# Patient Record
Sex: MALE | Race: BLACK OR AFRICAN AMERICAN | NOT HISPANIC OR LATINO | Employment: OTHER | URBAN - METROPOLITAN AREA
[De-identification: names, ages, dates, MRNs, and addresses within clinical notes are randomized per-mention and may not be internally consistent; named-entity substitution may affect disease eponyms.]

---

## 2017-04-10 PROBLEM — M54.42 LEFT-SIDED LOW BACK PAIN WITH SCIATICA: Status: ACTIVE | Noted: 2017-04-10

## 2017-08-14 PROBLEM — D64.9 ANEMIA: Status: ACTIVE | Noted: 2017-08-14

## 2017-12-18 PROBLEM — J30.1 ALLERGIC RHINITIS DUE TO POLLEN: Status: ACTIVE | Noted: 2017-12-18

## 2018-03-14 PROBLEM — Z09 HOSPITAL DISCHARGE FOLLOW-UP: Status: ACTIVE | Noted: 2018-03-14

## 2018-03-14 PROBLEM — M54.50 LEFT-SIDED LOW BACK PAIN WITHOUT SCIATICA: Status: ACTIVE | Noted: 2017-04-10

## 2018-06-18 PROBLEM — Z09 HOSPITAL DISCHARGE FOLLOW-UP: Status: RESOLVED | Noted: 2018-03-14 | Resolved: 2018-06-18

## 2018-10-03 PROBLEM — M54.2 NECK PAIN, ACUTE: Status: ACTIVE | Noted: 2018-10-03

## 2018-10-03 PROBLEM — E78.5 HYPERLIPIDEMIA: Status: ACTIVE | Noted: 2018-10-03

## 2022-08-09 ENCOUNTER — HOSPITAL ENCOUNTER (INPATIENT)
Facility: OTHER | Age: 77
LOS: 2 days | Discharge: HOME | DRG: 291 | End: 2022-08-11
Attending: EMERGENCY MEDICINE | Admitting: EMERGENCY MEDICINE
Payer: MEDICARE

## 2022-08-09 DIAGNOSIS — E87.70 VOLUME OVERLOAD: ICD-10-CM

## 2022-08-09 DIAGNOSIS — I50.9 HEART FAILURE: ICD-10-CM

## 2022-08-09 DIAGNOSIS — N17.9 AKI (ACUTE KIDNEY INJURY): ICD-10-CM

## 2022-08-09 DIAGNOSIS — R07.9 CHEST PAIN: ICD-10-CM

## 2022-08-09 DIAGNOSIS — I50.43 ACUTE ON CHRONIC COMBINED SYSTOLIC AND DIASTOLIC CONGESTIVE HEART FAILURE: Primary | ICD-10-CM

## 2022-08-09 DIAGNOSIS — I21.4 NSTEMI (NON-ST ELEVATED MYOCARDIAL INFARCTION): ICD-10-CM

## 2022-08-09 PROBLEM — F32.A DEPRESSION: Status: ACTIVE | Noted: 2022-08-09

## 2022-08-09 PROBLEM — Z85.46 HISTORY OF MALIGNANT NEOPLASM OF PROSTATE: Status: ACTIVE | Noted: 2022-08-09

## 2022-08-09 PROBLEM — I70.219 ATHEROSCLEROSIS OF NATIVE ARTERIES OF EXTREMITY WITH INTERMITTENT CLAUDICATION: Status: ACTIVE | Noted: 2022-08-09

## 2022-08-09 PROBLEM — R79.1 SUPRATHERAPEUTIC INR: Status: ACTIVE | Noted: 2022-08-09

## 2022-08-09 PROBLEM — N18.30 ACUTE RENAL FAILURE SUPERIMPOSED ON STAGE 3 CHRONIC KIDNEY DISEASE: Status: ACTIVE | Noted: 2022-08-09

## 2022-08-09 PROBLEM — F13.20 ANXIOLYTIC DEPENDENCE: Status: ACTIVE | Noted: 2022-08-09

## 2022-08-09 PROBLEM — I25.10 CAD (CORONARY ARTERY DISEASE): Status: ACTIVE | Noted: 2022-08-09

## 2022-08-09 PROBLEM — D50.9 IRON DEFICIENCY ANEMIA: Status: ACTIVE | Noted: 2022-08-09

## 2022-08-09 PROBLEM — Z86.73 HISTORY OF CVA (CEREBROVASCULAR ACCIDENT): Status: ACTIVE | Noted: 2022-08-09

## 2022-08-09 PROBLEM — I25.2 HISTORY OF HEART ATTACK: Status: ACTIVE | Noted: 2022-08-09

## 2022-08-09 PROBLEM — K21.9 GASTROESOPHAGEAL REFLUX DISEASE: Status: ACTIVE | Noted: 2022-08-09

## 2022-08-09 PROBLEM — D68.59 HYPERCOAGULABLE STATE: Status: ACTIVE | Noted: 2022-08-09

## 2022-08-09 LAB
ALBUMIN SERPL BCP-MCNC: 3.1 G/DL (ref 3.5–5.2)
ALBUMIN SERPL BCP-MCNC: 3.3 G/DL (ref 3.5–5.2)
ALP SERPL-CCNC: 74 U/L (ref 55–135)
ALP SERPL-CCNC: 75 U/L (ref 55–135)
ALT SERPL W/O P-5'-P-CCNC: 21 U/L (ref 10–44)
ALT SERPL W/O P-5'-P-CCNC: 22 U/L (ref 10–44)
ANION GAP SERPL CALC-SCNC: 11 MMOL/L (ref 8–16)
ANION GAP SERPL CALC-SCNC: 13 MMOL/L (ref 8–16)
ANISOCYTOSIS BLD QL SMEAR: SLIGHT
ANISOCYTOSIS BLD QL SMEAR: SLIGHT
AST SERPL-CCNC: 23 U/L (ref 10–40)
AST SERPL-CCNC: 25 U/L (ref 10–40)
AV INDEX (PROSTH): 0.76
AV MEAN GRADIENT: 1 MMHG
AV PEAK GRADIENT: 3 MMHG
AV VALVE AREA: 2.67 CM2
AV VELOCITY RATIO: 0.62
BASOPHILS # BLD AUTO: 0.03 K/UL (ref 0–0.2)
BASOPHILS # BLD AUTO: 0.03 K/UL (ref 0–0.2)
BASOPHILS NFR BLD: 0.4 % (ref 0–1.9)
BASOPHILS NFR BLD: 0.4 % (ref 0–1.9)
BILIRUB SERPL-MCNC: 0.6 MG/DL (ref 0.1–1)
BILIRUB SERPL-MCNC: 0.9 MG/DL (ref 0.1–1)
BNP SERPL-MCNC: 1117 PG/ML (ref 0–99)
BSA FOR ECHO PROCEDURE: 2.1 M2
BUN SERPL-MCNC: 26 MG/DL (ref 8–23)
BUN SERPL-MCNC: 26 MG/DL (ref 8–23)
CALCIUM SERPL-MCNC: 8.6 MG/DL (ref 8.7–10.5)
CALCIUM SERPL-MCNC: 9 MG/DL (ref 8.7–10.5)
CHLORIDE SERPL-SCNC: 109 MMOL/L (ref 95–110)
CHLORIDE SERPL-SCNC: 110 MMOL/L (ref 95–110)
CHOLEST SERPL-MCNC: 123 MG/DL (ref 120–199)
CHOLEST/HDLC SERPL: 3.5 {RATIO} (ref 2–5)
CO2 SERPL-SCNC: 19 MMOL/L (ref 23–29)
CO2 SERPL-SCNC: 20 MMOL/L (ref 23–29)
CREAT SERPL-MCNC: 1.8 MG/DL (ref 0.5–1.4)
CREAT SERPL-MCNC: 1.8 MG/DL (ref 0.5–1.4)
CREAT UR-MCNC: 102.4 MG/DL (ref 23–375)
CREAT UR-MCNC: 102.4 MG/DL (ref 23–375)
CTP QC/QA: YES
CV ECHO LV RWT: 0.39 CM
DIFFERENTIAL METHOD: ABNORMAL
DIFFERENTIAL METHOD: ABNORMAL
DOP CALC AO PEAK VEL: 0.89 M/S
DOP CALC AO VTI: 12.41 CM
DOP CALC LVOT AREA: 3.5 CM2
DOP CALC LVOT DIAMETER: 2.11 CM
DOP CALC LVOT PEAK VEL: 0.55 M/S
DOP CALC LVOT STROKE VOLUME: 33.1 CM3
DOP CALC RVOT PEAK VEL: 0.27 M/S
DOP CALC RVOT VTI: 6.38 CM
DOP CALCLVOT PEAK VEL VTI: 9.47 CM
E WAVE DECELERATION TIME: 102.63 MSEC
E/A RATIO: 2.79
E/E' RATIO: 26.29 M/S
ECHO LV POSTERIOR WALL: 0.99 CM (ref 0.6–1.1)
EJECTION FRACTION: 45 %
EOSINOPHIL # BLD AUTO: 0.1 K/UL (ref 0–0.5)
EOSINOPHIL # BLD AUTO: 0.2 K/UL (ref 0–0.5)
EOSINOPHIL NFR BLD: 1.8 % (ref 0–8)
EOSINOPHIL NFR BLD: 2.4 % (ref 0–8)
ERYTHROCYTE [DISTWIDTH] IN BLOOD BY AUTOMATED COUNT: 16.3 % (ref 11.5–14.5)
ERYTHROCYTE [DISTWIDTH] IN BLOOD BY AUTOMATED COUNT: 16.5 % (ref 11.5–14.5)
EST. GFR  (NO RACE VARIABLE): 38 ML/MIN/1.73 M^2
EST. GFR  (NO RACE VARIABLE): 38 ML/MIN/1.73 M^2
ESTIMATED AVG GLUCOSE: 117 MG/DL (ref 68–131)
FRACTIONAL SHORTENING: 27 % (ref 28–44)
GIANT PLATELETS BLD QL SMEAR: PRESENT
GIANT PLATELETS BLD QL SMEAR: PRESENT
GLUCOSE SERPL-MCNC: 80 MG/DL (ref 70–110)
GLUCOSE SERPL-MCNC: 80 MG/DL (ref 70–110)
HBA1C MFR BLD: 5.7 % (ref 4–5.6)
HCT VFR BLD AUTO: 40.6 % (ref 40–54)
HCT VFR BLD AUTO: 41.9 % (ref 40–54)
HDLC SERPL-MCNC: 35 MG/DL (ref 40–75)
HDLC SERPL: 28.5 % (ref 20–50)
HGB BLD-MCNC: 13.2 G/DL (ref 14–18)
HGB BLD-MCNC: 13.2 G/DL (ref 14–18)
IMM GRANULOCYTES # BLD AUTO: 0.01 K/UL (ref 0–0.04)
IMM GRANULOCYTES # BLD AUTO: 0.04 K/UL (ref 0–0.04)
IMM GRANULOCYTES NFR BLD AUTO: 0.1 % (ref 0–0.5)
IMM GRANULOCYTES NFR BLD AUTO: 0.5 % (ref 0–0.5)
INR PPP: 1.1 (ref 0.8–1.2)
INR PPP: 4.2 (ref 0.8–1.2)
INTERVENTRICULAR SEPTUM: 1.03 CM (ref 0.6–1.1)
IVRT: 91.34 MSEC
LA MAJOR: 5.58 CM
LA MINOR: 5.8 CM
LA WIDTH: 4.39 CM
LDLC SERPL CALC-MCNC: 78.2 MG/DL (ref 63–159)
LEFT ATRIUM SIZE: 4.3 CM
LEFT ATRIUM VOLUME INDEX MOD: 31.6 ML/M2
LEFT ATRIUM VOLUME INDEX: 43.7 ML/M2
LEFT ATRIUM VOLUME MOD: 66 CM3
LEFT ATRIUM VOLUME: 91.26 CM3
LEFT INTERNAL DIMENSION IN SYSTOLE: 3.69 CM (ref 2.1–4)
LEFT VENTRICLE DIASTOLIC VOLUME INDEX: 57.65 ML/M2
LEFT VENTRICLE DIASTOLIC VOLUME: 120.48 ML
LEFT VENTRICLE MASS INDEX: 89 G/M2
LEFT VENTRICLE SYSTOLIC VOLUME INDEX: 27.6 ML/M2
LEFT VENTRICLE SYSTOLIC VOLUME: 57.65 ML
LEFT VENTRICULAR INTERNAL DIMENSION IN DIASTOLE: 5.04 CM (ref 3.5–6)
LEFT VENTRICULAR MASS: 186.86 G
LV LATERAL E/E' RATIO: 23 M/S
LV SEPTAL E/E' RATIO: 30.67 M/S
LYMPHOCYTES # BLD AUTO: 1.7 K/UL (ref 1–4.8)
LYMPHOCYTES # BLD AUTO: 1.9 K/UL (ref 1–4.8)
LYMPHOCYTES NFR BLD: 23.7 % (ref 18–48)
LYMPHOCYTES NFR BLD: 24.2 % (ref 18–48)
MAGNESIUM SERPL-MCNC: 1.4 MG/DL (ref 1.6–2.6)
MCH RBC QN AUTO: 29.1 PG (ref 27–31)
MCH RBC QN AUTO: 29.4 PG (ref 27–31)
MCHC RBC AUTO-ENTMCNC: 31.5 G/DL (ref 32–36)
MCHC RBC AUTO-ENTMCNC: 32.5 G/DL (ref 32–36)
MCV RBC AUTO: 90 FL (ref 82–98)
MCV RBC AUTO: 92 FL (ref 82–98)
MONOCYTES # BLD AUTO: 1 K/UL (ref 0.3–1)
MONOCYTES # BLD AUTO: 1.1 K/UL (ref 0.3–1)
MONOCYTES NFR BLD: 13.4 % (ref 4–15)
MONOCYTES NFR BLD: 13.7 % (ref 4–15)
MV A" WAVE DURATION": 9.51 MSEC
MV PEAK A VEL: 0.33 M/S
MV PEAK E VEL: 0.92 M/S
MV STENOSIS PRESSURE HALF TIME: 29.76 MS
MV VALVE AREA P 1/2 METHOD: 7.39 CM2
NEUTROPHILS # BLD AUTO: 4.3 K/UL (ref 1.8–7.7)
NEUTROPHILS # BLD AUTO: 4.6 K/UL (ref 1.8–7.7)
NEUTROPHILS NFR BLD: 59.1 % (ref 38–73)
NEUTROPHILS NFR BLD: 60.3 % (ref 38–73)
NONHDLC SERPL-MCNC: 88 MG/DL
NRBC BLD-RTO: 0 /100 WBC
NRBC BLD-RTO: 0 /100 WBC
OSMOLALITY UR: 568 MOSM/KG (ref 50–1200)
OVALOCYTES BLD QL SMEAR: ABNORMAL
OVALOCYTES BLD QL SMEAR: ABNORMAL
PISA MRMAX VEL: 0.04 M/S
PISA TR MAX VEL: 3.74 M/S
PLATELET # BLD AUTO: 114 K/UL (ref 150–450)
PLATELET # BLD AUTO: 119 K/UL (ref 150–450)
PLATELET BLD QL SMEAR: ABNORMAL
PLATELET BLD QL SMEAR: ABNORMAL
PMV BLD AUTO: ABNORMAL FL (ref 9.2–12.9)
PMV BLD AUTO: ABNORMAL FL (ref 9.2–12.9)
POIKILOCYTOSIS BLD QL SMEAR: SLIGHT
POIKILOCYTOSIS BLD QL SMEAR: SLIGHT
POTASSIUM SERPL-SCNC: 4.5 MMOL/L (ref 3.5–5.1)
POTASSIUM SERPL-SCNC: 4.6 MMOL/L (ref 3.5–5.1)
PROT SERPL-MCNC: 6.8 G/DL (ref 6–8.4)
PROT SERPL-MCNC: 6.9 G/DL (ref 6–8.4)
PROT UR-MCNC: 19 MG/DL (ref 0–15)
PROT/CREAT UR: 0.19 MG/G{CREAT} (ref 0–0.2)
PROTHROMBIN TIME: 12.1 SEC (ref 9–12.5)
PROTHROMBIN TIME: 42.9 SEC (ref 9–12.5)
PULM VEIN S/D RATIO: 0.47
PV MEAN GRADIENT: 0.25 MMHG
PV PEAK D VEL: 0.43 M/S
PV PEAK S VEL: 0.2 M/S
PV PEAK VELOCITY: 0.53 CM/S
RA MAJOR: 4.71 CM
RA WIDTH: 5.03 CM
RBC # BLD AUTO: 4.49 M/UL (ref 4.6–6.2)
RBC # BLD AUTO: 4.54 M/UL (ref 4.6–6.2)
RIGHT VENTRICULAR END-DIASTOLIC DIMENSION: 3.26 CM
SARS-COV-2 RDRP RESP QL NAA+PROBE: NEGATIVE
SINUS: 3.05 CM
SODIUM SERPL-SCNC: 141 MMOL/L (ref 136–145)
SODIUM SERPL-SCNC: 141 MMOL/L (ref 136–145)
SODIUM UR-SCNC: 140 MMOL/L (ref 20–250)
STJ: 1.94 CM
TDI LATERAL: 0.04 M/S
TDI SEPTAL: 0.03 M/S
TDI: 0.04 M/S
TR MAX PG: 56 MMHG
TRICUSPID ANNULAR PLANE SYSTOLIC EXCURSION: 0.86 CM
TRIGL SERPL-MCNC: 49 MG/DL (ref 30–150)
TROPONIN I SERPL DL<=0.01 NG/ML-MCNC: 0.13 NG/ML (ref 0–0.03)
TROPONIN I SERPL DL<=0.01 NG/ML-MCNC: 0.14 NG/ML (ref 0–0.03)
TROPONIN I SERPL DL<=0.01 NG/ML-MCNC: 0.15 NG/ML (ref 0–0.03)
WBC # BLD AUTO: 7.06 K/UL (ref 3.9–12.7)
WBC # BLD AUTO: 7.82 K/UL (ref 3.9–12.7)

## 2022-08-09 PROCEDURE — 25000003 PHARM REV CODE 250: Performed by: EMERGENCY MEDICINE

## 2022-08-09 PROCEDURE — 84484 ASSAY OF TROPONIN QUANT: CPT | Performed by: EMERGENCY MEDICINE

## 2022-08-09 PROCEDURE — 80053 COMPREHEN METABOLIC PANEL: CPT | Mod: 91 | Performed by: PHYSICIAN ASSISTANT

## 2022-08-09 PROCEDURE — 85610 PROTHROMBIN TIME: CPT | Mod: 91 | Performed by: PHYSICIAN ASSISTANT

## 2022-08-09 PROCEDURE — 80053 COMPREHEN METABOLIC PANEL: CPT | Performed by: EMERGENCY MEDICINE

## 2022-08-09 PROCEDURE — 93010 EKG 12-LEAD: ICD-10-PCS | Mod: 76,,, | Performed by: INTERNAL MEDICINE

## 2022-08-09 PROCEDURE — 99222 1ST HOSP IP/OBS MODERATE 55: CPT | Mod: 25,,, | Performed by: INTERNAL MEDICINE

## 2022-08-09 PROCEDURE — 85610 PROTHROMBIN TIME: CPT | Performed by: EMERGENCY MEDICINE

## 2022-08-09 PROCEDURE — 36415 COLL VENOUS BLD VENIPUNCTURE: CPT | Performed by: PHYSICIAN ASSISTANT

## 2022-08-09 PROCEDURE — 96375 TX/PRO/DX INJ NEW DRUG ADDON: CPT

## 2022-08-09 PROCEDURE — 83880 ASSAY OF NATRIURETIC PEPTIDE: CPT | Performed by: EMERGENCY MEDICINE

## 2022-08-09 PROCEDURE — 84484 ASSAY OF TROPONIN QUANT: CPT | Mod: 91 | Performed by: PHYSICIAN ASSISTANT

## 2022-08-09 PROCEDURE — 83935 ASSAY OF URINE OSMOLALITY: CPT | Performed by: PHYSICIAN ASSISTANT

## 2022-08-09 PROCEDURE — 96365 THER/PROPH/DIAG IV INF INIT: CPT

## 2022-08-09 PROCEDURE — 85025 COMPLETE CBC W/AUTO DIFF WBC: CPT | Mod: 91 | Performed by: HOSPITALIST

## 2022-08-09 PROCEDURE — 83735 ASSAY OF MAGNESIUM: CPT | Performed by: PHYSICIAN ASSISTANT

## 2022-08-09 PROCEDURE — 93005 ELECTROCARDIOGRAM TRACING: CPT

## 2022-08-09 PROCEDURE — 80061 LIPID PANEL: CPT | Performed by: PHYSICIAN ASSISTANT

## 2022-08-09 PROCEDURE — 96366 THER/PROPH/DIAG IV INF ADDON: CPT

## 2022-08-09 PROCEDURE — 99222 PR INITIAL HOSPITAL CARE,LEVL II: ICD-10-PCS | Mod: 25,,, | Performed by: INTERNAL MEDICINE

## 2022-08-09 PROCEDURE — U0002 COVID-19 LAB TEST NON-CDC: HCPCS | Performed by: EMERGENCY MEDICINE

## 2022-08-09 PROCEDURE — 25000003 PHARM REV CODE 250: Performed by: PHYSICIAN ASSISTANT

## 2022-08-09 PROCEDURE — 82570 ASSAY OF URINE CREATININE: CPT | Performed by: PHYSICIAN ASSISTANT

## 2022-08-09 PROCEDURE — 99223 PR INITIAL HOSPITAL CARE,LEVL III: ICD-10-PCS | Mod: ,,, | Performed by: PHYSICIAN ASSISTANT

## 2022-08-09 PROCEDURE — 21400001 HC TELEMETRY ROOM

## 2022-08-09 PROCEDURE — 63600175 PHARM REV CODE 636 W HCPCS: Performed by: PHYSICIAN ASSISTANT

## 2022-08-09 PROCEDURE — 83036 HEMOGLOBIN GLYCOSYLATED A1C: CPT | Performed by: PHYSICIAN ASSISTANT

## 2022-08-09 PROCEDURE — 99285 EMERGENCY DEPT VISIT HI MDM: CPT | Mod: 25

## 2022-08-09 PROCEDURE — 85025 COMPLETE CBC W/AUTO DIFF WBC: CPT | Performed by: EMERGENCY MEDICINE

## 2022-08-09 PROCEDURE — 84300 ASSAY OF URINE SODIUM: CPT | Performed by: PHYSICIAN ASSISTANT

## 2022-08-09 PROCEDURE — 93010 ELECTROCARDIOGRAM REPORT: CPT | Mod: ,,, | Performed by: INTERNAL MEDICINE

## 2022-08-09 PROCEDURE — 99223 1ST HOSP IP/OBS HIGH 75: CPT | Mod: ,,, | Performed by: PHYSICIAN ASSISTANT

## 2022-08-09 PROCEDURE — 25000242 PHARM REV CODE 250 ALT 637 W/ HCPCS: Performed by: EMERGENCY MEDICINE

## 2022-08-09 RX ORDER — NITROGLYCERIN 0.4 MG/1
0.4 TABLET SUBLINGUAL EVERY 5 MIN PRN
Status: DISCONTINUED | OUTPATIENT
Start: 2022-08-09 | End: 2022-08-11 | Stop reason: HOSPADM

## 2022-08-09 RX ORDER — ACETAMINOPHEN 325 MG/1
650 TABLET ORAL EVERY 6 HOURS PRN
Status: DISCONTINUED | OUTPATIENT
Start: 2022-08-09 | End: 2022-08-11 | Stop reason: HOSPADM

## 2022-08-09 RX ORDER — SODIUM CHLORIDE 0.9 % (FLUSH) 0.9 %
10 SYRINGE (ML) INJECTION EVERY 8 HOURS
Status: DISCONTINUED | OUTPATIENT
Start: 2022-08-09 | End: 2022-08-11 | Stop reason: HOSPADM

## 2022-08-09 RX ORDER — NITROGLYCERIN 0.4 MG/1
0.4 TABLET SUBLINGUAL
Status: COMPLETED | OUTPATIENT
Start: 2022-08-09 | End: 2022-08-09

## 2022-08-09 RX ORDER — NALOXONE HCL 0.4 MG/ML
0.02 VIAL (ML) INJECTION
Status: DISCONTINUED | OUTPATIENT
Start: 2022-08-09 | End: 2022-08-11 | Stop reason: HOSPADM

## 2022-08-09 RX ORDER — ATORVASTATIN CALCIUM 20 MG/1
40 TABLET, FILM COATED ORAL DAILY
Status: DISCONTINUED | OUTPATIENT
Start: 2022-08-09 | End: 2022-08-11 | Stop reason: HOSPADM

## 2022-08-09 RX ORDER — AMOXICILLIN 250 MG
1 CAPSULE ORAL 2 TIMES DAILY PRN
Status: DISCONTINUED | OUTPATIENT
Start: 2022-08-09 | End: 2022-08-11 | Stop reason: HOSPADM

## 2022-08-09 RX ORDER — ASPIRIN 81 MG/1
81 TABLET ORAL DAILY
Status: DISCONTINUED | OUTPATIENT
Start: 2022-08-09 | End: 2022-08-11 | Stop reason: HOSPADM

## 2022-08-09 RX ORDER — TALC
6 POWDER (GRAM) TOPICAL NIGHTLY PRN
Status: DISCONTINUED | OUTPATIENT
Start: 2022-08-09 | End: 2022-08-11 | Stop reason: HOSPADM

## 2022-08-09 RX ORDER — MAGNESIUM SULFATE HEPTAHYDRATE 40 MG/ML
2 INJECTION, SOLUTION INTRAVENOUS ONCE
Status: COMPLETED | OUTPATIENT
Start: 2022-08-09 | End: 2022-08-09

## 2022-08-09 RX ORDER — FUROSEMIDE 10 MG/ML
20 INJECTION INTRAMUSCULAR; INTRAVENOUS DAILY
Status: DISCONTINUED | OUTPATIENT
Start: 2022-08-09 | End: 2022-08-10

## 2022-08-09 RX ORDER — NAPROXEN SODIUM 220 MG/1
324 TABLET, FILM COATED ORAL
Status: COMPLETED | OUTPATIENT
Start: 2022-08-09 | End: 2022-08-09

## 2022-08-09 RX ADMIN — MAGNESIUM SULFATE HEPTAHYDRATE 2 G: 40 INJECTION, SOLUTION INTRAVENOUS at 01:08

## 2022-08-09 RX ADMIN — NITROGLYCERIN 0.4 MG: 0.4 TABLET, ORALLY DISINTEGRATING SUBLINGUAL at 02:08

## 2022-08-09 RX ADMIN — ASPIRIN 81 MG: 81 TABLET, COATED ORAL at 08:08

## 2022-08-09 RX ADMIN — FUROSEMIDE 20 MG: 10 INJECTION, SOLUTION INTRAMUSCULAR; INTRAVENOUS at 08:08

## 2022-08-09 RX ADMIN — ASPIRIN 81 MG CHEWABLE TABLET 324 MG: 81 TABLET CHEWABLE at 02:08

## 2022-08-09 RX ADMIN — ATORVASTATIN CALCIUM 40 MG: 20 TABLET, FILM COATED ORAL at 08:08

## 2022-08-09 NOTE — ASSESSMENT & PLAN NOTE
- s/p stenting per history  - continue clopidogrel 75 mg QD, simvastatin 40 mg QD (or formulary equivalent)   - no ASA on home meds list, suspect 2/2 h/o thrombocytopenia

## 2022-08-09 NOTE — ASSESSMENT & PLAN NOTE
- baseline Cr appears to be about 1.3   - today Cr is 1.8   - suspect 2/2 volume overload  - holding lisinopril    - urine lytes ordered   - avoid nephrotoxins   - renally dose meds

## 2022-08-09 NOTE — ASSESSMENT & PLAN NOTE
- normotensive at present   - home meds: HCTZ 25 mg QD, lisinopril 20 mg QD, metoprolol tartrate 25 mg BID   - monitor

## 2022-08-09 NOTE — HPI
Mr. Felicia Lopez is a 77 y.o. male, with PMH of CAD (s/p stenting), CHF, prior MI/CVA, HTN, CKD-3, anemnia, HLD, who presented to [facility] ED on 8/9/22 due to chest pressure that awoke him from sleep tonight. He states the pain radiated to his left arm, and was associated with shortness of breath. He notes the pain started 30 mins PTA to the ED. He states the pain improved after 1 spray of Nitroglycerine. He states symptoms are similar to his prior MI, which occurred 12 years ago. He denied nausea, dizziness. He was evaluated in the ED with labs showing thrombocytopenia with PLT of 119K. INR was elevated at 4.2, he does not have coumadin listed as a home med. A metabolic panel showed Cr 1.8, BUN 26,  without hyperkalemia. BNP was elevated at 1117. A troponin was 0.150. CXR showed b/l infiltrates and left sided effusion concerning for pulmonary edema. An EKG showed ST depression and TWI in V4. After his pain returned in the ED, a repeat EKG showed both of these findings had normalized. He was placed on OBS.

## 2022-08-09 NOTE — ASSESSMENT & PLAN NOTE
- notes b/l LE edema, which on exam is only trace edema   - no lasix on home meds list   - today BNP is elevated at 1117  - lasix 20 mg ordered    - last Echo:   ECG Rhythm: Resting bradycardia (Heart rate < 60 bpm).   CHAMBER SIZE: Normal size chambers.   AORTA: Normal aortic root and proximal ascending aorta.   VALVULAR STRUCTURES: The visualized cardiac valves are structurally normal.   LEFT/RIGHT VENTRICULAR FUNCTION: Overall left ventricular systolic function is normal, with an  EF of > 55%.   LEFT/RIGHT VENTRICULAR FUNCTION: The right ventricular systolic function is normal.   PERICARDIUM / EFFUSIONS: No effusions noted.   INFERIOR VENA CAVA: Normal size inferior vena cava.   DOPPLER: Color: No valvular insufficiencies.   PA PRESSURE: An insufficent tricuspid regurgitation jet velocity envelope precludes \.br\confident assessment of pulmonary artery pressures.   DIASTOLOGY: The diastolic filling is normal for the age of the patient.   PULMONARY VEINS: The visualized pulmonary veins appear normal.   CARDIOLOGY:   Electronically signed: STAFF: AURELIA TRUJILLO M.D.   Fellow: TERRY FOREMAN M.D.   CONCLUSIONS   -----------   Conclusions: 1. GOOD LV FUNCTION   Conclusions: 2. AS BEFORE on 3/31/10  Specimen Collected: 01/10/12  9:38   - monitor I&Os and daily weights

## 2022-08-09 NOTE — ED PROVIDER NOTES
Encounter Date: 2022    SCRIBE #1 NOTE: I, Sarmad Truong, am scribing for, and in the presence of,  Beronica Lela MD. I have scribed the following portions of the note - Other sections scribed: HPI, ROS, PE.       History     Chief Complaint   Patient presents with    Chest Pain     X30 minutes with associated SOB pt pain relieved with 1 spray of NGT     Time seen by provider: 1:45 AM    This is a 77 y.o. male who presents with complaint of sudden left-sided chest pain. Patient reports that he awoke to this pain 1 hour and 45 minutes ago. He describes a pressure which extends into his left arm. While he does report improvement with nitroglycerin, his pain has returned and is currently rated at 7/10. Associated shortness of breath here. He recalls this presentation as familiar to his prior MI, which occurred 12 years ago. This required coronary stents. No recent illness. He denies any nausea or dizziness. PMHx also includes CHF as well as CVA with chronic right lower extremity deficit.     The history is provided by the patient.     Review of patient's allergies indicates:  No Known Allergies  Past Medical History:   Diagnosis Date    Disorder of kidney and ureter     Hyperlipidemia     Hypertension      Past Surgical History:   Procedure Laterality Date    FACIAL RECONSTRUCTION SURGERY      PROSTATE SURGERY       Family History   Problem Relation Age of Onset    No Known Problems Mother     No Known Problems Father      Social History     Tobacco Use    Smoking status: Former Smoker     Quit date: 10/5/2008     Years since quittin.8   Substance Use Topics    Alcohol use: No     Alcohol/week: 0.0 standard drinks    Drug use: No     Review of Systems   Constitutional: Negative for fever.   HENT: Negative for sore throat.    Respiratory: Positive for shortness of breath.    Cardiovascular: Positive for chest pain.   Gastrointestinal: Negative for nausea.   Genitourinary: Negative for dysuria.    Musculoskeletal: Negative for back pain.   Skin: Negative for rash.   Neurological: Negative for weakness.   Hematological: Does not bruise/bleed easily.   All other systems reviewed and are negative.      Physical Exam     Initial Vitals [08/09/22 0110]   BP Pulse Resp Temp SpO2   125/78 88 18 98.6 °F (37 °C) 96 %      MAP       --         Physical Exam    Nursing note and vitals reviewed.  Constitutional: He appears well-developed and well-nourished. He does not have a sickly appearance. No distress.   HENT:   Head: Normocephalic and atraumatic.   Right Ear: External ear normal.   Left Ear: External ear normal.   Eyes: Conjunctivae, EOM and lids are normal. Right eye exhibits no discharge. Left eye exhibits no discharge. Right conjunctiva is not injected. Right conjunctiva has no hemorrhage. Left conjunctiva is not injected. Left conjunctiva has no hemorrhage. No scleral icterus.   Neck: Phonation normal. No stridor present. No tracheal deviation present.   Normal range of motion.  Cardiovascular: Normal rate, regular rhythm and normal heart sounds. Exam reveals no friction rub.    No murmur heard.  Pulses:       Radial pulses are 2+ on the right side and 2+ on the left side.        Dorsalis pedis pulses are 2+ on the right side and 2+ on the left side.   Pulmonary/Chest: Breath sounds normal. No respiratory distress. He has no wheezes. He has no rhonchi. He has no rales. He exhibits no tenderness.   Abdominal: Abdomen is soft. He exhibits no distension. There is no abdominal tenderness. There is no rebound and no guarding.   Musculoskeletal:         General: No tenderness.      Cervical back: Normal range of motion.      Comments: No calf tenderness.     Neurological: He is alert and oriented to person, place, and time. He has normal strength. GCS eye subscore is 4. GCS verbal subscore is 5. GCS motor subscore is 6.   Skin: Skin is warm.   Psychiatric: He has a normal mood and affect. His speech is normal and  behavior is normal. Judgment and thought content normal. Cognition and memory are normal.         ED Course   Procedures  Labs Reviewed   CBC W/ AUTO DIFFERENTIAL - Abnormal; Notable for the following components:       Result Value    RBC 4.49 (*)     Hemoglobin 13.2 (*)     RDW 16.3 (*)     Platelets 119 (*)     Mono # 1.1 (*)     Platelet Estimate Decreased (*)     All other components within normal limits   COMPREHENSIVE METABOLIC PANEL - Abnormal; Notable for the following components:    CO2 19 (*)     BUN 26 (*)     Creatinine 1.8 (*)     Calcium 8.6 (*)     Albumin 3.1 (*)     eGFR 38 (*)     All other components within normal limits   B-TYPE NATRIURETIC PEPTIDE - Abnormal; Notable for the following components:    BNP 1,117 (*)     All other components within normal limits   TROPONIN I - Abnormal; Notable for the following components:    Troponin I 0.150 (*)     All other components within normal limits   PROTIME-INR - Abnormal; Notable for the following components:    Prothrombin Time 42.9 (*)     INR 4.2 (*)     All other components within normal limits   SARS-COV-2 RDRP GENE     EKG Readings: (Independently Interpreted)   Normal sinus rhythm. Rate of 93. T wave inversion in V4 with ST depression. Q waves in V1 and V2. Normal intervals. No old EKG for comparison.       Imaging Results          X-Ray Chest AP Portable (Final result)  Result time 08/09/22 01:40:07   Procedure changed from X-Ray Chest PA And Lateral     Final result by Charly Guzman MD (08/09/22 01:40:07)                 Impression:      Bilateral infiltrates and left effusion.  Differential considerations include pulmonary edema versus pneumonia and pneumonitis.      Electronically signed by: Charly Guzman  Date:    08/09/2022  Time:    01:40             Narrative:    EXAMINATION:  XR CHEST AP PORTABLE    CLINICAL HISTORY:  Chest Pain;    TECHNIQUE:  Single frontal view of the chest was  performed.    COMPARISON:  None    FINDINGS:  Cardiac silhouette is not enlarged with the trachea midline.  There are bilateral airspace opacities with elevation of the left hemidiaphragm and pleural effusion.                                 Medications   aspirin chewable tablet 324 mg (324 mg Oral Given 8/9/22 0208)   nitroGLYCERIN SL tablet 0.4 mg (0.4 mg Sublingual Given 8/9/22 0206)   nitroGLYCERIN SL tablet 0.4 mg (0.4 mg Sublingual Given 8/9/22 0255)     Medical Decision Making:   History:   Old Medical Records: I decided to obtain old medical records.  Initial Assessment:   77-year-old male presents with complaint of left-sided chest pain while at rest that was relieved with nitroglycerin given to him by EMS.  He states the pain feels similar to his previous MI.  shortly after arriving in the emergency department the pain intensified and radiated down his left arm.    Independently Interpreted Test(s):   I have ordered and independently interpreted EKG Reading(s) - see prior notes  Clinical Tests:   Lab Tests: Ordered and Reviewed  Radiological Study: Ordered and Reviewed  Medical Tests: Ordered and Reviewed  ED Management:  Initial EKG with T-wave inversion and ST depression in the for only.  Repeat EKG after the pain intensified showed normalization of the ST depression and T-wave inversion.  No other new changes compared to initial.  Unfortunately, there is no old EKG prior to today for comparison.    Will give aspirin and nitroglycerin as well as obtain lab work.  Heart score is greater than 6. Will plan to admit to the hospitalist service for further evaluation once workup complete.    2:58 AM  Pain improved to a 2/10 after 1 sublingual nitroglycerin and he refused any further doses.  Troponin is elevated at 0.150 with no old available for comparison.  Lab work also significant for an upward trend in his creatinine to 1.8.      He is currently resting comfortably but does report his pain is a 5/10.  Give  additional nitroglycerin now.  Case has been discussed with Mis Edge PA-C and the patient will be admitted to the hospitalist service.    Additional MDM:   Heart Score:    History:          Highly suspicious.  ECG:             Nonspecific repolarisation disturbance  Age:               >65 years  Risk factors: >= 3 risk factors or history of atherosclerotic disease  Troponin:       1-2x normal limit  Final Score: 8           Scribe Attestation:   Scribe #1: I performed the above scribed service and the documentation accurately describes the services I performed. I attest to the accuracy of the note.               Physician Attestation for Scribe: I, Beronica Leal  , reviewed documentation as scribed in my presence, which is both accurate and complete.    Clinical Impression:   Final diagnoses:  [R07.9] Chest pain  [I21.4] NSTEMI (non-ST elevated myocardial infarction) (Primary)  [N17.9] ARELI (acute kidney injury)          ED Disposition Condition    Observation               Beronica Leal MD  08/09/22 5850

## 2022-08-09 NOTE — H&P
[facility] Medicine  History & Physical    Patient Name: Felicia Lopez  MRN: [id number]  Patient Class: OP- Observation  Admission Date: 8/9/2022  Attending Physician: Miriam Field MD   Primary Care Provider: Mickey Darden MD         Patient information was obtained from patient, past medical records and ER records.     Subjective:     Principal Problem:Chest pain    Chief Complaint:   Chief Complaint   Patient presents with    Chest Pain     X30 minutes with associated SOB pt pain relieved with 1 spray of NGT        HPI: Mr. Felicia Lopez is a 77 y.o. male, with PMH of CAD (s/p stenting), CHF, prior MI/CVA, HTN, CKD-3, anemnia, HLD, who presented to [facility] [place] on 8/9/22 due to chest pressure that awoke him from sleep tonight. He states the pain radiated to his left arm, and was associated with shortness of breath. He notes the pain started 30 mins PTA to the ED. He states the pain improved after 1 spray of Nitroglycerine. He states symptoms are similar to his prior MI, which occurred 12 years ago. He denied nausea, dizziness. He was evaluated in the ED with labs showing thrombocytopenia with PLT of 119K. INR was elevated at 4.2, he does not have coumadin listed as a home med. A metabolic panel showed Cr 1.8, BUN 26,  without hyperkalemia. BNP was elevated at 1117. A troponin was 0.150. CXR showed b/l infiltrates and left sided effusion concerning for pulmonary edema. An EKG showed ST depression and TWI in V4. After his pain returned in the ED, a repeat EKG showed both of these findings had normalized. He was placed on OBS.       Past Medical History:   Diagnosis Date    Disorder of kidney and ureter     Hyperlipidemia     Hypertension        Past Surgical History:   Procedure Laterality Date    FACIAL RECONSTRUCTION SURGERY      PROSTATE SURGERY         Review of patient's allergies indicates:  No Known Allergies    No current facility-administered medications on file prior to  encounter.     Current Outpatient Medications on File Prior to Encounter   Medication Sig    clopidogrel (PLAVIX) 75 mg tablet Take 1 tablet (75 mg total) by mouth once daily.    hydroCHLOROthiazide (MICROZIDE) 12.5 mg capsule Take 1 capsule (12.5 mg total) by mouth once daily. (Patient taking differently: Take 25 mg by mouth once daily.)    lisinopril (PRINIVIL,ZESTRIL) 20 MG tablet Take 1 tablet (20 mg total) by mouth 2 (two) times daily.    simvastatin (ZOCOR) 40 MG tablet Take 1 tablet (40 mg total) by mouth once daily.    azithromycin (Z-CALLUM) 250 MG tablet Take 2 tablets by mouth on day 1; Take 1 tablet by mouth on days 2-5    baclofen (LIORESAL) 10 MG tablet Take 1 tablet (10 mg total) by mouth 3 (three) times daily.    brimonidine 0.2% (ALPHAGAN) 0.2 % Drop Place 1 drop into both eyes once daily.    diazePAM (VALIUM) 10 MG Tab Take 1 tablet (10 mg total) by mouth daily as needed.    diclofenac sodium (VOLTAREN) 1 % Gel Apply 4 g p.r.n. to left knee for pain.    latanoprost 0.005 % ophthalmic solution Place 1 drop into both eyes 2 (two) times daily.    levocetirizine (XYZAL) 5 MG tablet Take 1 tablet (5 mg total) by mouth every evening.    metoprolol tartrate (LOPRESSOR) 25 MG tablet TAKE 1/2 TABLET BY MOUTH ONCE DAILY     Family History       Problem Relation (Age of Onset)    No Known Problems Mother, Father          Tobacco Use    Smoking status: Former Smoker     Quit date: 10/5/2008     Years since quittin.8    Smokeless tobacco: Not on file   Substance and Sexual Activity    Alcohol use: No     Alcohol/week: 0.0 standard drinks    Drug use: No    Sexual activity: Never     Review of Systems   Reason unable to perform ROS: Poor patient participation during exam.   Constitutional:  Negative for activity change, appetite change, chills and fever.   Respiratory:  Positive for shortness of breath. Negative for cough and wheezing.    Cardiovascular:  Positive for chest pain (with  radiation to left arm). Negative for palpitations.   Gastrointestinal:  Negative for abdominal pain, constipation, diarrhea, nausea and vomiting.   Genitourinary:  Negative for decreased urine volume, difficulty urinating, dysuria, frequency, hematuria and urgency.   Musculoskeletal:  Negative for back pain, myalgias, neck pain and neck stiffness.   Skin:  Negative for color change and rash.   Neurological:  Negative for dizziness, syncope, weakness, light-headedness and headaches.   Hematological:  Does not bruise/bleed easily.   Psychiatric/Behavioral:  Negative for agitation and confusion.    Objective:     Vital Signs (Most Recent):  Temp: 98.6 °F (37 °C) (08/09/22 0110)  Pulse: 86 (08/09/22 0443)  Resp: 18 (08/09/22 0443)  BP: 131/83 (08/09/22 0443)  SpO2: 95 % (08/09/22 0443) Vital Signs (24h Range):  Temp:  [98.6 °F (37 °C)] 98.6 °F (37 °C)  Pulse:  [83-88] 86  Resp:  [18-22] 18  SpO2:  [95 %-97 %] 95 %  BP: (113-131)/(71-85) 131/83        There is no height or weight on file to calculate BMI.    Physical Exam  Vitals and nursing note reviewed.   Constitutional:       General: He is not in acute distress.     Appearance: He is well-developed and normal weight. He is not ill-appearing, toxic-appearing or diaphoretic.   HENT:      Head: Normocephalic and atraumatic.      Right Ear: External ear normal.      Left Ear: External ear normal.   Eyes:      General: No scleral icterus.        Right eye: No discharge.         Left eye: No discharge.      Conjunctiva/sclera: Conjunctivae normal.   Neck:      Vascular: No JVD.      Trachea: No tracheal deviation.   Cardiovascular:      Rate and Rhythm: Normal rate and regular rhythm.      Heart sounds: Normal heart sounds. No murmur heard.    No gallop.   Pulmonary:      Effort: Pulmonary effort is normal. No respiratory distress.      Breath sounds: Normal breath sounds. No stridor. No wheezing or rales.   Abdominal:      General: Bowel sounds are normal. There is no  distension.      Palpations: Abdomen is soft. There is no mass.      Tenderness: There is no abdominal tenderness. There is no guarding.   Musculoskeletal:         General: No deformity. Normal range of motion.      Cervical back: Normal range of motion and neck supple.   Skin:     General: Skin is warm and dry.   Neurological:      General: No focal deficit present.      Mental Status: He is alert and oriented to person, place, and time.      Cranial Nerves: No cranial nerve deficit.      Motor: No abnormal muscle tone.      Coordination: Coordination normal.   Psychiatric:         Mood and Affect: Mood normal. Affect is angry.         Speech: He is noncommunicative.         Behavior: Behavior normal.         Thought Content: Thought content normal.         Judgment: Judgment normal.           Significant Labs: All pertinent labs within the past 24 hours have been reviewed.  BMP:   Recent Labs   Lab 08/09/22  0122   GLU 80      K 4.5      CO2 19*   BUN 26*   CREATININE 1.8*   CALCIUM 8.6*     CBC:   Recent Labs   Lab 08/09/22 0122   WBC 7.82   HGB 13.2*   HCT 40.6   *     CMP:   Recent Labs   Lab 08/09/22 0122      K 4.5      CO2 19*   GLU 80   BUN 26*   CREATININE 1.8*   CALCIUM 8.6*   PROT 6.8   ALBUMIN 3.1*   BILITOT 0.6   ALKPHOS 74   AST 23   ALT 21   ANIONGAP 13     Urine Culture: No results for input(s): LABURIN in the last 48 hours.  Urine Studies: No results for input(s): COLORU, APPEARANCEUA, PHUR, SPECGRAV, PROTEINUA, GLUCUA, KETONESU, BILIRUBINUA, OCCULTUA, NITRITE, UROBILINOGEN, LEUKOCYTESUR, RBCUA, WBCUA, BACTERIA, SQUAMEPITHEL, HYALINECASTS in the last 48 hours.    Invalid input(s): WRIGHTSUR    Significant Imaging: I have reviewed all pertinent imaging results/findings within the past 24 hours.  Imaging Results              X-Ray Chest AP Portable (Final result)  Result time 08/09/22 01:40:07   Procedure changed from X-Ray Chest PA And Lateral     Final result by  Charly Guzman MD (08/09/22 01:40:07)                   Impression:      Bilateral infiltrates and left effusion.  Differential considerations include pulmonary edema versus pneumonia and pneumonitis.      Electronically signed by: Charly Guzman  Date:    08/09/2022  Time:    01:40               Narrative:    EXAMINATION:  XR CHEST AP PORTABLE    CLINICAL HISTORY:  Chest Pain;    TECHNIQUE:  Single frontal view of the chest was performed.    COMPARISON:  None    FINDINGS:  Cardiac silhouette is not enlarged with the trachea midline.  There are bilateral airspace opacities with elevation of the left hemidiaphragm and pleural effusion.                                       Assessment/Plan:     * Chest pain  - Mr. Felicia Lopez presents with acute onset of left sided chest pain while sleeping   - chest pain was relieved by Nitro administration both with EMS, and in the ED   - a 1/2012 stress echo showed evidence of inducible ischemia   - Troponin was elevated at 0.133, trend   - no anticoagulation ordered 2/2 thrombocytopenia, and elevated INR   - lipid panel and A1C pending     CAD (coronary artery disease)  - s/p stenting per history  - continue clopidogrel 75 mg QD, simvastatin 40 mg QD (or formulary equivalent)   - no ASA on home meds list, suspect 2/2 h/o thrombocytopenia     CHF (congestive heart failure)  - notes b/l LE edema, which on exam is only trace edema   - no lasix on home meds list   - today BNP is elevated at 1117  - lasix 20 mg ordered    - last Echo:   ECG Rhythm: Resting bradycardia (Heart rate < 60 bpm).   CHAMBER SIZE: Normal size chambers.   AORTA: Normal aortic root and proximal ascending aorta.   VALVULAR STRUCTURES: The visualized cardiac valves are structurally normal.   LEFT/RIGHT VENTRICULAR FUNCTION: Overall left ventricular systolic function is normal, with an  EF of > 55%.   LEFT/RIGHT VENTRICULAR FUNCTION: The right ventricular systolic function is normal.   PERICARDIUM /  EFFUSIONS: No effusions noted.   INFERIOR VENA CAVA: Normal size inferior vena cava.   DOPPLER: Color: No valvular insufficiencies.   PA PRESSURE: An insufficent tricuspid regurgitation jet velocity envelope precludes \.br\confident assessment of pulmonary artery pressures.   DIASTOLOGY: The diastolic filling is normal for the age of the patient.   PULMONARY VEINS: The visualized pulmonary veins appear normal.   CARDIOLOGY:   Electronically signed: STAFF: AURELIA TRUJILLO M.D.   Fellow: TERRY FOREMAN M.D.   CONCLUSIONS   -----------   Conclusions: 1. GOOD LV FUNCTION   Conclusions: 2. AS BEFORE on 3/31/10  Specimen Collected: 01/10/12  9:38   - monitor I&Os and daily weights       Acute renal failure superimposed on stage 3 chronic kidney disease  - baseline Cr appears to be about 1.3   - today Cr is 1.8   - suspect 2/2 volume overload  - holding lisinopril    - urine lytes ordered   - avoid nephrotoxins   - renally dose meds     Supratherapeutic INR  - INR in ED was elevated at 4.2   - monitor INR daily   - no warfarin use   - given he also has thrombocytopenia, suspect there may be an element of liver dysfunction  - no active bleeding at present     Thrombocytopenia  - baseline PLT appears to be 130-140K  - today PLT count is 119K   - no active bleeding   - monitor       Essential hypertension  - normotensive at present   - home meds: HCTZ 25 mg QD, lisinopril 20 mg QD, metoprolol tartrate 25 mg BID   - monitor       Iron deficiency anemia  - H&H appear stable   - monitor       Hyperlipidemia  - continue statin   - lipid panel pending       Anemia        VTE Risk Mitigation (From admission, onward)         Ordered     Reason for No Pharmacological VTE Prophylaxis  Once        Question:  Reasons:  Answer:  Thrombocytopenia    08/09/22 0645     IP VTE HIGH RISK PATIENT  Once         08/09/22 0645     Place sequential compression device  Until discontinued         08/09/22 0645                   Mis Edge,  CHULA  Department of [facility] Medicine   [place] - Emergency Dept

## 2022-08-09 NOTE — SUBJECTIVE & OBJECTIVE
Past Medical History:   Diagnosis Date    Disorder of kidney and ureter     Hyperlipidemia     Hypertension        Past Surgical History:   Procedure Laterality Date    FACIAL RECONSTRUCTION SURGERY      PROSTATE SURGERY         Review of patient's allergies indicates:  No Known Allergies    No current facility-administered medications on file prior to encounter.     Current Outpatient Medications on File Prior to Encounter   Medication Sig    clopidogrel (PLAVIX) 75 mg tablet Take 1 tablet (75 mg total) by mouth once daily.    hydroCHLOROthiazide (MICROZIDE) 12.5 mg capsule Take 1 capsule (12.5 mg total) by mouth once daily. (Patient taking differently: Take 25 mg by mouth once daily.)    lisinopril (PRINIVIL,ZESTRIL) 20 MG tablet Take 1 tablet (20 mg total) by mouth 2 (two) times daily.    simvastatin (ZOCOR) 40 MG tablet Take 1 tablet (40 mg total) by mouth once daily.    azithromycin (Z-CALLUM) 250 MG tablet Take 2 tablets by mouth on day 1; Take 1 tablet by mouth on days 2-5    baclofen (LIORESAL) 10 MG tablet Take 1 tablet (10 mg total) by mouth 3 (three) times daily.    brimonidine 0.2% (ALPHAGAN) 0.2 % Drop Place 1 drop into both eyes once daily.    diazePAM (VALIUM) 10 MG Tab Take 1 tablet (10 mg total) by mouth daily as needed.    diclofenac sodium (VOLTAREN) 1 % Gel Apply 4 g p.r.n. to left knee for pain.    latanoprost 0.005 % ophthalmic solution Place 1 drop into both eyes 2 (two) times daily.    levocetirizine (XYZAL) 5 MG tablet Take 1 tablet (5 mg total) by mouth every evening.    metoprolol tartrate (LOPRESSOR) 25 MG tablet TAKE 1/2 TABLET BY MOUTH ONCE DAILY     Family History       Problem Relation (Age of Onset)    No Known Problems Mother, Father          Tobacco Use    Smoking status: Former Smoker     Quit date: 10/5/2008     Years since quittin.8    Smokeless tobacco: Not on file   Substance and Sexual Activity    Alcohol use: No     Alcohol/week: 0.0 standard drinks    Drug use: No     Sexual activity: Never     Review of Systems   Reason unable to perform ROS: Poor patient participation during exam.   Constitutional:  Negative for activity change, appetite change, chills and fever.   Respiratory:  Positive for shortness of breath. Negative for cough and wheezing.    Cardiovascular:  Positive for chest pain (with radiation to left arm). Negative for palpitations.   Gastrointestinal:  Negative for abdominal pain, constipation, diarrhea, nausea and vomiting.   Genitourinary:  Negative for decreased urine volume, difficulty urinating, dysuria, frequency, hematuria and urgency.   Musculoskeletal:  Negative for back pain, myalgias, neck pain and neck stiffness.   Skin:  Negative for color change and rash.   Neurological:  Negative for dizziness, syncope, weakness, light-headedness and headaches.   Hematological:  Does not bruise/bleed easily.   Psychiatric/Behavioral:  Negative for agitation and confusion.    Objective:     Vital Signs (Most Recent):  Temp: 98.6 °F (37 °C) (08/09/22 0110)  Pulse: 86 (08/09/22 0443)  Resp: 18 (08/09/22 0443)  BP: 131/83 (08/09/22 0443)  SpO2: 95 % (08/09/22 0443) Vital Signs (24h Range):  Temp:  [98.6 °F (37 °C)] 98.6 °F (37 °C)  Pulse:  [83-88] 86  Resp:  [18-22] 18  SpO2:  [95 %-97 %] 95 %  BP: (113-131)/(71-85) 131/83        There is no height or weight on file to calculate BMI.    Physical Exam  Vitals and nursing note reviewed.   Constitutional:       General: He is not in acute distress.     Appearance: He is well-developed and normal weight. He is not ill-appearing, toxic-appearing or diaphoretic.   HENT:      Head: Normocephalic and atraumatic.      Right Ear: External ear normal.      Left Ear: External ear normal.   Eyes:      General: No scleral icterus.        Right eye: No discharge.         Left eye: No discharge.      Conjunctiva/sclera: Conjunctivae normal.   Neck:      Vascular: No JVD.      Trachea: No tracheal deviation.   Cardiovascular:      Rate  and Rhythm: Normal rate and regular rhythm.      Heart sounds: Normal heart sounds. No murmur heard.    No gallop.   Pulmonary:      Effort: Pulmonary effort is normal. No respiratory distress.      Breath sounds: Normal breath sounds. No stridor. No wheezing or rales.   Abdominal:      General: Bowel sounds are normal. There is no distension.      Palpations: Abdomen is soft. There is no mass.      Tenderness: There is no abdominal tenderness. There is no guarding.   Musculoskeletal:         General: No deformity. Normal range of motion.      Cervical back: Normal range of motion and neck supple.   Skin:     General: Skin is warm and dry.   Neurological:      General: No focal deficit present.      Mental Status: He is alert and oriented to person, place, and time.      Cranial Nerves: No cranial nerve deficit.      Motor: No abnormal muscle tone.      Coordination: Coordination normal.   Psychiatric:         Mood and Affect: Mood normal. Affect is angry.         Speech: He is noncommunicative.         Behavior: Behavior normal.         Thought Content: Thought content normal.         Judgment: Judgment normal.           Significant Labs: All pertinent labs within the past 24 hours have been reviewed.  BMP:   Recent Labs   Lab 08/09/22  0122   GLU 80      K 4.5      CO2 19*   BUN 26*   CREATININE 1.8*   CALCIUM 8.6*     CBC:   Recent Labs   Lab 08/09/22 0122   WBC 7.82   HGB 13.2*   HCT 40.6   *     CMP:   Recent Labs   Lab 08/09/22  0122      K 4.5      CO2 19*   GLU 80   BUN 26*   CREATININE 1.8*   CALCIUM 8.6*   PROT 6.8   ALBUMIN 3.1*   BILITOT 0.6   ALKPHOS 74   AST 23   ALT 21   ANIONGAP 13     Urine Culture: No results for input(s): LABURIN in the last 48 hours.  Urine Studies: No results for input(s): COLORU, APPEARANCEUA, PHUR, SPECGRAV, PROTEINUA, GLUCUA, KETONESU, BILIRUBINUA, OCCULTUA, NITRITE, UROBILINOGEN, LEUKOCYTESUR, RBCUA, WBCUA, BACTERIA, SQUAMEPITHEL, HYALINECASTS  in the last 48 hours.    Invalid input(s): STELLA    Significant Imaging: I have reviewed all pertinent imaging results/findings within the past 24 hours.  Imaging Results              X-Ray Chest AP Portable (Final result)  Result time 08/09/22 01:40:07   Procedure changed from X-Ray Chest PA And Lateral     Final result by Charly Guzman MD (08/09/22 01:40:07)                   Impression:      Bilateral infiltrates and left effusion.  Differential considerations include pulmonary edema versus pneumonia and pneumonitis.      Electronically signed by: Charly Guzman  Date:    08/09/2022  Time:    01:40               Narrative:    EXAMINATION:  XR CHEST AP PORTABLE    CLINICAL HISTORY:  Chest Pain;    TECHNIQUE:  Single frontal view of the chest was performed.    COMPARISON:  None    FINDINGS:  Cardiac silhouette is not enlarged with the trachea midline.  There are bilateral airspace opacities with elevation of the left hemidiaphragm and pleural effusion.

## 2022-08-09 NOTE — ASSESSMENT & PLAN NOTE
- INR in ED was elevated at 4.2   - monitor INR daily   - no warfarin use   - given he also has thrombocytopenia, suspect there may be an element of liver dysfunction  - no active bleeding at present

## 2022-08-09 NOTE — ED TRIAGE NOTES
"76 y/o male presents to ED via EMS with c/o L sided chest pain with associated SOB that woke pt out of sleep. Pt reports pmh of MI and stroke.  Pt reports L chest pain that radiates to L elbow.  Pt received 1 spray of NGT with EMS reports pain 5/10 with increased difficulty in breathing.  Pt compliant with all home medications as prescribed.  Of note pt states increased swelling in R leg and R arm pt states "it's been swollen for a while now and they can't get it to go down"  Pt AAOx4.  VSS.  Pt lying in stretcher, respirations even and unlabored, eyes open spontaneously.  NAD noted, AAOx4, answering questions appropriately.  Pt placed on BP cycling, pulse ox, and cardiac monitoring q30min.  Bed locked and in lowest position, SRx2 up, call light within reach.  "

## 2022-08-09 NOTE — ASSESSMENT & PLAN NOTE
- baseline PLT appears to be 130-140K  - today PLT count is 119K   - no active bleeding   - monitor

## 2022-08-09 NOTE — CARE UPDATE
Felicia Lopez was admitted for chest pain, CHF exacerbation. Troponin trend mildly elevated but flat. Seen at bedside in mild respiratory distress while laying flat, improved work of breathing once sitting up. Decreased breath sounds at base of lungs, pitting edema BLE. He has about 500cc clear yellow urine at bedside shortly after lasix IVP. Given his cardiac history, presentation with typical chest pain, will consult cardiology, appreciate assistance. TTE pending for today      Gill Vang PA-C  [facility]

## 2022-08-09 NOTE — CONSULTS
Cardiology Consult  8/9/2022  1:32 PM    Attending Cardiologist: Fabio Martinez M.D.  Primary Care Provider: Mickey Darden MD  Chief Complaint/Reason For Consultation:  SOB      Problem list  Patient Active Problem List   Diagnosis    Essential hypertension    Hyperglycemia    CKD (chronic kidney disease)    Thrombocytopenia    CKD (chronic kidney disease) stage 2, GFR 60-89 ml/min    CKD (chronic kidney disease) stage 3, GFR 30-59 ml/min    BMI 27.0-27.9,adult    Pain of left hand    Dizziness    Left-sided low back pain without sciatica    Anemia    Allergic rhinitis due to pollen    Hyperlipidemia    Neck pain, acute    Depression    Anxiolytic dependence    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    Gastroesophageal reflux disease    History of malignant neoplasm of prostate    Hypercoagulable state    Iron deficiency anemia    Chest pain    CAD (coronary artery disease)    CHF (congestive heart failure)    History of heart attack    History of CVA (cerebrovascular accident)    Supratherapeutic INR    Acute renal failure superimposed on stage 3 chronic kidney disease       CC:  SOB, CP    HPI:  Felicia Lopez is a 77 y.o.year-old male with PMH of CAD (s/p stenting 2010 at ), CHF (Dr Baum is his cardiologist), prior MI/CVA, HTN, CKD-3, anemnia, HLD, who presented to ED on 8/9/22 due to chest pressure that awoke him from sleep last night. He states the pain radiated to his left arm, and was associated with shortness of breath. He notes the pain started 30 mins PTA to the ED. He states the pain improved after 1 spray of Nitroglycerin.  He stated that he has been having worsening shortness of breath and dyspnea on exertion for the past 2-3 months.  He states that walking inside his house, he would be short of breath.  He also reported having paroxysmal nocturnal dyspnea which is been ongoing for the past 2 or 3 weeks.  He has not  follow-up with a cardiologist until recently.  He reported these symptoms to his primary care physician Dr. Darden who referred him to see Dr Baum last week.  Patient describes undergoing series cardiac testing at Dr Baum.  Echocardiogram showed EF 25-30%,  grade 1 diastolic dysfunction, trace aortic insufficiency mild to moderate tricuspid regurgitation.    Medications  Current Facility-Administered Medications   Medication Dose Route Frequency Provider Last Rate Last Admin    acetaminophen tablet 650 mg  650 mg Oral Q6H PRN Mis Edge PA-C        aspirin EC tablet 81 mg  81 mg Oral Daily Mis Edge PA-C   81 mg at 08/09/22 0813    atorvastatin tablet 40 mg  40 mg Oral Daily Mis Edge PA-C   40 mg at 08/09/22 0813    furosemide injection 20 mg  20 mg Intravenous Daily Mis Edge PA-C   20 mg at 08/09/22 0808    magnesium sulfate 2g in water 50mL IVPB (premix)  2 g Intravenous Once Gill Vang PA-C 25 mL/hr at 08/09/22 1304 2 g at 08/09/22 1304    melatonin tablet 6 mg  6 mg Oral Nightly PRN Mis Edge PA-C        naloxone 0.4 mg/mL injection 0.02 mg  0.02 mg Intravenous PRN Mis Edge PA-C        nitroGLYCERIN SL tablet 0.4 mg  0.4 mg Sublingual Q5 Min PRN Mis Edge PA-C        senna-docusate 8.6-50 mg per tablet 1 tablet  1 tablet Oral BID PRN Mis Edge PA-C        sodium chloride 0.9% flush 10 mL  10 mL Intravenous Q8H Mis Edge PA-C         Current Outpatient Medications   Medication Sig Dispense Refill    clopidogrel (PLAVIX) 75 mg tablet Take 1 tablet (75 mg total) by mouth once daily. 90 tablet 0    hydroCHLOROthiazide (MICROZIDE) 12.5 mg capsule Take 1 capsule (12.5 mg total) by mouth once daily. (Patient taking differently: Take 25 mg by mouth once daily.) 90 capsule 1    lisinopril (PRINIVIL,ZESTRIL) 20 MG tablet Take 1 tablet (20 mg total) by mouth 2 (two) times daily. 180 tablet 0     simvastatin (ZOCOR) 40 MG tablet Take 1 tablet (40 mg total) by mouth once daily. 90 tablet 0    azithromycin (Z-CALLUM) 250 MG tablet Take 2 tablets by mouth on day 1; Take 1 tablet by mouth on days 2-5 6 tablet 0    baclofen (LIORESAL) 10 MG tablet Take 1 tablet (10 mg total) by mouth 3 (three) times daily. 90 tablet 11    brimonidine 0.2% (ALPHAGAN) 0.2 % Drop Place 1 drop into both eyes once daily.      diazePAM (VALIUM) 10 MG Tab Take 1 tablet (10 mg total) by mouth daily as needed. 30 tablet 3    diclofenac sodium (VOLTAREN) 1 % Gel Apply 4 g p.r.n. to left knee for pain.      latanoprost 0.005 % ophthalmic solution Place 1 drop into both eyes 2 (two) times daily.      levocetirizine (XYZAL) 5 MG tablet Take 1 tablet (5 mg total) by mouth every evening. 30 tablet 1    metoprolol tartrate (LOPRESSOR) 25 MG tablet TAKE 1/2 TABLET BY MOUTH ONCE DAILY 90 tablet 2      Prior to Admission medications    Medication Sig Start Date End Date Taking? Authorizing Provider   clopidogrel (PLAVIX) 75 mg tablet Take 1 tablet (75 mg total) by mouth once daily. 12/14/18  Yes Mickey Darden MD   hydroCHLOROthiazide (MICROZIDE) 12.5 mg capsule Take 1 capsule (12.5 mg total) by mouth once daily.  Patient taking differently: Take 25 mg by mouth once daily. 10/31/18 8/9/22 Yes Mickey Darden MD   lisinopril (PRINIVIL,ZESTRIL) 20 MG tablet Take 1 tablet (20 mg total) by mouth 2 (two) times daily. 1/28/19  Yes Mickey Darden MD   simvastatin (ZOCOR) 40 MG tablet Take 1 tablet (40 mg total) by mouth once daily. 12/14/18  Yes Mickey Darden MD   azithromycin (Z-CALLUM) 250 MG tablet Take 2 tablets by mouth on day 1; Take 1 tablet by mouth on days 2-5 11/21/16   Mickey Darden MD   baclofen (LIORESAL) 10 MG tablet Take 1 tablet (10 mg total) by mouth 3 (three) times daily. 10/3/18 10/3/19  Mickey Darden MD   brimonidine 0.2% (ALPHAGAN) 0.2 % Drop Place 1 drop into both eyes once daily.    Historical  Provider   diazePAM (VALIUM) 10 MG Tab Take 1 tablet (10 mg total) by mouth daily as needed. 17  Mickey Darden MD   diclofenac sodium (VOLTAREN) 1 % Gel Apply 4 g p.r.n. to left knee for pain. 18   Historical Provider   latanoprost 0.005 % ophthalmic solution Place 1 drop into both eyes 2 (two) times daily.    Historical Provider   levocetirizine (XYZAL) 5 MG tablet Take 1 tablet (5 mg total) by mouth every evening. 16  Mickey Darden MD   metoprolol tartrate (LOPRESSOR) 25 MG tablet TAKE 1/2 TABLET BY MOUTH ONCE DAILY 16   Mickey Darden MD         History  Past Medical History:   Diagnosis Date    Disorder of kidney and ureter     Hyperlipidemia     Hypertension      Past Surgical History:   Procedure Laterality Date    FACIAL RECONSTRUCTION SURGERY      PROSTATE SURGERY       Social History     Socioeconomic History    Marital status:    Tobacco Use    Smoking status: Former Smoker     Quit date: 10/5/2008     Years since quittin.8   Substance and Sexual Activity    Alcohol use: No     Alcohol/week: 0.0 standard drinks    Drug use: No    Sexual activity: Never         Allergies  Review of patient's allergies indicates:  No Known Allergies      Review of Systems   Review of Systems   Constitutional: Negative for decreased appetite, fever and weight loss.   HENT: Negative for congestion and nosebleeds.    Eyes: Negative for double vision, vision loss in left eye, vision loss in right eye and visual disturbance.   Cardiovascular: Positive for chest pain and dyspnea on exertion. Negative for claudication, cyanosis, irregular heartbeat, leg swelling, near-syncope, orthopnea, palpitations, paroxysmal nocturnal dyspnea and syncope.   Respiratory: Negative for cough, hemoptysis, shortness of breath, sleep disturbances due to breathing, snoring, sputum production and wheezing.    Endocrine: Negative for cold intolerance and heat intolerance.    Skin: Negative for nail changes and rash.   Musculoskeletal: Negative for joint pain, muscle cramps, muscle weakness and myalgias.   Gastrointestinal: Negative for change in bowel habit, heartburn, hematemesis, hematochezia, hemorrhoids and melena.   Neurological: Negative for dizziness, focal weakness and headaches.         Physical Exam  Wt Readings from Last 1 Encounters:   08/09/22 87.1 kg (192 lb)     BP Readings from Last 3 Encounters:   08/09/22 123/82   10/03/18 117/64   05/03/18 115/69     Pulse Readings from Last 1 Encounters:   08/09/22 86     Body mass index is 26.04 kg/m².    Physical Exam  Vitals reviewed.   Neck:      Vascular: JVD present.   Cardiovascular:      Rate and Rhythm: Normal rate.      Pulses:           Carotid pulses are 2+ on the right side and 2+ on the left side.       Radial pulses are 2+ on the right side and 2+ on the left side.      Heart sounds: S1 normal and S2 normal.   Pulmonary:      Breath sounds: Examination of the right-lower field reveals rales. Examination of the left-lower field reveals rales. Wheezing and rales present.   Musculoskeletal:      Right lower leg: Swelling present.      Left lower leg: Swelling present.   Neurological:      Mental Status: He is alert.                   Assessment and Plan:  Acute on chronic systolic heart failure.  BNP level is 1117.  Chest x-ray is consistent with pulmonary edema.  Physical examination is consistent with pulmonary edema.  No ACS.  -Lasix 40 mg IV twice daily  -Follow-up echocardiogram results      HTN    INR 4.2, not on warfarin, no bleeding.  -monitor    Thrombocytopenia with platelet count [id number].    CKD       Thank you for allowing me to participate in the care of Felicia Lopez.      Fabio Martinez MD, F.A.C.C, F.S.C.A.I.

## 2022-08-09 NOTE — PLAN OF CARE
POC reviewed with patient. AAOx4. stable on room air. No concerns/complaints verbalized during shift. BSC utilized for voiding. Positions self. Turn Q2/frequent weight shift encouraged by RN. Low air loss mattress in use. Instructed to call for help ambulating. No injuries, falls, or trauma occurred during shift. Purposeful rounding completed. Bed low and locked with side rails up x 3 and call light within reach.        Problem: Adult Inpatient Plan of Care  Goal: Plan of Care Review  Outcome: Ongoing, Progressing  Goal: Patient-Specific Goal (Individualized)  Outcome: Ongoing, Progressing  Goal: Absence of Hospital-Acquired Illness or Injury  Outcome: Ongoing, Progressing  Goal: Optimal Comfort and Wellbeing  Outcome: Ongoing, Progressing  Goal: Readiness for Transition of Care  Outcome: Ongoing, Progressing

## 2022-08-09 NOTE — ASSESSMENT & PLAN NOTE
- Mr. Felicia Lopez presents with acute onset of left sided chest pain while sleeping   - chest pain was relieved by Nitro administration both with EMS, and in the ED   - a 1/2012 stress echo showed evidence of inducible ischemia   - Troponin was elevated at 0.133, trend   - no anticoagulation ordered 2/2 thrombocytopenia, and elevated INR   - lipid panel and A1C pending

## 2022-08-09 NOTE — Clinical Note
Diagnosis: NSTEMI (non-ST elevated myocardial infarction) [442577]   Future Attending Provider: ANTONI CAMPBELL [[id number]]   Admitting Provider:: SUSAN COREA [[id number]]

## 2022-08-10 PROBLEM — I50.43 ACUTE ON CHRONIC COMBINED SYSTOLIC AND DIASTOLIC CONGESTIVE HEART FAILURE: Status: ACTIVE | Noted: 2022-08-09

## 2022-08-10 LAB
ANION GAP SERPL CALC-SCNC: 14 MMOL/L (ref 8–16)
BASOPHILS # BLD AUTO: 0.03 K/UL (ref 0–0.2)
BASOPHILS NFR BLD: 0.4 % (ref 0–1.9)
BUN SERPL-MCNC: 32 MG/DL (ref 8–23)
CALCIUM SERPL-MCNC: 9.2 MG/DL (ref 8.7–10.5)
CHLORIDE SERPL-SCNC: 107 MMOL/L (ref 95–110)
CO2 SERPL-SCNC: 21 MMOL/L (ref 23–29)
CREAT SERPL-MCNC: 1.9 MG/DL (ref 0.5–1.4)
DIFFERENTIAL METHOD: ABNORMAL
EOSINOPHIL # BLD AUTO: 0.2 K/UL (ref 0–0.5)
EOSINOPHIL NFR BLD: 2.1 % (ref 0–8)
ERYTHROCYTE [DISTWIDTH] IN BLOOD BY AUTOMATED COUNT: 16.3 % (ref 11.5–14.5)
EST. GFR  (NO RACE VARIABLE): 36 ML/MIN/1.73 M^2
GLUCOSE SERPL-MCNC: 97 MG/DL (ref 70–110)
HCT VFR BLD AUTO: 46.1 % (ref 40–54)
HGB BLD-MCNC: 14.8 G/DL (ref 14–18)
IMM GRANULOCYTES # BLD AUTO: 0.02 K/UL (ref 0–0.04)
IMM GRANULOCYTES NFR BLD AUTO: 0.3 % (ref 0–0.5)
INR PPP: 1.1 (ref 0.8–1.2)
LYMPHOCYTES # BLD AUTO: 1.5 K/UL (ref 1–4.8)
LYMPHOCYTES NFR BLD: 21.1 % (ref 18–48)
MAGNESIUM SERPL-MCNC: 1.7 MG/DL (ref 1.6–2.6)
MCH RBC QN AUTO: 29.1 PG (ref 27–31)
MCHC RBC AUTO-ENTMCNC: 32.1 G/DL (ref 32–36)
MCV RBC AUTO: 91 FL (ref 82–98)
MONOCYTES # BLD AUTO: 0.8 K/UL (ref 0.3–1)
MONOCYTES NFR BLD: 11 % (ref 4–15)
NEUTROPHILS # BLD AUTO: 4.7 K/UL (ref 1.8–7.7)
NEUTROPHILS NFR BLD: 65.1 % (ref 38–73)
NRBC BLD-RTO: 0 /100 WBC
PLATELET # BLD AUTO: 111 K/UL (ref 150–450)
PMV BLD AUTO: ABNORMAL FL (ref 9.2–12.9)
POTASSIUM SERPL-SCNC: 4.7 MMOL/L (ref 3.5–5.1)
PROTHROMBIN TIME: 11.9 SEC (ref 9–12.5)
RBC # BLD AUTO: 5.08 M/UL (ref 4.6–6.2)
SODIUM SERPL-SCNC: 142 MMOL/L (ref 136–145)
WBC # BLD AUTO: 7.26 K/UL (ref 3.9–12.7)

## 2022-08-10 PROCEDURE — 80048 BASIC METABOLIC PNL TOTAL CA: CPT | Performed by: PHYSICIAN ASSISTANT

## 2022-08-10 PROCEDURE — 99232 SBSQ HOSP IP/OBS MODERATE 35: CPT | Mod: ,,, | Performed by: INTERNAL MEDICINE

## 2022-08-10 PROCEDURE — 25000003 PHARM REV CODE 250: Performed by: PHYSICIAN ASSISTANT

## 2022-08-10 PROCEDURE — 99233 PR SUBSEQUENT HOSPITAL CARE,LEVL III: ICD-10-PCS | Mod: ,,, | Performed by: PHYSICIAN ASSISTANT

## 2022-08-10 PROCEDURE — 99232 PR SUBSEQUENT HOSPITAL CARE,LEVL II: ICD-10-PCS | Mod: ,,, | Performed by: INTERNAL MEDICINE

## 2022-08-10 PROCEDURE — 83735 ASSAY OF MAGNESIUM: CPT | Performed by: PHYSICIAN ASSISTANT

## 2022-08-10 PROCEDURE — 85025 COMPLETE CBC W/AUTO DIFF WBC: CPT | Performed by: PHYSICIAN ASSISTANT

## 2022-08-10 PROCEDURE — 99233 SBSQ HOSP IP/OBS HIGH 50: CPT | Mod: ,,, | Performed by: PHYSICIAN ASSISTANT

## 2022-08-10 PROCEDURE — 63600175 PHARM REV CODE 636 W HCPCS: Performed by: PHYSICIAN ASSISTANT

## 2022-08-10 PROCEDURE — 94761 N-INVAS EAR/PLS OXIMETRY MLT: CPT

## 2022-08-10 PROCEDURE — 85610 PROTHROMBIN TIME: CPT | Performed by: PHYSICIAN ASSISTANT

## 2022-08-10 PROCEDURE — 21400001 HC TELEMETRY ROOM

## 2022-08-10 PROCEDURE — 36415 COLL VENOUS BLD VENIPUNCTURE: CPT | Performed by: PHYSICIAN ASSISTANT

## 2022-08-10 PROCEDURE — 25000242 PHARM REV CODE 250 ALT 637 W/ HCPCS: Performed by: PHYSICIAN ASSISTANT

## 2022-08-10 RX ORDER — LANOLIN ALCOHOL/MO/W.PET/CERES
400 CREAM (GRAM) TOPICAL ONCE
Status: COMPLETED | OUTPATIENT
Start: 2022-08-10 | End: 2022-08-10

## 2022-08-10 RX ORDER — FUROSEMIDE 10 MG/ML
40 INJECTION INTRAMUSCULAR; INTRAVENOUS
Status: DISCONTINUED | OUTPATIENT
Start: 2022-08-10 | End: 2022-08-11

## 2022-08-10 RX ADMIN — ASPIRIN 81 MG: 81 TABLET, COATED ORAL at 09:08

## 2022-08-10 RX ADMIN — NITROGLYCERIN 0.4 MG: 0.4 TABLET, ORALLY DISINTEGRATING SUBLINGUAL at 09:08

## 2022-08-10 RX ADMIN — FUROSEMIDE 40 MG: 10 INJECTION, SOLUTION INTRAMUSCULAR; INTRAVENOUS at 09:08

## 2022-08-10 RX ADMIN — ATORVASTATIN CALCIUM 40 MG: 20 TABLET, FILM COATED ORAL at 09:08

## 2022-08-10 RX ADMIN — Medication 400 MG: at 12:08

## 2022-08-10 NOTE — PLAN OF CARE
POC reviewed. Strict I and O recorded in flowsheet.  No significant events this shift. Cardiac monitor maintained. Pt voids and shifts in bed independently.   Low air loss mattress in use.  Instructed to call for help ambulating.  No injuries, falls, or trauma occurred during shift.  Purposeful rounding completed.  Bed low and locked with side rails up x 3 and call light within reach.      Problem: Adult Inpatient Plan of Care  Goal: Plan of Care Review  Outcome: Ongoing, Progressing  Goal: Patient-Specific Goal (Individualized)  Outcome: Ongoing, Progressing  Goal: Absence of Hospital-Acquired Illness or Injury  Outcome: Ongoing, Progressing  Intervention: Identify and Manage Fall Risk  Flowsheets (Taken 8/10/2022 0946)  Safety Promotion/Fall Prevention:   high risk medications identified   medications reviewed   lighting adjusted   commode/urinal/bedpan at bedside  Intervention: Prevent Skin Injury  Flowsheets (Taken 8/10/2022 0946)  Body Position:   position changed independently   sitting up in bed  Skin Protection: adhesive use limited  Intervention: Prevent and Manage VTE (Venous Thromboembolism) Risk  Flowsheets (Taken 8/10/2022 0946)  Activity Management: Ambulated in room - L4  VTE Prevention/Management: ambulation promoted  Intervention: Prevent Infection  Flowsheets (Taken 8/10/2022 0946)  Infection Prevention: hand hygiene promoted  Goal: Optimal Comfort and Wellbeing  Outcome: Ongoing, Progressing  Intervention: Provide Person-Centered Care  Flowsheets (Taken 8/10/2022 0946)  Trust Relationship/Rapport:   care explained   choices provided   emotional support provided   empathic listening provided   questions answered   thoughts/feelings acknowledged   reassurance provided   questions encouraged  Goal: Readiness for Transition of Care  Outcome: Ongoing, Progressing     Problem: Fall Injury Risk  Goal: Absence of Fall and Fall-Related Injury  Outcome: Ongoing, Progressing

## 2022-08-10 NOTE — HOSPITAL COURSE
Felicia Lopez was admitted for acute CHF exacerbation. BNP 1117 and CXR with pulmonary edema. Troponin mildly elevated but flat. Initially placed on 2L NC for increased work of breathing, weaning as tolerated. Cardiology consulted, diurese with lasix 40mg IVP BID, repeat TTE with cCHFrEF 45%.  Cardiology agreeable to discharge with lasix 40 mg bid.  D/C HCTZ and plavix.  CBC and BMP expected 8/15.  Cardiology and PCP follow up in 3-7 days.

## 2022-08-10 NOTE — PLAN OF CARE
08/10/22 1612   Physical Activity   On average, how many days per week do you engage in moderate to strenuous exercise (like a brisk walk)? 0 days   On average, how many minutes do you engage in exercise at this level? 0 min   Financial Resource Strain   How hard is it for you to pay for the very basics like food, housing, medical care, and heating? Not hard   Housing Stability   In the last 12 months, was there a time when you were not able to pay the mortgage or rent on time? N   In the last 12 months, was there a time when you did not have a steady place to sleep or slept in a shelter (including now)? N   Transportation Needs   In the past 12 months, has lack of transportation kept you from medical appointments or from getting medications? no   In the past 12 months, has lack of transportation kept you from meetings, work, or from getting things needed for daily living? No   Food Insecurity   Within the past 12 months, you worried that your food would run out before you got the money to buy more. Never true   Within the past 12 months, the food you bought just didn't last and you didn't have money to get more. Never true   Stress   Do you feel stress - tense, restless, nervous, or anxious, or unable to sleep at night because your mind is troubled all the time - these days? Only a littl   Social Connections   In a typical week, how many times do you talk on the phone with family, friends, or neighbors? Never   How often do you get together with friends or relatives? Never   How often do you attend Confucianism or Rastafari services? Never   Do you belong to any clubs or organizations such as Confucianism groups, unions, fraternal or athletic groups, or school groups? No   How often do you attend meetings of the clubs or organizations you belong to? Never   Are you , , , , never , or living with a partner?    Alcohol Use   Q1: How often do you have a drink containing alcohol?  Never   Q2: How many drinks containing alcohol do you have on a typical day when you are drinking? None   Q3: How often do you have six or more drinks on one occasion? Never

## 2022-08-10 NOTE — ASSESSMENT & PLAN NOTE
- Mr. Felicia Lopez presents with acute onset of left sided chest pain while sleeping   - chest pain was relieved by Nitro administration both with EMS, and in the ED   - a 1/2012 stress echo showed evidence of inducible ischemia   - Troponin was elevated at 0.133 and flat  - cardiology consulted: no ACS  - no anticoagulation ordered 2/2 thrombocytopenia, and elevated INR   - lipid panel and A1C: lipids well controlled, a1c 5.7

## 2022-08-10 NOTE — SUBJECTIVE & OBJECTIVE
Interval History: No acute events overnight, patient seen this AM sitting at bedside, work of breathing improved since yesterday's exam. Still decreased breath sounds in lower left lung fields, BLE edema  Continue diuresis    Review of Systems   Reason unable to perform ROS: Poor patient participation during exam.   Constitutional:  Negative for activity change, chills and fever.   Respiratory:  Positive for shortness of breath. Negative for cough.    Cardiovascular:  Positive for chest pain (with radiation to left arm; resolved). Negative for palpitations.   Gastrointestinal:  Negative for abdominal pain, nausea and vomiting.   Genitourinary:  Positive for frequency (2/2 lasix). Negative for decreased urine volume, difficulty urinating and dysuria.   Musculoskeletal:  Negative for back pain.   Skin:  Negative for color change.   Neurological:  Negative for dizziness, syncope, weakness, light-headedness and headaches.   Psychiatric/Behavioral:  Negative for agitation and confusion.    Objective:     Vital Signs (Most Recent):  Temp: 98.8 °F (37.1 °C) (08/10/22 0809)  Pulse: 99 (08/10/22 0809)  Resp: 17 (08/10/22 0809)  BP: 139/75 (08/10/22 0809)  SpO2: 97 % (08/10/22 0809) Vital Signs (24h Range):  Temp:  [97.2 °F (36.2 °C)-98.8 °F (37.1 °C)] 98.8 °F (37.1 °C)  Pulse:  [] 99  Resp:  [17-21] 17  SpO2:  [96 %-97 %] 97 %  BP: (109-139)/(62-87) 139/75     Weight: 89.8 kg (197 lb 15.6 oz)  Body mass index is 26.85 kg/m².    Intake/Output Summary (Last 24 hours) at 8/10/2022 0943  Last data filed at 8/10/2022 0906  Gross per 24 hour   Intake 240 ml   Output 650 ml   Net -410 ml      Physical Exam  Vitals and nursing note reviewed.   Constitutional:       General: He is not in acute distress.     Appearance: He is well-developed and normal weight. He is not ill-appearing.   HENT:      Head: Normocephalic and atraumatic.   Eyes:      General:         Right eye: No discharge.         Left eye: No discharge.   Neck:       Vascular: No JVD.      Trachea: No tracheal deviation.   Cardiovascular:      Rate and Rhythm: Normal rate and regular rhythm.   Pulmonary:      Effort: Pulmonary effort is normal. No respiratory distress.      Comments: Decreased breath sounds on left  Abdominal:      General: Abdomen is flat. There is no distension.      Palpations: Abdomen is soft.   Musculoskeletal:      Cervical back: Normal range of motion and neck supple.      Right lower leg: Edema present.      Left lower leg: Edema present.   Skin:     General: Skin is warm and dry.   Neurological:      General: No focal deficit present.      Mental Status: He is alert.      Motor: No abnormal muscle tone.   Psychiatric:         Mood and Affect: Mood normal.         Behavior: Behavior normal.       Significant Labs: All pertinent labs within the past 24 hours have been reviewed.    Significant Imaging: I have reviewed all pertinent imaging results/findings within the past 24 hours.

## 2022-08-10 NOTE — ASSESSMENT & PLAN NOTE
- notes b/l LE edema  - no lasix on home meds list   - today BNP is elevated at 1117  - CXR with pulm edema  - cardiology consulted given his cardiac history and chest pain with typical features  - No ACS  - diurese with lasix 40mg IVP BID  - monitor I&Os and daily weights     TTE:  Results for orders placed during the hospital encounter of 08/09/22    Echo    Interpretation Summary  · Moderate left atrial enlargement.  · The left ventricle is normal in size with mildly decreased systolic function.  · The estimated ejection fraction is 45%.  · Grade III left ventricular diastolic dysfunction.  · There is left ventricular global hypokinesis.  · Normal right ventricular size with normal right ventricular systolic function.  · Mild mitral regurgitation.  · Mild tricuspid regurgitation.

## 2022-08-10 NOTE — PROGRESS NOTES
Cardiology Progress Note    8/10/2022  1:53 PM    Subjective/Interim:      Feels much better today, almost back to baseline.  SOB improved.     Objective:   24-hour Vitals:  Temp:  [97.2 °F (36.2 °C)-98.8 °F (37.1 °C)] 97.4 °F (36.3 °C)  Pulse:  [] 96  Resp:  [16-21] 16  SpO2:  [96 %-97 %] 97 %  BP: (109-139)/(62-87) 124/65    Physical Examination:  Vitals: /65 (BP Location: Right arm, Patient Position: Sitting)   Pulse 96   Temp 97.4 °F (36.3 °C) (Oral)   Resp 16   Ht 6' (1.829 m)   Wt 89.8 kg (197 lb 15.6 oz)   SpO2 97%   BMI 26.85 kg/m²     Physical Exam  Vitals reviewed.   Neck:      Vascular: JVD present.   Cardiovascular:      Rate and Rhythm: Normal rate.      Pulses:           Carotid pulses are 2+ on the right side and 2+ on the left side.       Radial pulses are 2+ on the right side and 2+ on the left side.      Heart sounds: S1 normal and S2 normal.   Pulmonary:      Breath sounds: Normal breath sounds. No wheezing or rales.   Musculoskeletal:      Right lower le+ Edema present.      Left lower le+ Edema present.   Neurological:      Mental Status: He is alert.           Intake/Output Summary (Last 24 hours) at 8/10/2022 1353  Last data filed at 8/10/2022 1200  Gross per 24 hour   Intake 240 ml   Output 1800 ml   Net -1560 ml       Laboratory:  Trended Lab Data:  Recent Labs   Lab 22  0122 22  0812 08/10/22  0826   WBC 7.82 7.06 7.26   HGB 13.2* 13.2* 14.8   HCT 40.6 41.9 46.1   * 114* 111*       Recent Labs   Lab 22  0122 22  0610 08/10/22  0825    141 142   K 4.5 4.6 4.7    110 107   CO2 19* 20* 21*   BUN 26* 26* 32*   GLU 80 80 97   CALCIUM 8.6* 9.0 9.2   MG  --  1.4* 1.7       Recent Labs   Lab 22  0122 22  0610   PROT 6.8 6.9   ALBUMIN 3.1* 3.3*   BILITOT 0.6 0.9   AST 23 25   ALT 21 22   ALKPHOS 74 75       Recent Labs   Lab 22  0122 22  0655 08/10/22  0429   INR 4.2* 1.1 1.1       Cardiac:   Recent  Labs   Lab 08/09/22  0122 08/09/22  0557 08/09/22  1315   TROPONINI 0.150* 0.133* 0.141*   BNP 1,117*  --   --        FLP:   Lab Results   Component Value Date    CHOL 123 08/09/2022    HDL 35 (L) 08/09/2022    LDLCALC 78.2 08/09/2022    TRIG 49 08/09/2022    CHOLHDL 28.5 08/09/2022     DM:   Lab Results   Component Value Date    HGBA1C 5.7 (H) 08/09/2022    HGBA1C 5.7 (H) 02/01/2019    HGBA1C 6.0 (H) 09/28/2018    MICROALBUR 2.4 02/15/2016    LDLCALC 78.2 08/09/2022    CREATININE 1.9 (H) 08/10/2022     Thyroid: No results found for: TSH, FREET4, Q5ZCVUX, C6SQUIL, THYROIDAB  Anemia:   Lab Results   Component Value Date    IRON 57 02/01/2019     Urinalysis:   Lab Results   Component Value Date    COLORU YELLOW 02/01/2019    SPECGRAV 1.021 02/01/2019    NITRITE NEGATIVE 02/01/2019    GLUCOSEU NEGATIVE 02/01/2019    KETONESU NEGATIVE 02/01/2019    BILIRUBINUR NEGATIVE 02/01/2019     @      Other Results:      Echo:   Results for orders placed or performed during the hospital encounter of 08/09/22   Echo   Result Value Ref Range    STJ 1.94 cm    AV mean gradient 1 mmHg    Ao peak boby 0.89 m/s    Ao VTI 12.41 cm    IVRT 91.34 msec    IVS 1.03 0.6 - 1.1 cm    LA size 4.30 cm    Left Atrium Major Axis 5.58 cm    Left Atrium Minor Axis 5.80 cm    LVIDd 5.04 3.5 - 6.0 cm    LVIDs 3.69 2.1 - 4.0 cm    LVOT diameter 2.11 cm    LVOT peak VTI 9.47 cm    Posterior Wall 0.99 0.6 - 1.1 cm    MV Peak A Boby 0.33 m/s    E wave deceleration time 102.63 msec    MV Peak E Boby 0.92 m/s    PV Peak D Boby 0.43 m/s    PV Peak S Boby 0.20 m/s    RA Major Axis 4.71 cm    RA Width 5.03 cm    RVDD 3.26 cm    Sinus 3.05 cm    TAPSE 0.86 cm    TR Max Boby 3.74 m/s    LA WIDTH 4.39 cm    PV PEAK VELOCITY 0.53 cm/s    MV stenosis pressure 1/2 time 29.76 ms    LV Diastolic Volume 120.48 mL    LV Systolic Volume 57.65 mL    LVOT peak boby 0.55 m/s    TDI LATERAL 0.04 m/s    TDI SEPTAL 0.03 m/s    RVOT peak VTI 6.38 cm    Mr max boby 0.04 m/s    RVOT peak  "leora 0.27 m/s    MV "A" wave duration 9.51 msec    PV mean gradient 0.25 mmHg    LV LATERAL E/E' RATIO 23.00 m/s    LV SEPTAL E/E' RATIO 30.67 m/s    FS 27 %    LA volume 91.26 cm3    LV mass 186.86 g    Left Ventricle Relative Wall Thickness 0.39 cm    AV valve area 2.67 cm2    AV Velocity Ratio 0.62     AV index (prosthetic) 0.76     MV valve area p 1/2 method 7.39 cm2    E/A ratio 2.79     Mean e' 0.04 m/s    Pulm vein S/D ratio 0.47     LVOT area 3.5 cm2    LVOT stroke volume 33.10 cm3    AV peak gradient 3 mmHg    E/E' ratio 26.29 m/s    Triscuspid Valve Regurgitation Peak Gradient 56 mmHg    BSA 2.1 m2    LV Systolic Volume Index 27.6 mL/m2    LV Diastolic Volume Index 57.65 mL/m2    LA Volume Index 43.7 mL/m2    LV Mass Index 89 g/m2    LA Volume Index (Mod) 31.6 mL/m2    LA volume (mod) 66.00 cm3    EF 45 %    Narrative    · Moderate left atrial enlargement.  · The left ventricle is normal in size with mildly decreased systolic   function.  · The estimated ejection fraction is 45%.  · Grade III left ventricular diastolic dysfunction.  · There is left ventricular global hypokinesis.  · Normal right ventricular size with normal right ventricular systolic   function.  · Mild mitral regurgitation.  · Mild tricuspid regurgitation.          Radiology:  X-Ray Chest AP Portable    Result Date: 8/9/2022  EXAMINATION: XR CHEST AP PORTABLE CLINICAL HISTORY: Chest Pain; TECHNIQUE: Single frontal view of the chest was performed. COMPARISON: None FINDINGS: Cardiac silhouette is not enlarged with the trachea midline.  There are bilateral airspace opacities with elevation of the left hemidiaphragm and pleural effusion.     Bilateral infiltrates and left effusion.  Differential considerations include pulmonary edema versus pneumonia and pneumonitis. Electronically signed by: Charly Guzman Date:    08/09/2022 Time:    01:40    Echo    Result Date: 8/9/2022  · Moderate left atrial enlargement. · The left ventricle is normal " in size with mildly decreased systolic function. · The estimated ejection fraction is 45%. · Grade III left ventricular diastolic dysfunction. · There is left ventricular global hypokinesis. · Normal right ventricular size with normal right ventricular systolic function. · Mild mitral regurgitation. · Mild tricuspid regurgitation.          Current Medications:     Infusions:       Scheduled:   aspirin  81 mg Oral Daily    atorvastatin  40 mg Oral Daily    furosemide (LASIX) injection  40 mg Intravenous Q12H    sodium chloride 0.9%  10 mL Intravenous Q8H        PRN:  acetaminophen, melatonin, naloxone, nitroGLYCERIN, senna-docusate 8.6-50 mg     Assessment and Plan:     eFlicia Lopez is a 77 y.o.male with  Acute on chronic systolic heart failure.  BNP level is 1117.  Chest x-ray is consistent with pulmonary edema.  Physical examination is consistent with pulmonary edema.  No ACS.  -Lasix 40 mg IV twice daily today and change to PO tomorrow.  -echocardiogram results noted     HTN, controlled     INR 4.2, not on warfarin, no bleeding.  -repeat INR was 1.1, likely erroneous result of 4.2     Thrombocytopenia with platelet count [id number].     CKD          Fabio Martinez MD        Disclaimer: This document was created using voice recognition software (M*Modal Fluency Direct). Although it may be edited, this document may contain errors related to incorrect recognition of the spoken word. Please call the physician if clarification is needed.

## 2022-08-10 NOTE — PLAN OF CARE
Initial Discharge Planning Assessment:  Patient admitted on: 8/9/22     Chart reviewed, Care plan discussed with treatment team,  attending Everardo      PCP updated in Epic: Dr.Baptist   Pharmacy, updated in Epic: [facility]      DME at home: None      Current dispo: Home with family       Transportation: Family can provide      Power of  or Living Will: Wife      Anticipated DC needs from CM perspective:             08/10/22 1607   Discharge Assessment   Assessment Type Discharge Planning Assessment   Confirmed/corrected address, phone number and insurance Yes   Confirmed Demographics Correct on Facesheet   Source of Information patient;health record   Lives With significant other   Do you expect to return to your current living situation? Yes   Do you have help at home or someone to help you manage your care at home? Yes   Who are your caregiver(s) and their phone number(s)? Family can provide help   Prior to hospitilization cognitive status: Alert/Oriented   Current cognitive status: Alert/Oriented   Walking or Climbing Stairs Difficulty none   Dressing/Bathing Difficulty none   Equipment Currently Used at Home none   Readmission within 30 days? No   Patient currently being followed by outpatient case management? No   Do you currently have service(s) that help you manage your care at home? No   Do you take prescription medications? Yes   Do you have prescription coverage? Yes   Do you have any problems affording any of your prescribed medications? No   Is the patient taking medications as prescribed? yes   How do you get to doctors appointments? car, drives self;family or friend will provide   Are you on dialysis? No   Do you take coumadin? No   Discharge Plan A Home with family   Discharge Plan B Home Health   DME Needed Upon Discharge  none   Discharge Plan discussed with: Patient   Discharge Barriers Identified None

## 2022-08-11 ENCOUNTER — TELEPHONE (OUTPATIENT)
Dept: ADMINISTRATIVE | Facility: OTHER | Age: 77
End: 2022-08-11
Payer: MEDICARE

## 2022-08-11 VITALS
HEIGHT: 72 IN | DIASTOLIC BLOOD PRESSURE: 78 MMHG | TEMPERATURE: 98 F | SYSTOLIC BLOOD PRESSURE: 145 MMHG | OXYGEN SATURATION: 98 % | WEIGHT: 198 LBS | RESPIRATION RATE: 16 BRPM | HEART RATE: 97 BPM | BODY MASS INDEX: 26.82 KG/M2

## 2022-08-11 LAB
ANION GAP SERPL CALC-SCNC: 12 MMOL/L (ref 8–16)
BASOPHILS # BLD AUTO: 0.03 K/UL (ref 0–0.2)
BASOPHILS NFR BLD: 0.4 % (ref 0–1.9)
BUN SERPL-MCNC: 33 MG/DL (ref 8–23)
CALCIUM SERPL-MCNC: 8.7 MG/DL (ref 8.7–10.5)
CHLORIDE SERPL-SCNC: 108 MMOL/L (ref 95–110)
CO2 SERPL-SCNC: 22 MMOL/L (ref 23–29)
CREAT SERPL-MCNC: 1.7 MG/DL (ref 0.5–1.4)
DIFFERENTIAL METHOD: ABNORMAL
EOSINOPHIL # BLD AUTO: 0.2 K/UL (ref 0–0.5)
EOSINOPHIL NFR BLD: 2.6 % (ref 0–8)
ERYTHROCYTE [DISTWIDTH] IN BLOOD BY AUTOMATED COUNT: 16.6 % (ref 11.5–14.5)
EST. GFR  (NO RACE VARIABLE): 41 ML/MIN/1.73 M^2
GLUCOSE SERPL-MCNC: 76 MG/DL (ref 70–110)
HCT VFR BLD AUTO: 42.3 % (ref 40–54)
HGB BLD-MCNC: 13.5 G/DL (ref 14–18)
IMM GRANULOCYTES # BLD AUTO: 0.01 K/UL (ref 0–0.04)
IMM GRANULOCYTES NFR BLD AUTO: 0.1 % (ref 0–0.5)
INR PPP: 1.1 (ref 0.8–1.2)
LYMPHOCYTES # BLD AUTO: 1.5 K/UL (ref 1–4.8)
LYMPHOCYTES NFR BLD: 21.4 % (ref 18–48)
MCH RBC QN AUTO: 28.8 PG (ref 27–31)
MCHC RBC AUTO-ENTMCNC: 31.9 G/DL (ref 32–36)
MCV RBC AUTO: 90 FL (ref 82–98)
MONOCYTES # BLD AUTO: 0.9 K/UL (ref 0.3–1)
MONOCYTES NFR BLD: 12.7 % (ref 4–15)
NEUTROPHILS # BLD AUTO: 4.5 K/UL (ref 1.8–7.7)
NEUTROPHILS NFR BLD: 62.8 % (ref 38–73)
NRBC BLD-RTO: 0 /100 WBC
PLATELET # BLD AUTO: 116 K/UL (ref 150–450)
PMV BLD AUTO: ABNORMAL FL (ref 9.2–12.9)
POTASSIUM SERPL-SCNC: 4.8 MMOL/L (ref 3.5–5.1)
PROTHROMBIN TIME: 11.8 SEC (ref 9–12.5)
RBC # BLD AUTO: 4.68 M/UL (ref 4.6–6.2)
SODIUM SERPL-SCNC: 142 MMOL/L (ref 136–145)
WBC # BLD AUTO: 7.18 K/UL (ref 3.9–12.7)

## 2022-08-11 PROCEDURE — 80048 BASIC METABOLIC PNL TOTAL CA: CPT | Performed by: PHYSICIAN ASSISTANT

## 2022-08-11 PROCEDURE — 25000003 PHARM REV CODE 250: Performed by: PHYSICIAN ASSISTANT

## 2022-08-11 PROCEDURE — 85025 COMPLETE CBC W/AUTO DIFF WBC: CPT | Performed by: PHYSICIAN ASSISTANT

## 2022-08-11 PROCEDURE — 99239 HOSP IP/OBS DSCHRG MGMT >30: CPT | Mod: ,,, | Performed by: PHYSICIAN ASSISTANT

## 2022-08-11 PROCEDURE — 85610 PROTHROMBIN TIME: CPT | Performed by: PHYSICIAN ASSISTANT

## 2022-08-11 PROCEDURE — 99239 PR HOSPITAL DISCHARGE DAY,>30 MIN: ICD-10-PCS | Mod: ,,, | Performed by: PHYSICIAN ASSISTANT

## 2022-08-11 PROCEDURE — 36415 COLL VENOUS BLD VENIPUNCTURE: CPT | Performed by: PHYSICIAN ASSISTANT

## 2022-08-11 RX ORDER — FUROSEMIDE 40 MG/1
40 TABLET ORAL 2 TIMES DAILY
Status: DISCONTINUED | OUTPATIENT
Start: 2022-08-11 | End: 2022-08-11 | Stop reason: HOSPADM

## 2022-08-11 RX ORDER — FUROSEMIDE 40 MG/1
40 TABLET ORAL 2 TIMES DAILY
Qty: 60 TABLET | Refills: 1 | Status: ON HOLD | OUTPATIENT
Start: 2022-08-11 | End: 2023-08-29

## 2022-08-11 RX ORDER — ASPIRIN 81 MG/1
81 TABLET ORAL DAILY
Qty: 30 TABLET | Refills: 0 | Status: SHIPPED | OUTPATIENT
Start: 2022-08-11 | End: 2024-02-01

## 2022-08-11 RX ADMIN — FUROSEMIDE 40 MG: 40 TABLET ORAL at 09:08

## 2022-08-11 RX ADMIN — ASPIRIN 81 MG: 81 TABLET, COATED ORAL at 09:08

## 2022-08-11 RX ADMIN — ATORVASTATIN CALCIUM 40 MG: 20 TABLET, FILM COATED ORAL at 09:08

## 2022-08-11 NOTE — DISCHARGE SUMMARY
[facility] Medicine  Discharge Summary      Patient Name: Felicia Lopez  MRN: [id number]  Patient Class: IP- Inpatient  Admission Date: 8/9/2022  Hospital Length of Stay: 2 days  Discharge Date and Time:  08/11/2022 9:34 AM  Attending Physician: Dwight Molina MD   Discharging Provider: Mara Burgos PA-C  Primary Care Provider: Mickey Darden MD      HPI:   Mr. Felicia Lopez is a 77 y.o. male, with PMH of CAD (s/p stenting), CHF, prior MI/CVA, HTN, CKD-3, anemnia, HLD, who [place] [facility] [place]22 due to chest pressure that awoke him from sleep tonight. He states the pain radiated to his left arm, and was associated with shortness of breath. He notes the pain started 30 mins PTA to the ED. He states the pain improved after 1 spray of Nitroglycerine. He states symptoms are similar to his prior MI, which occurred 12 years ago. He denied nausea, dizziness. He was evaluated in the ED with labs showing thrombocytopenia with PLT of 119K. INR was elevated at 4.2, he does not have coumadin listed as a home med. A metabolic panel showed Cr 1.8, BUN 26,  without hyperkalemia. BNP was elevated at 1117. A troponin was 0.150. CXR showed b/l infiltrates and left sided effusion concerning for pulmonary edema. An EKG showed ST depression and TWI in V4. After his pain returned in the ED, a repeat EKG showed both of these findings had normalized. He was placed on OBS.       * No surgery found *      Hospital Course:   Felicia Lopez was admitted for acute CHF exacerbation. BNP 1117 and CXR with pulmonary edema. Troponin mildly elevated but flat. Initially placed on 2L NC for increased work of breathing, weaning as tolerated. Cardiology consulted, diurese with lasix 40mg IVP BID, repeat TTE with cCHFrEF 45%.  Cardiology agreeable to discharge with lasix 40 mg bid.  D/C HCTZ and plavix.  CBC and BMP expected 8/15.  Cardiology and PCP follow up in 3-7 days.       Goals of Care Treatment  Preferences:  Code Status: Full Code      Consults:   Consults (From admission, onward)        Status Ordering Provider     IP consult to case management  Once        Provider:  (Not yet assigned)    Acknowledged ANTONI CAMPBELL     Inpatient consult to Spiritual Care  Once        Provider:  (Not yet assigned)    Acknowledged ANTONI CAMPBELL     Inpatient consult to Cardiology  Once        Provider:  MD Silvana Ochoa BRITTANY M.          * Acute on chronic combined systolic and diastolic congestive heart failure  - notes b/l LE edema  - no lasix on home meds list   - today BNP is elevated at 1117  - CXR with pulm edema  - cardiology consulted given his cardiac history and chest pain with typical features  - No ACS  - diurese with lasix 40mg IVP BID.  Discharge with 40 mg po bid.  - monitor I&Os and daily weights     TTE:  Results for orders placed during the hospital encounter of 08/09/22    Echo    Interpretation Summary  · Moderate left atrial enlargement.  · The left ventricle is normal in size with mildly decreased systolic function.  · The estimated ejection fraction is 45%.  · Grade III left ventricular diastolic dysfunction.  · There is left ventricular global hypokinesis.  · Normal right ventricular size with normal right ventricular systolic function.  · Mild mitral regurgitation.  · Mild tricuspid regurgitation.      Chest pain  - Mr. Felicia Lopez presents with acute onset of left sided chest pain while sleeping   - chest pain was relieved by Nitro administration both with EMS, and in the ED   - a 1/2012 stress echo showed evidence of inducible ischemia   - Troponin was elevated at 0.133 and flat  - cardiology consulted: no ACS  - no anticoagulation ordered 2/2 thrombocytopenia, and elevated INR   - lipid panel and A1C: lipids well controlled, a1c 5.7    Acute renal failure superimposed on stage 3 chronic kidney disease  - baseline Cr appears to be about 1.3   - today Cr is 1.8    - suspect 2/2 volume overload  - holding lisinopril    - urine lytes ordered   - avoid nephrotoxins   - renally dose meds   Cr 1.7 at discharge, improved with diuresis, BMP for 8/15 and PCP follow up      Supratherapeutic INR  - INR in ED was elevated at 4.2   - monitor INR daily   - no warfarin use   - given he also has thrombocytopenia, suspect there may be an element of liver dysfunction  - no active bleeding at present, repeat 1.1, may have been lab error    CAD (coronary artery disease)  - s/p stenting per history  - continue simvastatin 40 mg QD (or formulary equivalent)   - no ASA on home meds list, suspect 2/2 h/o thrombocytopenia.  Cardiology recs asa 81 mg daily and d/c plavix (stent 2010)    Iron deficiency anemia  - H&H appear stable   - monitor       Hyperlipidemia  - continue statin         Thrombocytopenia  - baseline PLT appears to be 130-140K  - today PLT count is 119K   - no active bleeding   - monitor   Stop plavix    Essential hypertension  - normotensive at present   - home meds: HCTZ 25 mg QD, lisinopril 20 mg QD, metoprolol tartrate 25 mg BID   - monitor   Stop Hctz as going home on lasix        Final Active Diagnoses:    Diagnosis Date Noted POA    PRINCIPAL PROBLEM:  Acute on chronic combined systolic and diastolic congestive heart failure [I50.43] 08/09/2022 Yes    Chest pain [R07.9] 08/09/2022 Yes    Acute renal failure superimposed on stage 3 chronic kidney disease [N17.9, N18.30] 08/09/2022 Yes    Supratherapeutic INR [R79.1] 08/09/2022 Yes    CAD (coronary artery disease) [I25.10] 08/09/2022 Yes    Iron deficiency anemia [D50.9] 08/09/2022 Yes    Hyperlipidemia [E78.5] 10/03/2018 Yes    Thrombocytopenia [D69.6] 10/05/2015 Yes    Essential hypertension [I10] 10/05/2015 Yes      Problems Resolved During this Admission:       Discharged Condition: good    Disposition: Home or Self Care    Follow Up:   Follow-up Information     Mickey Darden MD Follow up in 3 day(s).     Specialty: Internal Medicine  Contact information:  [address] [facility] [address]  [phone]                       Patient Instructions:      CBC Auto Differential   Standing Status: Future Standing Exp. Date: 08/11/23     BASIC METABOLIC PANEL   Standing Status: Future Standing Exp. Date: 10/10/23     Ambulatory referral/consult to Cardiology   Standing Status: Future   Referral Priority: Urgent Referral Type: Consultation   Referral Reason: Specialty Services Required   Requested Specialty: Cardiology   Number of Visits Requested: 1     Notify your health care provider if you experience any of the following:  temperature >100.4     Notify your health care provider if you experience any of the following:  persistent nausea and vomiting or diarrhea     Notify your health care provider if you experience any of the following:  difficulty breathing or increased cough       Significant Diagnostic Studies: Labs: All labs within the past 24 hours have been reviewed    Pending Diagnostic Studies:     None         Medications:  Reconciled Home Medications:      Medication List      START taking these medications    aspirin 81 MG EC tablet  Commonly known as: ECOTRIN  Take 1 tablet (81 mg total) by mouth once daily.     furosemide 40 MG tablet  Commonly known as: LASIX  Take 1 tablet (40 mg total) by mouth 2 (two) times daily.        CONTINUE taking these medications    brimonidine 0.2% 0.2 % Drop  Commonly known as: ALPHAGAN  Place 1 drop into both eyes once daily.     diazePAM 10 MG Tab  Commonly known as: VALIUM  Take 1 tablet (10 mg total) by mouth daily as needed.     diclofenac sodium 1 % Gel  Commonly known as: VOLTAREN  Apply 4 g p.r.n. to left knee for pain.     latanoprost 0.005 % ophthalmic solution  Place 1 drop into both eyes 2 (two) times daily.     levocetirizine 5 MG tablet  Commonly known as: XYZAL  Take 1 tablet (5 mg total) by mouth every evening.     lisinopriL 20 MG tablet  Commonly known  as: PRINIVIL,ZESTRIL  Take 1 tablet (20 mg total) by mouth 2 (two) times daily.     metoprolol tartrate 25 MG tablet  Commonly known as: LOPRESSOR  TAKE 1/2 TABLET BY MOUTH ONCE DAILY     simvastatin 40 MG tablet  Commonly known as: ZOCOR  Take 1 tablet (40 mg total) by mouth once daily.        STOP taking these medications    azithromycin 250 MG tablet  Commonly known as: Z-CALLUM     baclofen 10 MG tablet  Commonly known as: LIORESAL     clopidogreL 75 mg tablet  Commonly known as: PLAVIX     hydroCHLOROthiazide 12.5 mg capsule  Commonly known as: MICROZIDE            Indwelling Lines/Drains at time of discharge:   Lines/Drains/Airways     None                 Time spent on the discharge of patient: >30 minutes         Mara Burgos PA-C  Department of Hospital Medicine  [facility]

## 2022-08-11 NOTE — ASSESSMENT & PLAN NOTE
- baseline Cr appears to be about 1.3   - today Cr is 1.8   - suspect 2/2 volume overload  - holding lisinopril    - urine lytes ordered   - avoid nephrotoxins   - renally dose meds   Cr 1.7 at discharge, improved with diuresis, BMP for 8/15 and PCP follow up

## 2022-08-11 NOTE — ASSESSMENT & PLAN NOTE
- notes b/l LE edema  - no lasix on home meds list   - today BNP is elevated at 1117  - CXR with pulm edema  - cardiology consulted given his cardiac history and chest pain with typical features  - No ACS  - diurese with lasix 40mg IVP BID.  Discharge with 40 mg po bid.  - monitor I&Os and daily weights     TTE:  Results for orders placed during the hospital encounter of 08/09/22    Echo    Interpretation Summary  · Moderate left atrial enlargement.  · The left ventricle is normal in size with mildly decreased systolic function.  · The estimated ejection fraction is 45%.  · Grade III left ventricular diastolic dysfunction.  · There is left ventricular global hypokinesis.  · Normal right ventricular size with normal right ventricular systolic function.  · Mild mitral regurgitation.  · Mild tricuspid regurgitation.

## 2022-08-11 NOTE — NURSING
Pt complaining of chest pain - not radiating anywhere. Vital signs stable see flowsheet for details.  Administered nitro and pain subsided. Telemetry not changed during episode. Notified Dick Duenas and no further orders. Will continue to monitor

## 2022-08-11 NOTE — ASSESSMENT & PLAN NOTE
- normotensive at present   - home meds: HCTZ 25 mg QD, lisinopril 20 mg QD, metoprolol tartrate 25 mg BID   - monitor   Stop Hctz as going home on lasix

## 2022-08-11 NOTE — ASSESSMENT & PLAN NOTE
- INR in ED was elevated at 4.2   - monitor INR daily   - no warfarin use   - given he also has thrombocytopenia, suspect there may be an element of liver dysfunction  - no active bleeding at present, repeat 1.1, may have been lab error

## 2022-08-11 NOTE — PROGRESS NOTES
AVS given to and discussed with pt.  IV removed.  Telemetry box removed and returned.  Pt assessed and determined to be in stable condition.  Pt refused transport.  RN escorted pt to elevator.

## 2022-08-11 NOTE — ASSESSMENT & PLAN NOTE
- baseline PLT appears to be 130-140K  - today PLT count is 119K   - no active bleeding   - monitor   Stop plavix

## 2022-08-11 NOTE — PLAN OF CARE
08/11/22 1126   Final Note   Assessment Type Final Discharge Note   Anticipated Discharge Disposition Home   Hospital Resources/Appts/Education Provided Provided patient/caregiver with written discharge plan information;Appointments scheduled and added to AVS;Appointments scheduled by Navigator/Coordinator   Post-Acute Status   Discharge Delays None known at this time       Pt states he lives independently at home with his wife.     Family to provide transportation home.    No DC needs from CM perspective.

## 2022-08-11 NOTE — ASSESSMENT & PLAN NOTE
- s/p stenting per history  - continue simvastatin 40 mg QD (or formulary equivalent)   - no ASA on home meds list, suspect 2/2 h/o thrombocytopenia.  Cardiology recs asa 81 mg daily and d/c plavix (stent 2010)

## 2022-08-15 ENCOUNTER — TELEPHONE (OUTPATIENT)
Dept: ADMINISTRATIVE | Facility: OTHER | Age: 77
End: 2022-08-15
Payer: MEDICARE

## 2022-08-15 NOTE — TELEPHONE ENCOUNTER
Spoke to patient who declined rescheduling Cardiology appointment of 8-15-22 until he sees his primary care provider. He will call when ready to reschedule.

## 2022-08-23 NOTE — PHYSICIAN QUERY
PT Name: Felicia Lopez  MR #: [id number]    DOCUMENTATION CLARIFICATION     CDS/: Russel Arauz Jr, RN CCDS              Contact information:[email or web address]  This form is a permanent document in the medical record.     Query Date: August 23, 2022    By submitting this query, we are merely seeking further clarification of documentation.  Please utilize your independent clinical judgment when addressing the question(s) below.      The Medical Record reflects the following:  Clinical Information Location in Medical Record   Final diagnoses:  [R07.9] Chest pain  [I21.4] NSTEMI (non-ST elevated myocardial infarction) (Primary)  [N17.9] ARELI (acute kidney injury)    Summary    · Moderate left atrial enlargement.  · The left ventricle is normal in size with mildly decreased systolic function.  · The estimated ejection fraction is 45%.  · Grade III left ventricular diastolic dysfunction.  · There is left ventricular global hypokinesis.  · Normal right ventricular size with normal right ventricular systolic function.  · Mild mitral regurgitation.  · Mild tricuspid regurgitation.    Sinus rhythm with occasional Premature ventricular complexes   Nonspecific ST and T wave abnormality   Prolonged QT   Abnormal ECG     Troponin=0.150-->0.133-->0.141    Physical examination is consistent with pulmonary edema.  No ACS.    presented to [facility] ED on 8/9/22 due to chest pressure that awoke him from sleep tonight. He states the pain radiated to his left arm, and was associated with shortness of breath. He notes the pain started 30 mins PTA to the ED. He states the pain improved after 1 spray of Nitroglycerine. He states symptoms are similar to his prior MI, which occurred 12 years ago.      8/9 ED Provider Note            8/9 Echo Report                        8/9 EKG Report        8/9 Labs      8/10 Cardiology Progress Note      8/11 Discharge Summary     Please clarify/confirm the Emergency Medicine diagnosis of        NSTEMI (non-ST elevated myocardial infarction)   ?     [   ] Diagnosis ruled in   [  X ] Diagnosis ruled out   [   ] Other diagnosis (please specify):    [   ] Clinically undetermined

## 2022-08-23 NOTE — PHYSICIAN QUERY
PT Name: Felicia Lopez  MR #: [id number]     DOCUMENTATION CLARIFICATION     CDS/: Russel Arauz Jr, RN CCDS               Contact information:[email or web address]  This form is a permanent document in the medical record.     Query Date: August 23, 2022    By submitting this query, we are merely seeking further clarification of documentation.  Please utilize your independent clinical judgment when addressing the question(s) below.    The Medical Record contains the following   Indicators Supporting Clinical Findings Location in Medical Record   x Heart Failure documented Acute on chronic systolic heart failure    Acute on chronic combined systolic and diastolic congestive heart failure   8/9 Cardiology Consult Note    8/11 Discharge Summary   x BNP today BNP is elevated at 1117   8/9 H&P   x EF/Echo Summary    · Moderate left atrial enlargement.  · The left ventricle is normal in size with mildly decreased systolic function.  · The estimated ejection fraction is 45%.  · Grade III left ventricular diastolic dysfunction.  · There is left ventricular global hypokinesis.  · Normal right ventricular size with normal right ventricular systolic function.  · Mild mitral regurgitation.  · Mild tricuspid regurgitation.      8/9 Echo   x Radiology findings Chest x-ray is consistent with pulmonary edema 8/9 Cardiology Consult Note   x Subjective/Objective Respiratory Conditions Respiratory:  Positive for shortness of breath 8/9 H&P    Recent/Current MI      Heart Transplant, LVAD     x Edema, JVD CHF (congestive heart failure)  - notes b/l LE edema, which on exam is only trace edema    8/9 H&P    Ascites     x Diuretics/Meds furosemide injection 20 mg Daily    furosemide injection 40 mg Every 12 Hours 8/9-8/10 MAR    8/10-8/11 MAR    Other Treatment      Other       Heart failure is a clinical diagnosis which includes symptomatic fluid retention, elevated intracardiac pressures, and/or the inability of the heart to  deliver adequate blood flow.     Heart Failure with reduced Ejection Fraction (HFrEF) or Systolic Heart Failure (loses ability to contract normally, EF is <40%)     Heart Failure with preserved Ejection Fraction (HFpEF) or Diastolic Heart Failure (stiff ventricles, does not relax properly, EF is >50%)      Heart Failure with Combined Systolic and Diastolic Failure (stiff ventricles, does not relax properly and EF is <50%)     Mid-range or mildly reduced ejection fraction (HFmrEF) is classified as systolic heart failure.   Congestive heart failure with a recovered EF is classified as Diastolic Heart Failure.  Common clues to acute exacerbation:  Rapidly progressive symptoms (w/in 2 weeks of presentation), using IV diuretics, using supplemental O2, pulmonary edema on Xray, new or worsening pleural effusion, +JVD or other signs of volume overload, MI w/in 4 weeks, and/or BNP >500  The clinical guidelines noted are only system guidelines, and do not replace the providers clinical judgment.    Provider, due to documentation conflict please clarify the type of          Acute on Chronic Heart Failure              [   ]  Acute on Chronic Systolic Heart Failure (HFrEF or HFmrEF) - worsening of CHF signs/symptoms in preexisting CHF   [ X  ]  Acute on Chronic Combined Systolic and Diastolic Heart Failure - worsening of CHF signs/symptoms in preexisting CHF   [   ]  Other (please specify):    [   ]  Clinically Undetermined       Please document in your progress notes daily for the duration of treatment until resolved and include in your discharge summary.    References:  American Heart Association editorial staff. (2017, May). Ejection Fraction Heart Failure Measurement. American Heart Association. https://www.heart.org/en/health-topics/heart-failure/diagnosing-heart-failure/ejection-fraction-heart-failure-measurement#:~:text=Ejection%20fraction%20(EF)%20is%20a,pushed%20out%20with%20each%20heartbeat  RON Francsico (2020,  December 15). Heart failure with preserved ejection fraction: Clinical manifestations and diagnosis. 5appToDate. https://www.[employer].com/contents/heart-failure-with-preserved-ejection-fraction-clinical-manifestations-and-diagnosis.  ICD-10-CM/PCS Coding Clinic Third Quarter ICD-10, Effective with discharges: September 8, 2020 [facility] [place] Heart failure with mid-range or mildly reduced ejection fraction (2020).  ICD-10-CM/PCS Coding Clinic Third Quarter ICD-10, Effective with discharges: September 8, 2020 [facility] [place] Heart failure with recovered ejection fraction (2020).  Form No. [id number]

## 2022-09-30 ENCOUNTER — OFFICE VISIT (OUTPATIENT)
Dept: CARDIOLOGY | Facility: CLINIC | Age: 77
End: 2022-09-30
Payer: MEDICARE

## 2022-09-30 VITALS
SYSTOLIC BLOOD PRESSURE: 116 MMHG | WEIGHT: 193.81 LBS | BODY MASS INDEX: 26.28 KG/M2 | HEART RATE: 82 BPM | DIASTOLIC BLOOD PRESSURE: 70 MMHG | OXYGEN SATURATION: 96 %

## 2022-09-30 DIAGNOSIS — D69.6 THROMBOCYTOPENIA: ICD-10-CM

## 2022-09-30 DIAGNOSIS — I50.43 ACUTE ON CHRONIC COMBINED SYSTOLIC AND DIASTOLIC CONGESTIVE HEART FAILURE: ICD-10-CM

## 2022-09-30 DIAGNOSIS — I10 ESSENTIAL HYPERTENSION: ICD-10-CM

## 2022-09-30 DIAGNOSIS — N18.30 STAGE 3 CHRONIC KIDNEY DISEASE, UNSPECIFIED WHETHER STAGE 3A OR 3B CKD: ICD-10-CM

## 2022-09-30 DIAGNOSIS — Z86.73 HISTORY OF CVA (CEREBROVASCULAR ACCIDENT): Primary | ICD-10-CM

## 2022-09-30 PROCEDURE — 3074F SYST BP LT 130 MM HG: CPT | Mod: CPTII,S$GLB,, | Performed by: INTERNAL MEDICINE

## 2022-09-30 PROCEDURE — 1101F PR PT FALLS ASSESS DOC 0-1 FALLS W/OUT INJ PAST YR: ICD-10-PCS | Mod: CPTII,S$GLB,, | Performed by: INTERNAL MEDICINE

## 2022-09-30 PROCEDURE — 99214 OFFICE O/P EST MOD 30 MIN: CPT | Mod: S$GLB,,, | Performed by: INTERNAL MEDICINE

## 2022-09-30 PROCEDURE — 99999 PR PBB SHADOW E&M-EST. PATIENT-LVL III: CPT | Mod: PBBFAC,,, | Performed by: INTERNAL MEDICINE

## 2022-09-30 PROCEDURE — 1125F PR PAIN SEVERITY QUANTIFIED, PAIN PRESENT: ICD-10-PCS | Mod: CPTII,S$GLB,, | Performed by: INTERNAL MEDICINE

## 2022-09-30 PROCEDURE — 1159F MED LIST DOCD IN RCRD: CPT | Mod: CPTII,S$GLB,, | Performed by: INTERNAL MEDICINE

## 2022-09-30 PROCEDURE — 1125F AMNT PAIN NOTED PAIN PRSNT: CPT | Mod: CPTII,S$GLB,, | Performed by: INTERNAL MEDICINE

## 2022-09-30 PROCEDURE — 3078F PR MOST RECENT DIASTOLIC BLOOD PRESSURE < 80 MM HG: ICD-10-PCS | Mod: CPTII,S$GLB,, | Performed by: INTERNAL MEDICINE

## 2022-09-30 PROCEDURE — 3074F PR MOST RECENT SYSTOLIC BLOOD PRESSURE < 130 MM HG: ICD-10-PCS | Mod: CPTII,S$GLB,, | Performed by: INTERNAL MEDICINE

## 2022-09-30 PROCEDURE — 3288F PR FALLS RISK ASSESSMENT DOCUMENTED: ICD-10-PCS | Mod: CPTII,S$GLB,, | Performed by: INTERNAL MEDICINE

## 2022-09-30 PROCEDURE — 1159F PR MEDICATION LIST DOCUMENTED IN MEDICAL RECORD: ICD-10-PCS | Mod: CPTII,S$GLB,, | Performed by: INTERNAL MEDICINE

## 2022-09-30 PROCEDURE — 3288F FALL RISK ASSESSMENT DOCD: CPT | Mod: CPTII,S$GLB,, | Performed by: INTERNAL MEDICINE

## 2022-09-30 PROCEDURE — 1101F PT FALLS ASSESS-DOCD LE1/YR: CPT | Mod: CPTII,S$GLB,, | Performed by: INTERNAL MEDICINE

## 2022-09-30 PROCEDURE — 99999 PR PBB SHADOW E&M-EST. PATIENT-LVL III: ICD-10-PCS | Mod: PBBFAC,,, | Performed by: INTERNAL MEDICINE

## 2022-09-30 PROCEDURE — 99214 PR OFFICE/OUTPT VISIT, EST, LEVL IV, 30-39 MIN: ICD-10-PCS | Mod: S$GLB,,, | Performed by: INTERNAL MEDICINE

## 2022-09-30 PROCEDURE — 3078F DIAST BP <80 MM HG: CPT | Mod: CPTII,S$GLB,, | Performed by: INTERNAL MEDICINE

## 2022-09-30 NOTE — PROGRESS NOTES
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  He was admitted to [facility] for acute on chronic heart failure decompensation.  He was diuresed and felt better.  Since discharge, he feels great.  He denies any chest pain, shortness of breath, proximal nocturnal dyspnea or orthopnea.  He has since decrease his Lasix to once a day because he was losing weight.   He decided to f/u with us at [facility] for cardiology care.      Medications  Current Outpatient Medications   Medication Sig Dispense Refill    aspirin (ECOTRIN) 81 MG EC tablet Take 1 tablet (81 mg total) by mouth once daily. 30 tablet 0    brimonidine 0.2% (ALPHAGAN) 0.2 % Drop Place 1 drop into both eyes once daily.      diclofenac sodium (VOLTAREN) 1 % Gel Apply 4 g p.r.n. to left knee for pain.      furosemide (LASIX) 40 MG tablet Take 1 tablet (40 mg total) by mouth 2 (two) times daily. (Patient taking differently: Take 40 mg by mouth once  daily.) 60 tablet 1    lisinopril (PRINIVIL,ZESTRIL) 20 MG tablet Take 1 tablet (20 mg total) by mouth 2 (two) times daily. 180 tablet 0    simvastatin (ZOCOR) 40 MG tablet Take 1 tablet (40 mg total) by mouth once daily. 90 tablet 0    diazePAM (VALIUM) 10 MG Tab Take 1 tablet (10 mg total) by mouth daily as needed. 30 tablet 3    levocetirizine (XYZAL) 5 MG tablet Take 1 tablet (5 mg total) by mouth every evening. 30 tablet 1    metoprolol tartrate (LOPRESSOR) 25 MG tablet TAKE 1/2 TABLET BY MOUTH ONCE DAILY 90 tablet 2     No current facility-administered medications for this visit.      Prior to Admission medications    Medication Sig Start Date End Date Taking? Authorizing Provider   aspirin (ECOTRIN) 81 MG EC tablet Take 1 tablet (81 mg total) by mouth once daily. 8/11/22 8/11/23 Yes Mara Burgos PA-C   brimonidine 0.2% (ALPHAGAN) 0.2 % Drop Place 1 drop into both eyes once daily.   Yes Historical Provider   diclofenac sodium (VOLTAREN) 1 % Gel Apply 4 g p.r.n. to left knee for pain. 5/29/18  Yes Historical Provider   furosemide (LASIX) 40 MG tablet Take 1 tablet (40 mg total) by mouth 2 (two) times daily.  Patient taking differently: Take 40 mg by mouth once daily. 8/11/22 8/11/23 Yes Mara Burgos PA-C   lisinopril (PRINIVIL,ZESTRIL) 20 MG tablet Take 1 tablet (20 mg total) by mouth 2 (two) times daily. 1/28/19  Yes Mickey Darden MD   simvastatin (ZOCOR) 40 MG tablet Take 1 tablet (40 mg total) by mouth once daily. 12/14/18  Yes Mickey Darden MD   diazePAM (VALIUM) 10 MG Tab Take 1 tablet (10 mg total) by mouth daily as needed. 12/18/17 8/9/22  Mickey Darden MD   levocetirizine (XYZAL) 5 MG tablet Take 1 tablet (5 mg total) by mouth every evening. 11/21/16 11/21/17  Mickey Darden MD   metoprolol tartrate (LOPRESSOR) 25 MG tablet TAKE 1/2 TABLET BY MOUTH ONCE DAILY 7/25/16   Mickey Darden MD   baclofen (LIORESAL) 10 MG tablet Take 1 tablet (10 mg total) by mouth 3 (three)  times daily. 10/3/18 8/11/22  Mickey Darden MD   clopidogrel (PLAVIX) 75 mg tablet Take 1 tablet (75 mg total) by mouth once daily. 18  Mickey Darden MD   hydroCHLOROthiazide (MICROZIDE) 12.5 mg capsule Take 1 capsule (12.5 mg total) by mouth once daily.  Patient taking differently: Take 25 mg by mouth once daily. 10/31/18 8/11/22  Mickey Darden MD   latanoprost 0.005 % ophthalmic solution Place 1 drop into both eyes 2 (two) times daily.  22  Historical Provider         History  Past Medical History:   Diagnosis Date    Disorder of kidney and ureter     Hyperlipidemia     Hypertension      Past Surgical History:   Procedure Laterality Date    FACIAL RECONSTRUCTION SURGERY      PROSTATE SURGERY       Social History     Socioeconomic History    Marital status:    Tobacco Use    Smoking status: Former     Types: Cigarettes     Quit date: 10/5/2008     Years since quittin.9    Smokeless tobacco: Never   Substance and Sexual Activity    Alcohol use: No     Alcohol/week: 0.0 standard drinks    Drug use: No    Sexual activity: Never     Social Determinants of Health     Financial Resource Strain: Low Risk     Difficulty of Paying Living Expenses: Not hard at all   Food Insecurity: No Food Insecurity    Worried About Running Out of Food in the Last Year: Never true    Ran Out of Food in the Last Year: Never true   Transportation Needs: No Transportation Needs    Lack of Transportation (Medical): No    Lack of Transportation (Non-Medical): No   Physical Activity: Inactive    Days of Exercise per Week: 0 days    Minutes of Exercise per Session: 0 min   Stress: No Stress Concern Present    Feeling of Stress : Only a little   Social Connections: Socially Isolated    Frequency of Communication with Friends and Family: Never    Frequency of Social Gatherings with Friends and Family: Never    Attends Scientology Services: Never    Active Member of Clubs or Organizations: No    Attends  Club or Organization Meetings: Never    Marital Status:    Housing Stability: Unknown    Unable to Pay for Housing in the Last Year: No    Unstable Housing in the Last Year: No         Allergies  Review of patient's allergies indicates:  No Known Allergies      Review of Systems   Review of Systems   Cardiovascular: Negative.    Respiratory: Negative.     All other systems reviewed and are negative.      Physical Exam  Wt Readings from Last 1 Encounters:   09/30/22 87.9 kg (193 lb 12.8 oz)     BP Readings from Last 3 Encounters:   09/30/22 116/70   08/11/22 (!) 145/78   10/03/18 117/64     Pulse Readings from Last 1 Encounters:   09/30/22 82     Body mass index is 26.28 kg/m².    Physical Exam  Neck:      Vascular: No JVD.   Cardiovascular:      Rate and Rhythm: Normal rate and regular rhythm.      Pulses:           Carotid pulses are 2+ on the right side and 2+ on the left side.       Radial pulses are 2+ on the right side and 2+ on the left side.      Heart sounds: S1 normal and S2 normal. Murmur heard.   Systolic murmur is present.   Pulmonary:      Breath sounds: Normal breath sounds and air entry.   Musculoskeletal:      Right lower leg: No edema.      Left lower leg: No edema.   Neurological:      Mental Status: He is alert.           Assessment  1. History of CVA (cerebrovascular accident)  stable    2. Essential hypertension  controlled    3. Acute on chronic combined systolic and diastolic congestive heart failure  stable  - Nuclear Stress - Cardiology Interpreted; Future    4. Stage 3 chronic kidney disease, unspecified whether stage 3a or 3b CKD  stable    5. Thrombocytopenia  unchanged        Plan and Discussion  Continue current meds.  Will get Lexiscan nuclear stress test to evaluate for ischemic CMP given his prior stent.    Follow Up  1-2 months      Fabio Martinez MD, F.A.C.C, F.S.C.A.I.

## 2022-10-24 ENCOUNTER — HOSPITAL ENCOUNTER (OUTPATIENT)
Dept: RADIOLOGY | Facility: OTHER | Age: 77
Discharge: HOME | End: 2022-10-24
Attending: INTERNAL MEDICINE
Payer: MEDICARE

## 2022-10-24 ENCOUNTER — HOSPITAL ENCOUNTER (OUTPATIENT)
Dept: CARDIOLOGY | Facility: OTHER | Age: 77
Discharge: HOME | End: 2022-10-24
Attending: INTERNAL MEDICINE
Payer: MEDICARE

## 2022-10-24 DIAGNOSIS — I50.43 ACUTE ON CHRONIC COMBINED SYSTOLIC AND DIASTOLIC CONGESTIVE HEART FAILURE: ICD-10-CM

## 2022-10-24 LAB
CV STRESS BASE HR: 73 BPM
DIASTOLIC BLOOD PRESSURE: 62 MMHG
NUC REST EJECTION FRACTION: 26
NUC STRESS EJECTION FRACTION: 41 %
OHS CV CPX 85 PERCENT MAX PREDICTED HEART RATE MALE: 121
OHS CV CPX MAX PREDICTED HEART RATE: 142
OHS CV CPX PATIENT IS FEMALE: 0
OHS CV CPX PATIENT IS MALE: 1
OHS CV CPX PEAK DIASTOLIC BLOOD PRESSURE: 68 MMHG
OHS CV CPX PEAK HEAR RATE: 82 BPM
OHS CV CPX PEAK RATE PRESSURE PRODUCT: 9266
OHS CV CPX PEAK SYSTOLIC BLOOD PRESSURE: 113 MMHG
OHS CV CPX PERCENT MAX PREDICTED HEART RATE ACHIEVED: 58
OHS CV CPX RATE PRESSURE PRODUCT PRESENTING: 8468
SYSTOLIC BLOOD PRESSURE: 116 MMHG

## 2022-10-24 PROCEDURE — 78452 HT MUSCLE IMAGE SPECT MULT: CPT | Mod: 26,,, | Performed by: INTERNAL MEDICINE

## 2022-10-24 PROCEDURE — 93018 CV STRESS TEST I&R ONLY: CPT | Mod: ,,, | Performed by: INTERNAL MEDICINE

## 2022-10-24 PROCEDURE — 93018 STRESS TEST WITH MYOCARDIAL PERFUSION (CUPID ONLY): ICD-10-PCS | Mod: ,,, | Performed by: INTERNAL MEDICINE

## 2022-10-24 PROCEDURE — 63600175 PHARM REV CODE 636 W HCPCS: Performed by: INTERNAL MEDICINE

## 2022-10-24 PROCEDURE — 78452 STRESS TEST WITH MYOCARDIAL PERFUSION (CUPID ONLY): ICD-10-PCS | Mod: 26,,, | Performed by: INTERNAL MEDICINE

## 2022-10-24 PROCEDURE — 93016 STRESS TEST WITH MYOCARDIAL PERFUSION (CUPID ONLY): ICD-10-PCS | Mod: ,,, | Performed by: INTERNAL MEDICINE

## 2022-10-24 PROCEDURE — 93016 CV STRESS TEST SUPVJ ONLY: CPT | Mod: ,,, | Performed by: INTERNAL MEDICINE

## 2022-10-24 PROCEDURE — A9500 TC99M SESTAMIBI: HCPCS

## 2022-10-24 RX ORDER — REGADENOSON 0.08 MG/ML
0.4 INJECTION, SOLUTION INTRAVENOUS ONCE
Status: COMPLETED | OUTPATIENT
Start: 2022-10-24 | End: 2022-10-24

## 2022-10-24 RX ADMIN — REGADENOSON 0.4 MG: 0.08 INJECTION, SOLUTION INTRAVENOUS at 10:10

## 2022-10-28 ENCOUNTER — OFFICE VISIT (OUTPATIENT)
Dept: CARDIOLOGY | Facility: CLINIC | Age: 77
End: 2022-10-28
Payer: MEDICARE

## 2022-10-28 VITALS
SYSTOLIC BLOOD PRESSURE: 110 MMHG | OXYGEN SATURATION: 98 % | BODY MASS INDEX: 26.45 KG/M2 | HEIGHT: 72 IN | DIASTOLIC BLOOD PRESSURE: 60 MMHG | WEIGHT: 195.31 LBS | HEART RATE: 86 BPM

## 2022-10-28 DIAGNOSIS — I70.219 ATHEROSCLEROSIS OF NATIVE ARTERY OF EXTREMITY WITH INTERMITTENT CLAUDICATION, UNSPECIFIED EXTREMITY: ICD-10-CM

## 2022-10-28 DIAGNOSIS — I25.10 CORONARY ARTERY DISEASE, UNSPECIFIED VESSEL OR LESION TYPE, UNSPECIFIED WHETHER ANGINA PRESENT, UNSPECIFIED WHETHER NATIVE OR TRANSPLANTED HEART: ICD-10-CM

## 2022-10-28 DIAGNOSIS — I10 ESSENTIAL HYPERTENSION: Primary | ICD-10-CM

## 2022-10-28 DIAGNOSIS — E78.5 HYPERLIPIDEMIA, UNSPECIFIED HYPERLIPIDEMIA TYPE: ICD-10-CM

## 2022-10-28 DIAGNOSIS — I50.43 ACUTE ON CHRONIC COMBINED SYSTOLIC AND DIASTOLIC CONGESTIVE HEART FAILURE: ICD-10-CM

## 2022-10-28 PROCEDURE — 3288F FALL RISK ASSESSMENT DOCD: CPT | Mod: CPTII,S$GLB,, | Performed by: INTERNAL MEDICINE

## 2022-10-28 PROCEDURE — 3288F PR FALLS RISK ASSESSMENT DOCUMENTED: ICD-10-PCS | Mod: CPTII,S$GLB,, | Performed by: INTERNAL MEDICINE

## 2022-10-28 PROCEDURE — 1126F AMNT PAIN NOTED NONE PRSNT: CPT | Mod: CPTII,S$GLB,, | Performed by: INTERNAL MEDICINE

## 2022-10-28 PROCEDURE — 3078F DIAST BP <80 MM HG: CPT | Mod: CPTII,S$GLB,, | Performed by: INTERNAL MEDICINE

## 2022-10-28 PROCEDURE — 1126F PR PAIN SEVERITY QUANTIFIED, NO PAIN PRESENT: ICD-10-PCS | Mod: CPTII,S$GLB,, | Performed by: INTERNAL MEDICINE

## 2022-10-28 PROCEDURE — 99999 PR PBB SHADOW E&M-EST. PATIENT-LVL III: ICD-10-PCS | Mod: PBBFAC,,, | Performed by: INTERNAL MEDICINE

## 2022-10-28 PROCEDURE — 3078F PR MOST RECENT DIASTOLIC BLOOD PRESSURE < 80 MM HG: ICD-10-PCS | Mod: CPTII,S$GLB,, | Performed by: INTERNAL MEDICINE

## 2022-10-28 PROCEDURE — 99999 PR PBB SHADOW E&M-EST. PATIENT-LVL III: CPT | Mod: PBBFAC,,, | Performed by: INTERNAL MEDICINE

## 2022-10-28 PROCEDURE — 1101F PR PT FALLS ASSESS DOC 0-1 FALLS W/OUT INJ PAST YR: ICD-10-PCS | Mod: CPTII,S$GLB,, | Performed by: INTERNAL MEDICINE

## 2022-10-28 PROCEDURE — 1159F MED LIST DOCD IN RCRD: CPT | Mod: CPTII,S$GLB,, | Performed by: INTERNAL MEDICINE

## 2022-10-28 PROCEDURE — 99214 PR OFFICE/OUTPT VISIT, EST, LEVL IV, 30-39 MIN: ICD-10-PCS | Mod: S$GLB,,, | Performed by: INTERNAL MEDICINE

## 2022-10-28 PROCEDURE — 3074F SYST BP LT 130 MM HG: CPT | Mod: CPTII,S$GLB,, | Performed by: INTERNAL MEDICINE

## 2022-10-28 PROCEDURE — 3074F PR MOST RECENT SYSTOLIC BLOOD PRESSURE < 130 MM HG: ICD-10-PCS | Mod: CPTII,S$GLB,, | Performed by: INTERNAL MEDICINE

## 2022-10-28 PROCEDURE — 99214 OFFICE O/P EST MOD 30 MIN: CPT | Mod: S$GLB,,, | Performed by: INTERNAL MEDICINE

## 2022-10-28 PROCEDURE — 1101F PT FALLS ASSESS-DOCD LE1/YR: CPT | Mod: CPTII,S$GLB,, | Performed by: INTERNAL MEDICINE

## 2022-10-28 PROCEDURE — 1159F PR MEDICATION LIST DOCUMENTED IN MEDICAL RECORD: ICD-10-PCS | Mod: CPTII,S$GLB,, | Performed by: INTERNAL MEDICINE

## 2022-10-28 NOTE — PROGRESS NOTES
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  He denies any chest pain, shortness of breath, dyspnea on exertion, palpitation or syncope.  He denies any bleeding.    Medications  Current Outpatient Medications   Medication Sig Dispense Refill    aspirin (ECOTRIN) 81 MG EC tablet Take 1 tablet (81 mg total) by mouth once daily. 30 tablet 0    furosemide (LASIX) 40 MG tablet Take 1 tablet (40 mg total) by mouth 2 (two) times daily. (Patient taking differently: Take 40 mg by mouth once daily.) 60 tablet 1    lisinopril (PRINIVIL,ZESTRIL) 20 MG tablet Take 1 tablet (20 mg total) by mouth 2 (two) times daily. 180 tablet 0    simvastatin (ZOCOR) 40 MG tablet Take 1 tablet (40 mg total) by mouth once daily. 90 tablet 0    brimonidine 0.2% (ALPHAGAN) 0.2 % Drop Place 1 drop into both eyes once daily.      diazePAM (VALIUM) 10 MG Tab Take 1 tablet (10 mg total) by  mouth daily as needed. 30 tablet 3    diclofenac sodium (VOLTAREN) 1 % Gel Apply 4 g p.r.n. to left knee for pain.      levocetirizine (XYZAL) 5 MG tablet Take 1 tablet (5 mg total) by mouth every evening. 30 tablet 1    metoprolol tartrate (LOPRESSOR) 25 MG tablet TAKE 1/2 TABLET BY MOUTH ONCE DAILY 90 tablet 2     No current facility-administered medications for this visit.      Prior to Admission medications    Medication Sig Start Date End Date Taking? Authorizing Provider   aspirin (ECOTRIN) 81 MG EC tablet Take 1 tablet (81 mg total) by mouth once daily. 8/11/22 8/11/23 Yes Mara Burgos PA-C   furosemide (LASIX) 40 MG tablet Take 1 tablet (40 mg total) by mouth 2 (two) times daily.  Patient taking differently: Take 40 mg by mouth once daily. 8/11/22 8/11/23 Yes Mara Burgos PA-C   lisinopril (PRINIVIL,ZESTRIL) 20 MG tablet Take 1 tablet (20 mg total) by mouth 2 (two) times daily. 1/28/19  Yes Mickey Darden MD   simvastatin (ZOCOR) 40 MG tablet Take 1 tablet (40 mg total) by mouth once daily. 12/14/18  Yes Mickey Darden MD   brimonidine 0.2% (ALPHAGAN) 0.2 % Drop Place 1 drop into both eyes once daily.    Historical Provider   diazePAM (VALIUM) 10 MG Tab Take 1 tablet (10 mg total) by mouth daily as needed. 12/18/17 8/9/22  Mickey Darden MD   diclofenac sodium (VOLTAREN) 1 % Gel Apply 4 g p.r.n. to left knee for pain. 5/29/18   Historical Provider   levocetirizine (XYZAL) 5 MG tablet Take 1 tablet (5 mg total) by mouth every evening. 11/21/16 11/21/17  Mickey Darden MD   metoprolol tartrate (LOPRESSOR) 25 MG tablet TAKE 1/2 TABLET BY MOUTH ONCE DAILY 7/25/16   Mickey Darden MD   baclofen (LIORESAL) 10 MG tablet Take 1 tablet (10 mg total) by mouth 3 (three) times daily. 10/3/18 8/11/22  Mickey Darden MD   clopidogrel (PLAVIX) 75 mg tablet Take 1 tablet (75 mg total) by mouth once daily. 12/14/18 8/11/22  Mickey Darden MD   hydroCHLOROthiazide (MICROZIDE) 12.5 mg  capsule Take 1 capsule (12.5 mg total) by mouth once daily.  Patient taking differently: Take 25 mg by mouth once daily. 10/31/18 8/11/22  Mickey Darden MD         History  Past Medical History:   Diagnosis Date    Disorder of kidney and ureter     Hyperlipidemia     Hypertension      Past Surgical History:   Procedure Laterality Date    FACIAL RECONSTRUCTION SURGERY      PROSTATE SURGERY       Social History     Socioeconomic History    Marital status:    Tobacco Use    Smoking status: Former     Types: Cigarettes     Quit date: 10/5/2008     Years since quittin.0    Smokeless tobacco: Never   Substance and Sexual Activity    Alcohol use: No     Alcohol/week: 0.0 standard drinks    Drug use: No    Sexual activity: Never     Social Determinants of Health     Financial Resource Strain: Low Risk     Difficulty of Paying Living Expenses: Not hard at all   Food Insecurity: No Food Insecurity    Worried About Running Out of Food in the Last Year: Never true    Ran Out of Food in the Last Year: Never true   Transportation Needs: No Transportation Needs    Lack of Transportation (Medical): No    Lack of Transportation (Non-Medical): No   Physical Activity: Inactive    Days of Exercise per Week: 0 days    Minutes of Exercise per Session: 0 min   Stress: No Stress Concern Present    Feeling of Stress : Only a little   Social Connections: Socially Isolated    Frequency of Communication with Friends and Family: Never    Frequency of Social Gatherings with Friends and Family: Never    Attends Alevism Services: Never    Active Member of Clubs or Organizations: No    Attends Club or Organization Meetings: Never    Marital Status:    Housing Stability: Unknown    Unable to Pay for Housing in the Last Year: No    Unstable Housing in the Last Year: No         Allergies  Review of patient's allergies indicates:  No Known Allergies      Review of Systems   Review of Systems   Cardiovascular: Negative.     Respiratory: Negative.     All other systems reviewed and are negative.      Physical Exam  Wt Readings from Last 1 Encounters:   10/28/22 88.6 kg (195 lb 4.8 oz)     BP Readings from Last 3 Encounters:   10/28/22 110/60   09/30/22 116/70   08/11/22 (!) 145/78     Pulse Readings from Last 1 Encounters:   10/28/22 86     Body mass index is 26.49 kg/m².    Physical Exam  Neck:      Vascular: No JVD.   Cardiovascular:      Rate and Rhythm: Normal rate and regular rhythm.      Pulses:           Carotid pulses are 2+ on the right side and 2+ on the left side.       Radial pulses are 2+ on the right side and 2+ on the left side.      Heart sounds: S1 normal and S2 normal. Murmur heard.   Systolic murmur is present.   Pulmonary:      Breath sounds: Normal breath sounds and air entry.   Musculoskeletal:      Right lower leg: No edema.      Left lower leg: No edema.   Neurological:      Mental Status: He is alert.           Assessment  1. Essential hypertension  Well controlled    2. Acute on chronic combined systolic and diastolic congestive heart failure  Stable    3. Coronary artery disease, unspecified vessel or lesion type, unspecified whether angina present, unspecified whether native or transplanted heart  Stable no angina    4. Atherosclerosis of native artery of extremity with intermittent claudication, unspecified extremity  Unchanged    5. Hyperlipidemia, unspecified hyperlipidemia type  On statin        Plan and Discussion  Discussed his Lexiscan nuclear stress test results.  He has a scar in the in the mid to distal inferior and inferior apical wall.  He had a very small size mild intensity reversible defect in the mid lateral wall.  Given his CKD, and no angina and small defect which is low risk, will proceed with medical management for his CAD.    Follow Up  4 months      Fabio Martinez MD, F.A.C.C, F.S.C.A.I.

## 2022-11-14 PROBLEM — N18.30 ACUTE RENAL FAILURE SUPERIMPOSED ON STAGE 3 CHRONIC KIDNEY DISEASE: Status: RESOLVED | Noted: 2022-08-09 | Resolved: 2022-11-14

## 2022-11-14 PROBLEM — N17.9 ACUTE RENAL FAILURE SUPERIMPOSED ON STAGE 3 CHRONIC KIDNEY DISEASE: Status: RESOLVED | Noted: 2022-08-09 | Resolved: 2022-11-14

## 2023-03-02 ENCOUNTER — OFFICE VISIT (OUTPATIENT)
Dept: CARDIOLOGY | Facility: CLINIC | Age: 78
End: 2023-03-02
Payer: MEDICARE

## 2023-03-02 VITALS
BODY MASS INDEX: 25.58 KG/M2 | DIASTOLIC BLOOD PRESSURE: 64 MMHG | HEIGHT: 72 IN | HEART RATE: 86 BPM | SYSTOLIC BLOOD PRESSURE: 104 MMHG | WEIGHT: 188.88 LBS | OXYGEN SATURATION: 96 %

## 2023-03-02 DIAGNOSIS — N18.30 STAGE 3 CHRONIC KIDNEY DISEASE, UNSPECIFIED WHETHER STAGE 3A OR 3B CKD: ICD-10-CM

## 2023-03-02 DIAGNOSIS — I10 ESSENTIAL HYPERTENSION: Primary | ICD-10-CM

## 2023-03-02 DIAGNOSIS — I70.219 ATHEROSCLEROSIS OF NATIVE ARTERY OF EXTREMITY WITH INTERMITTENT CLAUDICATION, UNSPECIFIED EXTREMITY: ICD-10-CM

## 2023-03-02 DIAGNOSIS — I50.43 ACUTE ON CHRONIC COMBINED SYSTOLIC AND DIASTOLIC CONGESTIVE HEART FAILURE: ICD-10-CM

## 2023-03-02 DIAGNOSIS — I25.10 CORONARY ARTERY DISEASE INVOLVING NATIVE CORONARY ARTERY OF NATIVE HEART WITHOUT ANGINA PECTORIS: ICD-10-CM

## 2023-03-02 PROCEDURE — 1101F PR PT FALLS ASSESS DOC 0-1 FALLS W/OUT INJ PAST YR: ICD-10-PCS | Mod: CPTII,S$GLB,, | Performed by: INTERNAL MEDICINE

## 2023-03-02 PROCEDURE — 1126F AMNT PAIN NOTED NONE PRSNT: CPT | Mod: CPTII,S$GLB,, | Performed by: INTERNAL MEDICINE

## 2023-03-02 PROCEDURE — 99214 OFFICE O/P EST MOD 30 MIN: CPT | Mod: S$GLB,,, | Performed by: INTERNAL MEDICINE

## 2023-03-02 PROCEDURE — 3078F DIAST BP <80 MM HG: CPT | Mod: CPTII,S$GLB,, | Performed by: INTERNAL MEDICINE

## 2023-03-02 PROCEDURE — 99999 PR PBB SHADOW E&M-EST. PATIENT-LVL III: CPT | Mod: PBBFAC,,, | Performed by: INTERNAL MEDICINE

## 2023-03-02 PROCEDURE — 3288F FALL RISK ASSESSMENT DOCD: CPT | Mod: CPTII,S$GLB,, | Performed by: INTERNAL MEDICINE

## 2023-03-02 PROCEDURE — 3074F SYST BP LT 130 MM HG: CPT | Mod: CPTII,S$GLB,, | Performed by: INTERNAL MEDICINE

## 2023-03-02 PROCEDURE — 1159F MED LIST DOCD IN RCRD: CPT | Mod: CPTII,S$GLB,, | Performed by: INTERNAL MEDICINE

## 2023-03-02 PROCEDURE — 3074F PR MOST RECENT SYSTOLIC BLOOD PRESSURE < 130 MM HG: ICD-10-PCS | Mod: CPTII,S$GLB,, | Performed by: INTERNAL MEDICINE

## 2023-03-02 PROCEDURE — 93010 ELECTROCARDIOGRAM REPORT: CPT | Mod: S$GLB,,, | Performed by: INTERNAL MEDICINE

## 2023-03-02 PROCEDURE — 3078F PR MOST RECENT DIASTOLIC BLOOD PRESSURE < 80 MM HG: ICD-10-PCS | Mod: CPTII,S$GLB,, | Performed by: INTERNAL MEDICINE

## 2023-03-02 PROCEDURE — 1126F PR PAIN SEVERITY QUANTIFIED, NO PAIN PRESENT: ICD-10-PCS | Mod: CPTII,S$GLB,, | Performed by: INTERNAL MEDICINE

## 2023-03-02 PROCEDURE — 99999 PR PBB SHADOW E&M-EST. PATIENT-LVL III: ICD-10-PCS | Mod: PBBFAC,,, | Performed by: INTERNAL MEDICINE

## 2023-03-02 PROCEDURE — 3288F PR FALLS RISK ASSESSMENT DOCUMENTED: ICD-10-PCS | Mod: CPTII,S$GLB,, | Performed by: INTERNAL MEDICINE

## 2023-03-02 PROCEDURE — 99214 PR OFFICE/OUTPT VISIT, EST, LEVL IV, 30-39 MIN: ICD-10-PCS | Mod: S$GLB,,, | Performed by: INTERNAL MEDICINE

## 2023-03-02 PROCEDURE — 93005 ELECTROCARDIOGRAM TRACING: CPT

## 2023-03-02 PROCEDURE — 1101F PT FALLS ASSESS-DOCD LE1/YR: CPT | Mod: CPTII,S$GLB,, | Performed by: INTERNAL MEDICINE

## 2023-03-02 PROCEDURE — 1159F PR MEDICATION LIST DOCUMENTED IN MEDICAL RECORD: ICD-10-PCS | Mod: CPTII,S$GLB,, | Performed by: INTERNAL MEDICINE

## 2023-03-02 PROCEDURE — 93010 EKG 12-LEAD: ICD-10-PCS | Mod: S$GLB,,, | Performed by: INTERNAL MEDICINE

## 2023-03-02 RX ORDER — METOPROLOL SUCCINATE 25 MG/1
25 TABLET, EXTENDED RELEASE ORAL NIGHTLY
Qty: 90 TABLET | Refills: 3 | Status: SHIPPED | OUTPATIENT
Start: 2023-03-02 | End: 2024-03-01

## 2023-03-02 RX ORDER — METOPROLOL TARTRATE 25 MG/1
25 TABLET, FILM COATED ORAL NIGHTLY
Qty: 90 TABLET | Refills: 3 | Status: SHIPPED | OUTPATIENT
Start: 2023-03-02 | End: 2023-03-02 | Stop reason: ALTCHOICE

## 2023-03-02 NOTE — PROGRESS NOTES
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  He has been doing well.  He denies any chest pain, shortness of breath, palpitations.  Dr. Darden stopped his metoprolol because his blood pressure was low.    Medications  Current Outpatient Medications   Medication Sig Dispense Refill    aspirin (ECOTRIN) 81 MG EC tablet Take 1 tablet (81 mg total) by mouth once daily. 30 tablet 0    brimonidine 0.2% (ALPHAGAN) 0.2 % Drop Place 1 drop into both eyes once daily.      diclofenac sodium (VOLTAREN) 1 % Gel Apply 4 g p.r.n. to left knee for pain.      furosemide (LASIX) 40 MG tablet Take 1 tablet (40 mg total) by mouth 2 (two) times daily. (Patient taking differently: Take 40 mg by mouth once daily. Pt takes BID x2 weeks then once daily for 1 week) 60 tablet 1    lisinopril (PRINIVIL,ZESTRIL) 20 MG tablet Take 1 tablet (20 mg total) by mouth 2 (two) times daily. 180 tablet 0    simvastatin (ZOCOR) 40 MG tablet Take 1 tablet (40 mg total) by mouth once daily. 90 tablet 0     diazePAM (VALIUM) 10 MG Tab Take 1 tablet (10 mg total) by mouth daily as needed. 30 tablet 3    levocetirizine (XYZAL) 5 MG tablet Take 1 tablet (5 mg total) by mouth every evening. 30 tablet 1    metoprolol succinate (TOPROL-XL) 25 MG 24 hr tablet Take 1 tablet (25 mg total) by mouth every evening. 90 tablet 3     No current facility-administered medications for this visit.      Prior to Admission medications    Medication Sig Start Date End Date Taking? Authorizing Provider   aspirin (ECOTRIN) 81 MG EC tablet Take 1 tablet (81 mg total) by mouth once daily. 8/11/22 8/11/23 Yes Mara Burgos PA-C   brimonidine 0.2% (ALPHAGAN) 0.2 % Drop Place 1 drop into both eyes once daily.   Yes Historical Provider   diclofenac sodium (VOLTAREN) 1 % Gel Apply 4 g p.r.n. to left knee for pain. 5/29/18  Yes Historical Provider   furosemide (LASIX) 40 MG tablet Take 1 tablet (40 mg total) by mouth 2 (two) times daily.  Patient taking differently: Take 40 mg by mouth once daily. Pt takes BID x2 weeks then once daily for 1 week 8/11/22 8/11/23 Yes Mara Burgos PA-C   lisinopril (PRINIVIL,ZESTRIL) 20 MG tablet Take 1 tablet (20 mg total) by mouth 2 (two) times daily. 1/28/19  Yes Mickey Darden MD   simvastatin (ZOCOR) 40 MG tablet Take 1 tablet (40 mg total) by mouth once daily. 12/14/18  Yes Mickey Darden MD   diazePAM (VALIUM) 10 MG Tab Take 1 tablet (10 mg total) by mouth daily as needed. 12/18/17 8/9/22  Mickey Darden MD   levocetirizine (XYZAL) 5 MG tablet Take 1 tablet (5 mg total) by mouth every evening. 11/21/16 11/21/17  Mickey Darden MD   metoprolol succinate (TOPROL-XL) 25 MG 24 hr tablet Take 1 tablet (25 mg total) by mouth every evening. 3/2/23 3/1/24  Fabio Martinez MD   baclofen (LIORESAL) 10 MG tablet Take 1 tablet (10 mg total) by mouth 3 (three) times daily. 10/3/18 8/11/22  Mickey Darden MD   clopidogrel (PLAVIX) 75 mg tablet Take 1 tablet (75 mg total) by mouth once daily.  18  Mickey Darden MD   hydroCHLOROthiazide (MICROZIDE) 12.5 mg capsule Take 1 capsule (12.5 mg total) by mouth once daily.  Patient taking differently: Take 25 mg by mouth once daily. 10/31/18 8/11/22  Mickey Darden MD   metoprolol tartrate (LOPRESSOR) 25 MG tablet TAKE 1/2 TABLET BY MOUTH ONCE DAILY  Patient not taking: Reported on 3/2/2023 7/25/16 3/2/23  Mickey Darden MD   metoprolol tartrate (LOPRESSOR) 25 MG tablet Take 1 tablet (25 mg total) by mouth nightly. 3/2/23 3/2/23  Fabio Martinez MD         History  Past Medical History:   Diagnosis Date    Disorder of kidney and ureter     Hyperlipidemia     Hypertension      Past Surgical History:   Procedure Laterality Date    FACIAL RECONSTRUCTION SURGERY      PROSTATE SURGERY       Social History     Socioeconomic History    Marital status:    Tobacco Use    Smoking status: Former     Types: Cigarettes     Quit date: 10/5/2008     Years since quittin.4    Smokeless tobacco: Never   Substance and Sexual Activity    Alcohol use: No     Alcohol/week: 0.0 standard drinks    Drug use: No    Sexual activity: Never     Social Determinants of Health     Financial Resource Strain: Low Risk     Difficulty of Paying Living Expenses: Not hard at all   Food Insecurity: No Food Insecurity    Worried About Running Out of Food in the Last Year: Never true    Ran Out of Food in the Last Year: Never true   Transportation Needs: No Transportation Needs    Lack of Transportation (Medical): No    Lack of Transportation (Non-Medical): No   Physical Activity: Inactive    Days of Exercise per Week: 0 days    Minutes of Exercise per Session: 0 min   Stress: No Stress Concern Present    Feeling of Stress : Only a little   Social Connections: Socially Isolated    Frequency of Communication with Friends and Family: Never    Frequency of Social Gatherings with Friends and Family: Never    Attends Muslim Services: Never    Active Member of  Clubs or Organizations: No    Attends Club or Organization Meetings: Never    Marital Status:    Housing Stability: Unknown    Unable to Pay for Housing in the Last Year: No    Unstable Housing in the Last Year: No         Allergies  Review of patient's allergies indicates:  No Known Allergies      Review of Systems   Review of Systems   Cardiovascular: Negative.    Respiratory: Negative.     All other systems reviewed and are negative.      Physical Exam  Wt Readings from Last 1 Encounters:   03/02/23 85.7 kg (188 lb 14.4 oz)     BP Readings from Last 3 Encounters:   03/02/23 104/64   10/28/22 110/60   09/30/22 116/70     Pulse Readings from Last 1 Encounters:   03/02/23 86     Body mass index is 25.62 kg/m².    Physical Exam  Neck:      Vascular: No JVD.   Cardiovascular:      Rate and Rhythm: Normal rate and regular rhythm.      Pulses:           Carotid pulses are 2+ on the right side and 2+ on the left side.       Radial pulses are 2+ on the right side and 2+ on the left side.      Heart sounds: S1 normal and S2 normal. Murmur heard.   Systolic murmur is present.   Pulmonary:      Breath sounds: Normal breath sounds and air entry.   Musculoskeletal:      Right lower leg: No edema.      Left lower leg: No edema.   Neurological:      Mental Status: He is alert.           Assessment  1. Acute on chronic combined systolic and diastolic congestive heart failure  compensated    2. Atherosclerosis of native artery of extremity with intermittent claudication, unspecified extremity  Stable, no angina  - EKG 12-lead    3. Essential hypertension  Well controlled on meds, continue to monitor    4. Coronary artery disease involving native coronary artery of native heart without angina pectoris  Stable, no angina    5. Stage 3 chronic kidney disease, unspecified whether stage 3a or 3b CKD  unchanged        Plan and Discussion  Discussed his EKG today showed normal sinus rhythm rate of 91 with nonspecific ST T wave  changes.  Discussed that he has CAD and will benefit from taking metoprolol which is a beta blocker.  Therefore will decrease his lisinopril 20 mg twice daily to every morning and start metoprolol succinate 25 mg in the evening.  Patient was instructed to continue to monitor his blood pressure and heart rate.    Follow Up  2 months      Fabio Martinez MD, F.A.C.C, F.S.C.A.I.        35 minutes were spent in chart review, documentation and review of results, and evaluation, treatment, and counseling of patient on the same day of service.    Disclaimer: This document was created using voice recognition software ([employer] Direct). Although it may be edited, this document may contain errors related to incorrect recognition of the spoken word. Please call the physician if clarification is needed.

## 2023-05-25 ENCOUNTER — OFFICE VISIT (OUTPATIENT)
Dept: CARDIOLOGY | Facility: CLINIC | Age: 78
End: 2023-05-25
Payer: MEDICARE

## 2023-05-25 VITALS
DIASTOLIC BLOOD PRESSURE: 70 MMHG | OXYGEN SATURATION: 96 % | HEART RATE: 76 BPM | BODY MASS INDEX: 25.96 KG/M2 | HEIGHT: 72 IN | WEIGHT: 191.69 LBS | SYSTOLIC BLOOD PRESSURE: 110 MMHG

## 2023-05-25 DIAGNOSIS — I25.10 CORONARY ARTERY DISEASE INVOLVING NATIVE CORONARY ARTERY OF NATIVE HEART WITHOUT ANGINA PECTORIS: ICD-10-CM

## 2023-05-25 DIAGNOSIS — I10 ESSENTIAL HYPERTENSION: Primary | ICD-10-CM

## 2023-05-25 DIAGNOSIS — E78.5 HYPERLIPIDEMIA, UNSPECIFIED HYPERLIPIDEMIA TYPE: ICD-10-CM

## 2023-05-25 DIAGNOSIS — I70.219 ATHEROSCLEROSIS OF NATIVE ARTERY OF EXTREMITY WITH INTERMITTENT CLAUDICATION, UNSPECIFIED EXTREMITY: ICD-10-CM

## 2023-05-25 PROCEDURE — 1159F MED LIST DOCD IN RCRD: CPT | Mod: CPTII,S$GLB,, | Performed by: INTERNAL MEDICINE

## 2023-05-25 PROCEDURE — 99999 PR PBB SHADOW E&M-EST. PATIENT-LVL III: ICD-10-PCS | Mod: PBBFAC,,, | Performed by: INTERNAL MEDICINE

## 2023-05-25 PROCEDURE — 99214 PR OFFICE/OUTPT VISIT, EST, LEVL IV, 30-39 MIN: ICD-10-PCS | Mod: S$GLB,,, | Performed by: INTERNAL MEDICINE

## 2023-05-25 PROCEDURE — 3074F SYST BP LT 130 MM HG: CPT | Mod: CPTII,S$GLB,, | Performed by: INTERNAL MEDICINE

## 2023-05-25 PROCEDURE — 1126F AMNT PAIN NOTED NONE PRSNT: CPT | Mod: CPTII,S$GLB,, | Performed by: INTERNAL MEDICINE

## 2023-05-25 PROCEDURE — 1101F PR PT FALLS ASSESS DOC 0-1 FALLS W/OUT INJ PAST YR: ICD-10-PCS | Mod: CPTII,S$GLB,, | Performed by: INTERNAL MEDICINE

## 2023-05-25 PROCEDURE — 99214 OFFICE O/P EST MOD 30 MIN: CPT | Mod: S$GLB,,, | Performed by: INTERNAL MEDICINE

## 2023-05-25 PROCEDURE — 1101F PT FALLS ASSESS-DOCD LE1/YR: CPT | Mod: CPTII,S$GLB,, | Performed by: INTERNAL MEDICINE

## 2023-05-25 PROCEDURE — 3288F FALL RISK ASSESSMENT DOCD: CPT | Mod: CPTII,S$GLB,, | Performed by: INTERNAL MEDICINE

## 2023-05-25 PROCEDURE — 3078F DIAST BP <80 MM HG: CPT | Mod: CPTII,S$GLB,, | Performed by: INTERNAL MEDICINE

## 2023-05-25 PROCEDURE — 3078F PR MOST RECENT DIASTOLIC BLOOD PRESSURE < 80 MM HG: ICD-10-PCS | Mod: CPTII,S$GLB,, | Performed by: INTERNAL MEDICINE

## 2023-05-25 PROCEDURE — 1126F PR PAIN SEVERITY QUANTIFIED, NO PAIN PRESENT: ICD-10-PCS | Mod: CPTII,S$GLB,, | Performed by: INTERNAL MEDICINE

## 2023-05-25 PROCEDURE — 99999 PR PBB SHADOW E&M-EST. PATIENT-LVL III: CPT | Mod: PBBFAC,,, | Performed by: INTERNAL MEDICINE

## 2023-05-25 PROCEDURE — 3288F PR FALLS RISK ASSESSMENT DOCUMENTED: ICD-10-PCS | Mod: CPTII,S$GLB,, | Performed by: INTERNAL MEDICINE

## 2023-05-25 PROCEDURE — 3074F PR MOST RECENT SYSTOLIC BLOOD PRESSURE < 130 MM HG: ICD-10-PCS | Mod: CPTII,S$GLB,, | Performed by: INTERNAL MEDICINE

## 2023-05-25 PROCEDURE — 1159F PR MEDICATION LIST DOCUMENTED IN MEDICAL RECORD: ICD-10-PCS | Mod: CPTII,S$GLB,, | Performed by: INTERNAL MEDICINE

## 2023-05-25 NOTE — PROGRESS NOTES
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  He denies any chest pain or shortness of breath.  He is tolerating his medications.  His blood pressure at home was 122/68.    Medications  Current Outpatient Medications   Medication Sig Dispense Refill    aspirin (ECOTRIN) 81 MG EC tablet Take 1 tablet (81 mg total) by mouth once daily. 30 tablet 0    furosemide (LASIX) 40 MG tablet Take 1 tablet (40 mg total) by mouth 2 (two) times daily. (Patient taking differently: Take 40 mg by mouth once daily. Pt takes BID x2 weeks then once daily for 1 week) 60 tablet 1    lisinopril (PRINIVIL,ZESTRIL) 20 MG tablet Take 1 tablet (20 mg total) by mouth 2 (two) times daily. (Patient taking differently: Take 20 mg by mouth once daily.) 180 tablet 0    metoprolol succinate (TOPROL-XL) 25 MG 24 hr tablet Take 1 tablet (25 mg total) by mouth every evening. 90 tablet 3    simvastatin (ZOCOR) 40 MG tablet Take 1 tablet (40 mg total) by mouth once daily. 90 tablet 0    brimonidine  0.2% (ALPHAGAN) 0.2 % Drop Place 1 drop into both eyes once daily.      diazePAM (VALIUM) 10 MG Tab Take 1 tablet (10 mg total) by mouth daily as needed. 30 tablet 3    diclofenac sodium (VOLTAREN) 1 % Gel Apply 4 g p.r.n. to left knee for pain.      levocetirizine (XYZAL) 5 MG tablet Take 1 tablet (5 mg total) by mouth every evening. 30 tablet 1     No current facility-administered medications for this visit.      Prior to Admission medications    Medication Sig Start Date End Date Taking? Authorizing Provider   aspirin (ECOTRIN) 81 MG EC tablet Take 1 tablet (81 mg total) by mouth once daily. 8/11/22 8/11/23 Yes Mara Burgos PA-C   furosemide (LASIX) 40 MG tablet Take 1 tablet (40 mg total) by mouth 2 (two) times daily.  Patient taking differently: Take 40 mg by mouth once daily. Pt takes BID x2 weeks then once daily for 1 week 8/11/22 8/11/23 Yes Mara Burgos PA-C   lisinopril (PRINIVIL,ZESTRIL) 20 MG tablet Take 1 tablet (20 mg total) by mouth 2 (two) times daily.  Patient taking differently: Take 20 mg by mouth once daily. 1/28/19  Yes Mickey Darden MD   metoprolol succinate (TOPROL-XL) 25 MG 24 hr tablet Take 1 tablet (25 mg total) by mouth every evening. 3/2/23 3/1/24 Yes Fabio Martinez MD   simvastatin (ZOCOR) 40 MG tablet Take 1 tablet (40 mg total) by mouth once daily. 12/14/18  Yes Mickey Darden MD   brimonidine 0.2% (ALPHAGAN) 0.2 % Drop Place 1 drop into both eyes once daily.    Historical Provider   diazePAM (VALIUM) 10 MG Tab Take 1 tablet (10 mg total) by mouth daily as needed. 12/18/17 8/9/22  Mickey Darden MD   diclofenac sodium (VOLTAREN) 1 % Gel Apply 4 g p.r.n. to left knee for pain. 5/29/18   Historical Provider   levocetirizine (XYZAL) 5 MG tablet Take 1 tablet (5 mg total) by mouth every evening. 11/21/16 11/21/17  Mickey Darden MD   baclofen (LIORESAL) 10 MG tablet Take 1 tablet (10 mg total) by mouth 3 (three) times daily. 10/3/18 8/11/22  Mickey Darden,  MD   clopidogrel (PLAVIX) 75 mg tablet Take 1 tablet (75 mg total) by mouth once daily. 18  Mickey Darden MD   hydroCHLOROthiazide (MICROZIDE) 12.5 mg capsule Take 1 capsule (12.5 mg total) by mouth once daily.  Patient taking differently: Take 25 mg by mouth once daily. 10/31/18 8/11/22  Mickey Darden MD         History  Past Medical History:   Diagnosis Date    Disorder of kidney and ureter     Hyperlipidemia     Hypertension      Past Surgical History:   Procedure Laterality Date    FACIAL RECONSTRUCTION SURGERY      PROSTATE SURGERY       Social History     Socioeconomic History    Marital status:    Tobacco Use    Smoking status: Former     Types: Cigarettes     Quit date: 10/5/2008     Years since quittin.6    Smokeless tobacco: Never   Substance and Sexual Activity    Alcohol use: No     Alcohol/week: 0.0 standard drinks    Drug use: No    Sexual activity: Never     Social Determinants of Health     Financial Resource Strain: Low Risk     Difficulty of Paying Living Expenses: Not hard at all   Food Insecurity: No Food Insecurity    Worried About Running Out of Food in the Last Year: Never true    Ran Out of Food in the Last Year: Never true   Transportation Needs: No Transportation Needs    Lack of Transportation (Medical): No    Lack of Transportation (Non-Medical): No   Physical Activity: Inactive    Days of Exercise per Week: 0 days    Minutes of Exercise per Session: 0 min   Stress: No Stress Concern Present    Feeling of Stress : Only a little   Social Connections: Socially Isolated    Frequency of Communication with Friends and Family: Never    Frequency of Social Gatherings with Friends and Family: Never    Attends Moravian Services: Never    Active Member of Clubs or Organizations: No    Attends Club or Organization Meetings: Never    Marital Status:    Housing Stability: Unknown    Unable to Pay for Housing in the Last Year: No    Unstable Housing in the  Last Year: No         Allergies  Review of patient's allergies indicates:  No Known Allergies      Review of Systems   Review of Systems   Cardiovascular: Negative.    Respiratory: Negative.     All other systems reviewed and are negative.      Physical Exam  Wt Readings from Last 1 Encounters:   05/25/23 87 kg (191 lb 11.2 oz)     BP Readings from Last 3 Encounters:   05/25/23 110/70   03/02/23 104/64   10/28/22 110/60     Pulse Readings from Last 1 Encounters:   05/25/23 76     Body mass index is 26 kg/m².    Physical Exam  Neck:      Vascular: No JVD.   Cardiovascular:      Rate and Rhythm: Normal rate and regular rhythm.      Pulses:           Carotid pulses are 2+ on the right side and 2+ on the left side.       Radial pulses are 2+ on the right side and 2+ on the left side.      Heart sounds: S1 normal and S2 normal. Murmur heard.   Systolic murmur is present.   Pulmonary:      Breath sounds: Normal breath sounds and air entry.   Musculoskeletal:      Right lower leg: No edema.      Left lower leg: No edema.   Neurological:      Mental Status: He is alert.           Assessment  1. Essential hypertension  Well controlled on current medications, continue to monitor    2. Coronary artery disease involving native coronary artery of native heart without angina pectoris  Stable, no angina    3. Atherosclerosis of native artery of extremity with intermittent claudication, unspecified extremity  Stable    4. Hyperlipidemia, unspecified hyperlipidemia type  Stable on statin        Plan and Discussion  Discussed his blood pressure is well controlled.  Recommend dual with continue current medication and monitor blood pressure at home.    Follow Up  6 months      Fabio Martinez MD, F.A.C.C, F.S.C.A.I.        30 minutes were spent in chart review, documentation and review of results, and evaluation, treatment, and counseling of patient on the same day of service.    Disclaimer: This document was created using voice  recognition software ([employer] Direct). Although it may be edited, this document may contain errors related to incorrect recognition of the spoken word. Please call the physician if clarification is needed.

## 2023-08-28 ENCOUNTER — HOSPITAL ENCOUNTER (OUTPATIENT)
Facility: OTHER | Age: 78
Discharge: HOME | End: 2023-08-29
Attending: EMERGENCY MEDICINE | Admitting: EMERGENCY MEDICINE
Payer: MEDICARE

## 2023-08-28 DIAGNOSIS — R91.8 LEFT LOWER LOBE PULMONARY INFILTRATE: ICD-10-CM

## 2023-08-28 DIAGNOSIS — I50.43 ACUTE ON CHRONIC COMBINED SYSTOLIC AND DIASTOLIC HEART FAILURE: Primary | ICD-10-CM

## 2023-08-28 DIAGNOSIS — I50.9 HEART FAILURE: ICD-10-CM

## 2023-08-28 DIAGNOSIS — I50.9 ACUTE ON CHRONIC CONGESTIVE HEART FAILURE, UNSPECIFIED HEART FAILURE TYPE: ICD-10-CM

## 2023-08-28 DIAGNOSIS — R06.02 SHORTNESS OF BREATH: ICD-10-CM

## 2023-08-28 LAB
ALBUMIN SERPL BCP-MCNC: 3.3 G/DL (ref 3.5–5.2)
ALP SERPL-CCNC: 77 U/L (ref 55–135)
ALT SERPL W/O P-5'-P-CCNC: 23 U/L (ref 10–44)
ANION GAP SERPL CALC-SCNC: 11 MMOL/L (ref 8–16)
AORTIC ROOT ANNULUS: 2.53 CM
ASCENDING AORTA: 1.92 CM
AST SERPL-CCNC: 27 U/L (ref 10–40)
AV INDEX (PROSTH): 0.48
AV MEAN GRADIENT: 3 MMHG
AV PEAK GRADIENT: 5 MMHG
AV VALVE AREA BY VELOCITY RATIO: 1.73 CM²
AV VALVE AREA: 1.45 CM²
AV VELOCITY RATIO: 0.58
BASOPHILS # BLD AUTO: 0.02 K/UL (ref 0–0.2)
BASOPHILS NFR BLD: 0.3 % (ref 0–1.9)
BILIRUB SERPL-MCNC: 0.6 MG/DL (ref 0.1–1)
BNP SERPL-MCNC: 2033 PG/ML (ref 0–99)
BSA FOR ECHO PROCEDURE: 2.1 M2
BUN SERPL-MCNC: 32 MG/DL (ref 8–23)
CALCIUM SERPL-MCNC: 9.2 MG/DL (ref 8.7–10.5)
CHLORIDE SERPL-SCNC: 112 MMOL/L (ref 95–110)
CO2 SERPL-SCNC: 21 MMOL/L (ref 23–29)
CREAT SERPL-MCNC: 2 MG/DL (ref 0.5–1.4)
CTP QC/QA: YES
CV ECHO LV RWT: 0.39 CM
DIFFERENTIAL METHOD: ABNORMAL
DOP CALC AO PEAK VEL: 1.13 M/S
DOP CALC AO VTI: 20.8 CM
DOP CALC LVOT AREA: 3 CM2
DOP CALC LVOT DIAMETER: 1.96 CM
DOP CALC LVOT PEAK VEL: 0.65 M/S
DOP CALC LVOT STROKE VOLUME: 30.16 CM3
DOP CALCLVOT PEAK VEL VTI: 10 CM
E WAVE DECELERATION TIME: 169.77 MSEC
E/A RATIO: 2.43
E/E' RATIO: 18.25 M/S
ECHO LV POSTERIOR WALL: 1 CM (ref 0.6–1.1)
EOSINOPHIL # BLD AUTO: 0.1 K/UL (ref 0–0.5)
EOSINOPHIL NFR BLD: 1.3 % (ref 0–8)
ERYTHROCYTE [DISTWIDTH] IN BLOOD BY AUTOMATED COUNT: 16.4 % (ref 11.5–14.5)
EST. GFR  (NO RACE VARIABLE): 33 ML/MIN/1.73 M^2
ESTIMATED AVG GLUCOSE: 117 MG/DL (ref 68–131)
FRACTIONAL SHORTENING: 14 % (ref 28–44)
GLUCOSE SERPL-MCNC: 89 MG/DL (ref 70–110)
HBA1C MFR BLD: 5.7 % (ref 4–5.6)
HCT VFR BLD AUTO: 41.2 % (ref 40–54)
HGB BLD-MCNC: 12.9 G/DL (ref 14–18)
IMM GRANULOCYTES # BLD AUTO: 0.04 K/UL (ref 0–0.04)
IMM GRANULOCYTES NFR BLD AUTO: 0.5 % (ref 0–0.5)
INTERVENTRICULAR SEPTUM: 1.02 CM (ref 0.6–1.1)
IVC DIAMETER: 2.1 CM
LA MAJOR: 5.31 CM
LA MINOR: 5.36 CM
LA WIDTH: 4.7 CM
LEFT ATRIUM AREA SYSTOLIC (APICAL 2 CHAMBER): 24.2 CM2
LEFT ATRIUM AREA SYSTOLIC (APICAL 4 CHAMBER): 24.3 CM2
LEFT ATRIUM SIZE: 3.8 CM
LEFT ATRIUM VOLUME INDEX: 38.8 ML/M2
LEFT ATRIUM VOLUME: 80.99 CM3
LEFT INTERNAL DIMENSION IN SYSTOLE: 4.4 CM (ref 2.1–4)
LEFT VENTRICLE DIASTOLIC VOLUME INDEX: 59.93 ML/M2
LEFT VENTRICLE DIASTOLIC VOLUME: 125.25 ML
LEFT VENTRICLE MASS INDEX: 92 G/M2
LEFT VENTRICLE SYSTOLIC VOLUME INDEX: 42.1 ML/M2
LEFT VENTRICLE SYSTOLIC VOLUME: 87.91 ML
LEFT VENTRICULAR INTERNAL DIMENSION IN DIASTOLE: 5.13 CM (ref 3.5–6)
LEFT VENTRICULAR MASS: 192.39 G
LV LATERAL E/E' RATIO: 18.25 M/S
LV SEPTAL E/E' RATIO: 18.25 M/S
LVOT MG: 0.76 MMHG
LVOT MV: 0.41 CM/S
LYMPHOCYTES # BLD AUTO: 1.5 K/UL (ref 1–4.8)
LYMPHOCYTES NFR BLD: 19.2 % (ref 18–48)
MCH RBC QN AUTO: 28.6 PG (ref 27–31)
MCHC RBC AUTO-ENTMCNC: 31.3 G/DL (ref 32–36)
MCV RBC AUTO: 91 FL (ref 82–98)
MONOCYTES # BLD AUTO: 1 K/UL (ref 0.3–1)
MONOCYTES NFR BLD: 12.3 % (ref 4–15)
MV PEAK A VEL: 0.3 M/S
MV PEAK E VEL: 0.73 M/S
MV STENOSIS PRESSURE HALF TIME: 49.23 MS
MV VALVE AREA P 1/2 METHOD: 4.47 CM2
NEUTROPHILS # BLD AUTO: 5.2 K/UL (ref 1.8–7.7)
NEUTROPHILS NFR BLD: 66.4 % (ref 38–73)
NRBC BLD-RTO: 0 /100 WBC
PISA TR MAX VEL: 2.48 M/S
PLATELET # BLD AUTO: 116 K/UL (ref 150–450)
PLATELET BLD QL SMEAR: ABNORMAL
PMV BLD AUTO: ABNORMAL FL (ref 9.2–12.9)
POTASSIUM SERPL-SCNC: 4.7 MMOL/L (ref 3.5–5.1)
PROT SERPL-MCNC: 6.9 G/DL (ref 6–8.4)
PV MV: 0.34 M/S
PV PEAK GRADIENT: 1 MMHG
PV PEAK VELOCITY: 0.48 M/S
RA MAJOR: 4.26 CM
RA PRESSURE ESTIMATED: 8 MMHG
RA WIDTH: 3.2 CM
RBC # BLD AUTO: 4.51 M/UL (ref 4.6–6.2)
RIGHT VENTRICULAR END-DIASTOLIC DIMENSION: 2.27 CM
RV TB RVSP: 10 MMHG
RV TISSUE DOPPLER FREE WALL SYSTOLIC VELOCITY 1 (APICAL 4 CHAMBER VIEW): 5.88 CM/S
SARS-COV-2 RDRP RESP QL NAA+PROBE: NEGATIVE
SINUS: 2.4 CM
SODIUM SERPL-SCNC: 144 MMOL/L (ref 136–145)
STJ: 2.35 CM
TASV: 6 CM/S
TDI LATERAL: 0.04 M/S
TDI SEPTAL: 0.04 M/S
TDI: 0.04 M/S
TR MAX PG: 25 MMHG
TRICUSPID ANNULAR PLANE SYSTOLIC EXCURSION: 1.4 CM
TROPONIN I SERPL DL<=0.01 NG/ML-MCNC: 0.2 NG/ML (ref 0–0.03)
TV REST PULMONARY ARTERY PRESSURE: 33 MMHG
WBC # BLD AUTO: 7.87 K/UL (ref 3.9–12.7)
Z-SCORE OF LEFT VENTRICULAR DIMENSION IN END DIASTOLE: -2.29
Z-SCORE OF LEFT VENTRICULAR DIMENSION IN END SYSTOLE: 0.87

## 2023-08-28 PROCEDURE — 63600175 PHARM REV CODE 636 W HCPCS: Performed by: HOSPITALIST

## 2023-08-28 PROCEDURE — 25000003 PHARM REV CODE 250: Performed by: HOSPITALIST

## 2023-08-28 PROCEDURE — 96376 TX/PRO/DX INJ SAME DRUG ADON: CPT | Mod: 59

## 2023-08-28 PROCEDURE — 99285 EMERGENCY DEPT VISIT HI MDM: CPT | Mod: 25

## 2023-08-28 PROCEDURE — 99223 1ST HOSP IP/OBS HIGH 75: CPT | Mod: ,,, | Performed by: HOSPITALIST

## 2023-08-28 PROCEDURE — G0378 HOSPITAL OBSERVATION PER HR: HCPCS

## 2023-08-28 PROCEDURE — 83036 HEMOGLOBIN GLYCOSYLATED A1C: CPT | Performed by: HOSPITALIST

## 2023-08-28 PROCEDURE — 93010 ELECTROCARDIOGRAM REPORT: CPT | Mod: ,,, | Performed by: INTERNAL MEDICINE

## 2023-08-28 PROCEDURE — 87635 SARS-COV-2 COVID-19 AMP PRB: CPT | Performed by: EMERGENCY MEDICINE

## 2023-08-28 PROCEDURE — 96374 THER/PROPH/DIAG INJ IV PUSH: CPT | Mod: 59

## 2023-08-28 PROCEDURE — 63600175 PHARM REV CODE 636 W HCPCS: Performed by: EMERGENCY MEDICINE

## 2023-08-28 PROCEDURE — 84484 ASSAY OF TROPONIN QUANT: CPT | Performed by: EMERGENCY MEDICINE

## 2023-08-28 PROCEDURE — 93010 EKG 12-LEAD: ICD-10-PCS | Mod: ,,, | Performed by: INTERNAL MEDICINE

## 2023-08-28 PROCEDURE — 93005 ELECTROCARDIOGRAM TRACING: CPT

## 2023-08-28 PROCEDURE — 97161 PT EVAL LOW COMPLEX 20 MIN: CPT

## 2023-08-28 PROCEDURE — 83880 ASSAY OF NATRIURETIC PEPTIDE: CPT | Performed by: EMERGENCY MEDICINE

## 2023-08-28 PROCEDURE — 96372 THER/PROPH/DIAG INJ SC/IM: CPT | Mod: 59 | Performed by: HOSPITALIST

## 2023-08-28 PROCEDURE — 99223 PR INITIAL HOSPITAL CARE,LEVL III: ICD-10-PCS | Mod: ,,, | Performed by: HOSPITALIST

## 2023-08-28 PROCEDURE — 85025 COMPLETE CBC W/AUTO DIFF WBC: CPT | Performed by: EMERGENCY MEDICINE

## 2023-08-28 PROCEDURE — 97165 OT EVAL LOW COMPLEX 30 MIN: CPT

## 2023-08-28 PROCEDURE — 80053 COMPREHEN METABOLIC PANEL: CPT | Performed by: EMERGENCY MEDICINE

## 2023-08-28 RX ORDER — METHYLPREDNISOLONE 4 MG/1
TABLET ORAL
Status: ON HOLD | COMMUNITY
Start: 2023-08-25 | End: 2023-08-29 | Stop reason: HOSPADM

## 2023-08-28 RX ORDER — METHOCARBAMOL 500 MG/1
500 TABLET, FILM COATED ORAL 4 TIMES DAILY
Status: ON HOLD | COMMUNITY
Start: 2023-08-08 | End: 2023-08-29 | Stop reason: HOSPADM

## 2023-08-28 RX ORDER — SODIUM CHLORIDE 0.9 % (FLUSH) 0.9 %
10 SYRINGE (ML) INJECTION
Status: DISCONTINUED | OUTPATIENT
Start: 2023-08-28 | End: 2023-08-29 | Stop reason: HOSPADM

## 2023-08-28 RX ORDER — HYDROCODONE BITARTRATE AND ACETAMINOPHEN 5; 325 MG/1; MG/1
1 TABLET ORAL EVERY 6 HOURS PRN
Status: ON HOLD | COMMUNITY
Start: 2023-08-08 | End: 2024-02-04 | Stop reason: HOSPADM

## 2023-08-28 RX ORDER — TALC
6 POWDER (GRAM) TOPICAL NIGHTLY PRN
Status: DISCONTINUED | OUTPATIENT
Start: 2023-08-28 | End: 2023-08-29 | Stop reason: HOSPADM

## 2023-08-28 RX ORDER — FUROSEMIDE 10 MG/ML
80 INJECTION INTRAMUSCULAR; INTRAVENOUS
Status: DISCONTINUED | OUTPATIENT
Start: 2023-08-28 | End: 2023-08-29

## 2023-08-28 RX ORDER — ONDANSETRON 8 MG/1
8 TABLET, ORALLY DISINTEGRATING ORAL EVERY 8 HOURS PRN
Status: DISCONTINUED | OUTPATIENT
Start: 2023-08-28 | End: 2023-08-29 | Stop reason: HOSPADM

## 2023-08-28 RX ORDER — LATANOPROST 50 UG/ML
1 SOLUTION/ DROPS OPHTHALMIC NIGHTLY
COMMUNITY
Start: 2023-07-21

## 2023-08-28 RX ORDER — FUROSEMIDE 10 MG/ML
80 INJECTION INTRAMUSCULAR; INTRAVENOUS
Status: COMPLETED | OUTPATIENT
Start: 2023-08-28 | End: 2023-08-28

## 2023-08-28 RX ORDER — ATORVASTATIN CALCIUM 20 MG/1
20 TABLET, FILM COATED ORAL DAILY
Status: DISCONTINUED | OUTPATIENT
Start: 2023-08-28 | End: 2023-08-29 | Stop reason: HOSPADM

## 2023-08-28 RX ORDER — HEPARIN SODIUM 5000 [USP'U]/ML
5000 INJECTION, SOLUTION INTRAVENOUS; SUBCUTANEOUS EVERY 8 HOURS
Status: DISCONTINUED | OUTPATIENT
Start: 2023-08-28 | End: 2023-08-29 | Stop reason: HOSPADM

## 2023-08-28 RX ORDER — BRIMONIDINE TARTRATE 1.5 MG/ML
1 SOLUTION/ DROPS OPHTHALMIC DAILY
Status: DISCONTINUED | OUTPATIENT
Start: 2023-08-28 | End: 2023-08-28

## 2023-08-28 RX ORDER — FUROSEMIDE 10 MG/ML
80 INJECTION INTRAMUSCULAR; INTRAVENOUS
Status: DISCONTINUED | OUTPATIENT
Start: 2023-08-28 | End: 2023-08-28

## 2023-08-28 RX ORDER — HYDROCODONE BITARTRATE AND ACETAMINOPHEN 5; 325 MG/1; MG/1
1 TABLET ORAL EVERY 6 HOURS PRN
Status: DISCONTINUED | OUTPATIENT
Start: 2023-08-28 | End: 2023-08-29 | Stop reason: HOSPADM

## 2023-08-28 RX ORDER — LATANOPROST 50 UG/ML
1 SOLUTION/ DROPS OPHTHALMIC NIGHTLY
Status: DISCONTINUED | OUTPATIENT
Start: 2023-08-28 | End: 2023-08-29 | Stop reason: HOSPADM

## 2023-08-28 RX ORDER — ASPIRIN 81 MG/1
81 TABLET ORAL DAILY
Status: DISCONTINUED | OUTPATIENT
Start: 2023-08-28 | End: 2023-08-29 | Stop reason: HOSPADM

## 2023-08-28 RX ORDER — METOPROLOL SUCCINATE 25 MG/1
25 TABLET, EXTENDED RELEASE ORAL NIGHTLY
Status: DISCONTINUED | OUTPATIENT
Start: 2023-08-28 | End: 2023-08-29 | Stop reason: HOSPADM

## 2023-08-28 RX ORDER — LISINOPRIL 20 MG/1
20 TABLET ORAL DAILY
Status: DISCONTINUED | OUTPATIENT
Start: 2023-08-28 | End: 2023-08-29

## 2023-08-28 RX ADMIN — ASPIRIN 81 MG: 81 TABLET, COATED ORAL at 10:08

## 2023-08-28 RX ADMIN — HEPARIN SODIUM 5000 UNITS: 5000 INJECTION INTRAVENOUS; SUBCUTANEOUS at 06:08

## 2023-08-28 RX ADMIN — FUROSEMIDE 80 MG: 10 INJECTION, SOLUTION INTRAMUSCULAR; INTRAVENOUS at 06:08

## 2023-08-28 RX ADMIN — ATORVASTATIN CALCIUM 20 MG: 20 TABLET, FILM COATED ORAL at 10:08

## 2023-08-28 RX ADMIN — LISINOPRIL 20 MG: 10 TABLET ORAL at 10:08

## 2023-08-28 RX ADMIN — METOPROLOL SUCCINATE 25 MG: 25 TABLET, EXTENDED RELEASE ORAL at 08:08

## 2023-08-28 RX ADMIN — LATANOPROST 1 DROP: 50 SOLUTION OPHTHALMIC at 08:08

## 2023-08-28 NOTE — ED TRIAGE NOTES
"Pt received per EMS with complaint of shortness of breath x 2 weeks that has progressively worsened. Pt states that he woke from his sleep unable to "catch my breath." Respirations even, tachypneic, with dyspnea with speech noted.     Placed on continuous cardiopulmonary monitoring. MD Pratt at bedside. Awaiting further orders.  "

## 2023-08-28 NOTE — PLAN OF CARE
Met patient for initial assessment.  Verified PCP and pharmacy. No discharge needs at this time.    08/28/23 3014   Discharge Assessment   Confirmed/corrected address, phone number and insurance Yes   Confirmed Demographics Correct on Facesheet   Source of Information patient   Does patient/caregiver understand observation status Yes   Communicated SHANICE with patient/caregiver Yes   Reason For Admission Shortness of Breath   People in Home spouse   Do you expect to return to your current living situation? Yes   Do you have help at home or someone to help you manage your care at home? No   Prior to hospitilization cognitive status: Alert/Oriented;No Deficits   Current cognitive status: Alert/Oriented;No Deficits   Equipment Currently Used at Home cane, straight   Readmission within 30 days? No   Patient currently being followed by outpatient case management? No   Do you currently have service(s) that help you manage your care at home? No   Do you take prescription medications? Yes   Coverage [employer]   Do you have any problems affording any of your prescribed medications? No   Is the patient taking medications as prescribed? yes   Who is going to help you get home at discharge? needs transportation   How do you get to doctors appointments? family or friend will provide   Are you on dialysis? No   Do you take coumadin? No   Discharge Plan discussed with: Patient   Transition of Care Barriers None   Discharge Plan A Home   Discharge Plan B Home   Physical Activity   On average, how many days per week do you engage in moderate to strenuous exercise (like a brisk walk)? 7 days   On average, how many minutes do you engage in exercise at this level? 20 min   Financial Resource Strain   How hard is it for you to pay for the very basics like food, housing, medical care, and heating? Not very   Housing Stability   In the last 12 months, was there a time when you were not able to pay the mortgage or rent on time? N   In  the last 12 months, was there a time when you did not have a steady place to sleep or slept in a shelter (including now)? N   Transportation Needs   In the past 12 months, has lack of transportation kept you from medical appointments or from getting medications? no   In the past 12 months, has lack of transportation kept you from meetings, work, or from getting things needed for daily living? No   Food Insecurity   Within the past 12 months, you worried that your food would run out before you got the money to buy more. Never true   Within the past 12 months, the food you bought just didn't last and you didn't have money to get more. Never true   Stress   Do you feel stress - tense, restless, nervous, or anxious, or unable to sleep at night because your mind is troubled all the time - these days? Not at all   Social Connections   In a typical week, how many times do you talk on the phone with family, friends, or neighbors? More than 3   How often do you get together with friends or relatives? More than 3   How often do you attend Cheondoism or Voodoo services? More than 4   Do you belong to any clubs or organizations such as Cheondoism groups, unions, fraternal or athletic groups, or school groups? No   Are you , , , , never , or living with a partner?    Alcohol Use   Q1: How often do you have a drink containing alcohol? Monthly or l   Q2: How many drinks containing alcohol do you have on a typical day when you are drinking? 1 or 2   Q3: How often do you have six or more drinks on one occasion? Never   OTHER   Name(s) of People in Home Lauryn Lopez/spouse  [phone]

## 2023-08-28 NOTE — PT/OT/SLP EVAL
"Physical Therapy Evaluation and Discharge Note    Patient Name:  Felicia Lopez   MRN:  [id number]    Recommendations:     Discharge Recommendations: home  Discharge Equipment Recommendations: none   Barriers to discharge: None    Assessment:     Felicia Lopez is a 78 y.o. male admitted with a medical diagnosis of Acute on chronic combined systolic and diastolic heart failure. .  At this time, patient is functioning at their prior level of function and does not require further acute PT services.     Recent Surgery: * No surgery found *      Plan:     During this hospitalization, patient does not require further acute PT services.  Please re-consult if situation changes.      Subjective     Chief Complaint: "I want to go home, I am fine"  Patient/Family Comments/goals: Patient agrees to participate in PT evaluation  Pain/Comfort:  Pain Rating 1: 0/10    Patients cultural, spiritual, Jainism conflicts given the current situation: no    Living Environment:  Patient lives in [facility] with no [facility] with his wife.   Prior to admission, patients level of function was independent with all functional mobility and ADLs.  Equipment used at home: none.  DME owned (not currently used): single point cane.  Upon discharge, patient will have assistance from wife.    Objective:     Communicated with nursing prior to session.  Patient found sitting edge of bed with peripheral IV upon PT entry to room.    General Precautions: Standard, fall    Orthopedic Precautions:N/A   Braces: N/A  Respiratory Status: Room air    Exams:  Cognition:   Patient is oriented to person, place, time and situation.  Pt follows approximately 100% of verbal commands.    Mood: Pleasant and cooperative.   Safety Awareness: good  Musculoskeletal:  BMI: 25  Posture:  slouched shoulders  LE ROM/Strength:   R ROM: WNL  L ROM: WNL  R Strength:   Knee extension: 4/5  Dorsiflexion: 4/5   L Strength:   Knee extension: 4/5  Dorsiflexion: 4/5   Neuromuscular:  Sensation: " Intact to light touch bilateral LEs.   Tone/Reflexes: No impairments identified with functional mobility. No formal testing performed.   Balance:   Static sitting: good  Static standing: good  Dynamic standing: good  Visual-vestibular: No impairments identified with functional mobility. No formal testing performed.  Integument: Visible skin intact  Cardiopulmonary:  Edema: none noted       Functional Mobility:  Transfers:     Sit to Stand:  independence with no AD  Gait: Patient ambulated 30 feet in room with no AD and independence. No LOB ort instability noted     AM-PAC 6 CLICK MOBILITY  Total Score:24       Treatment and Education:  Patient educated on POC, and purpose of PT.     Gait and transfers as stated above.     AM-PAC 6 CLICK MOBILITY  Total Score:24     Patient left sitting edge of bed with all lines intact and call button in reach.    GOALS:   Multidisciplinary Problems       Physical Therapy Goals       Not on file              Multidisciplinary Problems (Resolved)          Problem: Physical Therapy    Goal Priority Disciplines Outcome Goal Variances Interventions   Physical Therapy Goal   (Resolved)     PT, PT/OT Met                         History:     Past Medical History:   Diagnosis Date    Disorder of kidney and ureter     Hyperlipidemia     Hypertension        Past Surgical History:   Procedure Laterality Date    FACIAL RECONSTRUCTION SURGERY      PROSTATE SURGERY         Time Tracking:     PT Received On: 08/28/23  PT Start Time: 1259     PT Stop Time: 1306  PT Total Time (min): 7 min     Billable Minutes: Evaluation 7 minutes       08/28/2023

## 2023-08-28 NOTE — H&P
[address] Medicine  History & Physical    Patient Name: Felicia Lopez  MRN: [id number]  Patient Class: OP- Observation  Admission Date: 8/28/2023  Attending Physician: Dwight Molina MD   Primary Care Provider: Mickey Darden MD         Patient information was obtained from patient, past medical records and ER records.     Subjective:     Principal Problem:Acute on chronic combined systolic and diastolic heart failure    Chief Complaint:   Chief Complaint   Patient presents with    Shortness of Breath     Pt c/o waking up feeling SOB.  Per EMS O2 99% on RA        HPI: Patient is a 78-year-old man with history of hypertension, chronic kidney disease stage 3 with recent baseline serum creatinine 1.7-1.9 mg/dL, chronic systolic and diastolic heart failure, coronary artery disease status post percutaneous coronary intervention, prior myocardial infarction, prior stroke, dyslipidemia who presents today with 2 week history of progressive worsening shortness of breath.  She also reports increased bilateral lower extremity edema.    He reports compliance with prescription medications and denies any dietary indiscretions.    Chest radiograph abnormal with evidence of pulmonary edema and opacity left lower lobe however not significantly different from prior study.  BNP elevated.    Patient admitted to the hospital under observation for treatment of decompensated heart failure.      Past Medical History:   Diagnosis Date    Disorder of kidney and ureter     Hyperlipidemia     Hypertension        Past Surgical History:   Procedure Laterality Date    FACIAL RECONSTRUCTION SURGERY      PROSTATE SURGERY         Review of patient's allergies indicates:  No Known Allergies    No current facility-administered medications on file prior to encounter.     Current Outpatient Medications on File Prior to Encounter   Medication Sig    HYDROcodone-acetaminophen (NORCO) 5-325 mg per tablet  Take 1 tablet by mouth every 6 (six) hours as needed for Pain.    latanoprost 0.005 % ophthalmic solution Place 1 drop into both eyes every evening.    methocarbamoL (ROBAXIN) 500 MG Tab Take 500 mg by mouth 4 (four) times daily.    aspirin (ECOTRIN) 81 MG EC tablet Take 1 tablet (81 mg total) by mouth once daily.    brimonidine 0.2% (ALPHAGAN) 0.2 % Drop Place 1 drop into both eyes once daily.    diazePAM (VALIUM) 10 MG Tab Take 1 tablet (10 mg total) by mouth daily as needed.    diclofenac sodium (VOLTAREN) 1 % Gel Apply 4 g p.r.n. to left knee for pain.    furosemide (LASIX) 40 MG tablet Take 1 tablet (40 mg total) by mouth 2 (two) times daily. (Patient taking differently: Take 40 mg by mouth once daily. Pt takes BID x2 weeks then once daily for 1 week)    levocetirizine (XYZAL) 5 MG tablet Take 1 tablet (5 mg total) by mouth every evening.    lisinopril (PRINIVIL,ZESTRIL) 20 MG tablet Take 1 tablet (20 mg total) by mouth 2 (two) times daily. (Patient taking differently: Take 20 mg by mouth once daily.)    methylPREDNISolone (MEDROL DOSEPACK) 4 mg tablet Take by mouth.    metoprolol succinate (TOPROL-XL) 25 MG 24 hr tablet Take 1 tablet (25 mg total) by mouth every evening.    simvastatin (ZOCOR) 40 MG tablet Take 1 tablet (40 mg total) by mouth once daily.    [DISCONTINUED] baclofen (LIORESAL) 10 MG tablet Take 1 tablet (10 mg total) by mouth 3 (three) times daily.    [DISCONTINUED] clopidogrel (PLAVIX) 75 mg tablet Take 1 tablet (75 mg total) by mouth once daily.    [DISCONTINUED] hydroCHLOROthiazide (MICROZIDE) 12.5 mg capsule Take 1 capsule (12.5 mg total) by mouth once daily. (Patient taking differently: Take 25 mg by mouth once daily.)     Family History       Problem Relation (Age of Onset)    Hypertension Mother    No Known Problems Father          Tobacco Use    Smoking status: Former     Current packs/day: 0.00     Types: Cigarettes     Quit date: 10/5/2008     Years since quittin.9     Smokeless tobacco: Never   Substance and Sexual Activity    Alcohol use: No     Alcohol/week: 0.0 standard drinks of alcohol    Drug use: No    Sexual activity: Never     Review of Systems   Constitutional:  Negative for chills, diaphoresis, fatigue and fever.   HENT:  Negative for congestion, ear pain, hearing loss and tinnitus.    Eyes:  Negative for photophobia, pain, discharge and visual disturbance.   Respiratory:  Positive for shortness of breath. Negative for chest tightness and wheezing.    Cardiovascular:  Positive for leg swelling. Negative for chest pain and palpitations.   Gastrointestinal:  Negative for abdominal distention, abdominal pain, blood in stool, constipation, diarrhea, nausea and vomiting.   Endocrine: Negative for cold intolerance, heat intolerance, polydipsia, polyphagia and polyuria.   Genitourinary:  Negative for dysuria, flank pain, frequency, hematuria and urgency.   Musculoskeletal:  Negative for arthralgias, back pain, gait problem, myalgias and neck pain.   Skin:  Negative for color change, pallor, rash and wound.   Allergic/Immunologic: Negative for environmental allergies, food allergies and immunocompromised state.   Neurological:  Negative for seizures, syncope, facial asymmetry, weakness and headaches.   Psychiatric/Behavioral:  Negative for agitation, behavioral problems, confusion and hallucinations.      Objective:     Vital Signs (Most Recent):  Temp: 99 °F (37.2 °C) (08/28/23 0613)  Pulse: 80 (08/28/23 0625)  Resp: (!) 36 (08/28/23 0625)  BP: 127/71 (08/28/23 0625)  SpO2: 97 % (08/28/23 0625) Vital Signs (24h Range):  Temp:  [99 °F (37.2 °C)] 99 °F (37.2 °C)  Pulse:  [80-81] 80  Resp:  [20-36] 36  SpO2:  [97 %-100 %] 97 %  BP: (127)/(71-77) 127/71        There is no height or weight on file to calculate BMI.     Physical Exam  Constitutional:       General: He is in acute distress.      Appearance: He is ill-appearing. He is not diaphoretic.   HENT:      Head:  Normocephalic and atraumatic.      Nose: Nose normal.      Mouth/Throat:      Pharynx: No oropharyngeal exudate.   Eyes:      General: No scleral icterus.        Right eye: No discharge.         Left eye: No discharge.      Conjunctiva/sclera: Conjunctivae normal.      Pupils: Pupils are equal, round, and reactive to light.   Neck:      Thyroid: No thyromegaly.      Vascular: No JVD.      Trachea: No tracheal deviation.   Cardiovascular:      Rate and Rhythm: Normal rate and regular rhythm.      Heart sounds: Normal heart sounds. No murmur heard.     No friction rub. No gallop.   Pulmonary:      Effort: Tachypnea and respiratory distress present.      Breath sounds: No stridor. Rales present. No wheezing.   Chest:      Chest wall: No tenderness.   Abdominal:      General: Bowel sounds are normal. There is no distension.      Palpations: Abdomen is soft. There is no mass.      Tenderness: There is no abdominal tenderness. There is no guarding or rebound.   Musculoskeletal:         General: Swelling present. No tenderness. Normal range of motion.      Cervical back: Normal range of motion and neck supple.      Right lower leg: Edema present.      Left lower leg: Edema present.   Lymphadenopathy:      Cervical: No cervical adenopathy.   Skin:     General: Skin is warm and dry.      Coloration: Skin is not pale.      Findings: No erythema or rash.   Neurological:      Mental Status: He is alert and oriented to person, place, and time.      Cranial Nerves: No cranial nerve deficit.      Motor: No abnormal muscle tone.      Coordination: Coordination normal.      Deep Tendon Reflexes: Reflexes are normal and symmetric. Reflexes normal.   Psychiatric:         Behavior: Behavior normal.         Thought Content: Thought content normal.         Judgment: Judgment normal.              CRANIAL NERVES     CN III, IV, VI   Pupils are equal, round, and reactive to light.       Significant Labs: All pertinent labs within the past  24 hours have been reviewed.    Significant Imaging: I have reviewed all pertinent imaging results/findings within the past 24 hours.    Assessment/Plan:     * Acute on chronic combined systolic and diastolic heart failure  Evidence of decompensated heart failure with pulmonary edema and bilateral lower extremity edema.  Continue with intravenous furosemide.  Titrated to achieve negative fluid balance.  Continue negative fluid balance until we achieve euvolemia.  Otherwise continue medical therapy.  Echocardiogram.  Consult Cardiology for evaluation further treatment recommendations.    Chronic kidney disease, stage III (moderate)  Evidence of fairly advanced chronic kidney disease based on prior serum creatinine levels.  Serum creatinine mildly more elevated than prior baseline.  May improve with improving volume status.  Continue to closely monitor.  Patient follows up with Dr. Fareed Matson in clinic for management of his kidney disease.    Essential hypertension  Reasonably controlled with current regimen.  Will continue with current regimen and continue to monitor.    VTE Risk Mitigation (From admission, onward)         Ordered     heparin (porcine) injection 5,000 Units  Every 8 hours         08/28/23 0943     IP VTE HIGH RISK PATIENT  Once         08/28/23 0943     Place sequential compression device  Until discontinued         08/28/23 0943                   On 08/28/2023, patient should be placed in hospital observation services under my care.        Dwight Molina MD  Department of Hospital Medicine  [facility] ([address])

## 2023-08-28 NOTE — SUBJECTIVE & OBJECTIVE
Past Medical History:   Diagnosis Date    Disorder of kidney and ureter     Hyperlipidemia     Hypertension        Past Surgical History:   Procedure Laterality Date    FACIAL RECONSTRUCTION SURGERY      PROSTATE SURGERY         Review of patient's allergies indicates:  No Known Allergies    No current facility-administered medications on file prior to encounter.     Current Outpatient Medications on File Prior to Encounter   Medication Sig    HYDROcodone-acetaminophen (NORCO) 5-325 mg per tablet Take 1 tablet by mouth every 6 (six) hours as needed for Pain.    latanoprost 0.005 % ophthalmic solution Place 1 drop into both eyes every evening.    methocarbamoL (ROBAXIN) 500 MG Tab Take 500 mg by mouth 4 (four) times daily.    aspirin (ECOTRIN) 81 MG EC tablet Take 1 tablet (81 mg total) by mouth once daily.    brimonidine 0.2% (ALPHAGAN) 0.2 % Drop Place 1 drop into both eyes once daily.    diazePAM (VALIUM) 10 MG Tab Take 1 tablet (10 mg total) by mouth daily as needed.    diclofenac sodium (VOLTAREN) 1 % Gel Apply 4 g p.r.n. to left knee for pain.    furosemide (LASIX) 40 MG tablet Take 1 tablet (40 mg total) by mouth 2 (two) times daily. (Patient taking differently: Take 40 mg by mouth once daily. Pt takes BID x2 weeks then once daily for 1 week)    levocetirizine (XYZAL) 5 MG tablet Take 1 tablet (5 mg total) by mouth every evening.    lisinopril (PRINIVIL,ZESTRIL) 20 MG tablet Take 1 tablet (20 mg total) by mouth 2 (two) times daily. (Patient taking differently: Take 20 mg by mouth once daily.)    methylPREDNISolone (MEDROL DOSEPACK) 4 mg tablet Take by mouth.    metoprolol succinate (TOPROL-XL) 25 MG 24 hr tablet Take 1 tablet (25 mg total) by mouth every evening.    simvastatin (ZOCOR) 40 MG tablet Take 1 tablet (40 mg total) by mouth once daily.    [DISCONTINUED] baclofen (LIORESAL) 10 MG tablet Take 1 tablet (10 mg total) by mouth 3 (three) times daily.    [DISCONTINUED] clopidogrel (PLAVIX) 75 mg tablet  Take 1 tablet (75 mg total) by mouth once daily.    [DISCONTINUED] hydroCHLOROthiazide (MICROZIDE) 12.5 mg capsule Take 1 capsule (12.5 mg total) by mouth once daily. (Patient taking differently: Take 25 mg by mouth once daily.)     Family History       Problem Relation (Age of Onset)    Hypertension Mother    No Known Problems Father          Tobacco Use    Smoking status: Former     Current packs/day: 0.00     Types: Cigarettes     Quit date: 10/5/2008     Years since quittin.9    Smokeless tobacco: Never   Substance and Sexual Activity    Alcohol use: No     Alcohol/week: 0.0 standard drinks of alcohol    Drug use: No    Sexual activity: Never     Review of Systems   Constitutional:  Negative for chills, diaphoresis, fatigue and fever.   HENT:  Negative for congestion, ear pain, hearing loss and tinnitus.    Eyes:  Negative for photophobia, pain, discharge and visual disturbance.   Respiratory:  Positive for shortness of breath. Negative for chest tightness and wheezing.    Cardiovascular:  Positive for leg swelling. Negative for chest pain and palpitations.   Gastrointestinal:  Negative for abdominal distention, abdominal pain, blood in stool, constipation, diarrhea, nausea and vomiting.   Endocrine: Negative for cold intolerance, heat intolerance, polydipsia, polyphagia and polyuria.   Genitourinary:  Negative for dysuria, flank pain, frequency, hematuria and urgency.   Musculoskeletal:  Negative for arthralgias, back pain, gait problem, myalgias and neck pain.   Skin:  Negative for color change, pallor, rash and wound.   Allergic/Immunologic: Negative for environmental allergies, food allergies and immunocompromised state.   Neurological:  Negative for seizures, syncope, facial asymmetry, weakness and headaches.   Psychiatric/Behavioral:  Negative for agitation, behavioral problems, confusion and hallucinations.      Objective:     Vital Signs (Most Recent):  Temp: 99 °F (37.2 °C) (23 0613)  Pulse:  80 (08/28/23 0625)  Resp: (!) 36 (08/28/23 0625)  BP: 127/71 (08/28/23 0625)  SpO2: 97 % (08/28/23 0625) Vital Signs (24h Range):  Temp:  [99 °F (37.2 °C)] 99 °F (37.2 °C)  Pulse:  [80-81] 80  Resp:  [20-36] 36  SpO2:  [97 %-100 %] 97 %  BP: (127)/(71-77) 127/71        There is no height or weight on file to calculate BMI.     Physical Exam  Constitutional:       General: He is in acute distress.      Appearance: He is ill-appearing. He is not diaphoretic.   HENT:      Head: Normocephalic and atraumatic.      Nose: Nose normal.      Mouth/Throat:      Pharynx: No oropharyngeal exudate.   Eyes:      General: No scleral icterus.        Right eye: No discharge.         Left eye: No discharge.      Conjunctiva/sclera: Conjunctivae normal.      Pupils: Pupils are equal, round, and reactive to light.   Neck:      Thyroid: No thyromegaly.      Vascular: No JVD.      Trachea: No tracheal deviation.   Cardiovascular:      Rate and Rhythm: Normal rate and regular rhythm.      Heart sounds: Normal heart sounds. No murmur heard.     No friction rub. No gallop.   Pulmonary:      Effort: Tachypnea and respiratory distress present.      Breath sounds: No stridor. Rales present. No wheezing.   Chest:      Chest wall: No tenderness.   Abdominal:      General: Bowel sounds are normal. There is no distension.      Palpations: Abdomen is soft. There is no mass.      Tenderness: There is no abdominal tenderness. There is no guarding or rebound.   Musculoskeletal:         General: Swelling present. No tenderness. Normal range of motion.      Cervical back: Normal range of motion and neck supple.      Right lower leg: Edema present.      Left lower leg: Edema present.   Lymphadenopathy:      Cervical: No cervical adenopathy.   Skin:     General: Skin is warm and dry.      Coloration: Skin is not pale.      Findings: No erythema or rash.   Neurological:      Mental Status: He is alert and oriented to person, place, and time.      Cranial  Nerves: No cranial nerve deficit.      Motor: No abnormal muscle tone.      Coordination: Coordination normal.      Deep Tendon Reflexes: Reflexes are normal and symmetric. Reflexes normal.   Psychiatric:         Behavior: Behavior normal.         Thought Content: Thought content normal.         Judgment: Judgment normal.              CRANIAL NERVES     CN III, IV, VI   Pupils are equal, round, and reactive to light.       Significant Labs: All pertinent labs within the past 24 hours have been reviewed.    Significant Imaging: I have reviewed all pertinent imaging results/findings within the past 24 hours.

## 2023-08-28 NOTE — ASSESSMENT & PLAN NOTE
Evidence of fairly advanced chronic kidney disease based on prior serum creatinine levels.  Serum creatinine mildly more elevated than prior baseline.  May improve with improving volume status.  Continue to closely monitor.  Patient follows up with Dr. Fareed Matson in clinic for management of his kidney disease.

## 2023-08-28 NOTE — ED PROVIDER NOTES
Encounter Date: 2023    SCRIBE #1 NOTE: IJosemanuel, am scribing for, and in the presence of,  Kathleen Pratt MD. I have scribed the following portions of the note - Other sections scribed: HPI, ROS, PE.       History     Chief Complaint   Patient presents with    Shortness of Breath     Pt c/o waking up feeling SOB.  Per EMS O2 99% on RA     78 year-old male who presents via EMS with complaint of shortness of breath which was present when he awakened this two hours ago. He was asymptomatic last night and denies any recent fever or cough. However, he does report steadily increasing bilateral leg swelling over the last week despite taking his Lasix as prescribed. He denies any chest pain.   His medical history includes  a prior MI and CVA, the latter with a lasting, albeit slight right leg weakness. He states he has had no major surgeries. He has no SHx of alcohol or tobacco use. He also has no known drug allergies. This is the extent of the patient's complaints at this time.    The history is provided by the patient.     Review of patient's allergies indicates:  No Known Allergies  Past Medical History:   Diagnosis Date    Disorder of kidney and ureter     Hyperlipidemia     Hypertension      Past Surgical History:   Procedure Laterality Date    FACIAL RECONSTRUCTION SURGERY      PROSTATE SURGERY       Family History   Problem Relation Age of Onset    No Known Problems Mother     No Known Problems Father      Social History     Tobacco Use    Smoking status: Former     Current packs/day: 0.00     Types: Cigarettes     Quit date: 10/5/2008     Years since quittin.9    Smokeless tobacco: Never   Substance Use Topics    Alcohol use: No     Alcohol/week: 0.0 standard drinks of alcohol    Drug use: No     Review of Systems   Constitutional:  Negative for chills and fever.   HENT:  Negative for congestion and sore throat.    Eyes:  Negative for visual disturbance.   Respiratory:  Positive for shortness  of breath. Negative for cough.    Cardiovascular:  Positive for leg swelling. Negative for chest pain and palpitations.   Gastrointestinal:  Negative for abdominal pain, diarrhea and vomiting.   Genitourinary:  Negative for decreased urine volume, dysuria and frequency.   Musculoskeletal:  Negative for joint swelling, neck pain and neck stiffness.   Skin:  Negative for rash and wound.   Neurological:  Negative for weakness, numbness and headaches.   Psychiatric/Behavioral:  Negative for behavioral problems and confusion.        Physical Exam     Initial Vitals [08/28/23 0613]   BP Pulse Resp Temp SpO2   127/77 81 20 99 °F (37.2 °C) 100 %      MAP       --         Physical Exam    Constitutional: He appears well-developed and well-nourished. He is cooperative. He appears distressed.   HENT:   Head: Normocephalic and atraumatic.   Nose: Nose normal.   Mouth/Throat: Uvula is midline, oropharynx is clear and moist and mucous membranes are normal. No oropharyngeal exudate.   Eyes: Conjunctivae and EOM are normal. Pupils are equal, round, and reactive to light.   Neck: Neck supple.   Normal range of motion.  Cardiovascular:  Normal rate, regular rhythm and normal heart sounds.     Exam reveals no gallop and no friction rub.       No murmur heard.  Pulmonary/Chest: Tachypnea noted. He has no wheezes. He has rales.   Bibasilar rales.   Abdominal: Abdomen is soft. Bowel sounds are normal. There is no abdominal tenderness. There is no rebound and no guarding.   Musculoskeletal:         General: Edema present. No tenderness.      Cervical back: Normal range of motion and neck supple.      Comments: 1+ edema of LLE, 2+ RLE. Patient reports there is asymmetry at baseline.      Neurological: He is alert and oriented to person, place, and time. He has normal strength. No cranial nerve deficit or sensory deficit. Gait normal. GCS score is 15. GCS eye subscore is 4. GCS verbal subscore is 5. GCS motor subscore is 6.   Skin: Skin is  warm and dry. Capillary refill takes less than 2 seconds. No rash noted.   Psychiatric: His speech is normal and behavior is normal. His mood appears anxious.         ED Course   Procedures  Labs Reviewed   CBC W/ AUTO DIFFERENTIAL - Abnormal; Notable for the following components:       Result Value    RBC 4.51 (*)     Hemoglobin 12.9 (*)     MCHC 31.3 (*)     RDW 16.4 (*)     Platelets 116 (*)     Platelet Estimate Decreased (*)     All other components within normal limits   COMPREHENSIVE METABOLIC PANEL - Abnormal; Notable for the following components:    Chloride 112 (*)     CO2 21 (*)     BUN 32 (*)     Creatinine 2.0 (*)     Albumin 3.3 (*)     eGFR 33 (*)     All other components within normal limits   TROPONIN I - Abnormal; Notable for the following components:    Troponin I 0.196 (*)     All other components within normal limits   B-TYPE NATRIURETIC PEPTIDE - Abnormal; Notable for the following components:    BNP 2,033 (*)     All other components within normal limits   SARS-COV-2 RDRP GENE     EKG Readings: (Independently Interpreted)   Normal sinus rhythm. Rate of 80. No STEMI. No ectopy.     ECG Results              EKG 12-lead (Final result)  Result time 08/28/23 08:20:03      Final result by Interface, Lab [facility] (08/28/23 08:20:03)                   Narrative:    Test Reason : R06.02,    Vent. Rate : 080 BPM     Atrial Rate : 080 BPM     P-R Int : 160 ms          QRS Dur : 100 ms      QT Int : 394 ms       P-R-T Axes : 078 085 077 degrees     QTc Int : 454 ms    Normal sinus rhythm  Nonspecific ST and T wave abnormality    Confirmed by Art Miguel MD ([id number]) on 8/28/2023 8:19:53 AM    Referred By: System System           Confirmed By:Art Miguel MD                                  Imaging Results              X-Ray Chest 1 View (Final result)  Result time 08/28/23 07:12:05      Final result by Rubens Mcdonnell MD (08/28/23 07:12:05)                   Impression:      Borderline  cardiomegaly with some accentuated interstitial markings bilaterally which may represent interstitial pulmonary edema are again appreciated, as is opacity in the inferior hemithorax on the left side consistent with airspace consolidation/volume loss in the left lower lobe and/or pleural fluid on this side.  The current appearance of the chest is actually quite similar to the earlier examination of 08/09/2022.      Electronically signed by: Rubens Mcdonnell MD  Date:    08/28/2023  Time:    07:12               Narrative:    EXAMINATION:  XR CHEST 1 VIEW    CLINICAL HISTORY:  shortness of breath;    TECHNIQUE:  One view    COMPARISON:  Comparison is made to the only available prior chest radiograph, dated 08/09/2022.    FINDINGS:  Cardiomediastinal silhouette is magnified by projection, with the true heart size at the upper limit of normal to minimally enlarged.  The appearance of the cardiomediastinal silhouette has not changed appreciably since the examination referenced above.  Pulmonary vascularity is also stable.  Some accentuated pulmonary interstitial markings are again seen in the mid and lower lung zones bilaterally, similar to the previous study.  Opacity in the inferior hemithorax on the left side is again observed, consistent with airspace consolidation/volume loss in the left lower lobe and/or pleural fluid on this side.  No pleural fluid on the right.  No pneumothorax.                                    X-Rays:   Independently Interpreted Readings:   Chest X-Ray: Left lower lobe infiltrate. No pneumothorax or effusion. Will defer to radiologist's read.     Medications   furosemide injection 80 mg (80 mg Intravenous Given 8/28/23 0641)     Medical Decision Making  78-year-old male with history of CHF and CVA presents with complaint of shortness of breath which started this morning.  He denies any noncompliance with medications or diet.  On exam he is tachypneic, bibasilar rales are present, bilateral lower  extremity edema, right greater than left.  He was treated with Lasix 80 mg IV, normally takes 40 p.o..  Chest x-ray shows chronic left lower lobe infiltrate.  BNP is elevated at double from last value, troponin is slightly elevated but I doubt acute MI. other labs show azotemia which is chronic in nature, no major electrolyte disturbance.  There is mild thrombocytopenia.  Patient would benefit from further diuresis, will place in observation    Amount and/or Complexity of Data Reviewed  External Data Reviewed: notes.  Labs: ordered. Decision-making details documented in ED Course.  Radiology: ordered and independent interpretation performed. Decision-making details documented in ED Course.  ECG/medicine tests: ordered and independent interpretation performed. Decision-making details documented in ED Course.    Risk  Prescription drug management.            Scribe Attestation:   Scribe #1: I performed the above scribed service and the documentation accurately describes the services I performed. I attest to the accuracy of the note.    Physician Attestation for Scribe: I, Kathleen Pratt, reviewed documentation as scribed in my presence, which is both accurate and complete.      ED Course as of 08/28/23 0823   Mon Aug 28, 2023   0739 Troponin I(!)  Mildly elevated.  Consistent with prior values on file. [AK]   0739 Brain Natriuretic Peptide(!)  Elevated.  Higher than previous. [AK]   0739 Comprehensive Metabolic Panel(!)  Azotemia is chronic, no major electrolyte disturbance present. [AK]   0747 Sent secure chat to hospitalist regarding admission. [AK]   0747 POCT COVID-19 Rapid Screening  Reviewed and negative [AK]   0747 CBC Auto Differential(!)  Thrombocytopenia present, no leukocytosis [AK]      ED Course User Index  [AK] Kathleen Pratt MD                      Clinical Impression:   Final diagnoses:  [R06.02] Shortness of breath (Primary)  [I50.9] Acute on chronic congestive heart failure, unspecified heart  failure type  [R91.8] Left lower lobe pulmonary infiltrate        ED Disposition Condition    Observation Stable                Kathleen Pratt MD  08/28/23 6096

## 2023-08-28 NOTE — HPI
Patient is a 78-year-old man with history of hypertension, chronic kidney disease stage 3 with recent baseline serum creatinine 1.7-1.9 mg/dL, chronic systolic and diastolic heart failure, coronary artery disease status post percutaneous coronary intervention, prior myocardial infarction, prior stroke, dyslipidemia who presents today with 2 week history of progressive worsening shortness of breath.  She also reports increased bilateral lower extremity edema.    He reports compliance with prescription medications and denies any dietary indiscretions.    Chest radiograph abnormal with evidence of pulmonary edema and opacity left lower lobe however not significantly different from prior study.  BNP elevated.    Patient admitted to the hospital under observation for treatment of decompensated heart failure.

## 2023-08-28 NOTE — ASSESSMENT & PLAN NOTE
Evidence of decompensated heart failure with pulmonary edema and bilateral lower extremity edema.  Continue with intravenous furosemide.  Titrated to achieve negative fluid balance.  Continue negative fluid balance until we achieve euvolemia.  Otherwise continue medical therapy.  Echocardiogram.  Consult Cardiology for evaluation further treatment recommendations.

## 2023-08-28 NOTE — PLAN OF CARE
Please see previous note for initial assessment.   08/28/23 1620   Discharge Assessment   Assessment Type Discharge Planning Assessment

## 2023-08-28 NOTE — PT/OT/SLP EVAL
Occupational Therapy   Evaluation & Discharge Summary    Name: Felicia Lopez  MRN: [id number]  Admitting Diagnosis: Acute on chronic combined systolic and diastolic heart failure  Recent Surgery: * No surgery found *      Recommendations:     Discharge Recommendations: home  Discharge Equipment Recommendations:  none  Barriers to discharge:  None    Assessment:     Felicia Lopez is a 78 y.o. male with a medical diagnosis of Acute on chronic combined systolic and diastolic heart failure.  He presents with no pain. Pt agreeable to participating in therapy upon arrival to room.  Pt demonstrates strength and ROM in (B) UE needed for ADLs that is WNL, and is able to doff/don sandal/sock without assist utilizing figure four dressing technique.  While ambulating in room mild postural sway noted, but no instances of LOB observed.      PTA pt reports being (I) with ADLs, and occasionally utilizing a SPC for mobility.  Pt is performing at baseline and does not require OT services in the acute care setting.  Pt to d/c from OT; no further therapy needs recommended upon d/c from hospital.    Rehab Prognosis: Good; patient would benefit from acute skilled OT services to address these deficits and reach maximum level of function.       Plan:     Patient to d/c from OT; no further therapy needs recommended upon d/c from hospital    Subjective     Chief Complaint: Ready to go home  Patient/Family Comments/goals: Return home    Occupational Profile:  Living Environment: Pt lives with wife in [facility],  [place].  Bathroom has tub/shower.  Previous level of function:   -ADLs: Independent   -iADLs: Independent   -Mobility: Independent   Roles and Routines:   Equipment Used at Home: cane, straight (on occasion)  Assistance upon Discharge: Wife able to provide assist    Pain/Comfort:  Pain Rating 1: 0/10  Pain Rating Post-Intervention 1: 0/10    Patients cultural, spiritual, [employer]aritan conflicts given the current situation: no    Objective:      Communicated with: RN (Hallie) prior to session.  Patient found HOB elevated with peripheral IV upon OT entry to room.    General Precautions: Standard, fall  Orthopedic Precautions: N/A  Braces: N/A  Respiratory Status: Room air    Occupational Performance:    Bed Mobility:    Independent    Functional Mobility/Transfers:  Sit <> Stand: Independent x 1 trial from EOB  Functional Mobility: Pt ambulated ~30 ft independently.   No instances of LOB noted.    Activities of Daily Living:  Lower Body Dressing: Independent for doffing/donning sock and sandal on L foot while seated at EOB.    Cognitive/Visual Perceptual:  Cognitive/Psychosocial Skills:    -       Oriented to: Person, Place, Time, and Situation   -       Follows Commands/attention: Follows 100% of commands  -       Communication: clear/fluent  -       Memory: No Deficits noted  -       Safety awareness/insight to disability: intact   -       Mood/Affect/Coping skills/emotional control: Cooperative and Pleasant    Physical Exam:  Postural examination/scapula alignment:    -       No postural abnormalities identified  Skin integrity: Visible skin intact  Edema:  None noted  Sensation: -       Intact for (B) UE; reports some numbness in (B) LE  Motor Planning: WNL  Dominant hand: Right  Upper Extremity Range of Motion:    -       Right Upper Extremity: WNL  -       Left Upper Extremity: WNL  Upper Extremity Strength:   -       Right Upper Extremity: WNL; grossly 5/5 all muscle groups  -       Left Upper Extremity: WNL; grossly 5/5 all muscle groups   Strength:5/5 both hands  Fine Motor Coordination: -       Intact  Gross motor coordination: WFL  Balance: Sitting- Independent; Standing- Independent     AMPAC 6 Click ADL:  AMPAC Total Score: 24    Treatment & Education:  *Pt educated on role of OT in acute care setting    Patient left sitting edge of bed with call button in reach    GOALS:   Multidisciplinary Problems       Occupational Therapy Goals        Not on file              Multidisciplinary Problems (Resolved)          Problem: Occupational Therapy    Goal Priority Disciplines Outcome Interventions   Occupational Therapy Goal   (Resolved)     OT, PT/OT Met                        History:     Past Medical History:   Diagnosis Date    Disorder of kidney and ureter     Hyperlipidemia     Hypertension          Past Surgical History:   Procedure Laterality Date    FACIAL RECONSTRUCTION SURGERY      PROSTATE SURGERY         Time Tracking:     OT Date of Treatment: 08/28/23  OT Start Time: 1255  OT Stop Time: 1305  OT Total Time (min): 10 min    Billable Minutes:Evaluation 10    *completed with PT 2* anticipated nature of pt    AUBREE Meyers  8/28/2023

## 2023-08-28 NOTE — ED NOTES
Urinal placed at bedside.   Call light within reach. Instructed to notify RN for assistance, concerns, or needs. Verbalized understanding.   No apparent distress noted at this time. Care ongoing.

## 2023-08-28 NOTE — CONSULTS
Food & Nutrition  Education    Diet Education: Heart Health  Time Spent: < 15 min  Learners: pt      Nutrition Education provided with handouts:   Heart Health Nutrition Therapy    Comments:  Pt with good appetite, reports wife does the cooking and does not use salt to prepare meals. Follows a low sodium diet at home. Pt states wife cooks from scratch and uses fresh protein source, fresh greens, denies use of processed food items. Pt state previous education on heart health and wife follows nutrition guidelines d/t her own health condition. Reinforced low sodium restriction, increase intake of fruits, vegetables, and whole grains. Provided pt with handout and pt voiced understanding.      All questions and concerns answered. Dietitian's contact information provided.       Follow-Up: yes    Please Re-consult as needed      Thanks!  Jazzy Chaidez, RICARDON, LDN

## 2023-08-28 NOTE — ED NOTES
Pt positioned high [place].   Nasal cannula removed that was placed by EMS. Patient maintaining room air saturation of 97%.   Continuous pulse ox monitoring ongoing.

## 2023-08-28 NOTE — PLAN OF CARE
Problem: Occupational Therapy  Goal: Occupational Therapy Goal  Outcome: Met     OT evaluation complete.  Pt is performing at baseline and does not require OT services in the acute care setting.  Pt to d/c from OT; no further therapy needs recommended upon d/c from hospital.    AUBREE Meyers  8/28/2023

## 2023-08-28 NOTE — PLAN OF CARE
Problem: Physical Therapy  Goal: Physical Therapy Goal  Outcome: Met   At this time, patient is functioning at their prior level of function and does not require further acute PT services.

## 2023-08-29 VITALS
HEIGHT: 72 IN | RESPIRATION RATE: 18 BRPM | HEART RATE: 93 BPM | OXYGEN SATURATION: 98 % | DIASTOLIC BLOOD PRESSURE: 67 MMHG | TEMPERATURE: 98 F | BODY MASS INDEX: 24.65 KG/M2 | SYSTOLIC BLOOD PRESSURE: 129 MMHG | WEIGHT: 182 LBS

## 2023-08-29 LAB
ANION GAP SERPL CALC-SCNC: 11 MMOL/L (ref 8–16)
BASOPHILS # BLD AUTO: 0.04 K/UL (ref 0–0.2)
BASOPHILS NFR BLD: 0.5 % (ref 0–1.9)
BUN SERPL-MCNC: 37 MG/DL (ref 8–23)
CALCIUM SERPL-MCNC: 9.4 MG/DL (ref 8.7–10.5)
CHLORIDE SERPL-SCNC: 105 MMOL/L (ref 95–110)
CO2 SERPL-SCNC: 26 MMOL/L (ref 23–29)
CREAT SERPL-MCNC: 2.3 MG/DL (ref 0.5–1.4)
DIFFERENTIAL METHOD: ABNORMAL
EOSINOPHIL # BLD AUTO: 0.2 K/UL (ref 0–0.5)
EOSINOPHIL NFR BLD: 1.7 % (ref 0–8)
ERYTHROCYTE [DISTWIDTH] IN BLOOD BY AUTOMATED COUNT: 16.3 % (ref 11.5–14.5)
EST. GFR  (NO RACE VARIABLE): 28 ML/MIN/1.73 M^2
GLUCOSE SERPL-MCNC: 92 MG/DL (ref 70–110)
HCT VFR BLD AUTO: 39.1 % (ref 40–54)
HGB BLD-MCNC: 12.4 G/DL (ref 14–18)
IMM GRANULOCYTES # BLD AUTO: 0.03 K/UL (ref 0–0.04)
IMM GRANULOCYTES NFR BLD AUTO: 0.3 % (ref 0–0.5)
LYMPHOCYTES # BLD AUTO: 1.4 K/UL (ref 1–4.8)
LYMPHOCYTES NFR BLD: 16.8 % (ref 18–48)
MAGNESIUM SERPL-MCNC: 1.6 MG/DL (ref 1.6–2.6)
MCH RBC QN AUTO: 28.6 PG (ref 27–31)
MCHC RBC AUTO-ENTMCNC: 31.7 G/DL (ref 32–36)
MCV RBC AUTO: 90 FL (ref 82–98)
MONOCYTES # BLD AUTO: 1.2 K/UL (ref 0.3–1)
MONOCYTES NFR BLD: 14 % (ref 4–15)
NEUTROPHILS # BLD AUTO: 5.7 K/UL (ref 1.8–7.7)
NEUTROPHILS NFR BLD: 66.7 % (ref 38–73)
NRBC BLD-RTO: 0 /100 WBC
PHOSPHATE SERPL-MCNC: 3.9 MG/DL (ref 2.7–4.5)
PLATELET # BLD AUTO: 111 K/UL (ref 150–450)
PMV BLD AUTO: ABNORMAL FL (ref 9.2–12.9)
POTASSIUM SERPL-SCNC: 4.6 MMOL/L (ref 3.5–5.1)
RBC # BLD AUTO: 4.33 M/UL (ref 4.6–6.2)
SODIUM SERPL-SCNC: 142 MMOL/L (ref 136–145)
WBC # BLD AUTO: 8.59 K/UL (ref 3.9–12.7)

## 2023-08-29 PROCEDURE — 84100 ASSAY OF PHOSPHORUS: CPT | Performed by: HOSPITALIST

## 2023-08-29 PROCEDURE — 94761 N-INVAS EAR/PLS OXIMETRY MLT: CPT

## 2023-08-29 PROCEDURE — 96372 THER/PROPH/DIAG INJ SC/IM: CPT | Performed by: HOSPITALIST

## 2023-08-29 PROCEDURE — 99239 HOSP IP/OBS DSCHRG MGMT >30: CPT | Mod: ,,, | Performed by: HOSPITALIST

## 2023-08-29 PROCEDURE — 96376 TX/PRO/DX INJ SAME DRUG ADON: CPT

## 2023-08-29 PROCEDURE — 99214 PR OFFICE/OUTPT VISIT, EST, LEVL IV, 30-39 MIN: ICD-10-PCS | Mod: ,,, | Performed by: INTERNAL MEDICINE

## 2023-08-29 PROCEDURE — 25000003 PHARM REV CODE 250: Performed by: HOSPITALIST

## 2023-08-29 PROCEDURE — 36415 COLL VENOUS BLD VENIPUNCTURE: CPT | Performed by: HOSPITALIST

## 2023-08-29 PROCEDURE — 80048 BASIC METABOLIC PNL TOTAL CA: CPT | Performed by: HOSPITALIST

## 2023-08-29 PROCEDURE — 83735 ASSAY OF MAGNESIUM: CPT | Performed by: HOSPITALIST

## 2023-08-29 PROCEDURE — G0378 HOSPITAL OBSERVATION PER HR: HCPCS

## 2023-08-29 PROCEDURE — 99239 PR HOSPITAL DISCHARGE DAY,>30 MIN: ICD-10-PCS | Mod: ,,, | Performed by: HOSPITALIST

## 2023-08-29 PROCEDURE — 63600175 PHARM REV CODE 636 W HCPCS: Performed by: HOSPITALIST

## 2023-08-29 PROCEDURE — 99214 OFFICE O/P EST MOD 30 MIN: CPT | Mod: ,,, | Performed by: INTERNAL MEDICINE

## 2023-08-29 PROCEDURE — 85025 COMPLETE CBC W/AUTO DIFF WBC: CPT | Performed by: HOSPITALIST

## 2023-08-29 RX ORDER — FUROSEMIDE 40 MG/1
40 TABLET ORAL 2 TIMES DAILY
Qty: 60 TABLET | Refills: 1
Start: 2023-08-29 | End: 2024-01-17 | Stop reason: SDUPTHER

## 2023-08-29 RX ADMIN — HEPARIN SODIUM 5000 UNITS: 5000 INJECTION INTRAVENOUS; SUBCUTANEOUS at 05:08

## 2023-08-29 RX ADMIN — FUROSEMIDE 80 MG: 10 INJECTION, SOLUTION INTRAMUSCULAR; INTRAVENOUS at 05:08

## 2023-08-29 RX ADMIN — ASPIRIN 81 MG: 81 TABLET, COATED ORAL at 08:08

## 2023-08-29 NOTE — PLAN OF CARE
CM met with pt for final discharge planning assessment. Pt is ready for discharge home today.    No medication changes needed for discharge.    CM called Dr Matson' office to schedule a follow-up apt, however, Dr Mann's assistant will call pt to schedule for Cr check.    Pt requires a lyft ride home, pt's nurse, Reji notified.    Pt is ready for discharge from CM perspective.   08/29/23 1033   Final Note   Assessment Type Final Discharge Note   Anticipated Discharge Disposition Home   What phone number can be called within the next 1-3 days to see how you are doing after discharge? [phone]   Hospital Resources/Appts/Education Provided Provided patient/caregiver with written discharge plan information;Provided education on problems/symptoms using teachback   Post-Acute Status   Discharge Delays None known at this time     [facility] ([address])  Discharge Final Note    Primary Care Provider: Mickey Darden MD    Expected Discharge Date: 8/29/2023    Final Discharge Note (most recent)       Final Note - 08/29/23 1033          Final Note    Assessment Type Final Discharge Note (P)      Anticipated Discharge Disposition Home or Self Care (P)      What phone number can be called within the next 1-3 days to see how you are doing after discharge? [phone] (P)      Hospital Resources/Appts/Education Provided Provided patient/caregiver with written discharge plan information;Provided education on problems/symptoms using teachback (P)         Post-Acute Status    Discharge Delays None known at this time (P)                      Important Message from Medicare             Contact Info       Fareed Matson MD   Specialty: Nephrology    [address] [place] [id number]   Phone: [phone]       Next Steps: Schedule an appointment as soon as possible for a visit in 2 day(s)    Instructions: Repeat labs

## 2023-08-29 NOTE — HOSPITAL COURSE
Patient is a 78-year-old man with history of hypertension, chronic kidney disease stage 3 with recent baseline serum creatinine 1.7-1.9 mg/dL, chronic systolic and diastolic heart failure, coronary artery disease status post percutaneous coronary intervention, prior myocardial infarction, prior stroke, dyslipidemia admitted the hospital with evidence of decompensated heart failure.  Patient treated intravenous diuretic therapy with improvement in his respiratory symptoms decreasing lower extremity edema.  Serum creatinine trended up mildly this morning.      Recommended patient remain hospitalized to receive further intravenous diuretic therapy and to closely monitor his kidney function.  Serious risks and leaving the hospital prematurely discussed.  Patient understands risks however states he needs to go home to take care of his wife he is able to follow-up with his nephrologist Dr. Fareed Matson in clinic for repeat.    Recommend he hold his ACE inhibitor for now and to resume his prior dose of home diuretic therapy.  Patient counseled here adhere to a low-sodium diet.  Patient advised to return to the hospital immediately if he develops recurrence and shortness of breath or worsening lower extremity edema.

## 2023-08-29 NOTE — CONSULTS
[facility]    Date of admission:  8/28/2023     Reason for Consult:    Heart failure    HPI:    This 78-year-old male with known systolic heart failure and coronary artery disease came to the emergency department for worsening exertional dyspnea and lower extremity edema.  He states that about a month ago, he was instructed by Dr. Matson to reduce his Lasix dosing to once daily.  Since that time he has become gradually more short of breath.  He is unable lie supine at night without getting dyspneic.  He has had increasing lower extremity edema.  He was admitted with a diagnosis of heart failure.  He has been treated with intravenous diuresis.  He reports brisk urine output and is starting to feel better.  He does not yet feel back to baseline.  He denies any exertional chest discomfort.  He reports compliance with his medications as prescribed.  He reports compliance with a sodium restricted diet.    Medications  Current Facility-Administered Medications   Medication Dose Route Frequency Provider Last Rate Last Admin    aspirin EC tablet 81 mg  81 mg Oral Daily Dwight Molina MD   81 mg at 08/28/23 1033    atorvastatin tablet 20 mg  20 mg Oral Daily Dwight Molina MD   20 mg at 08/28/23 1033    heparin (porcine) injection 5,000 Units  5,000 Units Subcutaneous Q8H Dwight Molina MD   5,000 Units at 08/29/23 0511    HYDROcodone-acetaminophen 5-325 mg per tablet 1 tablet  1 tablet Oral Q6H PRN Dwight Molina MD        latanoprost 0.005 % ophthalmic solution 1 drop  1 drop Both Eyes QHS Dwight Molina MD   1 drop at 08/28/23 2012    melatonin tablet 6 mg  6 mg Oral Nightly PRN Kathleen Pratt MD        metoprolol succinate (TOPROL-XL) 24 hr tablet 25 mg  25 mg Oral QHS Dwight Molina MD   25 mg at 08/28/23 2012    ondansetron disintegrating tablet 8 mg  8 mg Oral Q8H PRN Kathleen Pratt MD        sodium chloride 0.9% flush 10 mL  10 mL Intravenous PRN Kathleen Pratt MD         sodium chloride 0.9% flush 10 mL  10 mL Intravenous PRN Dwight Molina MD          Prior to Admission medications    Medication Sig Start Date End Date Taking? Authorizing Provider   HYDROcodone-acetaminophen (NORCO) 5-325 mg per tablet Take 1 tablet by mouth every 6 (six) hours as needed for Pain. 8/8/23  Yes Provider, Historical   latanoprost 0.005 % ophthalmic solution Place 1 drop into both eyes every evening. 7/21/23  Yes Provider, Historical   methocarbamoL (ROBAXIN) 500 MG Tab Take 500 mg by mouth 4 (four) times daily. 8/8/23 8/29/23 Yes Provider, Historical   aspirin (ECOTRIN) 81 MG EC tablet Take 1 tablet (81 mg total) by mouth once daily. 8/11/22 8/11/23  Mara Burgos PA-C   furosemide (LASIX) 40 MG tablet Take 1 tablet (40 mg total) by mouth 2 (two) times daily. 8/29/23   Dwight Molina MD   metoprolol succinate (TOPROL-XL) 25 MG 24 hr tablet Take 1 tablet (25 mg total) by mouth every evening. 3/2/23 3/1/24  Fabio Martinez MD   simvastatin (ZOCOR) 40 MG tablet Take 1 tablet (40 mg total) by mouth once daily. 12/14/18   Mickey Darden MD   baclofen (LIORESAL) 10 MG tablet Take 1 tablet (10 mg total) by mouth 3 (three) times daily. 10/3/18 8/11/22  Mickey Darden MD   brimonidine 0.2% (ALPHAGAN) 0.2 % Drop Place 1 drop into both eyes once daily.  8/29/23  Provider, Historical   clopidogrel (PLAVIX) 75 mg tablet Take 1 tablet (75 mg total) by mouth once daily. 12/14/18 8/11/22  Mickey Darden MD   diazePAM (VALIUM) 10 MG Tab Take 1 tablet (10 mg total) by mouth daily as needed. 12/18/17 8/29/23  Mickey Darden MD   diclofenac sodium (VOLTAREN) 1 % Gel Apply 4 g p.r.n. to left knee for pain. 5/29/18 8/29/23  Provider, Historical   furosemide (LASIX) 40 MG tablet Take 1 tablet (40 mg total) by mouth 2 (two) times daily.  Patient taking differently: Take 40 mg by mouth once daily. Pt takes BID x2 weeks then once daily for 1 week 8/11/22 8/29/23  Mara Burgos, PALeloC    hydroCHLOROthiazide (MICROZIDE) 12.5 mg capsule Take 1 capsule (12.5 mg total) by mouth once daily.  Patient taking differently: Take 25 mg by mouth once daily. 10/31/18 8/11/22  Mickey Darden MD   levocetirizine (XYZAL) 5 MG tablet Take 1 tablet (5 mg total) by mouth every evening. 16  Mickey Darden MD   lisinopril (PRINIVIL,ZESTRIL) 20 MG tablet Take 1 tablet (20 mg total) by mouth 2 (two) times daily.  Patient taking differently: Take 20 mg by mouth once daily. 19  Mickey Darden MD   methylPREDNISolone (MEDROL DOSEPACK) 4 mg tablet Take by mouth. 23  Provider, Historical       History  Past Medical History:   Diagnosis Date    Disorder of kidney and ureter     Hyperlipidemia     Hypertension      Past Surgical History:   Procedure Laterality Date    FACIAL RECONSTRUCTION SURGERY      PROSTATE SURGERY       Social History     Socioeconomic History    Marital status:    Tobacco Use    Smoking status: Former     Current packs/day: 0.00     Types: Cigarettes     Quit date: 10/5/2008     Years since quittin.9    Smokeless tobacco: Never   Substance and Sexual Activity    Alcohol use: No     Alcohol/week: 0.0 standard drinks of alcohol    Drug use: No    Sexual activity: Never     Social Determinants of Health     Financial Resource Strain: Low Risk  (2023)    Overall Financial Resource Strain (CARDIA)     Difficulty of Paying Living Expenses: Not very hard   Food Insecurity: No Food Insecurity (2023)    Hunger Vital Sign     Worried About Running Out of Food in the Last Year: Never true     Ran Out of Food in the Last Year: Never true   Transportation Needs: No Transportation Needs (2023)    PRAPARE - Transportation     Lack of Transportation (Medical): No     Lack of Transportation (Non-Medical): No   Physical Activity: Insufficiently Active (2023)    Exercise Vital Sign     Days of Exercise per Week: 7 days     Minutes of  Exercise per Session: 20 min   Stress: No Stress Concern Present (8/28/2023)    [employer] [employer] - Occupational Stress Questionnaire     Feeling of Stress : Not at all   Social Connections: Moderately Integrated (8/28/2023)    Social Connection and Isolation Panel [NHANES]     Frequency of Communication with Friends and Family: More than three times a week     Frequency of Social Gatherings with Friends and Family: More than three times a week     Attends Sabianist Services: More than 4 times per year     Active Member of Clubs or Organizations: No     Marital Status:    Housing Stability: Unknown (8/28/2023)    Housing Stability Vital Sign     Unable to Pay for Housing in the Last Year: No     Unstable Housing in the Last Year: No     Family History   Problem Relation Age of Onset    Hypertension Mother     No Known Problems Father         Allergies  Review of patient's allergies indicates:  No Known Allergies    Review of Systems   Review of Systems   Constitutional: Negative for malaise/fatigue, weight gain and weight loss.   Eyes:  Negative for visual disturbance.   Cardiovascular:  Positive for dyspnea on exertion, leg swelling, orthopnea and paroxysmal nocturnal dyspnea. Negative for chest pain, claudication, cyanosis, irregular heartbeat, near-syncope, palpitations and syncope.   Respiratory:  Positive for shortness of breath. Negative for cough, hemoptysis, sleep disturbances due to breathing and wheezing.    Hematologic/Lymphatic: Negative for bleeding problem. Does not bruise/bleed easily.   Skin:  Negative for poor wound healing.   Musculoskeletal:  Negative for muscle cramps and myalgias.   Gastrointestinal:  Negative for abdominal pain, anorexia, diarrhea, heartburn, hematemesis, hematochezia, melena, nausea and vomiting.   Genitourinary:  Negative for hematuria and nocturia.   Neurological:  Negative for excessive daytime sleepiness, dizziness, focal weakness,  light-headedness and weakness.       Physical Exam    Temp:  [97.6 °F (36.4 °C)-97.7 °F (36.5 °C)]   Pulse:  [66-86]   Resp:  [16-18]   BP: (121-141)/(67-78)   SpO2:  [96 %-98 %]    Wt Readings from Last 1 Encounters:   23 82.6 kg (181 lb 15.8 oz)     Physical Exam  Vitals and nursing note reviewed.   Constitutional:       General: He is not in acute distress.     Appearance: He is not toxic-appearing or diaphoretic.   HENT:      Head: Normocephalic and atraumatic.      Mouth/Throat:      Lips: Pink.      Mouth: Mucous membranes are moist.   Eyes:      General: No scleral icterus.     Conjunctiva/sclera: Conjunctivae normal.   Neck:      Thyroid: No thyromegaly.      Vascular: Hepatojugular reflux present. No carotid bruit or JVD.      Trachea: Trachea normal.   Cardiovascular:      Rate and Rhythm: Normal rate and regular rhythm. No extrasystoles are present.     Chest Wall: PMI is not displaced.      Pulses:           Carotid pulses are 2+ on the right side and 2+ on the left side.       Radial pulses are 2+ on the right side and 2+ on the left side.      Heart sounds: S1 normal and S2 normal. Murmur heard.      Systolic murmur is present at the upper right sternal border.      No friction rub. Gallop present. S4 sounds present. No S3 sounds.   Pulmonary:      Effort: Pulmonary effort is normal. No tachypnea, bradypnea, accessory muscle usage or respiratory distress.      Breath sounds: Normal breath sounds and air entry. No decreased breath sounds, wheezing, rhonchi or rales.   Abdominal:      General: Bowel sounds are normal. There is no distension or abdominal bruit.      Palpations: Abdomen is soft. There is no hepatomegaly, splenomegaly or pulsatile mass.      Tenderness: There is no abdominal tenderness.   Musculoskeletal:         General: No tenderness or deformity.      Right lower le+ Edema present.      Left lower le+ Edema present.   Skin:     General: Skin is warm and dry.      Capillary  Refill: Capillary refill takes less than 2 seconds.      Coloration: Skin is not cyanotic or pale.      Nails: There is no clubbing.   Neurological:      General: No focal deficit present.      Mental Status: He is alert and oriented to person, place, and time.   Psychiatric:         Attention and Perception: Attention normal.         Mood and Affect: Mood normal.         Speech: Speech normal.         Behavior: Behavior normal. Behavior is cooperative.         Telemetry  Sinus rhythm    EKG  Sinus rhythm with nonspecific ST-T abnormality.  It is not significantly changed when compared to prior electrocardiograms.    Echocardiogram    Left Ventricle: The left ventricle is mildly dilated. Normal wall thickness. regional wall motion abnormalities present. There is moderately reduced systolic function with a visually estimated ejection fraction of 35 - 40%. There is diastolic dysfunction.    Left Atrium: Left atrium is mildly dilated.    Right Ventricle: Normal right ventricular cavity size. Wall thickness is normal. Systolic function is normal.    Aortic Valve: There is mild aortic valve sclerosis.    Mitral Valve: There is mild regurgitation.    Labs  Recent Results (from the past 72 hour(s))   CBC Auto Differential    Collection Time: 08/28/23  6:37 AM   Result Value Ref Range    WBC 7.87 3.90 - 12.70 K/uL    RBC 4.51 (L) 4.60 - 6.20 M/uL    Hemoglobin 12.9 (L) 14.0 - 18.0 g/dL    Hematocrit 41.2 40.0 - 54.0 %    MCV 91 82 - 98 fL    MCH 28.6 27.0 - 31.0 pg    MCHC 31.3 (L) 32.0 - 36.0 g/dL    RDW 16.4 (H) 11.5 - 14.5 %    Platelets 116 (L) 150 - 450 K/uL    MPV SEE COMMENT 9.2 - 12.9 fL    Immature Granulocytes 0.5 0.0 - 0.5 %    Gran # (ANC) 5.2 1.8 - 7.7 K/uL    Immature Grans (Abs) 0.04 0.00 - 0.04 K/uL    Lymph # 1.5 1.0 - 4.8 K/uL    Mono # 1.0 0.3 - 1.0 K/uL    Eos # 0.1 0.0 - 0.5 K/uL    Baso # 0.02 0.00 - 0.20 K/uL    nRBC 0 0 /100 WBC    Gran % 66.4 38.0 - 73.0 %    Lymph % 19.2 18.0 - 48.0 %    Mono %  12.3 4.0 - 15.0 %    Eosinophil % 1.3 0.0 - 8.0 %    Basophil % 0.3 0.0 - 1.9 %    Platelet Estimate Decreased (A)     Differential Method Automated    Comprehensive Metabolic Panel    Collection Time: 08/28/23  6:37 AM   Result Value Ref Range    Sodium 144 136 - 145 mmol/L    Potassium 4.7 3.5 - 5.1 mmol/L    Chloride 112 (H) 95 - 110 mmol/L    CO2 21 (L) 23 - 29 mmol/L    Glucose 89 70 - 110 mg/dL    BUN 32 (H) 8 - 23 mg/dL    Creatinine 2.0 (H) 0.5 - 1.4 mg/dL    Calcium 9.2 8.7 - 10.5 mg/dL    Total Protein 6.9 6.0 - 8.4 g/dL    Albumin 3.3 (L) 3.5 - 5.2 g/dL    Total Bilirubin 0.6 0.1 - 1.0 mg/dL    Alkaline Phosphatase 77 55 - 135 U/L    AST 27 10 - 40 U/L    ALT 23 10 - 44 U/L    eGFR 33 (A) >60 mL/min/1.73 m^2    Anion Gap 11 8 - 16 mmol/L   Troponin I    Collection Time: 08/28/23  6:37 AM   Result Value Ref Range    Troponin I 0.196 (H) 0.000 - 0.026 ng/mL   Brain Natriuretic Peptide    Collection Time: 08/28/23  6:37 AM   Result Value Ref Range    BNP 2,033 (H) 0 - 99 pg/mL   POCT COVID-19 Rapid Screening    Collection Time: 08/28/23  7:45 AM   Result Value Ref Range    POC Rapid COVID Negative Negative     Acceptable Yes    Hemoglobin A1c    Collection Time: 08/28/23 10:38 AM   Result Value Ref Range    Hemoglobin A1C 5.7 (H) 4.0 - 5.6 %    Estimated Avg Glucose 117 68 - 131 mg/dL   Echo    Collection Time: 08/28/23 11:27 AM   Result Value Ref Range    BSA 2.1 m2    LVOT stroke volume 30.16 cm3    LVIDd 5.13 3.5 - 6.0 cm    LV Systolic Volume 87.91 mL    LV Systolic Volume Index 42.1 mL/m2    LVIDs 4.40 (A) 2.1 - 4.0 cm    LV Diastolic Volume 125.25 mL    LV Diastolic Volume Index 59.93 mL/m2    IVS 1.02 0.6 - 1.1 cm    LVOT diameter 1.96 cm    LVOT area 3.0 cm2    FS 14 (A) 28 - 44 %    Left Ventricle Relative Wall Thickness 0.39 cm    Posterior Wall 1.00 0.6 - 1.1 cm    LV mass 192.39 g    LV Mass Index 92 g/m2    MV Peak E Boby 0.73 m/s    TDI LATERAL 0.04 m/s    TDI SEPTAL 0.04 m/s     E/E' ratio 18.25 m/s    MV Peak A Boby 0.30 m/s    TR Max Boby 2.48 m/s    E/A ratio 2.43     E wave deceleration time 169.77 msec    LV SEPTAL E/E' RATIO 18.25 m/s    LV LATERAL E/E' RATIO 18.25 m/s    LVOT peak boby 0.65 m/s    Left Ventricular Outflow Tract Mean Velocity 0.41 cm/s    Left Ventricular Outflow Tract Mean Gradient 0.76 mmHg    Left Atrium Major Axis 5.31 cm    Left Atrium Minor Axis 5.36 cm    RVDD 2.27 cm    RV S' 5.88 cm/s    RA Major Axis 4.26 cm    AV mean gradient 3 mmHg    AV peak gradient 5 mmHg    Ao peak boby 1.13 m/s    Ao VTI 20.80 cm    LVOT peak VTI 10.00 cm    AV valve area 1.45 cm²    AV Velocity Ratio 0.58     AV index (prosthetic) 0.48     MICHEL by Velocity Ratio 1.73 cm²    MV stenosis pressure 1/2 time 49.23 ms    MV valve area p 1/2 method 4.47 cm2    Triscuspid Valve Regurgitation Peak Gradient 25 mmHg    PV PEAK VELOCITY 0.48 m/s    PV peak gradient 1 mmHg    Pulmonary Valve Mean Velocity 0.34 m/s    Ao root annulus 2.53 cm    STJ 2.35 cm    Ascending aorta 1.92 cm    IVC diameter 2.10 cm    Mean e' 0.04 m/s    ZLVIDS 0.87     ZLVIDD -2.29     LA Volume Index 38.8 mL/m2    LA volume 80.99 cm3    LA area A2C 24.2 cm2    LA area A4C 24.3 cm2    LA size 3.8 cm    LA WIDTH 4.7 cm    TASV 6.0 cm/s    TAPSE 1.40 cm    RA Width 3.2 cm    Sinus 2.4 cm    TV resting pulmonary artery pressure 33 mmHg    RV TB RVSP 10 mmHg    Est. RA pres 8 mmHg   CBC Auto Differential    Collection Time: 08/29/23  3:21 AM   Result Value Ref Range    WBC 8.59 3.90 - 12.70 K/uL    RBC 4.33 (L) 4.60 - 6.20 M/uL    Hemoglobin 12.4 (L) 14.0 - 18.0 g/dL    Hematocrit 39.1 (L) 40.0 - 54.0 %    MCV 90 82 - 98 fL    MCH 28.6 27.0 - 31.0 pg    MCHC 31.7 (L) 32.0 - 36.0 g/dL    RDW 16.3 (H) 11.5 - 14.5 %    Platelets 111 (L) 150 - 450 K/uL    MPV SEE COMMENT 9.2 - 12.9 fL    Immature Granulocytes 0.3 0.0 - 0.5 %    Gran # (ANC) 5.7 1.8 - 7.7 K/uL    Immature Grans (Abs) 0.03 0.00 - 0.04 K/uL    Lymph # 1.4 1.0 - 4.8 K/uL     Mono # 1.2 (H) 0.3 - 1.0 K/uL    Eos # 0.2 0.0 - 0.5 K/uL    Baso # 0.04 0.00 - 0.20 K/uL    nRBC 0 0 /100 WBC    Gran % 66.7 38.0 - 73.0 %    Lymph % 16.8 (L) 18.0 - 48.0 %    Mono % 14.0 4.0 - 15.0 %    Eosinophil % 1.7 0.0 - 8.0 %    Basophil % 0.5 0.0 - 1.9 %    Differential Method Automated    Basic Metabolic Panel    Collection Time: 08/29/23  3:21 AM   Result Value Ref Range    Sodium 142 136 - 145 mmol/L    Potassium 4.6 3.5 - 5.1 mmol/L    Chloride 105 95 - 110 mmol/L    CO2 26 23 - 29 mmol/L    Glucose 92 70 - 110 mg/dL    BUN 37 (H) 8 - 23 mg/dL    Creatinine 2.3 (H) 0.5 - 1.4 mg/dL    Calcium 9.4 8.7 - 10.5 mg/dL    Anion Gap 11 8 - 16 mmol/L    eGFR 28 (A) >60 mL/min/1.73 m^2   Phosphorus    Collection Time: 08/29/23  3:21 AM   Result Value Ref Range    Phosphorus 3.9 2.7 - 4.5 mg/dL   Magnesium    Collection Time: 08/29/23  3:21 AM   Result Value Ref Range    Magnesium 1.6 1.6 - 2.6 mg/dL        Imaging  Echo    Result Date: 8/28/2023    Left Ventricle: The left ventricle is mildly dilated. Normal wall thickness. regional wall motion abnormalities present. There is moderately reduced systolic function with a visually estimated ejection fraction of 35 - 40%. There is diastolic dysfunction.   Left Atrium: Left atrium is mildly dilated.   Right Ventricle: Normal right ventricular cavity size. Wall thickness is normal. Systolic function is normal.   Aortic Valve: There is mild aortic valve sclerosis.   Mitral Valve: There is mild regurgitation.     X-Ray Chest 1 View    Result Date: 8/28/2023  EXAMINATION: XR CHEST 1 VIEW CLINICAL HISTORY: shortness of breath; TECHNIQUE: One view COMPARISON: Comparison is made to the only available prior chest radiograph, dated 08/09/2022. FINDINGS: Cardiomediastinal silhouette is magnified by projection, with the true heart size at the upper limit of normal to minimally enlarged.  The appearance of the cardiomediastinal silhouette has not changed appreciably since the  examination referenced above.  Pulmonary vascularity is also stable.  Some accentuated pulmonary interstitial markings are again seen in the mid and lower lung zones bilaterally, similar to the previous study.  Opacity in the inferior hemithorax on the left side is again observed, consistent with airspace consolidation/volume loss in the left lower lobe and/or pleural fluid on this side.  No pleural fluid on the right.  No pneumothorax.     Borderline cardiomegaly with some accentuated interstitial markings bilaterally which may represent interstitial pulmonary edema are again appreciated, as is opacity in the inferior hemithorax on the left side consistent with airspace consolidation/volume loss in the left lower lobe and/or pleural fluid on this side.  The current appearance of the chest is actually quite similar to the earlier examination of 08/09/2022. Electronically signed by: Rubens Mcdonnell MD Date:    08/28/2023 Time:    07:12    X-Ray Hip 2 or 3 views Left (with Pelvis when performed)    Result Date: 8/8/2023  2 VIEWS OF THE LEFT HIP. DATE: 8/8/2023 9:58 AM CDT HISTORY: hip PAIN COMPARISON: Radiograph 3/13/2018. FINDINGS: No acute displaced fracture or osseous destructive process. No dislocation or subluxation. Mild-to-moderate degenerative changes of bilateral femoral acetabular joints with joint space narrowing, sclerosis, and osteophyte production. Surgical clips are present throughout the pelvis. Left lower extremity vascular stent in place.    No acute osseous abnormality. Bilateral hip osteoarthritis. Electronically Signed By: John Ruggiero MD 8/8/2023 10:33 AM CDT      Assessment    Acute on chronic systolic heart failure  Exacerbation seems temporally related to the recent decrease in his diuretic dosing.  He has responded well to initial inpatient efforts at diuresis.      Coronary atherosclerosis   Stable and free of angina    Chronic kidney disease   Creatinine is slightly above baseline.  Likely  related to his heart failure exacerbation     Hypertension   Adequate control    Plan and Discussion    Continue with efforts at intravenous diuresis.  Monitor creatinine.  Correct electrolytes.  Discussed self titration of diuretics at home.  He will need additional education on management of his disease process.      Aneesh Borjas MD

## 2023-08-29 NOTE — PLAN OF CARE
Free from falls, injury, or skin breakdown this hospital admission.  Pt eager & in agreement w/ DC. VU of DC instructions--paperwork passed & explained. IV removed w/ cath tip intact, WNL. Voiding, ambulating, & tolerating PO well.  To be DCd home w/ spouse--will be escorted downstairs via  transport team after pt has eaten lunch.

## 2023-08-29 NOTE — DISCHARGE SUMMARY
[facility]urg [address] Medicine  Discharge Summary      Patient Name: Felicia Lopez  MRN: [id number]  [facility]: [id number]  Patient Class: OP- Observation  Admission Date: 8/28/2023  Hospital Length of Stay: 0 days  Discharge Date and Time: 8/29/2023  1:11 PM  Attending Physician: Dwight Molina MD  Discharging Provider: Dwight Molina MD  Primary Care Provider: Mickey Darden MD      HPI:   Patient is a 78-year-old man with history of hypertension, chronic kidney disease stage 3 with recent baseline serum creatinine 1.7-1.9 mg/dL, chronic systolic and diastolic heart failure, coronary artery disease status post percutaneous coronary intervention, prior myocardial infarction, prior stroke, dyslipidemia who presents today with 2 week history of progressive worsening shortness of breath.  She also reports increased bilateral lower extremity edema.    He reports compliance with prescription medications and denies any dietary indiscretions.    Chest radiograph abnormal with evidence of pulmonary edema and opacity left lower lobe however not significantly different from prior study.  BNP elevated.    Patient admitted to the hospital under observation for treatment of decompensated heart failure.    Hospital Course:   Patient is a 78-year-old man with history of hypertension, chronic kidney disease stage 3 with recent baseline serum creatinine 1.7-1.9 mg/dL, chronic systolic and diastolic heart failure, coronary artery disease status post percutaneous coronary intervention, prior myocardial infarction, prior stroke, dyslipidemia admitted the hospital with evidence of decompensated heart failure.  Patient treated intravenous diuretic therapy with improvement in his respiratory symptoms decreasing lower extremity edema.  Serum creatinine trended up mildly this morning.      Recommended patient remain hospitalized to receive further intravenous diuretic therapy and to closely monitor his kidney  function.  Serious risks and leaving the hospital prematurely discussed.  Patient understands risks however states he needs to go home to take care of his wife he is able to follow-up with his nephrologist Dr. Fareed Matson in clinic for repeat.    Recommend he hold his ACE inhibitor for now and to resume his prior dose of home diuretic therapy.  Patient counseled here adhere to a low-sodium diet.  Patient advised to return to the hospital immediately if he develops recurrence and shortness of breath or worsening lower extremity edema.       Goals of Care Treatment Preferences:  Code Status: Full Code      Consults:   Consults (From admission, onward)          Status Ordering Provider     Inpatient consult to Cardiology  Once        Provider:  Fabio Martinez MD    Completed ANTONI CAMPBELL     Inpatient consult to Social Work/Case Management  Once        Provider:  (Not yet assigned)    Acknowledged ANTONI CAMPBELL     Inpatient consult to Registered Dietitian/Nutritionist  Once        Provider:  (Not yet assigned)    Completed ANTONI CAMPBELL            Final Active Diagnoses:    Diagnosis Date Noted POA    PRINCIPAL PROBLEM:  Acute on chronic combined systolic and diastolic heart failure [I50.43] 08/28/2023 Yes    Chronic kidney disease, stage III (moderate) [N18.30] 10/05/2015 Yes    Essential hypertension [I10] 10/05/2015 Yes      Problems Resolved During this Admission:       Discharged Condition: Fair    Disposition: Home or Self Care    Follow Up:   Follow-up Information       Fareed Matson MD. Schedule an appointment as soon as possible for a visit in 2 day(s).    Specialty: Nephrology  Why: Repeat labs  Contact information:  [address] [place], [place] [id number]  [phone]                           Patient Instructions:      Diet Cardiac     Notify your health care provider if you experience any of the following:  temperature >100.4     Notify your health care provider if you  experience any of the following:  persistent nausea and vomiting or diarrhea     Notify your health care provider if you experience any of the following:  severe uncontrolled pain     Notify your health care provider if you experience any of the following:  difficulty breathing or increased cough     Notify your health care provider if you experience any of the following:  severe persistent headache     Notify your health care provider if you experience any of the following:  worsening rash     Notify your health care provider if you experience any of the following:  persistent dizziness, light-headedness, or visual disturbances     Notify your health care provider if you experience any of the following:  increased confusion or weakness     Activity as tolerated        Medications:  Reconciled Home Medications:      Medication List        CHANGE how you take these medications      furosemide 40 MG tablet  Commonly known as: LASIX  Take 1 tablet (40 mg total) by mouth 2 (two) times daily.  What changed:   when to take this  additional instructions            CONTINUE taking these medications      aspirin 81 MG EC tablet  Commonly known as: ECOTRIN  Take 1 tablet (81 mg total) by mouth once daily.     HYDROcodone-acetaminophen 5-325 mg per tablet  Commonly known as: NORCO  Take 1 tablet by mouth every 6 (six) hours as needed for Pain.     latanoprost 0.005 % ophthalmic solution  Place 1 drop into both eyes every evening.     metoprolol succinate 25 MG 24 hr tablet  Commonly known as: TOPROL-XL  Take 1 tablet (25 mg total) by mouth every evening.     simvastatin 40 MG tablet  Commonly known as: ZOCOR  Take 1 tablet (40 mg total) by mouth once daily.            STOP taking these medications      brimonidine 0.2% 0.2 % Drop  Commonly known as: ALPHAGAN     diazePAM 10 MG Tab  Commonly known as: VALIUM     diclofenac sodium 1 % Gel  Commonly known as: VOLTAREN     levocetirizine 5 MG tablet  Commonly known as: XYZAL      lisinopriL 20 MG tablet  Commonly known as: PRINIVIL,ZESTRIL     methocarbamoL 500 MG Tab  Commonly known as: ROBAXIN     methylPREDNISolone 4 mg tablet  Commonly known as: MEDROL DOSEPACK              Indwelling Lines/Drains at time of discharge:   Lines/Drains/Airways       None                   Time spent on the discharge of patient: 35 minutes         Dwight Molina MD  Department of Hospital Medicine  [facility] ([address])

## 2023-10-03 ENCOUNTER — HOSPITAL ENCOUNTER (EMERGENCY)
Facility: OTHER | Age: 78
Discharge: HOME | End: 2023-10-03
Attending: EMERGENCY MEDICINE
Payer: MEDICARE

## 2023-10-03 VITALS
OXYGEN SATURATION: 100 % | WEIGHT: 178 LBS | HEART RATE: 82 BPM | HEIGHT: 71 IN | DIASTOLIC BLOOD PRESSURE: 94 MMHG | BODY MASS INDEX: 24.92 KG/M2 | RESPIRATION RATE: 19 BRPM | TEMPERATURE: 98 F | SYSTOLIC BLOOD PRESSURE: 116 MMHG

## 2023-10-03 DIAGNOSIS — I50.9 ACUTE EXACERBATION OF CHF (CONGESTIVE HEART FAILURE): ICD-10-CM

## 2023-10-03 DIAGNOSIS — R06.09 DOE (DYSPNEA ON EXERTION): ICD-10-CM

## 2023-10-03 LAB
ALBUMIN SERPL BCP-MCNC: 3.6 G/DL (ref 3.5–5.2)
ALP SERPL-CCNC: 87 U/L (ref 55–135)
ALT SERPL W/O P-5'-P-CCNC: 26 U/L (ref 10–44)
ANION GAP SERPL CALC-SCNC: 10 MMOL/L (ref 8–16)
AST SERPL-CCNC: 26 U/L (ref 10–40)
BASOPHILS # BLD AUTO: 0.02 K/UL (ref 0–0.2)
BASOPHILS NFR BLD: 0.3 % (ref 0–1.9)
BILIRUB SERPL-MCNC: 0.6 MG/DL (ref 0.1–1)
BNP SERPL-MCNC: 1648 PG/ML (ref 0–99)
BUN SERPL-MCNC: 41 MG/DL (ref 8–23)
CALCIUM SERPL-MCNC: 10.5 MG/DL (ref 8.7–10.5)
CHLORIDE SERPL-SCNC: 105 MMOL/L (ref 95–110)
CO2 SERPL-SCNC: 24 MMOL/L (ref 23–29)
CREAT SERPL-MCNC: 2.1 MG/DL (ref 0.5–1.4)
DIFFERENTIAL METHOD: ABNORMAL
EOSINOPHIL # BLD AUTO: 0.1 K/UL (ref 0–0.5)
EOSINOPHIL NFR BLD: 0.9 % (ref 0–8)
ERYTHROCYTE [DISTWIDTH] IN BLOOD BY AUTOMATED COUNT: 15.8 % (ref 11.5–14.5)
EST. GFR  (NO RACE VARIABLE): 31 ML/MIN/1.73 M^2
GLUCOSE SERPL-MCNC: 100 MG/DL (ref 70–110)
HCT VFR BLD AUTO: 43.8 % (ref 40–54)
HGB BLD-MCNC: 14 G/DL (ref 14–18)
IMM GRANULOCYTES # BLD AUTO: 0.03 K/UL (ref 0–0.04)
IMM GRANULOCYTES NFR BLD AUTO: 0.4 % (ref 0–0.5)
LYMPHOCYTES # BLD AUTO: 1.2 K/UL (ref 1–4.8)
LYMPHOCYTES NFR BLD: 15.1 % (ref 18–48)
MCH RBC QN AUTO: 28.3 PG (ref 27–31)
MCHC RBC AUTO-ENTMCNC: 32 G/DL (ref 32–36)
MCV RBC AUTO: 89 FL (ref 82–98)
MONOCYTES # BLD AUTO: 1 K/UL (ref 0.3–1)
MONOCYTES NFR BLD: 12.3 % (ref 4–15)
NEUTROPHILS # BLD AUTO: 5.6 K/UL (ref 1.8–7.7)
NEUTROPHILS NFR BLD: 71 % (ref 38–73)
NRBC BLD-RTO: 0 /100 WBC
PLATELET # BLD AUTO: 136 K/UL (ref 150–450)
PMV BLD AUTO: ABNORMAL FL (ref 9.2–12.9)
POTASSIUM SERPL-SCNC: 4.7 MMOL/L (ref 3.5–5.1)
PROT SERPL-MCNC: 7.5 G/DL (ref 6–8.4)
RBC # BLD AUTO: 4.94 M/UL (ref 4.6–6.2)
SODIUM SERPL-SCNC: 139 MMOL/L (ref 136–145)
TROPONIN I SERPL DL<=0.01 NG/ML-MCNC: 0.21 NG/ML (ref 0–0.03)
WBC # BLD AUTO: 7.9 K/UL (ref 3.9–12.7)

## 2023-10-03 PROCEDURE — 83880 ASSAY OF NATRIURETIC PEPTIDE: CPT | Performed by: EMERGENCY MEDICINE

## 2023-10-03 PROCEDURE — 93010 ELECTROCARDIOGRAM REPORT: CPT | Mod: ,,, | Performed by: INTERNAL MEDICINE

## 2023-10-03 PROCEDURE — 63600175 PHARM REV CODE 636 W HCPCS: Performed by: EMERGENCY MEDICINE

## 2023-10-03 PROCEDURE — 85025 COMPLETE CBC W/AUTO DIFF WBC: CPT | Performed by: EMERGENCY MEDICINE

## 2023-10-03 PROCEDURE — 84484 ASSAY OF TROPONIN QUANT: CPT | Performed by: EMERGENCY MEDICINE

## 2023-10-03 PROCEDURE — 93010 EKG 12-LEAD: ICD-10-PCS | Mod: ,,, | Performed by: INTERNAL MEDICINE

## 2023-10-03 PROCEDURE — 99285 EMERGENCY DEPT VISIT HI MDM: CPT | Mod: 25

## 2023-10-03 PROCEDURE — 96374 THER/PROPH/DIAG INJ IV PUSH: CPT

## 2023-10-03 PROCEDURE — 93005 ELECTROCARDIOGRAM TRACING: CPT

## 2023-10-03 PROCEDURE — 80053 COMPREHEN METABOLIC PANEL: CPT | Performed by: EMERGENCY MEDICINE

## 2023-10-03 RX ORDER — FUROSEMIDE 10 MG/ML
80 INJECTION INTRAMUSCULAR; INTRAVENOUS
Status: COMPLETED | OUTPATIENT
Start: 2023-10-03 | End: 2023-10-03

## 2023-10-03 RX ADMIN — FUROSEMIDE 80 MG: 10 INJECTION, SOLUTION INTRAMUSCULAR; INTRAVENOUS at 10:10

## 2023-10-03 NOTE — ED PROVIDER NOTES
Encounter Date: 10/3/2023    SCRIBE #1 NOTE: I, Josemanuel Truong, am scribing for, and in the presence of,  Thierry Majano MD. I have scribed the following portions of the note - Other sections scribed: HPI, ROS, PE.       History     Chief Complaint   Patient presents with    Shortness of Breath     X2 days. Worse when sleeping. Hx of CHF . No respiratory distress upon arrival.      Time seen by provider: 7:43 AM    This is a 78 y.o. male, with HTN, CKD, CAD, and CHF, who presents with complaint of shortness of breath for the past three to four days. Associated with dry cough and orthopnea, which makes him wake up in the middle of the night. He does report some associated dyspnea on exertion but no worse than baseline. No fever, chills, or leg swelling. He takes all medications, including Lasix, as recommended. This is the extent of the patient's complaints at this time.    The history is provided by medical records and the patient.     Review of patient's allergies indicates:  No Known Allergies  Past Medical History:   Diagnosis Date    Disorder of kidney and ureter     Hyperlipidemia     Hypertension      Past Surgical History:   Procedure Laterality Date    FACIAL RECONSTRUCTION SURGERY      PROSTATE SURGERY       Family History   Problem Relation Age of Onset    Hypertension Mother     No Known Problems Father      Social History     Tobacco Use    Smoking status: Former     Current packs/day: 0.00     Types: Cigarettes     Quit date: 10/5/2008     Years since quitting: 15.0    Smokeless tobacco: Never   Substance Use Topics    Alcohol use: No     Alcohol/week: 0.0 standard drinks of alcohol    Drug use: No     Review of Systems   Constitutional:  Negative for fever.   HENT:  Negative for congestion.    Eyes:  Negative for redness.   Respiratory:  Positive for cough and shortness of breath.    Cardiovascular:  Negative for chest pain.   Gastrointestinal:  Negative for abdominal pain.   Genitourinary:   Negative for dysuria.   Skin:  Negative for rash.   Neurological:  Negative for headaches.   Psychiatric/Behavioral:  Negative for confusion.        Physical Exam     Initial Vitals [10/03/23 0709]   BP Pulse Resp Temp SpO2   128/76 79 19 97.6 °F (36.4 °C) 100 %      MAP       --         Physical Exam    Nursing note and vitals reviewed.  Constitutional: He appears well-developed and well-nourished. He is not diaphoretic. No distress.   HENT:   Head: Normocephalic and atraumatic.   Eyes: Conjunctivae are normal. No scleral icterus.   Neck: Neck supple.   Cardiovascular:  Normal rate, regular rhythm, normal heart sounds and intact distal pulses.           No murmur heard.  Pulmonary/Chest: No respiratory distress. He has no wheezes. He has no rhonchi. He has rales.   Faint rales at left lung base.   Abdominal: Abdomen is soft. There is no abdominal tenderness. There is no rebound and no guarding.   Musculoskeletal:      Cervical back: Neck supple.      Comments: Trace pedal edema bilaterally.     Neurological: He is alert and oriented to person, place, and time.   Skin: Skin is warm and dry.   Psychiatric: He has a normal mood and affect.         ED Course   Procedures  Labs Reviewed   CBC W/ AUTO DIFFERENTIAL - Abnormal; Notable for the following components:       Result Value    RDW 15.8 (*)     Platelets 136 (*)     Lymph % 15.1 (*)     All other components within normal limits   COMPREHENSIVE METABOLIC PANEL - Abnormal; Notable for the following components:    BUN 41 (*)     Creatinine 2.1 (*)     eGFR 31 (*)     All other components within normal limits   B-TYPE NATRIURETIC PEPTIDE - Abnormal; Notable for the following components:    BNP 1,648 (*)     All other components within normal limits   TROPONIN I - Abnormal; Notable for the following components:    Troponin I 0.211 (*)     All other components within normal limits     EKG Readings: (Independently Interpreted)   Normal sinus rhythm. Rate of 81 with  occasional PVC's. No significant change from previous or acute ischemia.      ECG Results              EKG 12-lead (Final result)  Result time 10/03/23 11:39:23      Final result by [facility] [place] (10/03/23 11:39:23)                   Narrative:    Test Reason : R06.09,    Vent. Rate : 081 BPM     Atrial Rate : 081 BPM     P-R Int : 160 ms          QRS Dur : 102 ms      QT Int : 414 ms       P-R-T Axes : 064 091 133 degrees     QTc Int : 480 ms    Sinus rhythm with occasional Premature ventricular complexes and Premature  atrial complexes  Rightward axis  Prolonged QT  Abnormal ECG    Confirmed by Michelle BISHOP, Fabio RAMSEY ([id number]) on 10/3/2023 11:39:15 AM    Referred By: [email or web address]   SELF           Confirmed By:Fabio Martinez MD                                  Imaging Results              X-Ray Chest AP Portable (Final result)  Result time 10/03/23 08:23:15      Final result by Lima Goyal MD (10/03/23 08:23:15)                   Impression:      Chronic appearing opacity at the left lung base most suggestive of chronic atelectasis and scarring      Electronically signed by: Lima Goyal MD  Date:    10/03/2023  Time:    08:23               Narrative:    EXAMINATION:  XR CHEST AP PORTABLE    CLINICAL HISTORY:  Heart failure, unspecified    TECHNIQUE:  Single frontal view of the chest was performed.    COMPARISON:  08/28/2023    FINDINGS:  The cardiac silhouette is midline, normal in size.  The pulmonary vascularity is normal.  Focal area of increased attenuation left lung base could represent subsegmental atelectasis or airspace disease, a trace of left pleural fluid cannot be excluded, it is unchanged.  No large pleural effusion, no pneumothorax.  The osseous structures appear normal.                                       Medications   furosemide injection 80 mg (80 mg Intravenous Given 10/3/23 1006)     Medical Decision Making      78-year-old male with history of CHF, CKD, HTN, CAD presents  for evaluation of worsening orthopnea for the past few days.  He states he has had difficulty sleeping as a result, with associated dry cough but no fevers or chest pain.  He has been compliant with Lasix b.i.d., no leg swelling or other associated symptoms.  On arrival patient well-appearing in no distress, with normal vitals and O2 sat on room air.  He does have faint rales at his left lung base and trace pedal edema, no wheezing or other concerning exam findings.  Concern for mild CHF exacerbation, unlikely to be infectious cause such as pneumonia, no chest pain to suggest ACS.  Per chart review, patient was admitted here a little over a month ago for CHF exacerbation and worsening CKD.      Initial labs with elevated BNP slightly below level at previous admission, and chronically elevated troponin.  EKG with no acute ischemia to suggest ACS.  He has CR 2.1, slightly improved from discharge when he was at 2.3.  He likely has mild CHF exacerbation, ambulatory O2 sat done and patient maintained O2 sat 95% on room air during activity.  No desaturation noted on room air while patient was lying almost flat.  Patient was treated with 80 mg IV Lasix with good urine output.  He was concerned about being dehydrated in urinating too much, so he has been drinking a lot of water and did not take Lasix for 1 day at onset of his symptoms, which likely contributed to mild CHF exacerbation.  On reassessment he feels much better with no significant orthopnea, no indication for admission for further inpatient diuresis at this time.  He is advised to take 80 mg of Lasix in the morning for the next 3 days and 40 at night, then resume usual 40 mg b.i.d. dosing.  I discussed his case with Dr. Martinez, his cardiologist, who agrees with treatment plan and close follow-up in his clinic.  Patient understands return to the ED for any worsening SOB or other concerns.          Amount and/or Complexity of Data Reviewed  Labs: ordered.  Decision-making details documented in ED Course.  Radiology: ordered and independent interpretation performed. Decision-making details documented in ED Course.  ECG/medicine tests: ordered and independent interpretation performed.     Details: See interpretation above.    Risk  Prescription drug management.            Scribe Attestation:   Scribe #1: I performed the above scribed service and the documentation accurately describes the services I performed. I attest to the accuracy of the note.    I, Dr. Thierry Majano, personally performed the services described in this documentation. All medical record entries made by the scribe were at my direction and in my presence.  I have reviewed the chart and agree that the record reflects my personal performance and is accurate and complete. Thierry Majano MD.                           Clinical Impression:   Final diagnoses:  [R06.09] HARTMAN (dyspnea on exertion)  [I50.9] Acute exacerbation of CHF (congestive heart failure)        ED Disposition Condition    Discharge Stable          ED Prescriptions    None       Follow-up Information       Follow up With Specialties Details Why Contact Info    Fabio Martinez MD Interventional Cardiology, Nuclear Medicine, Cardiovascular Disease, Cardiology Schedule an appointment as soon as possible for a visit in 3 days  [address] [facility] [address]  [facility] [address]  [phone]      [facility] - Emergency Dept Emergency Medicine Go to  If symptoms worsen [address] [facility] [id number] [phone]             Thierry Majano MD  10/03/23 9624

## 2023-10-03 NOTE — ED TRIAGE NOTES
Pt presents to ED c/o SOB x2 days. Pt reports SOB is worse when sleeping and on exertion. Pt placed on 2L NC  by EMS, remains 100%. Denies supplemental O2 use at home. Hx of CHF, HTN, MI. Compliant with medications.

## 2023-10-03 NOTE — ED NOTES
Received pt sitting on side of bed, pt is connected to monitor, no resp distress noted. Pt denies any pain or discomfort. pt is  vitally stable at this time.

## 2024-01-17 ENCOUNTER — OFFICE VISIT (OUTPATIENT)
Dept: CARDIOLOGY | Facility: CLINIC | Age: 79
End: 2024-01-17
Payer: MEDICARE

## 2024-01-17 VITALS
BODY MASS INDEX: 27.2 KG/M2 | HEIGHT: 71 IN | WEIGHT: 194.31 LBS | DIASTOLIC BLOOD PRESSURE: 58 MMHG | OXYGEN SATURATION: 96 % | SYSTOLIC BLOOD PRESSURE: 105 MMHG | HEART RATE: 87 BPM

## 2024-01-17 DIAGNOSIS — I25.10 CORONARY ARTERY DISEASE INVOLVING NATIVE CORONARY ARTERY OF NATIVE HEART WITHOUT ANGINA PECTORIS: ICD-10-CM

## 2024-01-17 DIAGNOSIS — I50.43 ACUTE ON CHRONIC COMBINED SYSTOLIC AND DIASTOLIC HEART FAILURE: ICD-10-CM

## 2024-01-17 DIAGNOSIS — I10 ESSENTIAL HYPERTENSION: Primary | ICD-10-CM

## 2024-01-17 PROCEDURE — 99215 OFFICE O/P EST HI 40 MIN: CPT | Mod: S$GLB,,, | Performed by: INTERNAL MEDICINE

## 2024-01-17 PROCEDURE — 99999 PR PBB SHADOW E&M-EST. PATIENT-LVL III: CPT | Mod: PBBFAC,,, | Performed by: INTERNAL MEDICINE

## 2024-01-17 RX ORDER — FUROSEMIDE 40 MG/1
40 TABLET ORAL 2 TIMES DAILY
Qty: 60 TABLET | Refills: 3 | Status: ON HOLD | OUTPATIENT
Start: 2024-01-17 | End: 2024-05-16

## 2024-01-17 NOTE — PATIENT INSTRUCTIONS
Take furosemide 40mg twice a day.  Get labs done next week.  Make appointment to see Dr Matson, your kidney doctor, [phone]

## 2024-01-17 NOTE — PROGRESS NOTES
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  Since his last visit, he went to the emergency room in October with CHF and was discharged after receiving IV Lasix 80 mg.  He has not been taking his Lasix twice a day.  He did not take any Lasix this morning.  He has not seen his nephrologist Dr. Matson.  He reports shortness of breath with exertion.  He also reports orthopnea.  He states that he is compliant with his medications and low-salt diet.    Medications  Current Outpatient Medications   Medication Sig Dispense Refill    latanoprost 0.005 % ophthalmic solution Place 1 drop into both eyes every evening.      metoprolol succinate (TOPROL-XL) 25 MG 24 hr tablet Take 1 tablet (25 mg total) by mouth every evening. 90 tablet 3    simvastatin (ZOCOR) 40 MG tablet Take 1 tablet (40 mg total) by mouth once daily. 90 tablet 0    aspirin (ECOTRIN) 81 MG EC tablet Take 1 tablet (81 mg total) by mouth once daily. 30 tablet 0    furosemide (LASIX) 40 MG tablet Take 1  tablet (40 mg total) by mouth 2 (two) times daily. 60 tablet 3    HYDROcodone-acetaminophen (NORCO) 5-325 mg per tablet Take 1 tablet by mouth every 6 (six) hours as needed for Pain.       No current facility-administered medications for this visit.      Prior to Admission medications    Medication Sig Start Date End Date Taking? Authorizing Provider   latanoprost 0.005 % ophthalmic solution Place 1 drop into both eyes every evening. 7/21/23  Yes Provider, Historical   metoprolol succinate (TOPROL-XL) 25 MG 24 hr tablet Take 1 tablet (25 mg total) by mouth every evening. 3/2/23 3/1/24 Yes Fabio Martinez MD   simvastatin (ZOCOR) 40 MG tablet Take 1 tablet (40 mg total) by mouth once daily. 12/14/18  Yes Mickey Darden MD   furosemide (LASIX) 40 MG tablet Take 1 tablet (40 mg total) by mouth 2 (two) times daily. 8/29/23 1/17/24 Yes Dwight Molina MD   aspirin (ECOTRIN) 81 MG EC tablet Take 1 tablet (81 mg total) by mouth once daily. 8/11/22 8/11/23  Mara Burgos PA-C   furosemide (LASIX) 40 MG tablet Take 1 tablet (40 mg total) by mouth 2 (two) times daily. 1/17/24   Fabio Martinez MD   HYDROcodone-acetaminophen (NORCO) 5-325 mg per tablet Take 1 tablet by mouth every 6 (six) hours as needed for Pain. 8/8/23   Provider, Historical   baclofen (LIORESAL) 10 MG tablet Take 1 tablet (10 mg total) by mouth 3 (three) times daily. 10/3/18 8/11/22  Mickey Darden MD   clopidogrel (PLAVIX) 75 mg tablet Take 1 tablet (75 mg total) by mouth once daily. 12/14/18 8/11/22  Mickey Darden MD   hydroCHLOROthiazide (MICROZIDE) 12.5 mg capsule Take 1 capsule (12.5 mg total) by mouth once daily.  Patient taking differently: Take 25 mg by mouth once daily. 10/31/18 8/11/22  Mickey Darden MD         History  Past Medical History:   Diagnosis Date    Disorder of kidney and ureter     Hyperlipidemia     Hypertension      Past Surgical History:   Procedure Laterality Date    FACIAL RECONSTRUCTION  SURGERY      PROSTATE SURGERY       Social History     Socioeconomic History    Marital status:    Tobacco Use    Smoking status: Former     Current packs/day: 0.00     Types: Cigarettes     Quit date: 10/5/2008     Years since quitting: 15.2    Smokeless tobacco: Never   Substance and Sexual Activity    Alcohol use: No     Alcohol/week: 0.0 standard drinks of alcohol    Drug use: No    Sexual activity: Never     Social Determinants of Health     Financial Resource Strain: Low Risk  (8/28/2023)    Overall Financial Resource Strain (CARDIA)     Difficulty of Paying Living Expenses: Not very hard   Food Insecurity: No Food Insecurity (8/28/2023)    Hunger Vital Sign     Worried About Running Out of Food in the Last Year: Never true     Ran Out of Food in the Last Year: Never true   Transportation Needs: No Transportation Needs (8/28/2023)    PRAPARE - Transportation     Lack of Transportation (Medical): No     Lack of Transportation (Non-Medical): No   Physical Activity: Insufficiently Active (8/28/2023)    Exercise Vital Sign     Days of Exercise per Week: 7 days     Minutes of Exercise per Session: 20 min   Stress: No Stress Concern Present (8/28/2023)    [employer] - Occupational Stress Questionnaire     Feeling of Stress : Not at all   Social Connections: Moderately Integrated (8/28/2023)    Social Connection and Isolation Panel [NHANES]     Frequency of Communication with Friends and Family: More than three times a week     Frequency of Social Gatherings with Friends and Family: More than three times a week     Attends Alevism Services: More than 4 times per year     Active Member of Clubs or Organizations: No     Marital Status:    Housing Stability: Unknown (8/28/2023)    Housing Stability Vital Sign     Unable to Pay for Housing in the Last Year: No     Unstable Housing in the Last Year: No         Allergies  Review of patient's allergies indicates:  No Known  Allergies      Review of Systems   Review of Systems   Cardiovascular:  Positive for dyspnea on exertion, leg swelling and orthopnea. Negative for chest pain, claudication, cyanosis, irregular heartbeat, near-syncope, palpitations and paroxysmal nocturnal dyspnea.   Respiratory:  Positive for shortness of breath. Negative for cough, hemoptysis, sleep disturbances due to breathing, snoring, sputum production and wheezing.    All other systems reviewed and are negative.        Physical Exam  Wt Readings from Last 1 Encounters:   01/17/24 88.1 kg (194 lb 4.8 oz)     BP Readings from Last 3 Encounters:   01/17/24 (!) 105/58   10/03/23 (!) 116/94   08/29/23 129/67     Pulse Readings from Last 1 Encounters:   01/17/24 87     Body mass index is 27.1 kg/m².    Physical Exam  Neck:      Vascular: No JVD.   Cardiovascular:      Rate and Rhythm: Normal rate and regular rhythm.      Pulses:           Carotid pulses are 2+ on the right side and 2+ on the left side.       Radial pulses are 2+ on the right side and 2+ on the left side.      Heart sounds: S1 normal and S2 normal. Murmur heard.      Systolic murmur is present.   Pulmonary:      Breath sounds: Normal breath sounds and air entry.   Musculoskeletal:      Right lower leg: Edema present.      Left lower leg: Edema present.   Neurological:      Mental Status: He is alert.             Assessment  1. Essential hypertension  Controlled on meds    2. Acute on chronic combined systolic and diastolic heart failure  Class III  - Comprehensive Metabolic Panel; Future    3. Coronary artery disease involving native coronary artery of native heart without angina pectoris  Stable, no angina, medical management for his CAD given CKD and no angina        Plan and Discussion  Reviewed his emergency room visit from October.  Discussed his emergency room visit and the results with him.  Discussed that he is hypervolemic with edema in both lower extremity.  His lungs are clear.  Recommend  to take furosemide 40 mg twice daily.  Will get labs next week to assess his creatinine.  Recommend to call his nephrologist for follow-up appointment.  Will see him back in 1 month.    Follow Up  1 month      Fabio Martinez MD, F.A.C.C, F.S.C.A.I.        40 minutes were spent in chart review, documentation and review of results, and evaluation, treatment, and counseling of patient on the same day of service.    Disclaimer: This document was created using voice recognition software ([email or web address]). Although it may be edited, this document may contain errors related to incorrect recognition of the spoken word. Please call the physician if clarification is needed.

## 2024-01-24 ENCOUNTER — LAB VISIT (OUTPATIENT)
Dept: LAB | Facility: OTHER | Age: 79
End: 2024-01-24
Attending: INTERNAL MEDICINE
Payer: MEDICARE

## 2024-01-24 DIAGNOSIS — I50.43 ACUTE ON CHRONIC COMBINED SYSTOLIC AND DIASTOLIC HEART FAILURE: ICD-10-CM

## 2024-01-24 LAB
ALBUMIN SERPL BCP-MCNC: 3.3 G/DL (ref 3.5–5.2)
ALP SERPL-CCNC: 93 U/L (ref 55–135)
ALT SERPL W/O P-5'-P-CCNC: 12 U/L (ref 10–44)
ANION GAP SERPL CALC-SCNC: 16 MMOL/L (ref 8–16)
AST SERPL-CCNC: 18 U/L (ref 10–40)
BILIRUB SERPL-MCNC: 1.1 MG/DL (ref 0.1–1)
BUN SERPL-MCNC: 43 MG/DL (ref 8–23)
CALCIUM SERPL-MCNC: 9.8 MG/DL (ref 8.7–10.5)
CHLORIDE SERPL-SCNC: 102 MMOL/L (ref 95–110)
CO2 SERPL-SCNC: 24 MMOL/L (ref 23–29)
CREAT SERPL-MCNC: 2.3 MG/DL (ref 0.5–1.4)
EST. GFR  (NO RACE VARIABLE): 28 ML/MIN/1.73 M^2
GLUCOSE SERPL-MCNC: 103 MG/DL (ref 70–110)
POTASSIUM SERPL-SCNC: 4.3 MMOL/L (ref 3.5–5.1)
PROT SERPL-MCNC: 7.9 G/DL (ref 6–8.4)
SODIUM SERPL-SCNC: 142 MMOL/L (ref 136–145)

## 2024-01-24 PROCEDURE — 80053 COMPREHEN METABOLIC PANEL: CPT | Performed by: INTERNAL MEDICINE

## 2024-01-24 PROCEDURE — 36415 COLL VENOUS BLD VENIPUNCTURE: CPT | Performed by: INTERNAL MEDICINE

## 2024-02-01 ENCOUNTER — HOSPITAL ENCOUNTER (INPATIENT)
Facility: OTHER | Age: 79
LOS: 3 days | Discharge: HOME HEALTH | DRG: 291 | End: 2024-02-04
Attending: EMERGENCY MEDICINE | Admitting: HOSPITALIST
Payer: MEDICARE

## 2024-02-01 DIAGNOSIS — R06.00 DYSPNEA: ICD-10-CM

## 2024-02-01 DIAGNOSIS — I50.43 ACUTE ON CHRONIC COMBINED SYSTOLIC AND DIASTOLIC HEART FAILURE: Primary | ICD-10-CM

## 2024-02-01 DIAGNOSIS — I50.9 ACUTE EXACERBATION OF CHF (CONGESTIVE HEART FAILURE): ICD-10-CM

## 2024-02-01 PROBLEM — Z71.89 ACP (ADVANCE CARE PLANNING): Status: ACTIVE | Noted: 2024-02-01

## 2024-02-01 LAB
ALBUMIN SERPL BCP-MCNC: 3.2 G/DL (ref 3.5–5.2)
ALP SERPL-CCNC: 102 U/L (ref 55–135)
ALT SERPL W/O P-5'-P-CCNC: 27 U/L (ref 10–44)
ANION GAP SERPL CALC-SCNC: 12 MMOL/L (ref 8–16)
AST SERPL-CCNC: 29 U/L (ref 10–40)
BASOPHILS # BLD AUTO: 0.03 K/UL (ref 0–0.2)
BASOPHILS NFR BLD: 0.4 % (ref 0–1.9)
BILIRUB SERPL-MCNC: 0.8 MG/DL (ref 0.1–1)
BNP SERPL-MCNC: 2354 PG/ML (ref 0–99)
BUN SERPL-MCNC: 45 MG/DL (ref 8–23)
CALCIUM SERPL-MCNC: 9.4 MG/DL (ref 8.7–10.5)
CHLORIDE SERPL-SCNC: 102 MMOL/L (ref 95–110)
CO2 SERPL-SCNC: 24 MMOL/L (ref 23–29)
CREAT SERPL-MCNC: 2.1 MG/DL (ref 0.5–1.4)
DIFFERENTIAL METHOD BLD: ABNORMAL
EOSINOPHIL # BLD AUTO: 0.1 K/UL (ref 0–0.5)
EOSINOPHIL NFR BLD: 0.8 % (ref 0–8)
ERYTHROCYTE [DISTWIDTH] IN BLOOD BY AUTOMATED COUNT: 16.2 % (ref 11.5–14.5)
EST. GFR  (NO RACE VARIABLE): 31 ML/MIN/1.73 M^2
GLUCOSE SERPL-MCNC: 88 MG/DL (ref 70–110)
HCT VFR BLD AUTO: 44.2 % (ref 40–54)
HGB BLD-MCNC: 14 G/DL (ref 14–18)
IMM GRANULOCYTES # BLD AUTO: 0.02 K/UL (ref 0–0.04)
IMM GRANULOCYTES NFR BLD AUTO: 0.3 % (ref 0–0.5)
LYMPHOCYTES # BLD AUTO: 1 K/UL (ref 1–4.8)
LYMPHOCYTES NFR BLD: 14.5 % (ref 18–48)
MCH RBC QN AUTO: 27.5 PG (ref 27–31)
MCHC RBC AUTO-ENTMCNC: 31.7 G/DL (ref 32–36)
MCV RBC AUTO: 87 FL (ref 82–98)
MONOCYTES # BLD AUTO: 1.3 K/UL (ref 0.3–1)
MONOCYTES NFR BLD: 17.4 % (ref 4–15)
NEUTROPHILS # BLD AUTO: 4.8 K/UL (ref 1.8–7.7)
NEUTROPHILS NFR BLD: 66.6 % (ref 38–73)
NRBC BLD-RTO: 0 /100 WBC
PLATELET # BLD AUTO: 118 K/UL (ref 150–450)
PMV BLD AUTO: ABNORMAL FL (ref 9.2–12.9)
POTASSIUM SERPL-SCNC: 4.8 MMOL/L (ref 3.5–5.1)
PROT SERPL-MCNC: 7.5 G/DL (ref 6–8.4)
RBC # BLD AUTO: 5.09 M/UL (ref 4.6–6.2)
SODIUM SERPL-SCNC: 138 MMOL/L (ref 136–145)
TROPONIN I SERPL DL<=0.01 NG/ML-MCNC: 0.18 NG/ML (ref 0–0.03)
WBC # BLD AUTO: 7.18 K/UL (ref 3.9–12.7)

## 2024-02-01 PROCEDURE — 93010 ELECTROCARDIOGRAM REPORT: CPT | Mod: ,,, | Performed by: INTERNAL MEDICINE

## 2024-02-01 PROCEDURE — 99285 EMERGENCY DEPT VISIT HI MDM: CPT | Mod: 25

## 2024-02-01 PROCEDURE — 96374 THER/PROPH/DIAG INJ IV PUSH: CPT

## 2024-02-01 PROCEDURE — 25000003 PHARM REV CODE 250: Performed by: HOSPITALIST

## 2024-02-01 PROCEDURE — 12000002 HC ACUTE/MED SURGE SEMI-PRIVATE ROOM

## 2024-02-01 PROCEDURE — 63600175 PHARM REV CODE 636 W HCPCS: Performed by: HOSPITALIST

## 2024-02-01 PROCEDURE — 85025 COMPLETE CBC W/AUTO DIFF WBC: CPT | Performed by: EMERGENCY MEDICINE

## 2024-02-01 PROCEDURE — 83880 ASSAY OF NATRIURETIC PEPTIDE: CPT | Performed by: EMERGENCY MEDICINE

## 2024-02-01 PROCEDURE — 84484 ASSAY OF TROPONIN QUANT: CPT | Performed by: EMERGENCY MEDICINE

## 2024-02-01 PROCEDURE — 63600175 PHARM REV CODE 636 W HCPCS: Performed by: EMERGENCY MEDICINE

## 2024-02-01 PROCEDURE — 80053 COMPREHEN METABOLIC PANEL: CPT | Performed by: EMERGENCY MEDICINE

## 2024-02-01 PROCEDURE — 93005 ELECTROCARDIOGRAM TRACING: CPT

## 2024-02-01 PROCEDURE — 94761 N-INVAS EAR/PLS OXIMETRY MLT: CPT

## 2024-02-01 RX ORDER — FUROSEMIDE 10 MG/ML
80 INJECTION INTRAMUSCULAR; INTRAVENOUS EVERY 12 HOURS
Status: DISCONTINUED | OUTPATIENT
Start: 2024-02-01 | End: 2024-02-03

## 2024-02-01 RX ORDER — LATANOPROST 50 UG/ML
1 SOLUTION/ DROPS OPHTHALMIC NIGHTLY
Status: DISCONTINUED | OUTPATIENT
Start: 2024-02-01 | End: 2024-02-04 | Stop reason: HOSPADM

## 2024-02-01 RX ORDER — FUROSEMIDE 10 MG/ML
80 INJECTION INTRAMUSCULAR; INTRAVENOUS
Status: COMPLETED | OUTPATIENT
Start: 2024-02-01 | End: 2024-02-01

## 2024-02-01 RX ORDER — ASPIRIN 81 MG/1
81 TABLET ORAL DAILY
Status: DISCONTINUED | OUTPATIENT
Start: 2024-02-02 | End: 2024-02-04 | Stop reason: HOSPADM

## 2024-02-01 RX ORDER — ACETAMINOPHEN 325 MG/1
650 TABLET ORAL EVERY 6 HOURS PRN
Status: DISCONTINUED | OUTPATIENT
Start: 2024-02-01 | End: 2024-02-04 | Stop reason: HOSPADM

## 2024-02-01 RX ORDER — SODIUM CHLORIDE 0.9 % (FLUSH) 0.9 %
10 SYRINGE (ML) INJECTION
Status: DISCONTINUED | OUTPATIENT
Start: 2024-02-01 | End: 2024-02-04 | Stop reason: HOSPADM

## 2024-02-01 RX ORDER — TALC
6 POWDER (GRAM) TOPICAL NIGHTLY PRN
Status: DISCONTINUED | OUTPATIENT
Start: 2024-02-01 | End: 2024-02-04 | Stop reason: HOSPADM

## 2024-02-01 RX ORDER — ONDANSETRON HYDROCHLORIDE 2 MG/ML
4 INJECTION, SOLUTION INTRAVENOUS EVERY 4 HOURS PRN
Status: DISCONTINUED | OUTPATIENT
Start: 2024-02-01 | End: 2024-02-04 | Stop reason: HOSPADM

## 2024-02-01 RX ORDER — LOPERAMIDE HYDROCHLORIDE 2 MG/1
2 CAPSULE ORAL 4 TIMES DAILY PRN
Status: DISCONTINUED | OUTPATIENT
Start: 2024-02-01 | End: 2024-02-04 | Stop reason: HOSPADM

## 2024-02-01 RX ORDER — HEPARIN SODIUM 5000 [USP'U]/ML
5000 INJECTION, SOLUTION INTRAVENOUS; SUBCUTANEOUS EVERY 12 HOURS
Status: DISCONTINUED | OUTPATIENT
Start: 2024-02-01 | End: 2024-02-04 | Stop reason: HOSPADM

## 2024-02-01 RX ORDER — ATORVASTATIN CALCIUM 20 MG/1
20 TABLET, FILM COATED ORAL NIGHTLY
Status: DISCONTINUED | OUTPATIENT
Start: 2024-02-01 | End: 2024-02-04 | Stop reason: HOSPADM

## 2024-02-01 RX ORDER — METOPROLOL SUCCINATE 25 MG/1
25 TABLET, EXTENDED RELEASE ORAL NIGHTLY
Status: DISCONTINUED | OUTPATIENT
Start: 2024-02-01 | End: 2024-02-04 | Stop reason: HOSPADM

## 2024-02-01 RX ORDER — HYDROCODONE BITARTRATE AND ACETAMINOPHEN 5; 325 MG/1; MG/1
1 TABLET ORAL EVERY 6 HOURS PRN
Status: DISCONTINUED | OUTPATIENT
Start: 2024-02-01 | End: 2024-02-04 | Stop reason: HOSPADM

## 2024-02-01 RX ADMIN — FUROSEMIDE 80 MG: 10 INJECTION, SOLUTION INTRAMUSCULAR; INTRAVENOUS at 01:02

## 2024-02-01 RX ADMIN — HEPARIN SODIUM 5000 UNITS: 5000 INJECTION INTRAVENOUS; SUBCUTANEOUS at 08:02

## 2024-02-01 RX ADMIN — ATORVASTATIN CALCIUM 20 MG: 20 TABLET, FILM COATED ORAL at 08:02

## 2024-02-01 RX ADMIN — FUROSEMIDE 80 MG: 10 INJECTION, SOLUTION INTRAMUSCULAR; INTRAVENOUS at 08:02

## 2024-02-01 NOTE — H&P
[facility] Medicine  History & Physical    Patient Name: Felicia Lopez  MRN: [id number]  Patient Class: IP- Inpatient  Admission Date: 2/1/2024  Attending Physician: Rubens Liao MD  Primary Care Provider: Mickey Darden MD         Patient information was obtained from patient, past medical records, and ER records.     Subjective:     Principal Problem:Acute on chronic combined systolic and diastolic heart failure    Chief Complaint:   Chief Complaint   Patient presents with    Shortness of Breath     Per EMS pt reports SOB today, hx of CHF, currently on 2L NC        HPI: Mr. Lopez is a 78 year old man with CAD (history of MI s/p PCI), prior stroke, CKDIII, hypertension and hyperlipidemia who presented for evaluation of shortness of breath and leg swelling.  He states that over the last 3 weeks he has been having progressive worsening of shortness of breath and leg swelling.  He notes that he has been unable to sleep laying flat due to difficulty breathing and has to sleep sitting upright.  He reports perfect compliance with diet and medications, but notes he ate [employer] chicken yesterday.  He notes over the last three days he has had 4 episodes of small volume emesis before eating.  He notes that after he eats his nausea subsides.  This morning, he felt much worse with increased shortness of breath, weakness and slight lightheadedness so felt he had to come into the hospital.  He denies fevers, chills, cough, diarrhea, constipation, leg pain, back pain, abdominal pain, headache and palpitations.  He notes he had one very brief episode of chest pain last night which felt like an electrical shock.  Presently he has no pain at all.    Past Medical History:   Diagnosis Date    CAD (coronary artery disease)     Chronic combined systolic and diastolic CHF (congestive heart failure)     Chronic idiopathic thrombocytopenia     Disorder of kidney and ureter     History of heart attack      History of stroke     Hyperlipidemia     Hypertension        Past Surgical History:   Procedure Laterality Date    FACIAL RECONSTRUCTION SURGERY      PROSTATE SURGERY         Review of patient's allergies indicates:  No Known Allergies    No current facility-administered medications on file prior to encounter.     Current Outpatient Medications on File Prior to Encounter   Medication Sig    aspirin (ECOTRIN) 81 MG EC tablet Take 1 tablet (81 mg total) by mouth once daily.    furosemide (LASIX) 40 MG tablet Take 1 tablet (40 mg total) by mouth 2 (two) times daily.    HYDROcodone-acetaminophen (NORCO) 5-325 mg per tablet Take 1 tablet by mouth every 6 (six) hours as needed for Pain.    latanoprost 0.005 % ophthalmic solution Place 1 drop into both eyes every evening.    metoprolol succinate (TOPROL-XL) 25 MG 24 hr tablet Take 1 tablet (25 mg total) by mouth every evening.    simvastatin (ZOCOR) 40 MG tablet Take 1 tablet (40 mg total) by mouth once daily.    [DISCONTINUED] baclofen (LIORESAL) 10 MG tablet Take 1 tablet (10 mg total) by mouth 3 (three) times daily.    [DISCONTINUED] clopidogrel (PLAVIX) 75 mg tablet Take 1 tablet (75 mg total) by mouth once daily.    [DISCONTINUED] hydroCHLOROthiazide (MICROZIDE) 12.5 mg capsule Take 1 capsule (12.5 mg total) by mouth once daily. (Patient taking differently: Take 25 mg by mouth once daily.)     Family History       Problem Relation (Age of Onset)    Hypertension Mother    No Known Problems Father          Tobacco Use    Smoking status: Former     Current packs/day: 0.00     Types: Cigarettes     Quit date: 10/5/2008     Years since quitting: 15.3    Smokeless tobacco: Never   Substance and Sexual Activity    Alcohol use: No     Alcohol/week: 0.0 standard drinks of alcohol    Drug use: No    Sexual activity: Never     Review of Systems   All other systems reviewed and are negative.    Objective:     Vital Signs (Most Recent):  Temp: 97.5 °F (36.4 °C) (02/01/24  1054)  Pulse: 82 (02/01/24 1330)  Resp: 20 (02/01/24 1330)  BP: 112/69 (02/01/24 1336)  SpO2: 100 % (02/01/24 1330) Vital Signs (24h Range):  Temp:  [97.5 °F (36.4 °C)] 97.5 °F (36.4 °C)  Pulse:  [81-83] 82  Resp:  [16-34] 20  SpO2:  [99 %-100 %] 100 %  BP: (112-125)/(69-78) 112/69        There is no height or weight on file to calculate BMI.     Physical Exam  Vitals and nursing note reviewed.   Constitutional:       General: He is not in acute distress.     Appearance: He is well-developed. He is ill-appearing. He is not toxic-appearing or diaphoretic.   HENT:      Head: Normocephalic and atraumatic.      Right Ear: External ear normal.      Left Ear: External ear normal.      Nose: Nose normal.      Mouth/Throat:      Mouth: Mucous membranes are moist.   Eyes:      Extraocular Movements: Extraocular movements intact.      Conjunctiva/sclera: Conjunctivae normal.   Cardiovascular:      Rate and Rhythm: Normal rate and regular rhythm.      Heart sounds: Normal heart sounds.   Pulmonary:      Effort: Pulmonary effort is normal. No respiratory distress.      Breath sounds: Rales present.   Abdominal:      General: Bowel sounds are normal. There is no distension.      Palpations: Abdomen is soft.      Tenderness: There is no abdominal tenderness.   Musculoskeletal:         General: Normal range of motion.      Cervical back: Normal range of motion. No rigidity.      Right lower leg: Edema present.      Left lower leg: Edema present.      Comments: Deep pitting edema to both knees   Skin:     General: Skin is warm and dry.   Neurological:      General: No focal deficit present.      Mental Status: He is alert and oriented to person, place, and time.      Cranial Nerves: No cranial nerve deficit.      Coordination: Coordination normal.      Comments: Diffuse weakness.   Psychiatric:         Behavior: Behavior normal.                Significant Labs: All pertinent labs within the past 24 hours have been  reviewed.    Significant Imaging: I have reviewed all pertinent imaging results/findings within the past 24 hours.  Assessment/Plan:     * Acute on chronic combined systolic and diastolic heart failure  -Admitted to inpatient status  -Presents with progressive shortness of breath and worsening edema in legs  -BNP 2354.  CXR shows pulmonary edema with small left effusion.  -On exam has pulmonary crackles and severe edema up to his knees.  -Echo 8/8/23 showed EF 35-40% and diastolic dysfunction  -Suspect he is eating more salt than he should.  Notes he had [employer] chicken for lunch yesterday.  -Strict ins/outs, daily weights, fluid restriction and salt restricted diet.  -Will diurese with iv lasix 80mg bid  -Continue home toprol.  No longer on acei as outpatient.  -Follows with Dr. LULI Martinez.  If not improving will consult cardiology tomorrow.    CAD (coronary artery disease)  -History noted  -No active chest pain  -Troponin mildly elevated but at his baseline.  Doubt ACS - more likely this is chronic demand ischemia secondary to his CHF in the context of diminished renal perfusion and ckd.  -Continue aspirin, statin and beta blocker  -Monitor on telemetry.    Essential hypertension  -Appears well controlled  -Continue home metoprolol    Chronic kidney disease, stage III (moderate)  -Baseline Cr appears to be 1.7--2.3  -On admit Cr 2.1  -Avoid nephrotoxic agents and renally dose meds  -Diurese as above  -Repeat labs in AM    Thrombocytopenia  -This is chronic mild thrombocytopenia  -On admit 118 - near baseline  -Etiology unclear  -Check folate and B12  -Repeat cbc in AM    Hyperlipidemia  -Continue statin    History of CVA (cerebrovascular accident)  -History noted  -Continue aspirin and statin  -Consult PT/OT    ACP (advance care planning)  -Advance Care PlanningDate: 02/01/2024   I engaged Mr. Lopez in a discussion regarding his desires for care at the very end of life.  We discussed the pros and cons of ACLS  "treatment in the face of cardiac arrest.  I recommended that we treat aggressively in an effort to return him to his prior level of function, but that if we faced cardiac arrest that we allow him a peaceful death.  He appreciated our discussion and had thoughtful questions.  Ultimately, he stated that he knows he will not face cardiac arrest, therefore wishes that we do absolutely everything.  Will continue as full code for now.  I spent 15 minutes ACP time 2/1/23.  -Noting that I have answered his MSQ as "no", will consult palliative care to continue ACP.      VTE Risk Mitigation (From admission, onward)           Ordered     heparin (porcine) injection 5,000 Units  Every 12 hours         02/01/24 1605     IP VTE HIGH RISK PATIENT  Once         02/01/24 1605     Place sequential compression device  Until discontinued         02/01/24 1605                                    Rubens Liao MD  Department of Hospital Medicine  [facility] - Emergency Dept          "

## 2024-02-01 NOTE — ASSESSMENT & PLAN NOTE
"-Advance Care Planning Date: 02/01/2024   I engaged Mr. Lopez in a discussion regarding his desires for care at the very end of life.  We discussed the pros and cons of ACLS treatment in the face of cardiac arrest.  I recommended that we treat aggressively in an effort to return him to his prior level of function, but that if we faced cardiac arrest that we allow him a peaceful death.  He appreciated our discussion and had thoughtful questions.  Ultimately, he stated that he knows he will not face cardiac arrest, therefore wishes that we do absolutely everything.  Will continue as full code for now.  I spent 15 minutes ACP time 2/1/23.  -Noting that I have answered his MSQ as "no", will consult palliative care to continue ACP.  "

## 2024-02-01 NOTE — SUBJECTIVE & OBJECTIVE
Past Medical History:   Diagnosis Date    CAD (coronary artery disease)     Chronic combined systolic and diastolic CHF (congestive heart failure)     Chronic idiopathic thrombocytopenia     Disorder of kidney and ureter     History of heart attack     History of stroke     Hyperlipidemia     Hypertension        Past Surgical History:   Procedure Laterality Date    FACIAL RECONSTRUCTION SURGERY      PROSTATE SURGERY         Review of patient's allergies indicates:  No Known Allergies    No current facility-administered medications on file prior to encounter.     Current Outpatient Medications on File Prior to Encounter   Medication Sig    aspirin (ECOTRIN) 81 MG EC tablet Take 1 tablet (81 mg total) by mouth once daily.    furosemide (LASIX) 40 MG tablet Take 1 tablet (40 mg total) by mouth 2 (two) times daily.    HYDROcodone-acetaminophen (NORCO) 5-325 mg per tablet Take 1 tablet by mouth every 6 (six) hours as needed for Pain.    latanoprost 0.005 % ophthalmic solution Place 1 drop into both eyes every evening.    metoprolol succinate (TOPROL-XL) 25 MG 24 hr tablet Take 1 tablet (25 mg total) by mouth every evening.    simvastatin (ZOCOR) 40 MG tablet Take 1 tablet (40 mg total) by mouth once daily.    [DISCONTINUED] baclofen (LIORESAL) 10 MG tablet Take 1 tablet (10 mg total) by mouth 3 (three) times daily.    [DISCONTINUED] clopidogrel (PLAVIX) 75 mg tablet Take 1 tablet (75 mg total) by mouth once daily.    [DISCONTINUED] hydroCHLOROthiazide (MICROZIDE) 12.5 mg capsule Take 1 capsule (12.5 mg total) by mouth once daily. (Patient taking differently: Take 25 mg by mouth once daily.)     Family History       Problem Relation (Age of Onset)    Hypertension Mother    No Known Problems Father          Tobacco Use    Smoking status: Former     Current packs/day: 0.00     Types: Cigarettes     Quit date: 10/5/2008     Years since quitting: 15.3    Smokeless tobacco: Never   Substance and Sexual Activity    Alcohol use:  No     Alcohol/week: 0.0 standard drinks of alcohol    Drug use: No    Sexual activity: Never     Review of Systems   All other systems reviewed and are negative.    Objective:     Vital Signs (Most Recent):  Temp: 97.5 °F (36.4 °C) (02/01/24 1054)  Pulse: 82 (02/01/24 1330)  Resp: 20 (02/01/24 1330)  BP: 112/69 (02/01/24 1336)  SpO2: 100 % (02/01/24 1330) Vital Signs (24h Range):  Temp:  [97.5 °F (36.4 °C)] 97.5 °F (36.4 °C)  Pulse:  [81-83] 82  Resp:  [16-34] 20  SpO2:  [99 %-100 %] 100 %  BP: (112-125)/(69-78) 112/69        There is no height or weight on file to calculate BMI.     Physical Exam  Vitals and nursing note reviewed.   Constitutional:       General: He is not in acute distress.     Appearance: He is well-developed. He is ill-appearing. He is not toxic-appearing or diaphoretic.   HENT:      Head: Normocephalic and atraumatic.      Right Ear: External ear normal.      Left Ear: External ear normal.      Nose: Nose normal.      Mouth/Throat:      Mouth: Mucous membranes are moist.   Eyes:      Extraocular Movements: Extraocular movements intact.      Conjunctiva/sclera: Conjunctivae normal.   Cardiovascular:      Rate and Rhythm: Normal rate and regular rhythm.      Heart sounds: Normal heart sounds.   Pulmonary:      Effort: Pulmonary effort is normal. No respiratory distress.      Breath sounds: Rales present.   Abdominal:      General: Bowel sounds are normal. There is no distension.      Palpations: Abdomen is soft.      Tenderness: There is no abdominal tenderness.   Musculoskeletal:         General: Normal range of motion.      Cervical back: Normal range of motion. No rigidity.      Right lower leg: Edema present.      Left lower leg: Edema present.      Comments: Deep pitting edema to both knees   Skin:     General: Skin is warm and dry.   Neurological:      General: No focal deficit present.      Mental Status: He is alert and oriented to person, place, and time.      Cranial Nerves: No cranial  nerve deficit.      Coordination: Coordination normal.      Comments: Diffuse weakness.   Psychiatric:         Behavior: Behavior normal.                Significant Labs: All pertinent labs within the past 24 hours have been reviewed.    Significant Imaging: I have reviewed all pertinent imaging results/findings within the past 24 hours.

## 2024-02-01 NOTE — ASSESSMENT & PLAN NOTE
-History noted  -No active chest pain  -Troponin mildly elevated but at his baseline.  Doubt ACS - more likely this is chronic demand ischemia secondary to his CHF in the context of diminished renal perfusion and ckd.  -Continue aspirin, statin and beta blocker  -Monitor on telemetry.

## 2024-02-01 NOTE — ASSESSMENT & PLAN NOTE
-Baseline Cr appears to be 1.7--2.3  -On admit Cr 2.1  -Avoid nephrotoxic agents and renally dose meds  -Diurese as above  -Repeat labs in AM

## 2024-02-01 NOTE — ED PROVIDER NOTES
Encounter Date: 2/1/2024       History     Chief Complaint   Patient presents with    Shortness of Breath     Per EMS pt reports SOB today, hx of CHF, currently on 2L NC     78-year-old male with multiple comorbidities including HTN, CHF, CKD presents for evaluation of worsening SOB and leg swelling.  Patient states that he started to have worsening leg swelling in the past 3 weeks, and for about 10 days he has had orthopnea and worsening HARTMAN as well.  This feels similar to previous episodes when he had too much fluid.  He denies any cough or congestion but has had brief episodes of shocking chest pain, none currently.  He also has had episodes of nausea and vomiting over the past week which is new for him, and occasional mid abdominal pain.  He is still able to eat and take his medications, only had a few episodes of vomiting but nausea is more constant.  No fevers, wheezing, blood in stool, or other new complaints.  He is compliant with Lasix.      Review of patient's allergies indicates:  No Known Allergies  Past Medical History:   Diagnosis Date    Disorder of kidney and ureter     Hyperlipidemia     Hypertension      Past Surgical History:   Procedure Laterality Date    FACIAL RECONSTRUCTION SURGERY      PROSTATE SURGERY       Family History   Problem Relation Age of Onset    Hypertension Mother     No Known Problems Father      Social History     Tobacco Use    Smoking status: Former     Current packs/day: 0.00     Types: Cigarettes     Quit date: 10/5/2008     Years since quitting: 15.3    Smokeless tobacco: Never   Substance Use Topics    Alcohol use: No     Alcohol/week: 0.0 standard drinks of alcohol    Drug use: No     Review of Systems   Constitutional:  Negative for fever.   HENT:  Negative for congestion.    Eyes:  Negative for redness.   Respiratory:  Positive for shortness of breath.    Cardiovascular:  Positive for chest pain and leg swelling.   Gastrointestinal:  Positive for abdominal pain, nausea  and vomiting.   Genitourinary:  Negative for dysuria.   Skin:  Negative for rash.   Neurological:  Negative for headaches.   Psychiatric/Behavioral:  Negative for confusion.        Physical Exam     Initial Vitals [02/01/24 1054]   BP Pulse Resp Temp SpO2   125/78 83 20 97.5 °F (36.4 °C) 100 %      MAP       --         Physical Exam    Nursing note and vitals reviewed.  Constitutional: He is not diaphoretic. No distress.   HENT:   Head: Normocephalic and atraumatic.   Eyes: Conjunctivae are normal. No scleral icterus.   Neck: Neck supple.   Cardiovascular:  Normal rate, regular rhythm, normal heart sounds and intact distal pulses.           No murmur heard.  Pulmonary/Chest: No respiratory distress. He has no wheezes. He has no rhonchi. He has rales.   Faint bibasilar rales, no wheezing   Abdominal: Abdomen is soft. There is no abdominal tenderness.   No focal tenderness There is no rebound and no guarding.   Musculoskeletal:         General: Edema (2+ pitting edema in bilateral extremities) present.      Cervical back: Neck supple.     Neurological: He is alert and oriented to person, place, and time.   Skin: Skin is warm and dry.   Psychiatric: He has a normal mood and affect.         ED Course   Procedures  Labs Reviewed   COMPREHENSIVE METABOLIC PANEL - Abnormal; Notable for the following components:       Result Value    BUN 45 (*)     Creatinine 2.1 (*)     Albumin 3.2 (*)     eGFR 31 (*)     All other components within normal limits   CBC W/ AUTO DIFFERENTIAL - Abnormal; Notable for the following components:    MCHC 31.7 (*)     RDW 16.2 (*)     Platelets 118 (*)     Mono # 1.3 (*)     Lymph % 14.5 (*)     Mono % 17.4 (*)     All other components within normal limits   B-TYPE NATRIURETIC PEPTIDE - Abnormal; Notable for the following components:    BNP 2,354 (*)     All other components within normal limits   TROPONIN I - Abnormal; Notable for the following components:    Troponin I 0.178 (*)     All other  components within normal limits     EKG Readings: (Independently Interpreted)   Normal sinus rhythm at rate 87, downsloping ST segments with T-wave inversion laterally and inferiorly, unchanged from previous.  Frequent PVCs and PACs.       Imaging Results              X-Ray Chest AP Portable (In process)                      Medications - No data to display  Medical Decision Making      78-year-old male with multiple comorbidities including HTN, CHF, CKD presents for evaluation of worsening SOB and leg swelling.  Patient started having worsening leg swelling about 3 weeks ago, and in the past 10 days has noticed orthopnea and worse HARTMAN as well.  He has occasional brief stabbing episodes of atypical chest pain that appear nonexertional, but denies any significant cough, fevers, or wheezing.  He also reports abdominal distention and constant nausea with episodic vomiting, which is somewhat different than his usual fluid overload presentation.  He is still able to eat, denies any current abdominal pain, no fevers or other associated symptoms.  On arrival patient with normal vitals but requires 2 L O2 on nasal cannula.  He does have faint bibasilar rales and 2+ lower extremity edema concerning for CHF exacerbation.  He is compliant with Lasix but was previously on mg b.i.d., but after he became weak and dizzy he was reduced to 40 mg daily, after which his swelling started to get worse 3 weeks ago.  Presentation consistent with CHF exacerbation, will confirm with labs and treat with IV Lasix.  No abdominal tenderness to suggest infection.      Labs with CR 2.1, consistent with previous baseline, no CBC abnormalities.  BNP is 2354, above previous levels, and troponin also chronically elevated, with no active chest pain or EKG changes to suggest ACS.  Chest x-ray confirms pulmonary edema with left pleural effusion per my independent interpretation.  Workup consistent with CHF exacerbation, will start IV Lasix.  Given  patient's issues with weakness with increased Lasix doses and CKD, will admit to hospitalist for close observation with IV diuresis.    Amount and/or Complexity of Data Reviewed  Labs: ordered.  Radiology: ordered.    Risk  Prescription drug management.  Decision regarding hospitalization.                                      Clinical Impression:  Final diagnoses:  [R06.00] Dyspnea  [I50.9] Acute exacerbation of CHF (congestive heart failure)                 Thierry Majano MD  02/01/24 1500

## 2024-02-01 NOTE — ASSESSMENT & PLAN NOTE
-This is chronic mild thrombocytopenia  -On admit 118 - near baseline  -Etiology unclear  -Check folate and B12  -Repeat cbc in AM

## 2024-02-01 NOTE — HPI
Mr. Lopez is a 78 year old man with CAD (history of MI s/p PCI), prior stroke, CKDIII, hypertension and hyperlipidemia who presented for evaluation of shortness of breath and leg swelling.  He states that over the last 3 weeks he has been having progressive worsening of shortness of breath and leg swelling.  He notes that he has been unable to sleep laying flat due to difficulty breathing and has to sleep sitting upright.  He reports perfect compliance with diet and medications, but notes he ate popeyes chicken yesterday.  He notes over the last three days he has had 4 episodes of small volume emesis before eating.  He notes that after he eats his nausea subsides.  This morning, he felt much worse with increased shortness of breath, weakness and slight lightheadedness so felt he had to come into the hospital.  He denies fevers, chills, cough, diarrhea, constipation, leg pain, back pain, abdominal pain, headache and palpitations.  He notes he had one very brief episode of chest pain last night which felt like an electrical shock.  Presently he has no pain at all.

## 2024-02-01 NOTE — PHARMACY MED REC
"Admission Medication History         The home medication history was taken by Shelli Fletcher CPHT    You may go to "Admission" then "Reconcile Home Medications" tabs to review and/or act upon these items.     The  patient was able to verify medication at bedside.  "

## 2024-02-01 NOTE — ASSESSMENT & PLAN NOTE
-Admitted to inpatient status  -Presents with progressive shortness of breath and worsening edema in legs  -BNP 2354.  CXR shows pulmonary edema with small left effusion.  -On exam has pulmonary crackles and severe edema up to his knees.  -Echo 8/8/23 showed EF 35-40% and diastolic dysfunction  -Suspect he is eating more salt than he should.  Notes he had popeyes chicken for lunch yesterday.  -Strict ins/outs, daily weights, fluid restriction and salt restricted diet.  -Will diurese with iv lasix 80mg bid  -Continue home toprol.  No longer on acei as outpatient.  -Follows with Dr. LULI Martinez.  If not improving will consult cardiology tomorrow.

## 2024-02-02 LAB
ANION GAP SERPL CALC-SCNC: 10 MMOL/L (ref 8–16)
BASOPHILS # BLD AUTO: 0.03 K/UL (ref 0–0.2)
BASOPHILS NFR BLD: 0.5 % (ref 0–1.9)
BUN SERPL-MCNC: 42 MG/DL (ref 8–23)
CALCIUM SERPL-MCNC: 7.6 MG/DL (ref 8.7–10.5)
CHLORIDE SERPL-SCNC: 108 MMOL/L (ref 95–110)
CO2 SERPL-SCNC: 24 MMOL/L (ref 23–29)
CREAT SERPL-MCNC: 1.7 MG/DL (ref 0.5–1.4)
DIFFERENTIAL METHOD BLD: ABNORMAL
EOSINOPHIL # BLD AUTO: 0.1 K/UL (ref 0–0.5)
EOSINOPHIL NFR BLD: 1.1 % (ref 0–8)
ERYTHROCYTE [DISTWIDTH] IN BLOOD BY AUTOMATED COUNT: 16.2 % (ref 11.5–14.5)
EST. GFR  (NO RACE VARIABLE): 41 ML/MIN/1.73 M^2
GLUCOSE SERPL-MCNC: 77 MG/DL (ref 70–110)
HCT VFR BLD AUTO: 40.8 % (ref 40–54)
HGB BLD-MCNC: 13 G/DL (ref 14–18)
IMM GRANULOCYTES # BLD AUTO: 0.02 K/UL (ref 0–0.04)
IMM GRANULOCYTES NFR BLD AUTO: 0.4 % (ref 0–0.5)
LYMPHOCYTES # BLD AUTO: 1 K/UL (ref 1–4.8)
LYMPHOCYTES NFR BLD: 17.9 % (ref 18–48)
MAGNESIUM SERPL-MCNC: 1.7 MG/DL (ref 1.6–2.6)
MCH RBC QN AUTO: 27.7 PG (ref 27–31)
MCHC RBC AUTO-ENTMCNC: 31.9 G/DL (ref 32–36)
MCV RBC AUTO: 87 FL (ref 82–98)
MONOCYTES # BLD AUTO: 0.9 K/UL (ref 0.3–1)
MONOCYTES NFR BLD: 16.5 % (ref 4–15)
NEUTROPHILS # BLD AUTO: 3.6 K/UL (ref 1.8–7.7)
NEUTROPHILS NFR BLD: 63.6 % (ref 38–73)
NRBC BLD-RTO: 0 /100 WBC
PLATELET # BLD AUTO: 110 K/UL (ref 150–450)
PMV BLD AUTO: ABNORMAL FL (ref 9.2–12.9)
POTASSIUM SERPL-SCNC: 4.1 MMOL/L (ref 3.5–5.1)
RBC # BLD AUTO: 4.69 M/UL (ref 4.6–6.2)
SODIUM SERPL-SCNC: 142 MMOL/L (ref 136–145)
WBC # BLD AUTO: 5.69 K/UL (ref 3.9–12.7)

## 2024-02-02 PROCEDURE — 97530 THERAPEUTIC ACTIVITIES: CPT

## 2024-02-02 PROCEDURE — 27000221 HC OXYGEN, UP TO 24 HOURS

## 2024-02-02 PROCEDURE — 83735 ASSAY OF MAGNESIUM: CPT | Performed by: HOSPITALIST

## 2024-02-02 PROCEDURE — 99223 1ST HOSP IP/OBS HIGH 75: CPT | Mod: ,,, | Performed by: FAMILY MEDICINE

## 2024-02-02 PROCEDURE — 97162 PT EVAL MOD COMPLEX 30 MIN: CPT

## 2024-02-02 PROCEDURE — 36415 COLL VENOUS BLD VENIPUNCTURE: CPT | Mod: XB | Performed by: HOSPITALIST

## 2024-02-02 PROCEDURE — 82746 ASSAY OF FOLIC ACID SERUM: CPT | Performed by: HOSPITALIST

## 2024-02-02 PROCEDURE — 85025 COMPLETE CBC W/AUTO DIFF WBC: CPT | Performed by: HOSPITALIST

## 2024-02-02 PROCEDURE — 97165 OT EVAL LOW COMPLEX 30 MIN: CPT

## 2024-02-02 PROCEDURE — 63600175 PHARM REV CODE 636 W HCPCS: Performed by: HOSPITALIST

## 2024-02-02 PROCEDURE — 25000003 PHARM REV CODE 250: Performed by: HOSPITALIST

## 2024-02-02 PROCEDURE — 21400001 HC TELEMETRY ROOM

## 2024-02-02 PROCEDURE — 94761 N-INVAS EAR/PLS OXIMETRY MLT: CPT

## 2024-02-02 PROCEDURE — 80048 BASIC METABOLIC PNL TOTAL CA: CPT | Performed by: HOSPITALIST

## 2024-02-02 PROCEDURE — 82607 VITAMIN B-12: CPT | Performed by: HOSPITALIST

## 2024-02-02 PROCEDURE — A4216 STERILE WATER/SALINE, 10 ML: HCPCS | Performed by: HOSPITALIST

## 2024-02-02 RX ADMIN — LATANOPROST 1 DROP: 50 SOLUTION OPHTHALMIC at 09:02

## 2024-02-02 RX ADMIN — ASPIRIN 81 MG: 81 TABLET, COATED ORAL at 08:02

## 2024-02-02 RX ADMIN — FUROSEMIDE 80 MG: 10 INJECTION, SOLUTION INTRAMUSCULAR; INTRAVENOUS at 08:02

## 2024-02-02 RX ADMIN — METOPROLOL SUCCINATE 25 MG: 25 TABLET, EXTENDED RELEASE ORAL at 09:02

## 2024-02-02 RX ADMIN — SODIUM CHLORIDE, PRESERVATIVE FREE 10 ML: 5 INJECTION INTRAVENOUS at 06:02

## 2024-02-02 RX ADMIN — ATORVASTATIN CALCIUM 20 MG: 20 TABLET, FILM COATED ORAL at 09:02

## 2024-02-02 RX ADMIN — HEPARIN SODIUM 5000 UNITS: 5000 INJECTION INTRAVENOUS; SUBCUTANEOUS at 09:02

## 2024-02-02 RX ADMIN — FUROSEMIDE 80 MG: 10 INJECTION, SOLUTION INTRAMUSCULAR; INTRAVENOUS at 09:02

## 2024-02-02 RX ADMIN — HEPARIN SODIUM 5000 UNITS: 5000 INJECTION INTRAVENOUS; SUBCUTANEOUS at 08:02

## 2024-02-02 NOTE — ASSESSMENT & PLAN NOTE
-This is chronic mild thrombocytopenia  -On admit 118 and now 110 - near baseline  -Etiology unclear  -Check folate and B12  -Repeat cbc in AM

## 2024-02-02 NOTE — ASSESSMENT & PLAN NOTE
-Baseline Cr appears to be 1.7--2.3  -On admit Cr 2.1 and now 1.7  -Avoid nephrotoxic agents and renally dose meds  -Diurese as above  -Repeat labs in AM

## 2024-02-02 NOTE — ASSESSMENT & PLAN NOTE
"-Advance Care Planning Date: 02/01/2024   I engaged Mr. Lopez in a discussion regarding his desires for care at the very end of life.  We discussed the pros and cons of ACLS treatment in the face of cardiac arrest.  I recommended that we treat aggressively in an effort to return him to his prior level of function, but that if we faced cardiac arrest that we allow him a peaceful death.  He appreciated our discussion and had thoughtful questions.  Ultimately, he stated that he knows he will not face cardiac arrest, therefore wishes that we do absolutely everything.  Will continue as full code for now.  I spent 15 minutes ACP time 2/1/23.  -Noting that I have answered his MSQ as "no",  consulted palliative care to continue ACP.  Input appreciated.  "

## 2024-02-02 NOTE — NURSING
Patient received from ED at around 1545 to the floor.No report received on arrival.Patient came to floor on strecher on oxygen 2lit/min.Vital sign monitored.Admission assessment done. Nurses Note -- 4 Eyes      2/2/2024   5:08 PM      Skin assessed during: Admit      [x] No Altered Skin Integrity Present    []Prevention Measures Documented      [] Yes- Altered Skin Integrity Present or Discovered   [] LDA Added if Not in Epic (Describe Wound)   [] New Altered Skin Integrity was Present on Admit and Documented in LDA   [] Wound Image Taken    Wound Care Consulted? No    Attending Nurse:  RAIN Shah    Second RN/Staff Member:   Gina FarnsworthRN

## 2024-02-02 NOTE — ASSESSMENT & PLAN NOTE
- Patient sees Dr. Martinez on 2/22/24  -Echo 8/28/23    Left Ventricle: The left ventricle is mildly dilated. Normal wall thickness. regional wall motion abnormalities present. There is moderately reduced systolic function with a visually estimated ejection fraction of 35 - 40%. There is diastolic dysfunction.    Left Atrium: Left atrium is mildly dilated.    Right Ventricle: Normal right ventricular cavity size. Wall thickness is normal. Systolic function is normal.    Aortic Valve: There is mild aortic valve sclerosis.    Mitral Valve: There is mild regurgitation.

## 2024-02-02 NOTE — PLAN OF CARE
Problem: Occupational Therapy  Goal: Occupational Therapy Goal  Description: Goals to be met by: 2/16/2024     Patient will increase functional independence with ADLs by performing:    UE Dressing with Stand-by Assistance.  LE Dressing with Moderate Assistance.  Grooming while standing at sink with Contact Guard Assistance.  Toileting from toilet with Contact Guard Assistance for hygiene and clothing management.   Toilet transfer to toilet with Contact Guard Assistance.    Outcome: Ongoing, Progressing     OT evaluation complete and POC established.  PTA pt reports being Mod I-Independent with ADLs, and was Mod I for mobility with use of SPC.  Pt reports he has had more difficulty with daily routine the last 2-3 weeks 2* swelling in (B) LE.    Pt's wife has caretaker that assists her daily with ADLs and functional mobility.  Moderate intensity therapy is recommended upon d/c from acute care to further address deficits and help pt improve overall functional independence.     AUBREE Meyers  2/2/2024

## 2024-02-02 NOTE — PLAN OF CARE
Problem: Physical Therapy  Goal: Physical Therapy Goal  Description: Goals to be met by: 2024    Patient will increase functional independence with mobility by performin. Sit<>stand with SV with LRAD.  2. Gait x 50 feet with SV with LRAD.  3.  Standing activity > 5 minutes maintaining upright posture.  4. Ascend/descend 2 step(s) with least restrictive assistive device and SV.     Outcome: Ongoing, Progressing     Patient evaluated by PT and goals established. Patient reports mobility has been more difficult in the past 2 to 3 weeks. He reports he cannot stand or walk at this time but was able to perform OOB mobility with therapist. Patient's functional mobility is limited by weakness and decreased endurance. PT will continue to follow and progress as tolerated. Rec for dc to Moderate Intensity Therapy. Please see progress note for detailed plan of care and recommendations.

## 2024-02-02 NOTE — ASSESSMENT & PLAN NOTE
-Admitted to inpatient status  -Presents with progressive shortness of breath and worsening edema in legs  -BNP 2354.  CXR shows pulmonary edema with small left effusion.  -On exam has pulmonary crackles and severe edema up to his knees.  -Echo 8/8/23 showed EF 35-40% and diastolic dysfunction  -Suspect he is eating more salt than he should.  Notes he had popeyes chicken for lunch yesterday.  -Strict ins/outs, daily weights, fluid restriction and salt restricted diet.  -Clinically a bit improved and breathing more comfortably.  Still fluid overloaded.  Net negative 1.5L thus far.  -Continue home toprol.  No longer on acei as outpatient.  -Continue to diurese with iv lasix 80mg bid  -Follows with Dr. LULI Martinez.  If not improving will consult cardiology tomorrow.

## 2024-02-02 NOTE — HPI
"Per H&P: "HPI: Mr. Lopez is a 78 year old man with CAD (history of MI s/p PCI), prior stroke, CKDIII, hypertension and hyperlipidemia who presented for evaluation of shortness of breath and leg swelling.  He states that over the last 3 weeks he has been having progressive worsening of shortness of breath and leg swelling.  He notes that he has been unable to sleep laying flat due to difficulty breathing and has to sleep sitting upright.  He reports perfect compliance with diet and medications, but notes he ate popeyes chicken yesterday.  He notes over the last three days he has had 4 episodes of small volume emesis before eating.  He notes that after he eats his nausea subsides.  This morning, he felt much worse with increased shortness of breath, weakness and slight lightheadedness so felt he had to come into the hospital.  He denies fevers, chills, cough, diarrhea, constipation, leg pain, back pain, abdominal pain, headache and palpitations.  He notes he had one very brief episode of chest pain last night which felt like an electrical shock.  Presently he has no pain at all."    At time of initial consult, patient seen in the ED. He is chronically ill appearing. Palliative medicine consulted for goals of care/ advance care planning.   "

## 2024-02-02 NOTE — SUBJECTIVE & OBJECTIVE
Interval History: Frustrated with still being in the ED.     Past Medical History:   Diagnosis Date    CAD (coronary artery disease)     Chronic combined systolic and diastolic CHF (congestive heart failure)     Chronic idiopathic thrombocytopenia     Disorder of kidney and ureter     History of heart attack     History of stroke     Hyperlipidemia     Hypertension        Past Surgical History:   Procedure Laterality Date    FACIAL RECONSTRUCTION SURGERY      PROSTATE SURGERY         Review of patient's allergies indicates:  No Known Allergies    Medications:  Continuous Infusions:  Scheduled Meds:   aspirin  81 mg Oral Daily    atorvastatin  20 mg Oral QHS    furosemide (LASIX) injection  80 mg Intravenous Q12H    heparin (porcine)  5,000 Units Subcutaneous Q12H    latanoprost  1 drop Both Eyes QHS    metoprolol succinate  25 mg Oral QHS     PRN Meds:acetaminophen, HYDROcodone-acetaminophen, loperamide, melatonin, ondansetron, sodium chloride 0.9%    Family History       Problem Relation (Age of Onset)    Hypertension Mother    No Known Problems Father          Tobacco Use    Smoking status: Former     Current packs/day: 0.00     Types: Cigarettes     Quit date: 10/5/2008     Years since quitting: 15.3    Smokeless tobacco: Never   Substance and Sexual Activity    Alcohol use: No     Alcohol/week: 0.0 standard drinks of alcohol    Drug use: No    Sexual activity: Never       Review of Systems   Constitutional:  Positive for activity change and fatigue. Negative for appetite change and unexpected weight change.   Respiratory:  Positive for shortness of breath. Negative for cough.    Musculoskeletal:  Negative for gait problem.   Neurological:  Positive for weakness.     Objective:     Vital Signs (Most Recent):  Temp: 98 °F (36.7 °C) (02/01/24 1930)  Pulse: 83 (02/02/24 1041)  Resp: 11 (02/02/24 1041)  BP: (!) 106/59 (02/02/24 1011)  SpO2: 98 % (02/02/24 1041) Vital Signs (24h Range):  Temp:  [98 °F (36.7 °C)] 98 °F  (36.7 °C)  Pulse:  [] 83  Resp:  [11-27] 11  SpO2:  [96 %-100 %] 98 %  BP: ()/(57-80) 106/59        There is no height or weight on file to calculate BMI.       Physical Exam  Constitutional:       Appearance: He is ill-appearing (Chronically).      Comments: Thin, temporal lobe wasting    Cardiovascular:      Rate and Rhythm: Normal rate.   Pulmonary:      Effort: No respiratory distress.   Neurological:      Mental Status: He is alert and oriented to person, place, and time.            Review of Symptoms      Symptom Assessment (ESAS 0-10 Scale)  Pain:  0  Dyspnea:  3  Anxiety:  0  Nausea:  0  Depression:  0  Anorexia:  0  Fatigue:  6  Insomnia:  0  Restlessness:  0  Agitation:  0         Living Arrangements:  Lives with spouse    Psychosocial/Cultural:   See Palliative Psychosocial Note: Yes  - Patient lives with his elderly wifeLauryn  - He has one son who lives away   **Primary  to Follow**  Palliative Care  Consult: Yes        Advance Care Planning   Advance Directives:     Decision Making:  Patient answered questions  Goals of Care: The patient endorses that what is most important right now is to focus on spending time at home and remaining as independent as possible    Accordingly, we have decided that the best plan to meet the patient's goals includes continuing with treatment         Significant Labs: All pertinent labs within the past 24 hours have been reviewed.  CBC:   Recent Labs   Lab 02/02/24  0551   WBC 5.69   HGB 13.0*   HCT 40.8   MCV 87   *     BMP:  Recent Labs   Lab 02/02/24  0551   GLU 77      K 4.1      CO2 24   BUN 42*   CREATININE 1.7*   CALCIUM 7.6*   MG 1.7     LFT:  Lab Results   Component Value Date    AST 29 02/01/2024    ALKPHOS 102 02/01/2024    BILITOT 0.8 02/01/2024     Albumin:   Albumin   Date Value Ref Range Status   02/01/2024 3.2 (L) 3.5 - 5.2 g/dL Final     Protein:   Total Protein   Date Value Ref Range Status  "  02/01/2024 7.5 6.0 - 8.4 g/dL Final     Lactic acid:   No results found for: "LACTATE"    Significant Imaging: I have reviewed all pertinent imaging results/findings within the past 24 hours.    "

## 2024-02-02 NOTE — PT/OT/SLP EVAL
Occupational Therapy   Evaluation & Treatment    Name: Felicia Lopez  MRN: [id number]  Admitting Diagnosis: Acute on chronic combined systolic and diastolic heart failure  Recent Surgery: * No surgery found *      Recommendations:     Discharge Recommendations: Moderate Intensity Therapy  Discharge Equipment Recommendations:  to be determined by next level of care (if pt discharges home then RW, BSC, and TTB recommended)  Barriers to discharge:       Assessment:     Felicia Lopez is a 78 y.o. male with a medical diagnosis of Acute on chronic combined systolic and diastolic heart failure.  He presents with swelling in (B) LE. Performance deficits affecting function: impaired balance, impaired endurance, impaired self care skills, impaired functional mobility, gait instability, decreased lower extremity function, edema, decreased coordination, weakness. Pt agreeable to participating in therapy upon arrival to room.  Mod A required to perform supine <> sit transfer.  Once seated at EOB pt able to maintain balance without assist.  Total A required to don socks while seated at EOB.  Pt required Min A progressing to CGA, SPC to perform sit <> stand transfer.  Pt initially required Min A, SPC to take steps forward and back, but later able to complete task with CGA.    Overall, pt tolerated therapy well.  PTA pt reports being Mod I-Independent with ADLs, and Mod I for mobility with use of SPC.  Pt states that over the last 2-3 weeks he has experienced increased difficulty performing his daily routine 2* swelling in (B) LE.  Pt's wife requires caretaker (present everyday).  Pt states caretaker able to provide some assist for him, but their primarily role is caring for his wife.  Pt is not at PLOF and would benefit from skilled OT services to address problems listed above and increase independence with ADLs.  Moderate intensity therapy (pending family desire) is recommended upon d/c from acute care to further address deficits  "and help pt improve overall functional independence.         Rehab Prognosis: Good; patient would benefit from acute skilled OT services to address these deficits and reach maximum level of function.       Plan:     Patient to be seen 5 x/week to address the above listed problems via self-care/home management, therapeutic activities, therapeutic exercises  Plan of Care Expires: 03/03/24  Plan of Care Reviewed with: patient    Subjective     Chief Complaint: swelling in (B) LE, fatigue with activity, difficulty with daily routine  Patient/Family Comments/goals: "Be able to manage day to day routine"    Occupational Profile:  Living Environment: Pt lives with wife in [facility], [address].  Bathroom has tub/shower.  Previous level of function:   -ADLs: Mod I-Independent  -iADLs: Can no longer cook or clean  -Mobility: Mod I with SPC  Notes:   *Over last 2-3 weeks pt reports increased difficulty performing daily routine 2* swelling in (B) LE; reports he has required some assist  Roles and Routines: , father  Equipment Used at Home: cane, straight  Assistance upon Discharge: Wife is sick and unable to provide assist.  She has a caretaker that helps her everyday.  Caretaker able to provide some assist for pt, but not full    Pain/Comfort:  Pain Rating 1: 0/10  Pain Rating Post-Intervention 1: 0/10    Patients cultural, spiritual, Islam conflicts given the current situation: no    Objective:     Communicated with: RN prior to session.  Patient found HOB elevated with pulse ox (continuous), peripheral IV, telemetry, blood pressure cuff, oxygen upon OT entry to room.    General Precautions: Standard, fall  Orthopedic Precautions: N/A  Braces: N/A  Respiratory Status: Nasal cannula, flow 2 L/min    Occupational Performance:    Bed Mobility:    Supine <> sit: Mod A  Scooting:   SBA seated towards EOB  SBA supine towards HOB  Sit <> supine: Mod A for (B) LE management     Functional Mobility/Transfers:  Sit <> Stand:   Min A, " SPC x 1 trial from EOB  Functional Mobility: Pt  took steps forward, back, in place, and to the side with Min A-CGA, SPC  Initially pt required Min A with postural sway noted  As task progressed pt able to take steps with CGA, SPC    Activities of Daily Living:  Lower Body Dressing: total assistance for donning socks while seated at EOB.  Toileting: Mod I with use of urinal while supine with HOB elevated.    Cognitive/Visual Perceptual:  Cognitive/Psychosocial Skills:    -       Oriented to: Person, Place, Time, and Situation   -       Follows Commands/attention:Follows 100% of simple commands  -       Communication: clear/fluent  -       Memory: No Deficits noted  -       Safety awareness/insight to disability: intact   -       Mood/Affect/Coping skills/emotional control: Cooperative and pleasant, but concerned with recent weakness and swelling in (B) LE    Physical Exam:  Postural examination/scapula alignment:   -       Rounded shoulders  -       Forward head  Skin integrity: Visible skin intact  Edema:  Moderate in (B) LE  Sensation:    -       Intact  Motor Planning: WFL  Dominant hand: Right  Upper Extremity Range of Motion:    -       Right Upper Extremity: WNL  -       Left Upper Extremity: WNL  Upper Extremity Strength:   -       Right Upper Extremity: WNL; grossly 5/5 all muscle groups  -       Left Upper Extremity: WNL; grossly 5/5 all muscle groups   Strength: WNL  Fine Motor Coordination: Intact  Gross motor coordination: Decreased   Balance: Sitting- Independent; Standing- Min A-CGA    AMPAC 6 Click ADL:  AMPAC Total Score: 16    Treatment & Education:  *Pt educated on role of OT in acute care setting  *Pt performed supine <> sit transfer with increased time and cues   *Pt performed sit <> stand transfer from EOB; cues for technique and balance provided  *Pt took steps forward, back, in place, and to the side; cues for postural stability provided  *Pt performed multiple trials of scooting while  supine   *POC reviewed with pt    Patient left HOB elevated with all lines intact and call button in reach    GOALS:   Multidisciplinary Problems       Occupational Therapy Goals          Problem: Occupational Therapy    Goal Priority Disciplines Outcome Interventions   Occupational Therapy Goal     OT, PT/OT Ongoing, Progressing    Description: Goals to be met by: 2/16/2024     Patient will increase functional independence with ADLs by performing:    UE Dressing with Stand-by Assistance.  LE Dressing with Moderate Assistance.  Grooming while standing at sink with Contact Guard Assistance.  Toileting from toilet with Contact Guard Assistance for hygiene and clothing management.   Toilet transfer to toilet with Contact Guard Assistance.                         History:     Past Medical History:   Diagnosis Date    CAD (coronary artery disease)     Chronic combined systolic and diastolic CHF (congestive heart failure)     Chronic idiopathic thrombocytopenia     Disorder of kidney and ureter     History of heart attack     History of stroke     Hyperlipidemia     Hypertension          Past Surgical History:   Procedure Laterality Date    FACIAL RECONSTRUCTION SURGERY      PROSTATE SURGERY         Time Tracking:     OT Date of Treatment: 02/02/24  OT Start Time: 1012  OT Stop Time: 1034  OT Total Time (min): 22 min    Billable Minutes:Evaluation 14  Therapeutic Activity 8    AUBREE Meyers  2/2/2024

## 2024-02-02 NOTE — CONSULTS
[facility] Emergency Dept  Palliative Medicine  Consult Note    Patient Name: Felicia Lopez  MRN: [id number]  Admission Date: 2/1/2024  Hospital Length of Stay: 1 days  Code Status: Full Code   Attending Provider: Rubens Liao MD  Consulting Provider: Rosa Bacon DNP  Primary Care Physician: Mickey Darden MD  Principal Problem:Acute on chronic combined systolic and diastolic heart failure    Patient information was obtained from patient, spouse/SO, and primary team.      Inpatient consult to Palliative Care  Consult performed by: Rosa Bacon DNP  Consult ordered by: Rubens Liao MD        Assessment/Plan:     Neuro  History of CVA (cerebrovascular accident)  - Noted     Cardiac/Vascular  * Acute on chronic combined systolic and diastolic heart failure  - Patient sees Dr. Martinez on 2/22/24  -Echo 8/28/23    Left Ventricle: The left ventricle is mildly dilated. Normal wall thickness. regional wall motion abnormalities present. There is moderately reduced systolic function with a visually estimated ejection fraction of 35 - 40%. There is diastolic dysfunction.    Left Atrium: Left atrium is mildly dilated.    Right Ventricle: Normal right ventricular cavity size. Wall thickness is normal. Systolic function is normal.    Aortic Valve: There is mild aortic valve sclerosis.    Mitral Valve: There is mild regurgitation.    Renal/  Chronic kidney disease, stage III (moderate)  - Management per primary team     Palliative Care  ACP (advance care planning)  - Consult for advance care planning/ goals of care in chronically ill patient who presented to the ED with worsening SOB and lower extremity swelling. Extensive chart review performed.  - Along with Vangie Lara (RN), visited with patient in the emergency room. He was lying in bed, he is chronically ill appearing. He appears very weak with temporal lobe wasting. We reflected on Mr. Lopez outside of the hospital. He lives with his wife,  "Lauryn. She is elderly and sick herself and has a caregiver with her. He reports he is independent at home and "do whatever I want to do". He worked as a . He has 1 son who lives out of state. He reports two weeks ago he began having SOB and increased lower extremity swelling. He reports he has been unable to walk for the last 2 weeks. He reports currently he can not stand. Two weeks ago he was driving. Overall, he was very frustrated with still being in the emergency room and being unable to sleep.   - He reports little social support at home besides his wife's caregiver. He does have a sister and niece who call to check in on him  - Currently he denies SOB and purely reports fatigue/ weakness.  - Recommend PT/OT.  - I did contact his very support wife, Lauryn. They have been  for 54 years. She reports Mr. Lopez had a heart attack in 2011 and did relatively well. She reports since he started taking lasix (which she reports has been some time) he has been weak and losing weight. She reports he has a very poor appetite and she is concerned about how weak he is. She reports 2 weeks ago he was able to drive to [employer]/ the grocery. However, the last two weeks Mrs. Combs's caregiver has had to care for Mr. Lopez also. She is hopeful he is able to regain his strength and increase his independence at home. She presented with poor insight into factor of CHF contributing to weakness/ fatigue/ weight loss.   - I can see code status was discussed by Dr. Liao in ED. Due to Mr. Lopez being very frustrated and irritated, I did not readdress. I will address when he is admitted to a hospital room.  - At the minimum, I would recommend home based pall care. Mrs. Combs would like to speak to  in regards to Mr. Lopez having a sitter at home.   - This is the patients third time coming to the ED for SOB related to CHF in the last 6 months. He is very weak appearing with temporal lobe wasting. Albumin " "3.2. He is unable to walk or stand currently. All of these factors contribute to his qualification for hospice, should that align with the patients goals.         Thank you for your consult.     Subjective:     HPI:   Per H&P: "HPI: Mr. Lopez is a 78 year old man with CAD (history of MI s/p PCI), prior stroke, CKDIII, hypertension and hyperlipidemia who presented for evaluation of shortness of breath and leg swelling.  He states that over the last 3 weeks he has been having progressive worsening of shortness of breath and leg swelling.  He notes that he has been unable to sleep laying flat due to difficulty breathing and has to sleep sitting upright.  He reports perfect compliance with diet and medications, but notes he ate popeyes chicken yesterday.  He notes over the last three days he has had 4 episodes of small volume emesis before eating.  He notes that after he eats his nausea subsides.  This morning, he felt much worse with increased shortness of breath, weakness and slight lightheadedness so felt he had to come into the hospital.  He denies fevers, chills, cough, diarrhea, constipation, leg pain, back pain, abdominal pain, headache and palpitations.  He notes he had one very brief episode of chest pain last night which felt like an electrical shock.  Presently he has no pain at all."    At time of initial consult, patient seen in the ED. He is chronically ill appearing. Palliative medicine consulted for goals of care/ advance care planning.     Hospital Course:  No notes on file    Interval History: Frustrated with still being in the ED.     Past Medical History:   Diagnosis Date    CAD (coronary artery disease)     Chronic combined systolic and diastolic CHF (congestive heart failure)     Chronic idiopathic thrombocytopenia     Disorder of kidney and ureter     History of heart attack     History of stroke     Hyperlipidemia     Hypertension        Past Surgical History:   Procedure Laterality Date    " FACIAL RECONSTRUCTION SURGERY      PROSTATE SURGERY         Review of patient's allergies indicates:  No Known Allergies    Medications:  Continuous Infusions:  Scheduled Meds:   aspirin  81 mg Oral Daily    atorvastatin  20 mg Oral QHS    furosemide (LASIX) injection  80 mg Intravenous Q12H    heparin (porcine)  5,000 Units Subcutaneous Q12H    latanoprost  1 drop Both Eyes QHS    metoprolol succinate  25 mg Oral QHS     PRN Meds:acetaminophen, HYDROcodone-acetaminophen, loperamide, melatonin, ondansetron, sodium chloride 0.9%    Family History       Problem Relation (Age of Onset)    Hypertension Mother    No Known Problems Father          Tobacco Use    Smoking status: Former     Current packs/day: 0.00     Types: Cigarettes     Quit date: 10/5/2008     Years since quitting: 15.3    Smokeless tobacco: Never   Substance and Sexual Activity    Alcohol use: No     Alcohol/week: 0.0 standard drinks of alcohol    Drug use: No    Sexual activity: Never       Review of Systems   Constitutional:  Positive for activity change and fatigue. Negative for appetite change and unexpected weight change.   Respiratory:  Positive for shortness of breath. Negative for cough.    Musculoskeletal:  Negative for gait problem.   Neurological:  Positive for weakness.     Objective:     Vital Signs (Most Recent):  Temp: 98 °F (36.7 °C) (02/01/24 1930)  Pulse: 83 (02/02/24 1041)  Resp: 11 (02/02/24 1041)  BP: (!) 106/59 (02/02/24 1011)  SpO2: 98 % (02/02/24 1041) Vital Signs (24h Range):  Temp:  [98 °F (36.7 °C)] 98 °F (36.7 °C)  Pulse:  [] 83  Resp:  [11-27] 11  SpO2:  [96 %-100 %] 98 %  BP: ()/(57-80) 106/59        There is no height or weight on file to calculate BMI.       Physical Exam  Constitutional:       Appearance: He is ill-appearing (Chronically).      Comments: Thin, temporal lobe wasting    Cardiovascular:      Rate and Rhythm: Normal rate.   Pulmonary:      Effort: No respiratory distress.   Neurological:       "Mental Status: He is alert and oriented to person, place, and time.            Review of Symptoms      Symptom Assessment (ESAS 0-10 Scale)  Pain:  0  Dyspnea:  3  Anxiety:  0  Nausea:  0  Depression:  0  Anorexia:  0  Fatigue:  6  Insomnia:  0  Restlessness:  0  Agitation:  0         Living Arrangements:  Lives with spouse    Psychosocial/Cultural:   See Palliative Psychosocial Note: Yes  - Patient lives with his elderly wife, Lauryn  - He has one son who lives away   **Primary  to Follow**  Palliative Care  Consult: Yes        Advance Care Planning  Advance Directives:     Decision Making:  Patient answered questions  Goals of Care: The patient endorses that what is most important right now is to focus on spending time at home and remaining as independent as possible    Accordingly, we have decided that the best plan to meet the patient's goals includes continuing with treatment         Significant Labs: All pertinent labs within the past 24 hours have been reviewed.  CBC:   Recent Labs   Lab 02/02/24  0551   WBC 5.69   HGB 13.0*   HCT 40.8   MCV 87   *     BMP:  Recent Labs   Lab 02/02/24  0551   GLU 77      K 4.1      CO2 24   BUN 42*   CREATININE 1.7*   CALCIUM 7.6*   MG 1.7     LFT:  Lab Results   Component Value Date    AST 29 02/01/2024    ALKPHOS 102 02/01/2024    BILITOT 0.8 02/01/2024     Albumin:   Albumin   Date Value Ref Range Status   02/01/2024 3.2 (L) 3.5 - 5.2 g/dL Final     Protein:   Total Protein   Date Value Ref Range Status   02/01/2024 7.5 6.0 - 8.4 g/dL Final     Lactic acid:   No results found for: "LACTATE"    Significant Imaging: I have reviewed all pertinent imaging results/findings within the past 24 hours.      I spent a total of 70 minutes on the day of the visit. This includes face to face time in discussion of goals of care, symptom assessment, coordination of care and emotional support.  This also includes non-face to face time preparing " to see the patient (eg, review of tests/imaging), obtaining and/or reviewing separately obtained history, documenting clinical information in the electronic or other health record, independently interpreting results and communicating results to the patient/family/caregiver, or care coordinator.    Rosa Bacon, DK  Palliative Medicine  [facility] - Emergency Dept

## 2024-02-02 NOTE — PROGRESS NOTES
[facility]  [facility]    Patient Name: Felicia Lopez  MRN: [id number]  Patient Class: IP- Inpatient   Admission Date: 2/1/2024  Length of Stay: 1 days  Attending Physician: Rubens Liao MD  Primary Care Provider: Mickey Darden MD        Subjective:     Principal Problem:Acute on chronic combined systolic and diastolic heart failure        HPI:  Mr. Lopez is a 78 year old man with CAD (history of MI s/p PCI), prior stroke, CKDIII, hypertension and hyperlipidemia who presented for evaluation of shortness of breath and leg swelling.  He states that over the last 3 weeks he has been having progressive worsening of shortness of breath and leg swelling.  He notes that he has been unable to sleep laying flat due to difficulty breathing and has to sleep sitting upright.  He reports perfect compliance with diet and medications, but notes he ate popeyes chicken yesterday.  He notes over the last three days he has had 4 episodes of small volume emesis before eating.  He notes that after he eats his nausea subsides.  This morning, he felt much worse with increased shortness of breath, weakness and slight lightheadedness so felt he had to come into the hospital.  He denies fevers, chills, cough, diarrhea, constipation, leg pain, back pain, abdominal pain, headache and palpitations.  He notes he had one very brief episode of chest pain last night which felt like an electrical shock.  Presently he has no pain at all.    Overview/Hospital Course:  No notes on file    Interval History: No acute events overnight.  Very frustrated as no beds on floor available yet and he has struggled to sleep in the ED bay overnight.  Denies chest pain and says sob seems to be improving a bit.  All questions answered and patient had no further complaints.    Objective:     Vital Signs (Most Recent):  Temp: 98 °F (36.7 °C) (02/01/24 1930)  Pulse: 93 (02/02/24 1152)  Resp: 13 (02/02/24 1152)  BP: (!)  106/59 (02/02/24 1011)  SpO2: 97 % (02/02/24 1152) Vital Signs (24h Range):  Temp:  [98 °F (36.7 °C)] 98 °F (36.7 °C)  Pulse:  [] 93  Resp:  [11-27] 13  SpO2:  [96 %-99 %] 97 %  BP: ()/(57-80) 106/59        There is no height or weight on file to calculate BMI.    Intake/Output Summary (Last 24 hours) at 2/2/2024 1413  Last data filed at 2/2/2024 1045  Gross per 24 hour   Intake --   Output 2025 ml   Net -2025 ml         Physical Exam  Vitals and nursing note reviewed.   Constitutional:       General: He is not in acute distress.     Appearance: He is well-developed. He is ill-appearing. He is not toxic-appearing or diaphoretic.   HENT:      Head: Normocephalic and atraumatic.      Right Ear: External ear normal.      Left Ear: External ear normal.      Nose: Nose normal.      Mouth/Throat:      Mouth: Mucous membranes are moist.   Eyes:      Extraocular Movements: Extraocular movements intact.      Conjunctiva/sclera: Conjunctivae normal.   Cardiovascular:      Rate and Rhythm: Normal rate and regular rhythm.      Heart sounds: Normal heart sounds.   Pulmonary:      Effort: Pulmonary effort is normal. No respiratory distress.      Breath sounds: Rales present.   Abdominal:      General: Bowel sounds are normal. There is no distension.      Palpations: Abdomen is soft.      Tenderness: There is no abdominal tenderness.   Musculoskeletal:         General: Normal range of motion.      Cervical back: Normal range of motion. No rigidity.      Right lower leg: Edema present.      Left lower leg: Edema present.      Comments: Deep pitting edema to both knees   Skin:     General: Skin is warm and dry.   Neurological:      General: No focal deficit present.      Mental Status: He is alert and oriented to person, place, and time.      Cranial Nerves: No cranial nerve deficit.      Coordination: Coordination normal.      Comments: Diffuse weakness.   Psychiatric:         Behavior: Behavior normal.              Significant Labs: All pertinent labs within the past 24 hours have been reviewed.    Significant Imaging: I have reviewed all pertinent imaging results/findings within the past 24 hours.    Assessment/Plan:      * Acute on chronic combined systolic and diastolic heart failure  -Admitted to inpatient status  -Presents with progressive shortness of breath and worsening edema in legs  -BNP 2354.  CXR shows pulmonary edema with small left effusion.  -On exam has pulmonary crackles and severe edema up to his knees.  -Echo 8/8/23 showed EF 35-40% and diastolic dysfunction  -Suspect he is eating more salt than he should.  Notes he had popeyes chicken for lunch yesterday.  -Strict ins/outs, daily weights, fluid restriction and salt restricted diet.  -Clinically a bit improved and breathing more comfortably.  Still fluid overloaded.  Net negative 1.5L thus far.  -Continue home toprol.  No longer on acei as outpatient.  -Continue to diurese with iv lasix 80mg bid  -Follows with Dr. LULI Martinez.  If not improving will consult cardiology tomorrow.    CAD (coronary artery disease)  -History noted  -No active chest pain  -Troponin mildly elevated but at his baseline.  Doubt ACS - more likely this is chronic demand ischemia secondary to his CHF in the context of diminished renal perfusion and ckd.  -Continue aspirin, statin and beta blocker  -Monitor on telemetry.    Essential hypertension  -Appears well controlled  -Continue home metoprolol    Chronic kidney disease, stage III (moderate)  -Baseline Cr appears to be 1.7--2.3  -On admit Cr 2.1 and now 1.7  -Avoid nephrotoxic agents and renally dose meds  -Diurese as above  -Repeat labs in AM    Thrombocytopenia  -This is chronic mild thrombocytopenia  -On admit 118 and now 110 - near baseline  -Etiology unclear  -Check folate and B12  -Repeat cbc in AM    Hyperlipidemia  -Continue statin    History of CVA (cerebrovascular accident)  -History noted  -Continue aspirin and  "statin  -Consult PT/OT    ACP (advance care planning)  -Advance Care PlanningDate: 02/01/2024   I engaged Mr. Lopez in a discussion regarding his desires for care at the very end of life.  We discussed the pros and cons of ACLS treatment in the face of cardiac arrest.  I recommended that we treat aggressively in an effort to return him to his prior level of function, but that if we faced cardiac arrest that we allow him a peaceful death.  He appreciated our discussion and had thoughtful questions.  Ultimately, he stated that he knows he will not face cardiac arrest, therefore wishes that we do absolutely everything.  Will continue as full code for now.  I spent 15 minutes ACP time 2/1/23.  -Noting that I have answered his MSQ as "no",  consulted palliative care to continue ACP.  Input appreciated.      VTE Risk Mitigation (From admission, onward)           Ordered     heparin (porcine) injection 5,000 Units  Every 12 hours         02/01/24 1605     IP VTE HIGH RISK PATIENT  Once         02/01/24 1605     Place sequential compression device  Until discontinued         02/01/24 1605                    Discharge Planning   SHANICE:      Code Status: Full Code   Is the patient medically ready for discharge?:     Reason for patient still in hospital (select all that apply): Treatment  Discharge Plan A: Home with family                  Rubens Liao MD  Department of Hospital Medicine   [facility] - Emergency Dept    "

## 2024-02-02 NOTE — SUBJECTIVE & OBJECTIVE
Interval History: No acute events overnight.  Very frustrated as no beds on floor available yet and he has struggled to sleep in the ED bay overnight.  Denies chest pain and says sob seems to be improving a bit.  All questions answered and patient had no further complaints.    Objective:     Vital Signs (Most Recent):  Temp: 98 °F (36.7 °C) (02/01/24 1930)  Pulse: 93 (02/02/24 1152)  Resp: 13 (02/02/24 1152)  BP: (!) 106/59 (02/02/24 1011)  SpO2: 97 % (02/02/24 1152) Vital Signs (24h Range):  Temp:  [98 °F (36.7 °C)] 98 °F (36.7 °C)  Pulse:  [] 93  Resp:  [11-27] 13  SpO2:  [96 %-99 %] 97 %  BP: ()/(57-80) 106/59        There is no height or weight on file to calculate BMI.    Intake/Output Summary (Last 24 hours) at 2/2/2024 1413  Last data filed at 2/2/2024 1045  Gross per 24 hour   Intake --   Output 2025 ml   Net -2025 ml         Physical Exam  Vitals and nursing note reviewed.   Constitutional:       General: He is not in acute distress.     Appearance: He is well-developed. He is ill-appearing. He is not toxic-appearing or diaphoretic.   HENT:      Head: Normocephalic and atraumatic.      Right Ear: External ear normal.      Left Ear: External ear normal.      Nose: Nose normal.      Mouth/Throat:      Mouth: Mucous membranes are moist.   Eyes:      Extraocular Movements: Extraocular movements intact.      Conjunctiva/sclera: Conjunctivae normal.   Cardiovascular:      Rate and Rhythm: Normal rate and regular rhythm.      Heart sounds: Normal heart sounds.   Pulmonary:      Effort: Pulmonary effort is normal. No respiratory distress.      Breath sounds: Rales present.   Abdominal:      General: Bowel sounds are normal. There is no distension.      Palpations: Abdomen is soft.      Tenderness: There is no abdominal tenderness.   Musculoskeletal:         General: Normal range of motion.      Cervical back: Normal range of motion. No rigidity.      Right lower leg: Edema present.      Left lower leg:  Edema present.      Comments: Deep pitting edema to both knees   Skin:     General: Skin is warm and dry.   Neurological:      General: No focal deficit present.      Mental Status: He is alert and oriented to person, place, and time.      Cranial Nerves: No cranial nerve deficit.      Coordination: Coordination normal.      Comments: Diffuse weakness.   Psychiatric:         Behavior: Behavior normal.             Significant Labs: All pertinent labs within the past 24 hours have been reviewed.    Significant Imaging: I have reviewed all pertinent imaging results/findings within the past 24 hours.

## 2024-02-02 NOTE — ASSESSMENT & PLAN NOTE
"- Consult for advance care planning/ goals of care in chronically ill patient who presented to the ED with worsening SOB and lower extremity swelling. Extensive chart review performed.  - Along with Vangie Lara (RN), visited with patient in the emergency room. He was lying in bed, he is chronically ill appearing. He appears very weak with temporal lobe wasting. We reflected on Mr. Lopez outside of the hospital. He lives with his wife, Lauryn. She is elderly and sick herself and has a caregiver with her. He reports he is independent at home and "do whatever I want to do". He worked as a . He has 1 son who lives out of state. He reports two weeks ago he began having SOB and increased lower extremity swelling. He reports he has been unable to walk for the last 2 weeks. He reports currently he can not stand. Two weeks ago he was driving. Overall, he was very frustrated with still being in the emergency room and being unable to sleep.   - He reports little social support at home besides his wife's caregiver. He does have a sister and niece who call to check in on him  - Currently he denies SOB and purely reports fatigue/ weakness.  - Recommend PT/OT.  - I did contact his very support wife, Lauryn. They have been  for 54 years. She reports Mr. Lopez had a heart attack in 2011 and did relatively well. She reports since he started taking lasix (which she reports has been some time) he has been weak and losing weight. She reports he has a very poor appetite and she is concerned about how weak he is. She reports 2 weeks ago he was able to drive to [employer]/ the grocery. However, the last two weeks Mrs. Combs's caregiver has had to care for Mr. Lopez also. She is hopeful he is able to regain his strength and increase his independence at home. She presented with poor insight into factor of CHF contributing to weakness/ fatigue/ weight loss.   - I can see code status was discussed by Dr. Liao in " ED. Due to Mr. Lopez being very frustrated and irritated, I did not readdress. I will address when he is admitted to a hospital room.  - At the minimum, I would recommend home based pall care. Mrs. Combs would like to speak to CM in regards to Mr. Lopez having a sitter at home.

## 2024-02-02 NOTE — CONSULTS
"Food & Nutrition  Education    Diet Education: fluid and sodium restriction diet education  Time Spent: < 10 min  Learners: Patient      Nutrition Education provided with handouts:   Heart Failure Nutrition Therapy    Comments:  Patient currently in ED, noted WOB. Education limited d/t patient reported not feeling well. Patient reports not using salt at home. Wife prepares meals and does not salt food. Patient states they will eat out "every now and again" and "it's not an everyday thing." Encouraged patient to continue to follow a sodium restricted diet. Patient currently on 1500 mL fluid restriction and encouraged patient to monitor fluid intake. Patient states he will drink water, coffee in the morning, and sometimes orange juice. Discussed ways to monitor fluid intake and to limit to 1.5 L/day. Provided handout in patient's bag, patient voiced understanding.    All questions and concerns answered. Dietitian's contact information provided.       Follow-Up: yes    Please Re-consult as needed    Thanks!    Jazzy Chaidez, RICARDON, LDN    "

## 2024-02-02 NOTE — PLAN OF CARE
Lives with wife - independent in ADLs - utilizes cane - will need transportation home     [facility] - Emergency Dept  Initial Discharge Assessment       Primary Care Provider: Mickey Darden MD    Admission Diagnosis: Dyspnea [R06.00]    Admission Date: 2/1/2024  Expected Discharge Date:     Transition of Care Barriers: None    Payor: [employer] [facility] / Plan: [employer] / Product Type: Medicare Advantage /     Extended Emergency Contact Information  Primary Emergency Contact: Lauryn Lopez   [place]  Mobile Phone: [phone]  Relation: Spouse    Discharge Plan A: Home with family         [facility] - [place], [place] - [address]  [address] [address]  Phone: [phone] Fax: [phone]      Initial Assessment (most recent)       Adult Discharge Assessment - 02/02/24 1044          Discharge Assessment    Assessment Type Discharge Planning Assessment     Confirmed/corrected address, phone number and insurance Yes     Confirmed Demographics Correct on Facesheet   updated wife's contact #    Source of Information patient     Communicated SHANICE with patient/caregiver Date not available/Unable to determine     People in Home spouse     Do you expect to return to your current living situation? Yes     Do you have help at home or someone to help you manage your care at home? Yes     Prior to hospitilization cognitive status: Alert/Oriented     Current cognitive status: Alert/Oriented     Walking or Climbing Stairs Difficulty yes     Walking or Climbing Stairs ambulation difficulty, requires equipment     Equipment Currently Used at Home cane, straight     Readmission within 30 days? No     Patient currently being followed by outpatient case management? No     Do you currently have service(s) that help you manage your care at home? No     Do you take prescription medications? Yes     Do you have prescription coverage? Yes      Do you have any problems affording any of your prescribed medications? No     Is the patient taking medications as prescribed? yes     Who is going to help you get home at discharge? needs ride     How do you get to doctors appointments? health plan transportation     Are you on dialysis? No     Discharge Plan A Home with family     DME Needed Upon Discharge  none     Discharge Plan discussed with: Patient     Transition of Care Barriers None

## 2024-02-02 NOTE — PT/OT/SLP EVAL
Physical Therapy Evaluation    Patient Name:  Felicia Lopez   MRN:  [id number]    Recommendations:     Discharge Recommendations: Moderate Intensity Therapy   Discharge Equipment Recommendations: to be determined by next level of care, walker, rolling, bedside commode   Barriers to discharge: Decreased caregiver support    Assessment:     Felicia Lopez is a 78 y.o. male admitted with a medical diagnosis of Acute on chronic combined systolic and diastolic heart failure.  He presents with the following impairments/functional limitations: weakness, impaired endurance, impaired self care skills, impaired functional mobility, gait instability, impaired balance, decreased lower extremity function, decreased upper extremity function, decreased coordination, decreased safety awareness, edema, impaired cardiopulmonary response to activity.    Patient evaluated by PT and goals established. Patient reports mobility has been more difficult in the past 2 to 3 weeks. He reports he cannot stand or walk at this time but was able to perform OOB mobility with therapist. Patient's functional mobility is limited by weakness and decreased endurance. PT will continue to follow and progress as tolerated. Rec for dc to Moderate Intensity Therapy.      Rehab Prognosis: Good; patient would benefit from acute skilled PT services to address these deficits and reach maximum level of function.    Recent Surgery: * No surgery found *      Plan:     During this hospitalization, patient to be seen 5 x/week to address the identified rehab impairments via gait training, therapeutic activities, therapeutic exercises, neuromuscular re-education and progress toward the following goals:    Plan of Care Expires:  02/14/24    Subjective     Chief Complaint: Patient reports fatigue, weakness, and inability to walk or stand at this time.  Patient/Family Comments/goals: Patient is willing and agreeable to therapy.  Pain/Comfort:  Pain Rating 1: 0/10  Pain  Rating Post-Intervention 1: 0/10    Patients cultural, spiritual, Faith conflicts given the current situation: no    Living Environment:  Pt lives with wife in a single story home with no steps to enter.  Pt reports his wife has a caregiver that may be able to assist him if needed.  Pt has a tub shower combo.  Prior level of function:  Ambulation: independent with SPC  ADL's: independent  IADLs: independent  Patient reports his mobility has become more difficult in the last 2 to 3 weeks. He believes the difficulty is secondary to the swelling.       Equipment used at home: cane, straight. Upon discharge, patient will have assistance possibly from wife's caregiver.    Objective:     Communicated with RN and OTOlivia, prior to session.  Patient found HOB elevated with pulse ox (continuous), peripheral IV, telemetry, blood pressure cuff, oxygen  upon PT entry to room.    General Precautions: Standard, fall  Orthopedic Precautions:N/A   Braces: N/A  Respiratory Status: Nasal cannula, flow 1 L/min    Exams:  Cognition:   Patient is oriented to person, name, place, and situation.  Pt follows approximately 100% of simple commands.    Mood: Pleasant and cooperative.   Safety Awareness: decreased  Musculoskeletal:  BMI: 27.10  Posture:  forward flexed posture in standing and rounded shoulders throughout.  LE ROM/Strength:   R ROM: WFL  L ROM: WFL  R Strength:   Knee extension: 5/5  Dorsiflexion: 5/5   L Strength:   Knee extension: 5/5  Dorsiflexion: 5/5   Neuromuscular:  Sensation: Intact to light touch bilateral LEs.   Coordination/Tone/Reflexes: No impairments identified with functional mobility. No formal testing performed.   Visual-vestibular: No impairments identified with functional mobility. No formal testing performed.  Integument: Visible skin intact  Cardiopulmonary:  Vital signs:   Pre: /64  During, EOB: /69  Edema: bilateral LE edema noted.    Patient donned non slip socks and gait belt for OOB  mobility.    Functional Mobility:  Bed Mobility:     Supine to Sit: stand by assistance  Sit to Supine: minimum assistance for LE  Bridging: total assistance of 2 persons secondary to fatigue post session.  Transfers:     Sit to Stand:  contact guard assistance with rolling walker and straight cane  Patient performed 3 sit to stands.   Trial one required increased time but trial two and three did not.   Gait:   Patient ambulated 10 ft around the room with straight cane and contact guard assistance.  Forward flexed posture, decreased mabel, decreased step length and wide ROBB noted.   Slight sway noted but no LOB.   Patient was amble to continue ambulating without cane on ground with CGA and no LOB secondary to trying to move trash on floor out of the way. Increased sway noted with AD off the ground.  Patient took 3 steps to bed with rolling walker and stand by assistance.   Upright posture and decreased sway noted.   Balance:   Static sitting: SBA with no UE support.  Verbal cues for upright posture with little carryover.  Static standing: SBA with cane and with rolling walker  Verbal cues for upright posture with good carryover  Dynamic standing: SBA to remove pants and robe with rolling walker.  No LOB noted    AM-PAC 6 CLICK MOBILITY  Total Score:18     Treatment & Education:  PT educated patient re:   PT plan of care/role of PT  Safety with OOB mobility  Use of rolling walker  Discharge disposition    Pt verbalized understanding   Balance and transfers with emphasis on OOB endurance and upright posture.    Patient left HOB elevated with all lines intact and call button in reach.    GOALS:   Multidisciplinary Problems       Physical Therapy Goals          Problem: Physical Therapy    Goal Priority Disciplines Outcome Goal Variances Interventions   Physical Therapy Goal     PT, PT/OT Ongoing, Progressing     Description: Goals to be met by: 2/14/2024    Patient will increase functional independence with mobility  by performin. Sit<>stand with SV with LRAD.  2. Gait x 50 feet with SV with LRAD.  3.  Standing activity > 5 minutes maintaining upright posture.  4. Ascend/descend 2 step(s) with least restrictive assistive device and SV.                                History:     Past Medical History:   Diagnosis Date    CAD (coronary artery disease)     Chronic combined systolic and diastolic CHF (congestive heart failure)     Chronic idiopathic thrombocytopenia     Disorder of kidney and ureter     History of heart attack     History of stroke     Hyperlipidemia     Hypertension        Past Surgical History:   Procedure Laterality Date    FACIAL RECONSTRUCTION SURGERY      PROSTATE SURGERY         Time Tracking:     PT Received On: 24  PT Start Time: 1315     PT Stop Time: 1355  PT Total Time (min): 40 min     Billable Minutes: Evaluation 15 and Therapeutic Activity 2024

## 2024-02-03 LAB
ANION GAP SERPL CALC-SCNC: 12 MMOL/L (ref 8–16)
BASOPHILS # BLD AUTO: 0.03 K/UL (ref 0–0.2)
BASOPHILS NFR BLD: 0.4 % (ref 0–1.9)
BUN SERPL-MCNC: 51 MG/DL (ref 8–23)
CALCIUM SERPL-MCNC: 9.4 MG/DL (ref 8.7–10.5)
CHLORIDE SERPL-SCNC: 99 MMOL/L (ref 95–110)
CO2 SERPL-SCNC: 30 MMOL/L (ref 23–29)
CREAT SERPL-MCNC: 2.1 MG/DL (ref 0.5–1.4)
DIFFERENTIAL METHOD BLD: ABNORMAL
EOSINOPHIL # BLD AUTO: 0.1 K/UL (ref 0–0.5)
EOSINOPHIL NFR BLD: 0.6 % (ref 0–8)
ERYTHROCYTE [DISTWIDTH] IN BLOOD BY AUTOMATED COUNT: 15.9 % (ref 11.5–14.5)
EST. GFR  (NO RACE VARIABLE): 31 ML/MIN/1.73 M^2
FOLATE SERPL-MCNC: 9.3 NG/ML (ref 4–24)
GLUCOSE SERPL-MCNC: 82 MG/DL (ref 70–110)
HCT VFR BLD AUTO: 44.3 % (ref 40–54)
HGB BLD-MCNC: 14.1 G/DL (ref 14–18)
IMM GRANULOCYTES # BLD AUTO: 0.04 K/UL (ref 0–0.04)
IMM GRANULOCYTES NFR BLD AUTO: 0.5 % (ref 0–0.5)
LYMPHOCYTES # BLD AUTO: 0.9 K/UL (ref 1–4.8)
LYMPHOCYTES NFR BLD: 10.8 % (ref 18–48)
MAGNESIUM SERPL-MCNC: 2 MG/DL (ref 1.6–2.6)
MCH RBC QN AUTO: 27.6 PG (ref 27–31)
MCHC RBC AUTO-ENTMCNC: 31.8 G/DL (ref 32–36)
MCV RBC AUTO: 87 FL (ref 82–98)
MONOCYTES # BLD AUTO: 1.3 K/UL (ref 0.3–1)
MONOCYTES NFR BLD: 16.1 % (ref 4–15)
NEUTROPHILS # BLD AUTO: 5.8 K/UL (ref 1.8–7.7)
NEUTROPHILS NFR BLD: 71.6 % (ref 38–73)
NRBC BLD-RTO: 0 /100 WBC
PLATELET # BLD AUTO: 124 K/UL (ref 150–450)
PMV BLD AUTO: ABNORMAL FL (ref 9.2–12.9)
POTASSIUM SERPL-SCNC: 4.4 MMOL/L (ref 3.5–5.1)
RBC # BLD AUTO: 5.1 M/UL (ref 4.6–6.2)
SODIUM SERPL-SCNC: 141 MMOL/L (ref 136–145)
VIT B12 SERPL-MCNC: 1045 PG/ML (ref 210–950)
WBC # BLD AUTO: 8.15 K/UL (ref 3.9–12.7)

## 2024-02-03 PROCEDURE — 36415 COLL VENOUS BLD VENIPUNCTURE: CPT | Performed by: HOSPITALIST

## 2024-02-03 PROCEDURE — 21400001 HC TELEMETRY ROOM

## 2024-02-03 PROCEDURE — 97116 GAIT TRAINING THERAPY: CPT | Mod: CQ

## 2024-02-03 PROCEDURE — 80048 BASIC METABOLIC PNL TOTAL CA: CPT | Performed by: HOSPITALIST

## 2024-02-03 PROCEDURE — 63600175 PHARM REV CODE 636 W HCPCS: Performed by: HOSPITALIST

## 2024-02-03 PROCEDURE — 97110 THERAPEUTIC EXERCISES: CPT | Mod: CQ

## 2024-02-03 PROCEDURE — 83735 ASSAY OF MAGNESIUM: CPT | Performed by: HOSPITALIST

## 2024-02-03 PROCEDURE — 85025 COMPLETE CBC W/AUTO DIFF WBC: CPT | Performed by: HOSPITALIST

## 2024-02-03 PROCEDURE — 97535 SELF CARE MNGMENT TRAINING: CPT

## 2024-02-03 PROCEDURE — 94761 N-INVAS EAR/PLS OXIMETRY MLT: CPT

## 2024-02-03 PROCEDURE — 25000003 PHARM REV CODE 250: Performed by: HOSPITALIST

## 2024-02-03 RX ORDER — FUROSEMIDE 40 MG/1
40 TABLET ORAL 2 TIMES DAILY
Status: DISCONTINUED | OUTPATIENT
Start: 2024-02-03 | End: 2024-02-04 | Stop reason: HOSPADM

## 2024-02-03 RX ADMIN — ATORVASTATIN CALCIUM 20 MG: 20 TABLET, FILM COATED ORAL at 08:02

## 2024-02-03 RX ADMIN — HEPARIN SODIUM 5000 UNITS: 5000 INJECTION INTRAVENOUS; SUBCUTANEOUS at 09:02

## 2024-02-03 RX ADMIN — FUROSEMIDE 40 MG: 40 TABLET ORAL at 06:02

## 2024-02-03 RX ADMIN — LATANOPROST 1 DROP: 50 SOLUTION OPHTHALMIC at 08:02

## 2024-02-03 RX ADMIN — ASPIRIN 81 MG: 81 TABLET, COATED ORAL at 09:02

## 2024-02-03 RX ADMIN — HEPARIN SODIUM 5000 UNITS: 5000 INJECTION INTRAVENOUS; SUBCUTANEOUS at 08:02

## 2024-02-03 RX ADMIN — FUROSEMIDE 80 MG: 10 INJECTION, SOLUTION INTRAMUSCULAR; INTRAVENOUS at 09:02

## 2024-02-03 RX ADMIN — METOPROLOL SUCCINATE 25 MG: 25 TABLET, EXTENDED RELEASE ORAL at 08:02

## 2024-02-03 NOTE — ASSESSMENT & PLAN NOTE
-History noted  -Continue aspirin and statin  -Consulted PT/OT who recommend SNF, but patient is firmly against SNF placement due to need to care for his wife.  Plan for HH with RW at discharge.

## 2024-02-03 NOTE — PLAN OF CARE
I certify I provided patient choice and a list to the patient of CMS rated Home Health agencies. Patient signed Patient's Choice Disclosure Form choosing the following  First available   Referrals forwarded via CarePort - will forward orders once available   Request for rolling walker forwarded to [facility] - Kira reports patient request to make payment once home - they will set up delivery to home once copay collected      02/03/24 1311   Post-Acute Status   Post-Acute Authorization Home Health;[facility] Status (!) Pending Delivery   Home Health Status Referrals Sent   Patient choice form signed by patient/caregiver List from System Post-Acute Care   Discharge Delays None known at this time   Discharge Plan   Discharge Plan A Home Health

## 2024-02-03 NOTE — PLAN OF CARE
Problem: Occupational Therapy  Goal: Occupational Therapy Goal  Description: Goals to be met by: 2/16/2024     Patient will increase functional independence with ADLs by performing:    UE Dressing with Stand-by Assistance.  LE Dressing with Stand by Assistance.  Grooming while standing at sink with Supervision.    Toileting from toilet with Contact Guard Assistance for hygiene and clothing management.   Toilet transfer to toilet with Contact Guard Assistance.    COMPLETED GOALS:  LE Dressing with Moderate Assistance.  GOAL MET for donning pants on 2/3/2024.  Grooming while standing at sink with Contact Guard Assistance.  GOAL MET 2/3/2024.    Outcome: Ongoing, Progressing    Progressing towards goals as Pt. Donning paper scrub bottoms with CGA and performing oral hygiene/washing face with CGA for steadying/safety.  Requiring 2-minute seated rest break during grooming/hygiene task due to decreased endurance.  Consistent cuing needed for safe hand placement during controlled descent.  O2 sats monitored throughout session and found to be 94% and greater even after activity.  Will defer AD needs to PT.  Needs TTB to increase safety and independence in bathing as Pt. Demos decreased endurance/activity tolerance and impaired dynamic standing balance limiting ability to safely perform extended dynamic standing activity associated with showering.  Needs BSC  because they are minimally ambulatory and lack the endurance due to their current medical diagnosis, to ambulate as far as required to their usual toilet facility.  Continued recommendation of Moderate Intensity therapy.   To benefit from continued acute care OT services to increase independence in self-care/functional transfers.  Continue POC.

## 2024-02-03 NOTE — PLAN OF CARE
POC reviewed.Alert and orinetedX4.Patient stable in room air since afternoon.Safety measures assessed and maintained.Instructed to call for mobility.Purposeful rounding done.No any specific event occurred during the day.  Problem: Adult Inpatient Plan of Care  Goal: Plan of Care Review  Outcome: Ongoing, Progressing  Goal: Patient-Specific Goal (Individualized)  Outcome: Ongoing, Progressing  Goal: Absence of Hospital-Acquired Illness or Injury  Outcome: Ongoing, Progressing  Goal: Optimal Comfort and Wellbeing  Outcome: Ongoing, Progressing  Goal: Readiness for Transition of Care  Outcome: Ongoing, Progressing     Problem: Coping Ineffective  Goal: Effective Coping  Outcome: Ongoing, Progressing

## 2024-02-03 NOTE — ASSESSMENT & PLAN NOTE
-This is chronic mild thrombocytopenia  -On admit 118 and now 124 - near baseline  -Etiology unclear  -folate and B12 are replete  -Repeat cbc in AM

## 2024-02-03 NOTE — ASSESSMENT & PLAN NOTE
-Baseline Cr appears to be 1.7--2.3  -On admit Cr 2.1 and now 2.1 with CO2 up to 30  -Avoid nephrotoxic agents and renally dose meds  -Monitor renal function closely!  -Diuretics as above  -Repeat labs in AM

## 2024-02-03 NOTE — PLAN OF CARE
Encouraged patient to provide copay for rolling walker prior to discharge so DME could be delivered to bedside - patient provided credit card mesfin - Kira ([facility]) gave approval - delivered rolling walker to bedside - education provided on use - delivery ticket signed

## 2024-02-03 NOTE — SUBJECTIVE & OBJECTIVE
Interval History: No acute events overnight.  Slept well and is breathing comfortably on room air this morning.  Still feels unwell and not ready for discharge overall.  Notes edema in legs is a bit improved but he is hoping for more improvement.  All questions answered and patient had no further complaints.    Objective:     Vital Signs (Most Recent):  Temp: 97.3 °F (36.3 °C) (02/03/24 0804)  Pulse: 80 (02/03/24 1129)  Resp: 16 (02/03/24 0804)  BP: (!) 116/56 (02/03/24 0804)  SpO2: 97 % (02/03/24 0804) Vital Signs (24h Range):  Temp:  [97.3 °F (36.3 °C)-97.7 °F (36.5 °C)] 97.3 °F (36.3 °C)  Pulse:  [60-90] 80  Resp:  [10-20] 16  SpO2:  [3 %-99 %] 97 %  BP: (105-122)/(56-70) 116/56     Weight: 83.2 kg (183 lb 8 oz)  Body mass index is 25.59 kg/m².    Intake/Output Summary (Last 24 hours) at 2/3/2024 1321  Last data filed at 2/3/2024 1204  Gross per 24 hour   Intake 200 ml   Output 2250 ml   Net -2050 ml           Physical Exam  Vitals and nursing note reviewed.   Constitutional:       General: He is not in acute distress.     Appearance: He is well-developed. He is ill-appearing. He is not toxic-appearing or diaphoretic.   HENT:      Head: Normocephalic and atraumatic.      Right Ear: External ear normal.      Left Ear: External ear normal.      Nose: Nose normal.      Mouth/Throat:      Mouth: Mucous membranes are moist.   Eyes:      Extraocular Movements: Extraocular movements intact.      Conjunctiva/sclera: Conjunctivae normal.   Cardiovascular:      Rate and Rhythm: Normal rate and regular rhythm.      Heart sounds: Normal heart sounds.   Pulmonary:      Effort: Pulmonary effort is normal. No respiratory distress.      Breath sounds: No rales.   Abdominal:      General: Bowel sounds are normal. There is no distension.      Palpations: Abdomen is soft.      Tenderness: There is no abdominal tenderness.   Musculoskeletal:         General: Normal range of motion.      Cervical back: Normal range of motion. No  rigidity.      Right lower leg: Edema present.      Left lower leg: Edema present.      Comments: Edema is a bit improved - noting some wrinkles in shins   Skin:     General: Skin is warm and dry.   Neurological:      General: No focal deficit present.      Mental Status: He is alert and oriented to person, place, and time.      Cranial Nerves: No cranial nerve deficit.      Coordination: Coordination normal.      Comments: Diffuse weakness.   Psychiatric:         Behavior: Behavior normal.             Significant Labs: All pertinent labs within the past 24 hours have been reviewed.    Significant Imaging: I have reviewed all pertinent imaging results/findings within the past 24 hours.

## 2024-02-03 NOTE — PLAN OF CARE
"Met with patient at bedside to discuss discharge dispo including therapy's recommendation for Moderate Intensity Therapy - patient declining any type of placement stating "oh no, I'm going back home" - team updated   "

## 2024-02-03 NOTE — PT/OT/SLP PROGRESS
Physical Therapy Treatment    Patient Name:  Felicia Lopez   MRN:  [id number]    Recommendations:     Discharge Recommendations: Moderate Intensity Therapy (pt intends to d/c home with HHPT)  Discharge Equipment Recommendations: bath bench, walker, rolling  Barriers to discharge: Decreased caregiver support    Assessment:     Felicia Lopez is a 78 y.o. male admitted with a medical diagnosis of Acute on chronic combined systolic and diastolic heart failure.  He presents with the following impairments/functional limitations: weakness, gait instability, edema, impaired balance, impaired cardiopulmonary response to activity, impaired endurance, impaired functional mobility, impaired self care skills, decreased lower extremity function, decreased coordination, decreased safety awareness, decreased upper extremity function.    Sit>stand with RW and CGA progressing to SBA (2 trials)  Bed>Chair with no AD and SBA, pt furniture cruising, step t/f  Amb 32' with RW and CGA, decreased gait speed, mabel and B step length  Pt able to transfer and ambulate a limited distance, though with generalized weakness and below baseline  Rec Moderate Intensity Therapy    Rehab Prognosis: Good; patient would benefit from acute skilled PT services to address these deficits and reach maximum level of function.    Recent Surgery: * No surgery found *      Plan:     During this hospitalization, patient to be seen 5 x/week to address the identified rehab impairments via gait training, therapeutic activities, therapeutic exercises, neuromuscular re-education and progress toward the following goals:    Plan of Care Expires:  02/14/24    Subjective     Chief Complaint: L knee pain sometimes  Patient/Family Comments/goals: No SNF, I'm going home  Pain/Comfort:  Pain Rating 1: 0/10  Pain Rating Post-Intervention 1: 0/10      Objective:     Communicated with nurse Shah prior to session.  Patient found sitting edge of bed with peripheral IV,  telemetry, oxygen upon PT entry to room.     General Precautions: Standard, fall  Orthopedic Precautions: N/A  Braces: N/A  Respiratory Status: Nasal cannula, flow 1 L/min     Functional Mobility:  Transfers:     Sit to Stand:  stand by assistance and contact guard assistance with no AD and rolling walker  Bed to Chair: stand by assistance with  no AD  using  Step Transfer  Gait: 32' with RW and CGA, decreased gait speed, mabel and B step length      AM-PAC 6 CLICK MOBILITY  Turning over in bed (including adjusting bedclothes, sheets and blankets)?: 3  Sitting down on and standing up from a chair with arms (e.g., wheelchair, bedside commode, etc.): 3  Moving from lying on back to sitting on the side of the bed?: 3  Moving to and from a bed to a chair (including a wheelchair)?: 3  Need to walk in hospital room?: 3  Climbing 3-5 steps with a railing?: 3  Basic Mobility Total Score: 18       Treatment & Education:  Reclined therex B LE: AP, QS, GS, SAQ, hip abd/add x 10 (HEP pamphlet provided)  Gait training as noted    Patient left up in chair with all lines intact, call button in reach, and nurse Alyssa notified..    GOALS:   Multidisciplinary Problems       Physical Therapy Goals          Problem: Physical Therapy    Goal Priority Disciplines Outcome Goal Variances Interventions   Physical Therapy Goal     PT, PT/OT Ongoing, Progressing     Description: Goals to be met by: 2024    Patient will increase functional independence with mobility by performin. Sit<>stand with SV with LRAD.  2. Gait x 50 feet with SV with LRAD.  3.  Standing activity > 5 minutes maintaining upright posture.  4. Ascend/descend 2 step(s) with least restrictive assistive device and SV.                                Time Tracking:     PT Received On: 24  PT Start Time: 1121     PT Stop Time: 1147  PT Total Time (min): 26 min     Billable Minutes: Gait Training 13 and Therapeutic Exercise 13    Treatment Type:  Treatment  PT/PTA: PTA     Number of PTA visits since last PT visit: 1 02/03/2024

## 2024-02-03 NOTE — PT/OT/SLP PROGRESS
Occupational Therapy   Treatment    Name: Felicia Lopez  MRN: [id number]  Admitting Diagnosis:  Acute on chronic combined systolic and diastolic heart failure       Recommendations:     Discharge Recommendations: Moderate Intensity Therapy  Discharge Equipment Recommendations:  bath bench (to defer AD to PT)  Barriers to discharge:   (current functional level)    Assessment:   Progressing towards goals as Pt. Donning paper scrub bottoms with CGA and performing oral hygiene/washing face with CGA for steadying/safety.  Requiring 2-minute seated rest break during grooming/hygiene task due to decreased endurance.  Consistent cuing needed for safe hand placement during controlled descent.  O2 sats monitored throughout session and found to be 94% and greater even after activity.  Will defer AD needs to PT.  Needs TTB to increase safety and independence in bathing as Pt. Demos decreased endurance/activity tolerance and impaired dynamic standing balance limiting ability to safely perform extended dynamic standing activity associated with showering.  Needs BSC  because they are minimally ambulatory and lack the endurance due to their current medical diagnosis, to ambulate as far as required to their usual toilet facility.  Continued recommendation of Moderate Intensity therapy.   To benefit from continued acute care OT services to increase independence in self-care/functional transfers.  Continue POC.     Felicia Lopez is a 78 y.o. male with a medical diagnosis of Acute on chronic combined systolic and diastolic heart failure.  He presents with below deficits decreasing independence in self-care/functional transfer. Performance deficits affecting function are weakness, impaired endurance, impaired self care skills, impaired functional mobility, gait instability, impaired balance, decreased coordination, decreased upper extremity function, decreased lower extremity function, decreased safety awareness, pain, decreased ROM,  impaired cardiopulmonary response to activity.     Rehab Prognosis:  Good; patient would benefit from acute skilled OT services to address these deficits and reach maximum level of function.       Plan:     Patient to be seen 5 x/week to address the above listed problems via self-care/home management, therapeutic activities, therapeutic exercises  Plan of Care Expires: 03/03/24  Plan of Care Reviewed with: patient    Subjective     Chief Complaint: With c/o RLE knee pain.   Patient/Family Comments/goals: No goals stated at this time.   Pain/Comfort:  Pain Rating 1: 7/10  Location - Side 1: Right  Location - Orientation 1: generalized  Pain Addressed 1: Distraction, Cessation of Activity, Nurse notified, Reposition  Pain Rating Post-Intervention 1: 7/10    Objective:     Communicated with: Alyssa ROCKWELL RN prior to session.  Patient found up in chair with peripheral IV, telemetry upon OT entry to room.    General Precautions: Standard, fall (1500mL fluid restriction)    Orthopedic Precautions:N/A  Braces: N/A  Respiratory Status: Room air     Occupational Performance:     Functional Mobility/Transfers:  Sit>stand from bedside chair with MOD A for lift and ambulating bedside chair<>sink with RW, CGA for steadying/safety, and forward flexed trunk noted.      Activities of Daily Living:  Requiring retrieval of clothing and needed grooming items.  Donned paper scrub pants threading while seated via downward reaching with 2-minute rest break after task and pulling clothing to waist in stance with CGA for steadying/safety.  Performed oral hygiene in stance at sink supported on B-forearms with good self-initiation of safe RW placement at sink and requiring seated rest break between 2 grooming tasks.        [facility] 6 Click ADL: 19    Treatment & Education:  Sit>stand from bedside chair with MOD A for lift and ambulating bedside chair<>sink with RW, CGA for steadying/safety, and forward flexed trunk noted.    Requiring retrieval  of clothing and needed grooming items.  Donned paper scrub pants threading while seated via downward reaching with 2-minute rest break after task and pulling clothing to waist in stance with CGA for steadying/safety.  Performed oral hygiene and washing face in stance at sink supported on B-forearms with good self-initiation of safe RW placement at sink prior to task; requiring seated rest break between 2 grooming tasks.    Educated on energy conservation including self-pacing and deep breathing tech with good follow through.  Received call light review and importance of calling for assist as needed.     Patient left up in chair with all lines intact, call button in reach, and nursing notified    GOALS:   Multidisciplinary Problems       Occupational Therapy Goals          Problem: Occupational Therapy    Goal Priority Disciplines Outcome Interventions   Occupational Therapy Goal     OT, PT/OT Ongoing, Progressing    Description: Goals to be met by: 2/16/2024     Patient will increase functional independence with ADLs by performing:    UE Dressing with Stand-by Assistance.  LE Dressing with Stand by Assistance.  Grooming while standing at sink with Supervision.    Toileting from toilet with Contact Guard Assistance for hygiene and clothing management.   Toilet transfer to toilet with Contact Guard Assistance.    COMPLETED GOALS:  LE Dressing with Moderate Assistance.  GOAL MET for donning pants on 2/3/2024.  Grooming while standing at sink with Contact Guard Assistance.  GOAL MET 2/3/2024.                       Time Tracking:     OT Date of Treatment: 02/03/24  OT Start Time: 1207  OT Stop Time: 1233  OT Total Time (min): 26 min    Billable Minutes:Self Care/Home Management 26    OT/NADEEM: OT          2/3/2024

## 2024-02-03 NOTE — PROGRESS NOTES
[facility]  Progress Note    Patient Name: Felicia Lopez  MRN: [id number]  Patient Class: IP- Inpatient   Admission Date: 2/1/2024  Length of Stay: 2 days  Attending Physician: Rubens Liao MD  Primary Care Provider: Mickey Darden MD        Subjective:     Principal Problem:Acute on chronic combined systolic and diastolic heart failure        HPI:  Mr. Lopez is a 78 year old man with CAD (history of MI s/p PCI), prior stroke, CKDIII, hypertension and hyperlipidemia who presented for evaluation of shortness of breath and leg swelling.  He states that over the last 3 weeks he has been having progressive worsening of shortness of breath and leg swelling.  He notes that he has been unable to sleep laying flat due to difficulty breathing and has to sleep sitting upright.  He reports perfect compliance with diet and medications, but notes he ate [employer] chicken yesterday.  He notes over the last three days he has had 4 episodes of small volume emesis before eating.  He notes that after he eats his nausea subsides.  This morning, he felt much worse with increased shortness of breath, weakness and slight lightheadedness so felt he had to come into the hospital.  He denies fevers, chills, cough, diarrhea, constipation, leg pain, back pain, abdominal pain, headache and palpitations.  He notes he had one very brief episode of chest pain last night which felt like an electrical shock.  Presently he has no pain at all.    Overview/Hospital Course:  No notes on file    Interval History: No acute events overnight.  Slept well and is breathing comfortably on room air this morning.  Still feels unwell and not ready for discharge overall.  Notes edema in legs is a bit improved but he is hoping for more improvement.  All questions answered and patient had no further complaints.    Objective:     Vital Signs (Most Recent):  Temp: 97.3 °F (36.3 °C) (02/03/24 0804)  Pulse: 80 (02/03/24  1129)  Resp: 16 (02/03/24 0804)  BP: (!) 116/56 (02/03/24 0804)  SpO2: 97 % (02/03/24 0804) Vital Signs (24h Range):  Temp:  [97.3 °F (36.3 °C)-97.7 °F (36.5 °C)] 97.3 °F (36.3 °C)  Pulse:  [60-90] 80  Resp:  [10-20] 16  SpO2:  [3 %-99 %] 97 %  BP: (105-122)/(56-70) 116/56     Weight: 83.2 kg (183 lb 8 oz)  Body mass index is 25.59 kg/m².    Intake/Output Summary (Last 24 hours) at 2/3/2024 1321  Last data filed at 2/3/2024 1204  Gross per 24 hour   Intake 200 ml   Output 2250 ml   Net -2050 ml           Physical Exam  Vitals and nursing note reviewed.   Constitutional:       General: He is not in acute distress.     Appearance: He is well-developed. He is ill-appearing. He is not toxic-appearing or diaphoretic.   HENT:      Head: Normocephalic and atraumatic.      Right Ear: External ear normal.      Left Ear: External ear normal.      Nose: Nose normal.      Mouth/Throat:      Mouth: Mucous membranes are moist.   Eyes:      Extraocular Movements: Extraocular movements intact.      Conjunctiva/sclera: Conjunctivae normal.   Cardiovascular:      Rate and Rhythm: Normal rate and regular rhythm.      Heart sounds: Normal heart sounds.   Pulmonary:      Effort: Pulmonary effort is normal. No respiratory distress.      Breath sounds: No rales.   Abdominal:      General: Bowel sounds are normal. There is no distension.      Palpations: Abdomen is soft.      Tenderness: There is no abdominal tenderness.   Musculoskeletal:         General: Normal range of motion.      Cervical back: Normal range of motion. No rigidity.      Right lower leg: Edema present.      Left lower leg: Edema present.      Comments: Edema is a bit improved - noting some wrinkles in shins   Skin:     General: Skin is warm and dry.   Neurological:      General: No focal deficit present.      Mental Status: He is alert and oriented to person, place, and time.      Cranial Nerves: No cranial nerve deficit.      Coordination: Coordination normal.       Comments: Diffuse weakness.   Psychiatric:         Behavior: Behavior normal.             Significant Labs: All pertinent labs within the past 24 hours have been reviewed.    Significant Imaging: I have reviewed all pertinent imaging results/findings within the past 24 hours.    Assessment/Plan:      * Acute on chronic combined systolic and diastolic heart failure  -Admitted to inpatient status  -Presents with progressive shortness of breath and worsening edema in legs  -BNP 2354.  CXR shows pulmonary edema with small left effusion.  -On exam has pulmonary crackles and severe edema up to his knees.  -Echo 8/8/23 showed EF 35-40% and diastolic dysfunction  -Suspect he is eating more salt than he should.  Notes he had [employer] chicken for lunch yesterday.  -Strict ins/outs, daily weights, fluid restriction and salt restricted diet.  -Clinically improving and crackles have resolved.  Breathing comfortably.  Thus far net negative 3.1L.  Still fluid overloaded - only a bit of improvement in his leg edema.  -Continue home toprol.  No longer on acei as outpatient.  -Cr has bumped up today and has signs of early contraction alkalosis.  Discussed with patient need to walk a tightrope with diuretics to achieve satisfactory diuresis but also to protect his kidneys.  Will change from iv lasix to his home po lasix today.  Repeat labs in AM.  -Follows with Dr. LULI Martinez.  If not improving will consult cardiology tomorrow.    CAD (coronary artery disease)  -History noted  -No active chest pain  -Troponin mildly elevated but at his baseline.  Doubt ACS - more likely this is chronic demand ischemia secondary to his CHF in the context of diminished renal perfusion and ckd.  -Continue aspirin, statin and beta blocker  -Monitor on telemetry.    Essential hypertension  -Appears well controlled  -Continue home metoprolol    Chronic kidney disease, stage III (moderate)  -Baseline Cr appears to be 1.7--2.3  -On admit Cr 2.1 and now 2.1 with  "CO2 up to 30  -Avoid nephrotoxic agents and renally dose meds  -Monitor renal function closely!  -Diuretics as above  -Repeat labs in AM    Thrombocytopenia  -This is chronic mild thrombocytopenia  -On admit 118 and now 124 - near baseline  -Etiology unclear  -folate and B12 are replete  -Repeat cbc in AM    Hyperlipidemia  -Continue statin    History of CVA (cerebrovascular accident)  -History noted  -Continue aspirin and statin  -Consulted PT/OT who recommend SNF, but patient is firmly against SNF placement due to need to care for his wife.  Plan for HH with RW at discharge.    ACP (advance care planning)  -Advance Care PlanningDate: 02/01/2024  I engaged Mr. Lopez in a discussion regarding his desires for care at the very end of life.  We discussed the pros and cons of ACLS treatment in the face of cardiac arrest.  I recommended that we treat aggressively in an effort to return him to his prior level of function, but that if we faced cardiac arrest that we allow him a peaceful death.  He appreciated our discussion and had thoughtful questions.  Ultimately, he stated that he knows he will not face cardiac arrest, therefore wishes that we do absolutely everything.  Will continue as full code for now.  I spent 15 minutes ACP time 2/1/23.  -Noting that I have answered his MSQ as "no",  consulted palliative care to continue ACP.  Input appreciated.      VTE Risk Mitigation (From admission, onward)           Ordered     heparin (porcine) injection 5,000 Units  Every 12 hours         02/01/24 1605     IP VTE HIGH RISK PATIENT  Once         02/01/24 1605     Place sequential compression device  Until discontinued         02/01/24 1605                    Discharge Planning   SHANICE:      Code Status: Full Code   Is the patient medically ready for discharge?:     Reason for patient still in hospital (select all that apply): Treatment  Discharge Plan A: Home Health   Discharge Delays: None known at this time              Rubens " LOBITO Liao MD  Department of Hospital Medicine   [facility] ([place])

## 2024-02-03 NOTE — PLAN OF CARE
Patient is stable on 4 PCT.   Problem: Adult Inpatient Plan of Care  Goal: Plan of Care Review  Outcome: Ongoing, Progressing  Goal: Patient-Specific Goal (Individualized)  Outcome: Ongoing, Progressing  Goal: Absence of Hospital-Acquired Illness or Injury  Outcome: Ongoing, Progressing  Goal: Optimal Comfort and Wellbeing  Outcome: Ongoing, Progressing  Goal: Readiness for Transition of Care  Outcome: Ongoing, Progressing     Problem: Coping Ineffective  Goal: Effective Coping  Outcome: Ongoing, Progressing   .

## 2024-02-03 NOTE — ASSESSMENT & PLAN NOTE
-Admitted to inpatient status  -Presents with progressive shortness of breath and worsening edema in legs  -BNP 2354.  CXR shows pulmonary edema with small left effusion.  -On exam has pulmonary crackles and severe edema up to his knees.  -Echo 8/8/23 showed EF 35-40% and diastolic dysfunction  -Suspect he is eating more salt than he should.  Notes he had popeyes chicken for lunch yesterday.  -Strict ins/outs, daily weights, fluid restriction and salt restricted diet.  -Clinically improving and crackles have resolved.  Breathing comfortably.  Thus far net negative 3.1L.  Still fluid overloaded - only a bit of improvement in his leg edema.  -Continue home toprol.  No longer on acei as outpatient.  -Cr has bumped up today and has signs of early contraction alkalosis.  Discussed with patient need to walk a tightrope with diuretics to achieve satisfactory diuresis but also to protect his kidneys.  Will change from iv lasix to his home po lasix today.  Repeat labs in AM.  -Follows with Dr. LULI Martinez.  If not improving will consult cardiology tomorrow.

## 2024-02-04 VITALS
OXYGEN SATURATION: 96 % | HEART RATE: 77 BPM | SYSTOLIC BLOOD PRESSURE: 124 MMHG | RESPIRATION RATE: 16 BRPM | TEMPERATURE: 98 F | BODY MASS INDEX: 25.62 KG/M2 | WEIGHT: 183.69 LBS | DIASTOLIC BLOOD PRESSURE: 67 MMHG

## 2024-02-04 LAB
ANION GAP SERPL CALC-SCNC: 10 MMOL/L (ref 8–16)
BASOPHILS # BLD AUTO: 0.02 K/UL (ref 0–0.2)
BASOPHILS NFR BLD: 0.3 % (ref 0–1.9)
BUN SERPL-MCNC: 52 MG/DL (ref 8–23)
CALCIUM SERPL-MCNC: 9.4 MG/DL (ref 8.7–10.5)
CHLORIDE SERPL-SCNC: 97 MMOL/L (ref 95–110)
CO2 SERPL-SCNC: 30 MMOL/L (ref 23–29)
CREAT SERPL-MCNC: 2.2 MG/DL (ref 0.5–1.4)
DIFFERENTIAL METHOD BLD: ABNORMAL
EOSINOPHIL # BLD AUTO: 0.1 K/UL (ref 0–0.5)
EOSINOPHIL NFR BLD: 0.7 % (ref 0–8)
ERYTHROCYTE [DISTWIDTH] IN BLOOD BY AUTOMATED COUNT: 15.8 % (ref 11.5–14.5)
EST. GFR  (NO RACE VARIABLE): 30 ML/MIN/1.73 M^2
GLUCOSE SERPL-MCNC: 92 MG/DL (ref 70–110)
HCT VFR BLD AUTO: 44.3 % (ref 40–54)
HGB BLD-MCNC: 14.1 G/DL (ref 14–18)
IMM GRANULOCYTES # BLD AUTO: 0.03 K/UL (ref 0–0.04)
IMM GRANULOCYTES NFR BLD AUTO: 0.4 % (ref 0–0.5)
LYMPHOCYTES # BLD AUTO: 1 K/UL (ref 1–4.8)
LYMPHOCYTES NFR BLD: 13.4 % (ref 18–48)
MAGNESIUM SERPL-MCNC: 2 MG/DL (ref 1.6–2.6)
MCH RBC QN AUTO: 27.3 PG (ref 27–31)
MCHC RBC AUTO-ENTMCNC: 31.8 G/DL (ref 32–36)
MCV RBC AUTO: 86 FL (ref 82–98)
MONOCYTES # BLD AUTO: 1.1 K/UL (ref 0.3–1)
MONOCYTES NFR BLD: 14.7 % (ref 4–15)
NEUTROPHILS # BLD AUTO: 5.4 K/UL (ref 1.8–7.7)
NEUTROPHILS NFR BLD: 70.5 % (ref 38–73)
NRBC BLD-RTO: 0 /100 WBC
PLATELET # BLD AUTO: 133 K/UL (ref 150–450)
PMV BLD AUTO: ABNORMAL FL (ref 9.2–12.9)
POTASSIUM SERPL-SCNC: 4.1 MMOL/L (ref 3.5–5.1)
RBC # BLD AUTO: 5.17 M/UL (ref 4.6–6.2)
SODIUM SERPL-SCNC: 137 MMOL/L (ref 136–145)
WBC # BLD AUTO: 7.63 K/UL (ref 3.9–12.7)

## 2024-02-04 PROCEDURE — 63600175 PHARM REV CODE 636 W HCPCS: Performed by: HOSPITALIST

## 2024-02-04 PROCEDURE — 83735 ASSAY OF MAGNESIUM: CPT | Performed by: HOSPITALIST

## 2024-02-04 PROCEDURE — 36415 COLL VENOUS BLD VENIPUNCTURE: CPT | Performed by: HOSPITALIST

## 2024-02-04 PROCEDURE — 85025 COMPLETE CBC W/AUTO DIFF WBC: CPT | Performed by: HOSPITALIST

## 2024-02-04 PROCEDURE — 80048 BASIC METABOLIC PNL TOTAL CA: CPT | Performed by: HOSPITALIST

## 2024-02-04 PROCEDURE — 25000003 PHARM REV CODE 250: Performed by: HOSPITALIST

## 2024-02-04 PROCEDURE — 94761 N-INVAS EAR/PLS OXIMETRY MLT: CPT

## 2024-02-04 RX ADMIN — FUROSEMIDE 40 MG: 40 TABLET ORAL at 08:02

## 2024-02-04 RX ADMIN — HEPARIN SODIUM 5000 UNITS: 5000 INJECTION INTRAVENOUS; SUBCUTANEOUS at 08:02

## 2024-02-04 RX ADMIN — ASPIRIN 81 MG: 81 TABLET, COATED ORAL at 08:02

## 2024-02-04 NOTE — PLAN OF CARE
Accepted by [facility] - orders forwarded via [facility] - they will contact patient to schedule home visits - rolling walker delivered to bedside - follow up appts scheduled with info on AVS - Cardiology Dr Martinez 2/22 - PCP Dr Darden 2/23 - patient instructed to contact PCP's office tomorrow when they reopen & request sooner appt for hospital follow up - ambulatory referral placed to [facility] - will need transportation home - house supv to set up LYFT once ready for discharge - RN aware     [facility] Med [facility] ([place])  Discharge Final Note    Primary Care Provider: Mickey Darden MD    Expected Discharge Date: 2/4/2024    Final Discharge Note (most recent)       Final Note - 02/04/24 1223          Final Note    Assessment Type Final Discharge Note     Anticipated Discharge Disposition Home-Health Care Svc     What phone number can be called within the next 1-3 days to see how you are doing after discharge? [phone]     Hospital Resources/Appts/Education Provided Provided patient/caregiver with written discharge plan information;Appointments scheduled and added to AVS;Post-Acute resouces added to AVS        Post-Acute Status    Post-Acute Authorization Home Health;HME     HME Status Set-up Complete/Auth obtained     Home Health Status Set-up Complete/Auth obtained     Patient choice form signed by patient/caregiver List from System Post-Acute Care     Discharge Delays Taxi Service Set-up                   Contact Info       [facility]   Specialty: Home Health Services, Hospice and Palliative Medicine, Hospice Services, Physical Therapy    [address]  [address]  [place] [address]   Phone: [phone]       Next Steps: Follow up    Instructions: they will contact you to schedule home visits    Mickey Darden MD   Specialty: Internal Medicine   Relationship: PCP - General    [address] [facility] [place] [address]   Phone: [phone]       Next Steps: Schedule an  appointment as soon as possible for a visit in 1 week(s)    Instructions: for hospital follow up

## 2024-02-04 NOTE — PLAN OF CARE
[facility] - Med Surg ([place])      HOME HEALTH ORDERS  FACE TO FACE ENCOUNTER    Patient Name: Felicia Lopez  YOB: 1945    PCP: Mickey Darden MD   PCP Address: [address] [place] [address]  PCP Phone Number: [phone]  PCP Fax: [phone]    Encounter Date: 2/1/24    Admit to Home Health    Diagnoses:  Active Hospital Problems    Diagnosis  POA    *Acute on chronic combined systolic and diastolic heart failure [I50.43]  Yes     Priority: 1 - High    CAD (coronary artery disease) [I25.10]  Yes     Priority: 2     Essential hypertension [I10]  Yes     Priority: 3     Chronic kidney disease, stage III (moderate) [N18.30]  Yes     Priority: 4     Thrombocytopenia [D69.6]  Yes     Priority: 5     Hyperlipidemia [E78.5]  Yes     Priority: 6     History of CVA (cerebrovascular accident) [Z86.73]  Not Applicable     Priority: 7     ACP (advance care planning) [Z71.89]  Not Applicable     Priority: 8       Resolved Hospital Problems   No resolved problems to display.       Follow Up Appointments:  Future Appointments   Date Time Provider Department Center   2/22/2024  8:40 AM Fabio Martinez MD [id number]       Allergies:Review of patient's allergies indicates:  No Known Allergies    Medications: Review discharge medications with patient and family and provide education.      Current Discharge Medication List        CONTINUE these medications which have NOT CHANGED    Details   aspirin (ECOTRIN) 81 MG EC tablet Take 1 tablet (81 mg total) by mouth once daily.  Qty: 30 tablet, Refills: 0      furosemide (LASIX) 40 MG tablet Take 1 tablet (40 mg total) by mouth 2 (two) times daily.  Qty: 60 tablet, Refills: 3      latanoprost 0.005 % ophthalmic solution Place 1 drop into both eyes every evening.      metoprolol succinate (TOPROL-XL) 25 MG 24 hr tablet Take 1 tablet (25 mg total) by mouth every evening.  Qty: 90 tablet, Refills: 3      simvastatin (ZOCOR) 40 MG tablet Take 1  tablet (40 mg total) by mouth once daily.  Qty: 90 tablet, Refills: 0    Associated Diagnoses: Hyperlipidemia, unspecified hyperlipidemia type; Essential hypertension; Hyperglycemia; Anemia, unspecified type; Allergic rhinitis due to pollen; Thrombocytopenia; Screening for prostate cancer           STOP taking these medications       HYDROcodone-acetaminophen (NORCO) 5-325 mg per tablet Comments:   Reason for Stopping:                 I have seen and examined this patient within the last 30 days. My clinical findings that support the need for the home health skilled services and home bound status are the following:no   Weakness/numbness causing balance and gait disturbance due to Heart Failure and Weakness/Debility making it taxing to leave home.  Requiring assistive device to leave home due to unsteady gait caused by  Heart Failure and Weakness/Debility.     Diet:   cardiac diet, fluid restriction, and 2 gram sodium diet    Referrals/ Consults  Physical Therapy to evaluate and treat. Evaluate for home safety and equipment needs; Establish/upgrade home exercise program. Perform / instruct on therapeutic exercises, gait training, transfer training, and Range of Motion.  Occupational Therapy to evaluate and treat. Evaluate home environment for safety and equipment needs. Perform/Instruct on transfers, ADL training, ROM, and therapeutic exercises.  Aide to provide assistance with personal care, ADLs, and vital signs.    Activities:   ambulate in house with assistance    Nursing:   Agency to admit patient within 24 hours of hospital discharge unless specified on physician order or at patient request    SN to complete comprehensive assessment including routine vital signs. Instruct on disease process and s/s of complications to report to MD. Review/verify medication list sent home with the patient at time of discharge  and instruct patient/caregiver as needed. Frequency may be adjusted depending on start of care date.      Skilled nurse to perform up to 3 visits PRN for symptoms related to diagnosis    Notify MD if SBP > 160 or < 90; DBP > 90 or < 50; HR > 120 or < 50; Temp > 101; O2 < 88%; Other:       Ok to schedule additional visits based on staff availability and patient request on consecutive days within the home health episode.    When multiple disciplines ordered:    Start of Care occurs on Sunday - Wednesday schedule remaining discipline evaluations as ordered on separate consecutive days following the start of care.    Thursday SOC -schedule subsequent evaluations Friday and Monday the following week.     Friday - Saturday SOC - schedule subsequent discipline evaluations on consecutive days starting Monday of the following week.    For all post-discharge communication and subsequent orders please contact patient's primary care physician.     Miscellaneous   Heart Failure:      SN to instruct on the following:    Instruct on the definition of CHF.   Instruct on the signs/sympoms of CHF to be reported.   Instruct on and monitor daily weights.   Instruct on factors that cause exacerbation.   Instruct on action, dose, schedule, and side effects of medications.   Instruct on diet as prescribed.   Instruct on activity allowed.   Instruct on life-style modifications for life long management of CHF   SN to assess compliance with daily weights, diet, medications, fluid retention,    safety precautions, activities permitted and life-style modifications.   Additional 1-2 SN visits per week as needed for signs and symptoms     of CHF exacerbation.      For Weight Gain > 2-3 lbs in 1 day or 4-6 lbs over 1 week notify PCP:  CHF DIURETIC SLIDING SCALE     Skilled nurse visits daily to instruct and monitor medication adherence until target weight. Then resume prior order frequency.     If weight gain exceeds 5 lbs over target weight, call MD  If weight gain of 3-4 lbs over target weight, then:     Increase lasix 40mg po twice daily to  lasix 40mg po three times daily until target weight achieved or maximum 3 days.     After 3 days of increased oral diuretic dose, get BMP. If patient is on increased oral diuretic dose greater than 5 days, repeat BMP at day 7 of increased dose.     Potassium supplementation   Scr > 1.5 mg/dl  Scr  1.5 mg/dl    K < 3.0 - NOTIFY MD and 40 mEq bid  40 mEq tid   K- 3.1-3.3  20 mEq bid  20 mEq tid    K 3.4-3.7  10 mEq bid  10 mEq tid      Home Health Aide:  Nursing Three times weekly, Physical Therapy Three times weekly, Occupational Therapy Three times weekly, and Home Health Aide Twice weekly    I certify that this patient is confined to his home and needs intermittent skilled nursing care, physical therapy, and occupational therapy.

## 2024-02-04 NOTE — PLAN OF CARE
02/04/24 1227   Medicare Message   Important Message from Medicare regarding Discharge Appeal Rights Given to patient/caregiver;Explained to patient/caregiver;Signed/date by patient/caregiver   Date IMM was signed 02/04/24   Time IMM was signed 1000

## 2024-02-04 NOTE — CARE UPDATE
02/04/24 0902   PRE-TX-O2   Device (Oxygen Therapy) room air   SpO2 96 %   Pulse Oximetry Type Intermittent   $ Pulse Oximetry - Multiple Charge Pulse Oximetry - Multiple

## 2024-02-04 NOTE — DISCHARGE SUMMARY
[facility] Medicine  Discharge Summary      Patient Name: Felicia Lopez  MRN: [id number]  ANAMARIA: [id number]  Patient Class: IP- Inpatient  Admission Date: 2/1/2024  Hospital Length of Stay: 3 days  Discharge Date and Time: 2/4/2024  2:22 PM  Attending Physician: No att. providers found   Discharging Provider: Rubens Liao MD  Primary Care Provider: Mickey Darden MD    Primary Care Team: Networked reference to record PCT     HPI:   Mr. Lopez is a 78 year old man with CAD (history of MI s/p PCI), prior stroke, CKDIII, hypertension and hyperlipidemia who presented for evaluation of shortness of breath and leg swelling.  He states that over the last 3 weeks he has been having progressive worsening of shortness of breath and leg swelling.  He notes that he has been unable to sleep laying flat due to difficulty breathing and has to sleep sitting upright.  He reports perfect compliance with diet and medications, but notes he ate popeyes chicken yesterday.  He notes over the last three days he has had 4 episodes of small volume emesis before eating.  He notes that after he eats his nausea subsides.  This morning, he felt much worse with increased shortness of breath, weakness and slight lightheadedness so felt he had to come into the hospital.  He denies fevers, chills, cough, diarrhea, constipation, leg pain, back pain, abdominal pain, headache and palpitations.  He notes he had one very brief episode of chest pain last night which felt like an electrical shock.  Presently he has no pain at all.    Goals of Care Treatment Preferences:  Code Status: Full Code          What is most important right now is to focus on spending time at home, remaining as independent as possible.  Accordingly, we have decided that the best plan to meet the patient's goals includes continuing with treatment.      Consults:   Consults (From admission, onward)          Status Ordering Provider     Inpatient consult  to Social Work  Once        Provider:  (Not yet assigned)    Completed АЛЕКСАНДР AVERY     Inpatient consult to Social Work/Case Management  Once        Provider:  (Not yet assigned)    Completed АЛЕКСАНДР AVERY     Inpatient consult to Registered Dietitian/Nutritionist  Once        Provider:  (Not yet assigned)    Completed АЛЕКСАНДР AVERY     Inpatient consult to Palliative Care  Once        Provider:  (Not yet assigned)    Completed АЛЕКСАНДР AVERY          Hospital Course By Problem:   * Acute on chronic combined systolic and diastolic heart failure  -Admitted to inpatient status  -Presents with progressive shortness of breath and worsening edema in legs  -BNP 2354.  CXR shows pulmonary edema with small left effusion.  -On exam has pulmonary crackles and severe edema up to his knees.  -Echo 8/8/23 showed EF 35-40% and diastolic dysfunction  -Suspect he is eating more salt than he should.  Notes he had [employer] chicken for lunch yesterday.  -Strict ins/outs, daily weights, fluid restriction and salt restricted diet.  -Treated with home toprol (no longer on ACEi), iv lasix 40mg bid and has had a good diuresis with resolution of shortness of breath.  He is net negative 3.8L today and is asking to go home.  -Medically stable and will resume home regimen which he already has.  Reinforced need for strict compliance with low salt diet.  Ordered HH with nurse TID and CHF protocol.  Also ordered CRISSY at home.  Will need f/u with pcp and Dr. LULI Martinez.       CAD (coronary artery disease)  -History noted  -No active chest pain  -Troponin mildly elevated but at his baseline.  Doubt ACS - more likely this is chronic demand ischemia secondary to his CHF in the context of diminished renal perfusion and ckd.  -Continue aspirin, statin and beta blocker  -Monitored on telemetry.     Essential hypertension  -Appears well controlled  -Continue home metoprolol     Chronic kidney disease, stage III (moderate)  -Baseline Cr appears to  "be 1.7--2.3  -On admit Cr 2.1 and now 2.2 with CO2 up to 30  -Avoid nephrotoxic agents and renally dose meds  -Monitor renal function closely!  -Diuretics as above     Thrombocytopenia  -This is chronic mild thrombocytopenia  -On admit 118 and now 133 - near baseline  -Etiology unclear  -folate and B12 are replete     Hyperlipidemia  -Continue statin     History of CVA (cerebrovascular accident)  -History noted  -Continue aspirin and statin  -Consulted PT/OT who recommend SNF, but patient is firmly against SNF placement due to need to care for his wife.  Ordered HH, CRISSY at home and RW at discharge.     ACP (advance care planning)  -Advance Care PlanningDate: 02/01/2024  I engaged Mr. Lopez in a discussion regarding his desires for care at the very end of life.  We discussed the pros and cons of ACLS treatment in the face of cardiac arrest.  I recommended that we treat aggressively in an effort to return him to his prior level of function, but that if we faced cardiac arrest that we allow him a peaceful death.  He appreciated our discussion and had thoughtful questions.  Ultimately, he stated that he knows he will not face cardiac arrest, therefore wishes that we do absolutely everything.  Will continue as full code for now.  I spent 15 minutes ACP time 2/1/23.  -Noting that I have answered his MSQ as "no",  consulted palliative care to continue ACP.  Input appreciated.    Final Active Diagnoses:    Diagnosis Date Noted POA    PRINCIPAL PROBLEM:  Acute on chronic combined systolic and diastolic heart failure [I50.43] 08/28/2023 Yes    CAD (coronary artery disease) [I25.10] 08/09/2022 Yes    Essential hypertension [I10] 10/05/2015 Yes    Chronic kidney disease, stage III (moderate) [N18.30] 10/05/2015 Yes    Thrombocytopenia [D69.6] 10/05/2015 Yes    Hyperlipidemia [E78.5] 10/03/2018 Yes    History of CVA (cerebrovascular accident) [Z86.73] 08/09/2022 Not Applicable    ACP (advance care planning) [Z71.89] 02/01/2024 " "Not Applicable      Problems Resolved During this Admission:       Discharged Condition: stable    Disposition: Home-Health Care [facility]    Follow Up:   Follow-up Information       [facility].    Specialties: Home Health Services, Hospice and Palliative Medicine, Hospice Services, Physical Therapy  Why: they will contact you to schedule home visits  Contact information:  [address]  [address]  [place] [address]  [phone]               Mickey Darden MD. Schedule an appointment as soon as possible for a visit in 1 week(s).    Specialty: Internal Medicine  Why: for hospital follow up  Contact information:  [address] [address]  [phone]                           Patient Instructions:      WALKER FOR HOME USE     Order Specific Question Answer Comments   Type of Walker: Adult (5'4"-6'6")    With wheels? Yes    Height: 5'11"    Weight: 83.2 kg (183 lb 8 oz)    Length of need (1-99 months): 99    Does patient have medical equipment at home? cane, straight    Please check all that apply: Patient's condition impairs ambulation.      Ambulatory referral/consult to [facility]   Standing Status: Future   Referral Priority: Routine Referral Type: Consultation   Referral Reason: Specialty Services Required   Number of Visits Requested: 1     Diet Cardiac     Notify your health care provider if you experience any of the following:  increased confusion or weakness     Notify your health care provider if you experience any of the following:  persistent dizziness, light-headedness, or visual disturbances     Notify your health care provider if you experience any of the following:  worsening rash     Notify your health care provider if you experience any of the following:  severe persistent headache     Notify your health care provider if you experience any of the following:  difficulty breathing or increased cough     Notify your health care provider if you " experience any of the following:  severe uncontrolled pain     Notify your health care provider if you experience any of the following:  persistent nausea and vomiting or diarrhea     Notify your health care provider if you experience any of the following:  temperature >100.4     Activity as tolerated       Significant Diagnostic Studies: Labs: BMP:   Recent Labs   Lab 02/03/24  0449 02/04/24  0505   GLU 82 92    137   K 4.4 4.1   CL 99 97   CO2 30* 30*   BUN 51* 52*   CREATININE 2.1* 2.2*   CALCIUM 9.4 9.4   MG 2.0 2.0   , CMP   Recent Labs   Lab 02/03/24  0449 02/04/24  0505    137   K 4.4 4.1   CL 99 97   CO2 30* 30*   GLU 82 92   BUN 51* 52*   CREATININE 2.1* 2.2*   CALCIUM 9.4 9.4   ANIONGAP 12 10   , CBC   Recent Labs   Lab 02/03/24  0449 02/04/24  0505   WBC 8.15 7.63   HGB 14.1 14.1   HCT 44.3 44.3   * 133*   , INR   Lab Results   Component Value Date    INR 1.1 08/11/2022    INR 1.1 08/10/2022    INR 1.1 08/09/2022   , Lipid Panel   Lab Results   Component Value Date    CHOL 123 08/09/2022    HDL 35 (L) 08/09/2022    LDLCALC 78.2 08/09/2022    TRIG 49 08/09/2022    CHOLHDL 28.5 08/09/2022   , Troponin   Recent Labs   Lab 02/01/24  1116   TROPONINI 0.178*   , and A1C:   Recent Labs   Lab 08/28/23  1038   HGBA1C 5.7*       Pending Diagnostic Studies:       None           Medications:  Reconciled Home Medications:      Medication List        CONTINUE taking these medications      aspirin 81 MG EC tablet  Commonly known as: ECOTRIN  Take 1 tablet (81 mg total) by mouth once daily.     furosemide 40 MG tablet  Commonly known as: LASIX  Take 1 tablet (40 mg total) by mouth 2 (two) times daily.     metoprolol succinate 25 MG 24 hr tablet  Commonly known as: TOPROL-XL  Take 1 tablet (25 mg total) by mouth every evening.     simvastatin 40 MG tablet  Commonly known as: ZOCOR  Take 1 tablet (40 mg total) by mouth once daily.            STOP taking these medications      HYDROcodone-acetaminophen  5-325 mg per tablet  Commonly known as: NORCO            ASK your doctor about these medications      latanoprost 0.005 % ophthalmic solution  Place 1 drop into both eyes every evening.              Indwelling Lines/Drains at time of discharge:   Lines/Drains/Airways       None                   Time spent on the discharge of patient: 35 minutes         Rubens Liao MD  Department of Hospital Medicine  [facility] ([place])

## 2024-02-04 NOTE — DISCHARGE INSTRUCTIONS
Take all medications as prescribed.  Eat a strict low salt cardiac diet.  Follow up with your physicians as scheduled - pcp within 1 week and cardiologist within 2 weeks.  We are ordering a home health service and CRISSY at home for you..  Thank you for trusting [facility] and Dr. Liao with your care.  We are honored that you entrusted us with your healthcare needs. Your satisfaction is very important to us and we hope you have been very pleased with your experience at [facility]. After your discharge you may receive a survey asking you to rate your hospital experience. We encourage you to take the time to complete the survey as your feedback allows us to identify areas for improvement as well as recognize our staff.   We hope that you have received the very best care possible during your hospitalization at [facility], as your satisfaction is our top priority.

## 2024-02-22 ENCOUNTER — OFFICE VISIT (OUTPATIENT)
Dept: CARDIOLOGY | Facility: CLINIC | Age: 79
End: 2024-02-22
Payer: MEDICARE

## 2024-02-22 VITALS
SYSTOLIC BLOOD PRESSURE: 120 MMHG | HEART RATE: 90 BPM | OXYGEN SATURATION: 98 % | DIASTOLIC BLOOD PRESSURE: 80 MMHG | WEIGHT: 178.38 LBS | BODY MASS INDEX: 24.88 KG/M2

## 2024-02-22 DIAGNOSIS — I70.219 ATHEROSCLEROSIS OF NATIVE ARTERY OF EXTREMITY WITH INTERMITTENT CLAUDICATION, UNSPECIFIED EXTREMITY: ICD-10-CM

## 2024-02-22 DIAGNOSIS — E78.5 HYPERLIPIDEMIA, UNSPECIFIED HYPERLIPIDEMIA TYPE: ICD-10-CM

## 2024-02-22 DIAGNOSIS — I10 ESSENTIAL HYPERTENSION: Primary | ICD-10-CM

## 2024-02-22 DIAGNOSIS — I50.43 ACUTE ON CHRONIC COMBINED SYSTOLIC AND DIASTOLIC HEART FAILURE: ICD-10-CM

## 2024-02-22 DIAGNOSIS — I25.10 CORONARY ARTERY DISEASE INVOLVING NATIVE CORONARY ARTERY OF NATIVE HEART WITHOUT ANGINA PECTORIS: ICD-10-CM

## 2024-02-22 PROCEDURE — 93005 ELECTROCARDIOGRAM TRACING: CPT

## 2024-02-22 PROCEDURE — 99214 OFFICE O/P EST MOD 30 MIN: CPT | Mod: S$GLB,,, | Performed by: INTERNAL MEDICINE

## 2024-02-22 PROCEDURE — 93010 ELECTROCARDIOGRAM REPORT: CPT | Mod: S$GLB,,, | Performed by: INTERNAL MEDICINE

## 2024-02-22 PROCEDURE — 99999 PR PBB SHADOW E&M-EST. PATIENT-LVL III: CPT | Mod: PBBFAC,,, | Performed by: INTERNAL MEDICINE

## 2024-02-22 NOTE — PROGRESS NOTES
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  He reports feeling a little bit better.  He has some swelling in his feet still.  He has not made an appointment to see his nephrologist yet.  He denies any chest pain.  He denies any paroxysmal nocturnal dyspnea.  He was evaluated by his PCP who thought he had irregular heart rate.    Medications  Current Outpatient Medications   Medication Sig Dispense Refill    furosemide (LASIX) 40 MG tablet Take 1 tablet (40 mg total) by mouth 2 (two) times daily. 60 tablet 3    latanoprost 0.005 % ophthalmic solution Place 1 drop into both eyes every evening.      metoprolol succinate (TOPROL-XL) 25 MG 24 hr tablet Take 1 tablet (25 mg total) by mouth every evening. 90 tablet 3    simvastatin (ZOCOR) 40 MG tablet Take 1 tablet (40 mg total) by mouth once daily. 90 tablet 0    aspirin (ECOTRIN) 81 MG EC tablet Take 1 tablet (81 mg total) by mouth once daily. 30 tablet 0     No current  facility-administered medications for this visit.      Prior to Admission medications    Medication Sig Start Date End Date Taking? Authorizing Provider   furosemide (LASIX) 40 MG tablet Take 1 tablet (40 mg total) by mouth 2 (two) times daily. 1/17/24  Yes Fabio Martinez MD   latanoprost 0.005 % ophthalmic solution Place 1 drop into both eyes every evening. 7/21/23  Yes Provider, Historical   metoprolol succinate (TOPROL-XL) 25 MG 24 hr tablet Take 1 tablet (25 mg total) by mouth every evening. 3/2/23 3/1/24 Yes Fabio Martinez MD   simvastatin (ZOCOR) 40 MG tablet Take 1 tablet (40 mg total) by mouth once daily. 12/14/18  Yes Mickey Darden MD   aspirin (ECOTRIN) 81 MG EC tablet Take 1 tablet (81 mg total) by mouth once daily. 8/11/22 2/1/24  Mara Burgos PA-C   baclofen (LIORESAL) 10 MG tablet Take 1 tablet (10 mg total) by mouth 3 (three) times daily. 10/3/18 8/11/22  Mickey Darden MD   clopidogrel (PLAVIX) 75 mg tablet Take 1 tablet (75 mg total) by mouth once daily. 12/14/18 8/11/22  Mickey Darden MD   hydroCHLOROthiazide (MICROZIDE) 12.5 mg capsule Take 1 capsule (12.5 mg total) by mouth once daily.  Patient taking differently: Take 25 mg by mouth once daily. 10/31/18 8/11/22  Mickey Darden MD         History  Past Medical History:   Diagnosis Date    CAD (coronary artery disease)     Chronic combined systolic and diastolic CHF (congestive heart failure)     Chronic idiopathic thrombocytopenia     Disorder of kidney and ureter     History of heart attack     History of stroke     Hyperlipidemia     Hypertension      Past Surgical History:   Procedure Laterality Date    FACIAL RECONSTRUCTION SURGERY      PROSTATE SURGERY       Social History     Socioeconomic History    Marital status:    Tobacco Use    Smoking status: Former     Current packs/day: 0.00     Types: Cigarettes     Quit date: 10/5/2008     Years since quitting: 15.3    Smokeless tobacco: Never    Substance and Sexual Activity    Alcohol use: No     Alcohol/week: 0.0 standard drinks of alcohol    Drug use: No    Sexual activity: Never     Social Determinants of Health     Financial Resource Strain: Low Risk  (8/28/2023)    Overall Financial Resource Strain (CARDIA)     Difficulty of Paying Living Expenses: Not very hard   Food Insecurity: No Food Insecurity (8/28/2023)    Hunger Vital Sign     Worried About Running Out of Food in the Last Year: Never true     Ran Out of Food in the Last Year: Never true   Transportation Needs: No Transportation Needs (8/28/2023)    PRAPARE - Transportation     Lack of Transportation (Medical): No     Lack of Transportation (Non-Medical): No   Physical Activity: Insufficiently Active (8/28/2023)    Exercise Vital Sign     Days of Exercise per Week: 7 days     Minutes of Exercise per Session: 20 min   Stress: No Stress Concern Present (8/28/2023)    [employer] [place] [employer] - Occupational Stress Questionnaire     Feeling of Stress : Not at all   Social Connections: Moderately Integrated (8/28/2023)    Social Connection and Isolation Panel [NHANES]     Frequency of Communication with Friends and Family: More than three times a week     Frequency of Social Gatherings with Friends and Family: More than three times a week     Attends Sikhism Services: More than 4 times per year     Active Member of Clubs or Organizations: No     Marital Status:    Housing Stability: Unknown (8/28/2023)    Housing Stability Vital Sign     Unable to Pay for Housing in the Last Year: No     Unstable Housing in the Last Year: No         Allergies  Review of patient's allergies indicates:  No Known Allergies      Review of Systems   Review of Systems   Cardiovascular:  Positive for leg swelling and orthopnea. Negative for chest pain, claudication, cyanosis, dyspnea on exertion, irregular heartbeat, near-syncope, palpitations and paroxysmal nocturnal dyspnea.   Respiratory:   Positive for shortness of breath. Negative for cough, hemoptysis, sleep disturbances due to breathing, snoring, sputum production and wheezing.    All other systems reviewed and are negative.        Physical Exam  Wt Readings from Last 1 Encounters:   02/22/24 80.9 kg (178 lb 6.4 oz)     BP Readings from Last 3 Encounters:   02/22/24 120/80   02/04/24 124/67   01/17/24 (!) 105/58     Pulse Readings from Last 1 Encounters:   02/22/24 90     Body mass index is 24.88 kg/m².    Physical Exam  Neck:      Vascular: No JVD.   Cardiovascular:      Rate and Rhythm: Normal rate and regular rhythm.      Pulses:           Carotid pulses are 2+ on the right side and 2+ on the left side.       Radial pulses are 2+ on the right side and 2+ on the left side.      Heart sounds: S1 normal and S2 normal. Murmur heard.      Systolic murmur is present.   Pulmonary:      Breath sounds: Normal breath sounds and air entry.   Musculoskeletal:      Right lower leg: Edema present.      Left lower leg: Edema present.   Neurological:      Mental Status: He is alert.             Assessment  1. Atherosclerosis of native artery of extremity with intermittent claudication, unspecified extremity  Stable    2. Essential hypertension  Controlled    3. Hyperlipidemia, unspecified hyperlipidemia type  Stable    4. Acute on chronic combined systolic and diastolic heart failure  Class 3    5. Coronary artery disease involving native coronary artery of native heart without angina pectoris  Stable no angina        Plan and Discussion  Discussed his EKG today which shows sinus rhythm rate of 103 nonspecific T-wave change.  His volume status has slightly improved compared to last month.  Recommend to continue with twice daily furosemide.  Recommend to follow up with Dr. Matson.    Follow Up  2 months      Fabio Martinez MD, F.A.C.C, F.S.C.A.I.      Total professional time spent for the encounter: 30 minutes  Time was spent preparing to see the patient,  reviewing results of prior testing, obtaining and/or reviewing separately obtained history, performing a medically appropriate examination and interview, counseling and educating the patient/family, ordering medications/tests/procedures, referring and communicating with other health care professionals, documenting clinical information in the electronic health record, and independently interpreting results.    Disclaimer: This document was created using voice recognition software ([employer]). Although it may be edited, this document may contain errors related to incorrect recognition of the spoken word. Please call the physician if clarification is needed.

## 2024-02-28 LAB
OHS QRS DURATION: 102 MS
OHS QTC CALCULATION: 463 MS

## 2024-05-09 ENCOUNTER — OFFICE VISIT (OUTPATIENT)
Dept: CARDIOLOGY | Facility: CLINIC | Age: 79
End: 2024-05-09
Payer: MEDICARE

## 2024-05-09 ENCOUNTER — HOSPITAL ENCOUNTER (INPATIENT)
Facility: OTHER | Age: 79
LOS: 7 days | Discharge: HOME HEALTH | DRG: 291 | End: 2024-05-16
Attending: EMERGENCY MEDICINE | Admitting: EMERGENCY MEDICINE
Payer: MEDICARE

## 2024-05-09 VITALS
HEART RATE: 90 BPM | SYSTOLIC BLOOD PRESSURE: 112 MMHG | WEIGHT: 191 LBS | BODY MASS INDEX: 26.64 KG/M2 | DIASTOLIC BLOOD PRESSURE: 74 MMHG | OXYGEN SATURATION: 99 %

## 2024-05-09 DIAGNOSIS — I50.43 ACUTE ON CHRONIC COMBINED SYSTOLIC AND DIASTOLIC CONGESTIVE HEART FAILURE: ICD-10-CM

## 2024-05-09 DIAGNOSIS — I50.9 ACUTE ON CHRONIC CONGESTIVE HEART FAILURE, UNSPECIFIED HEART FAILURE TYPE: Primary | ICD-10-CM

## 2024-05-09 DIAGNOSIS — N18.30 STAGE 3 CHRONIC KIDNEY DISEASE, UNSPECIFIED WHETHER STAGE 3A OR 3B CKD: ICD-10-CM

## 2024-05-09 DIAGNOSIS — Z51.5 ENCOUNTER FOR PALLIATIVE CARE: ICD-10-CM

## 2024-05-09 DIAGNOSIS — I10 ESSENTIAL HYPERTENSION: Primary | ICD-10-CM

## 2024-05-09 DIAGNOSIS — N18.9 CHRONIC KIDNEY DISEASE, UNSPECIFIED CKD STAGE: ICD-10-CM

## 2024-05-09 DIAGNOSIS — I50.9 CHF EXACERBATION: ICD-10-CM

## 2024-05-09 DIAGNOSIS — R06.02 SHORTNESS OF BREATH: ICD-10-CM

## 2024-05-09 DIAGNOSIS — I25.10 CORONARY ARTERY DISEASE INVOLVING NATIVE CORONARY ARTERY OF NATIVE HEART WITHOUT ANGINA PECTORIS: ICD-10-CM

## 2024-05-09 DIAGNOSIS — I50.43 ACUTE ON CHRONIC COMBINED SYSTOLIC AND DIASTOLIC HEART FAILURE: ICD-10-CM

## 2024-05-09 PROBLEM — N25.81 SECONDARY HYPERPARATHYROIDISM: Status: ACTIVE | Noted: 2024-05-09

## 2024-05-09 PROBLEM — Z98.61 HISTORY OF PERCUTANEOUS CORONARY INTERVENTION: Status: ACTIVE | Noted: 2024-05-09

## 2024-05-09 PROBLEM — R60.1 ANASARCA: Status: ACTIVE | Noted: 2024-05-09

## 2024-05-09 LAB
ALBUMIN SERPL BCP-MCNC: 3 G/DL (ref 3.5–5.2)
ALP SERPL-CCNC: 83 U/L (ref 55–135)
ALT SERPL W/O P-5'-P-CCNC: 14 U/L (ref 10–44)
ANION GAP SERPL CALC-SCNC: 13 MMOL/L (ref 8–16)
AST SERPL-CCNC: 19 U/L (ref 10–40)
BASOPHILS # BLD AUTO: 0.02 K/UL (ref 0–0.2)
BASOPHILS NFR BLD: 0.4 % (ref 0–1.9)
BILIRUB SERPL-MCNC: 1.1 MG/DL (ref 0.1–1)
BILIRUB UR QL STRIP: NEGATIVE
BNP SERPL-MCNC: 2691 PG/ML (ref 0–99)
BUN SERPL-MCNC: 40 MG/DL (ref 8–23)
CALCIUM SERPL-MCNC: 9.1 MG/DL (ref 8.7–10.5)
CHLORIDE SERPL-SCNC: 105 MMOL/L (ref 95–110)
CLARITY UR: CLEAR
CO2 SERPL-SCNC: 24 MMOL/L (ref 23–29)
COLOR UR: YELLOW
CREAT SERPL-MCNC: 2.6 MG/DL (ref 0.5–1.4)
DIFFERENTIAL METHOD BLD: ABNORMAL
EOSINOPHIL # BLD AUTO: 0.1 K/UL (ref 0–0.5)
EOSINOPHIL NFR BLD: 1.1 % (ref 0–8)
ERYTHROCYTE [DISTWIDTH] IN BLOOD BY AUTOMATED COUNT: 21.8 % (ref 11.5–14.5)
EST. GFR  (NO RACE VARIABLE): 24 ML/MIN/1.73 M^2
GLUCOSE SERPL-MCNC: 79 MG/DL (ref 70–110)
GLUCOSE UR QL STRIP: NEGATIVE
HCT VFR BLD AUTO: 49.2 % (ref 40–54)
HCV AB SERPL QL IA: NEGATIVE
HGB BLD-MCNC: 15.6 G/DL (ref 14–18)
HGB UR QL STRIP: NEGATIVE
HIV 1+2 AB+HIV1 P24 AG SERPL QL IA: NEGATIVE
IMM GRANULOCYTES # BLD AUTO: 0.01 K/UL (ref 0–0.04)
IMM GRANULOCYTES NFR BLD AUTO: 0.2 % (ref 0–0.5)
KETONES UR QL STRIP: NEGATIVE
LEUKOCYTE ESTERASE UR QL STRIP: NEGATIVE
LYMPHOCYTES # BLD AUTO: 1.1 K/UL (ref 1–4.8)
LYMPHOCYTES NFR BLD: 20.9 % (ref 18–48)
MCH RBC QN AUTO: 28.3 PG (ref 27–31)
MCHC RBC AUTO-ENTMCNC: 31.7 G/DL (ref 32–36)
MCV RBC AUTO: 89 FL (ref 82–98)
MONOCYTES # BLD AUTO: 0.7 K/UL (ref 0.3–1)
MONOCYTES NFR BLD: 13.7 % (ref 4–15)
NEUTROPHILS # BLD AUTO: 3.5 K/UL (ref 1.8–7.7)
NEUTROPHILS NFR BLD: 63.7 % (ref 38–73)
NITRITE UR QL STRIP: NEGATIVE
NRBC BLD-RTO: 0 /100 WBC
PH UR STRIP: 6 [PH] (ref 5–8)
PLATELET # BLD AUTO: 92 K/UL (ref 150–450)
PMV BLD AUTO: ABNORMAL FL (ref 9.2–12.9)
POTASSIUM SERPL-SCNC: 4.2 MMOL/L (ref 3.5–5.1)
PROT SERPL-MCNC: 6.9 G/DL (ref 6–8.4)
PROT UR QL STRIP: ABNORMAL
RBC # BLD AUTO: 5.51 M/UL (ref 4.6–6.2)
SODIUM SERPL-SCNC: 142 MMOL/L (ref 136–145)
SP GR UR STRIP: 1.01 (ref 1–1.03)
TROPONIN I SERPL DL<=0.01 NG/ML-MCNC: 0.19 NG/ML (ref 0–0.03)
URN SPEC COLLECT METH UR: ABNORMAL
UROBILINOGEN UR STRIP-ACNC: ABNORMAL EU/DL
WBC # BLD AUTO: 5.41 K/UL (ref 3.9–12.7)

## 2024-05-09 PROCEDURE — 93005 ELECTROCARDIOGRAM TRACING: CPT

## 2024-05-09 PROCEDURE — 63600175 PHARM REV CODE 636 W HCPCS: Performed by: EMERGENCY MEDICINE

## 2024-05-09 PROCEDURE — 25000003 PHARM REV CODE 250: Performed by: NURSE PRACTITIONER

## 2024-05-09 PROCEDURE — 93010 ELECTROCARDIOGRAM REPORT: CPT | Mod: ,,, | Performed by: INTERNAL MEDICINE

## 2024-05-09 PROCEDURE — 63600175 PHARM REV CODE 636 W HCPCS: Performed by: NURSE PRACTITIONER

## 2024-05-09 PROCEDURE — 3288F FALL RISK ASSESSMENT DOCD: CPT | Mod: CPTII,S$GLB,, | Performed by: INTERNAL MEDICINE

## 2024-05-09 PROCEDURE — 96374 THER/PROPH/DIAG INJ IV PUSH: CPT

## 2024-05-09 PROCEDURE — 87389 HIV-1 AG W/HIV-1&-2 AB AG IA: CPT | Performed by: EMERGENCY MEDICINE

## 2024-05-09 PROCEDURE — 81003 URINALYSIS AUTO W/O SCOPE: CPT | Performed by: EMERGENCY MEDICINE

## 2024-05-09 PROCEDURE — 84484 ASSAY OF TROPONIN QUANT: CPT | Performed by: EMERGENCY MEDICINE

## 2024-05-09 PROCEDURE — 1101F PT FALLS ASSESS-DOCD LE1/YR: CPT | Mod: CPTII,S$GLB,, | Performed by: INTERNAL MEDICINE

## 2024-05-09 PROCEDURE — 86803 HEPATITIS C AB TEST: CPT | Performed by: EMERGENCY MEDICINE

## 2024-05-09 PROCEDURE — 85025 COMPLETE CBC W/AUTO DIFF WBC: CPT | Performed by: EMERGENCY MEDICINE

## 2024-05-09 PROCEDURE — 99999 PR PBB SHADOW E&M-EST. PATIENT-LVL III: CPT | Mod: PBBFAC,,, | Performed by: INTERNAL MEDICINE

## 2024-05-09 PROCEDURE — 83880 ASSAY OF NATRIURETIC PEPTIDE: CPT | Performed by: EMERGENCY MEDICINE

## 2024-05-09 PROCEDURE — 11000001 HC ACUTE MED/SURG PRIVATE ROOM

## 2024-05-09 PROCEDURE — 3074F SYST BP LT 130 MM HG: CPT | Mod: CPTII,S$GLB,, | Performed by: INTERNAL MEDICINE

## 2024-05-09 PROCEDURE — 99291 CRITICAL CARE FIRST HOUR: CPT

## 2024-05-09 PROCEDURE — 99215 OFFICE O/P EST HI 40 MIN: CPT | Mod: 25,S$GLB,, | Performed by: INTERNAL MEDICINE

## 2024-05-09 PROCEDURE — 1126F AMNT PAIN NOTED NONE PRSNT: CPT | Mod: CPTII,S$GLB,, | Performed by: INTERNAL MEDICINE

## 2024-05-09 PROCEDURE — 1159F MED LIST DOCD IN RCRD: CPT | Mod: CPTII,S$GLB,, | Performed by: INTERNAL MEDICINE

## 2024-05-09 PROCEDURE — 3078F DIAST BP <80 MM HG: CPT | Mod: CPTII,S$GLB,, | Performed by: INTERNAL MEDICINE

## 2024-05-09 PROCEDURE — 80053 COMPREHEN METABOLIC PANEL: CPT | Performed by: EMERGENCY MEDICINE

## 2024-05-09 RX ORDER — ONDANSETRON 4 MG/1
4 TABLET, ORALLY DISINTEGRATING ORAL EVERY 8 HOURS PRN
Status: DISCONTINUED | OUTPATIENT
Start: 2024-05-09 | End: 2024-05-16 | Stop reason: HOSPADM

## 2024-05-09 RX ORDER — ALLOPURINOL 100 MG/1
1 TABLET ORAL DAILY
COMMUNITY
Start: 2024-02-23

## 2024-05-09 RX ORDER — AMOXICILLIN 250 MG
1 CAPSULE ORAL 2 TIMES DAILY
Status: DISCONTINUED | OUTPATIENT
Start: 2024-05-09 | End: 2024-05-16 | Stop reason: HOSPADM

## 2024-05-09 RX ORDER — ASPIRIN 81 MG/1
81 TABLET ORAL DAILY
Status: DISCONTINUED | OUTPATIENT
Start: 2024-05-09 | End: 2024-05-16 | Stop reason: HOSPADM

## 2024-05-09 RX ORDER — HYDROCODONE BITARTRATE AND ACETAMINOPHEN 5; 325 MG/1; MG/1
1 TABLET ORAL EVERY 4 HOURS PRN
Status: DISCONTINUED | OUTPATIENT
Start: 2024-05-09 | End: 2024-05-16 | Stop reason: HOSPADM

## 2024-05-09 RX ORDER — SODIUM CHLORIDE 0.9 % (FLUSH) 0.9 %
10 SYRINGE (ML) INJECTION
Status: DISCONTINUED | OUTPATIENT
Start: 2024-05-09 | End: 2024-05-16 | Stop reason: HOSPADM

## 2024-05-09 RX ORDER — ONDANSETRON HYDROCHLORIDE 2 MG/ML
4 INJECTION, SOLUTION INTRAVENOUS EVERY 8 HOURS PRN
Status: DISCONTINUED | OUTPATIENT
Start: 2024-05-09 | End: 2024-05-16 | Stop reason: HOSPADM

## 2024-05-09 RX ORDER — CETIRIZINE HYDROCHLORIDE 10 MG/1
10 TABLET ORAL NIGHTLY
Status: DISCONTINUED | OUTPATIENT
Start: 2024-05-09 | End: 2024-05-16 | Stop reason: HOSPADM

## 2024-05-09 RX ORDER — ACETAMINOPHEN 325 MG/1
650 TABLET ORAL EVERY 4 HOURS PRN
Status: DISCONTINUED | OUTPATIENT
Start: 2024-05-09 | End: 2024-05-16 | Stop reason: HOSPADM

## 2024-05-09 RX ORDER — FLUTICASONE PROPIONATE 50 MCG
1 SPRAY, SUSPENSION (ML) NASAL DAILY
COMMUNITY

## 2024-05-09 RX ORDER — METOPROLOL TARTRATE 25 MG/1
12.5 TABLET ORAL 2 TIMES DAILY
Status: DISCONTINUED | OUTPATIENT
Start: 2024-05-09 | End: 2024-05-11

## 2024-05-09 RX ORDER — LEVOCETIRIZINE DIHYDROCHLORIDE 5 MG/1
1 TABLET, FILM COATED ORAL DAILY
COMMUNITY
Start: 2024-02-23

## 2024-05-09 RX ORDER — MORPHINE SULFATE 2 MG/ML
2 INJECTION, SOLUTION INTRAMUSCULAR; INTRAVENOUS EVERY 6 HOURS PRN
Status: DISCONTINUED | OUTPATIENT
Start: 2024-05-09 | End: 2024-05-11

## 2024-05-09 RX ORDER — POLYETHYLENE GLYCOL 3350 17 G/17G
17 POWDER, FOR SOLUTION ORAL 2 TIMES DAILY PRN
Status: DISCONTINUED | OUTPATIENT
Start: 2024-05-09 | End: 2024-05-16 | Stop reason: HOSPADM

## 2024-05-09 RX ORDER — ALLOPURINOL 100 MG/1
100 TABLET ORAL DAILY
Status: DISCONTINUED | OUTPATIENT
Start: 2024-05-10 | End: 2024-05-16 | Stop reason: HOSPADM

## 2024-05-09 RX ORDER — FUROSEMIDE 10 MG/ML
60 INJECTION INTRAMUSCULAR; INTRAVENOUS 2 TIMES DAILY
Status: DISCONTINUED | OUTPATIENT
Start: 2024-05-09 | End: 2024-05-10

## 2024-05-09 RX ORDER — TALC
6 POWDER (GRAM) TOPICAL NIGHTLY PRN
Status: DISCONTINUED | OUTPATIENT
Start: 2024-05-09 | End: 2024-05-16 | Stop reason: HOSPADM

## 2024-05-09 RX ORDER — CALCITRIOL 0.25 UG/1
0.25 CAPSULE ORAL
COMMUNITY

## 2024-05-09 RX ORDER — HEPARIN SODIUM 5000 [USP'U]/ML
5000 INJECTION, SOLUTION INTRAVENOUS; SUBCUTANEOUS EVERY 8 HOURS
Status: DISCONTINUED | OUTPATIENT
Start: 2024-05-09 | End: 2024-05-16 | Stop reason: HOSPADM

## 2024-05-09 RX ORDER — TALC
6 POWDER (GRAM) TOPICAL NIGHTLY PRN
Status: CANCELLED | OUTPATIENT
Start: 2024-05-09

## 2024-05-09 RX ORDER — ATORVASTATIN CALCIUM 20 MG/1
20 TABLET, FILM COATED ORAL DAILY
Status: DISCONTINUED | OUTPATIENT
Start: 2024-05-09 | End: 2024-05-16 | Stop reason: HOSPADM

## 2024-05-09 RX ORDER — FUROSEMIDE 10 MG/ML
80 INJECTION INTRAMUSCULAR; INTRAVENOUS
Status: COMPLETED | OUTPATIENT
Start: 2024-05-09 | End: 2024-05-09

## 2024-05-09 RX ORDER — MORPHINE SULFATE 2 MG/ML
2 INJECTION, SOLUTION INTRAMUSCULAR; INTRAVENOUS EVERY 4 HOURS PRN
Status: CANCELLED | OUTPATIENT
Start: 2024-05-09

## 2024-05-09 RX ORDER — CALCITRIOL 0.25 UG/1
0.25 CAPSULE ORAL
Status: DISCONTINUED | OUTPATIENT
Start: 2024-05-10 | End: 2024-05-16 | Stop reason: HOSPADM

## 2024-05-09 RX ORDER — ACETAMINOPHEN 325 MG/1
650 TABLET ORAL EVERY 4 HOURS PRN
Status: CANCELLED | OUTPATIENT
Start: 2024-05-09

## 2024-05-09 RX ORDER — TRAMADOL HYDROCHLORIDE 50 MG/1
50 TABLET ORAL EVERY 6 HOURS PRN
Status: CANCELLED | OUTPATIENT
Start: 2024-05-09

## 2024-05-09 RX ADMIN — METOPROLOL TARTRATE 12.5 MG: 25 TABLET, FILM COATED ORAL at 04:05

## 2024-05-09 RX ADMIN — ATORVASTATIN CALCIUM 20 MG: 20 TABLET, FILM COATED ORAL at 04:05

## 2024-05-09 RX ADMIN — FUROSEMIDE 60 MG: 10 INJECTION, SOLUTION INTRAMUSCULAR; INTRAVENOUS at 08:05

## 2024-05-09 RX ADMIN — Medication 6 MG: at 09:05

## 2024-05-09 RX ADMIN — HEPARIN SODIUM 5000 UNITS: 5000 INJECTION INTRAVENOUS; SUBCUTANEOUS at 09:05

## 2024-05-09 RX ADMIN — FUROSEMIDE 80 MG: 10 INJECTION, SOLUTION INTRAMUSCULAR; INTRAVENOUS at 01:05

## 2024-05-09 NOTE — ASSESSMENT & PLAN NOTE
Chronic idiopathic thrombocytopenia  -chronic MARLENI and thrombocytopenia  -at admission H/H 15.6/49.2 and platelets 92 >>>at/near baseline  -no overt bleeding  -trend labs over course, address/transfuse as indicated

## 2024-05-09 NOTE — HPI
HPI by Roselia William NP:  Mr. Lopez is a 78 YOM with PMHx of CAD with history of MI s/p PCI (RCA x 2 JOSE C 2010), combined systolic and diastolic CHF, HTN, HLD, history of CVA (2015), preDM, CKD III (baseline SCr 2.1-2.3, follows with Dr. Matson), secondary hyperparathyroidism, MARLENI, chronic idiopathic thrombocytopenia, and history of prostate CA s/p TURP.     He presents to ED from Cardiology clinic due to worsening edema in thighs, legs, and scrotum with associated HARTMAN, orthopnea, PND, and decreased UOP despite taking home PO lasix daily. Symptoms have progressively worsened over the last couple of weeks. He notes some noncompliance with low sodium diet including take out pizza and [employer] meals recently. He denies fever, chills, recent illness, CP, abdominal pain, N/V/D, constipation, hematuria, changes in bowel habits or blood in stool, decreased appetite, changes in PO intake, light headiness, dizziness, seizures, or syncope. He lives with spouse, uses a walker at baseline, and is independent in ADLs.     In the ED he is HDS and afebrile. CBC with WBC 5, H/H 15.6/49.2, platelets 92 (baseline 110-133). Chemistry with , K 4.2, chloride 105, CO2 24, BUN 40, SCr 2.6 (baseline 2.1-2.3), glucose 79. LFTs WNL. BNP 2691. Troponin 0.189 (similar to priors). Hep C and HIV non reactive. UA noninfectious. CXR with pulmonary edema and effusions. EKG with SR, PACs, and T wave abnormalities similar to priors.     The patient was placed in observation to the Hospital Medicine Service for further evaluation and treatment.

## 2024-05-09 NOTE — ASSESSMENT & PLAN NOTE
ARELI on CKD  Secondary hyperparathyroidism   -chronic CKD with admission SCr 2.6   -baseline 2.1-2.3  -likely cardiorenal syndrome in setting of CHF exacerbation  -IV diuresis as noted above, monitor for toleration  -consider Nephrology consult in AM if indicated  -continue home Calcitrol MWF  -trend labs, address/replete electrolytes as indicated  -avoid nephrotoxins and hypotension as able, renally dose medications

## 2024-05-09 NOTE — PROGRESS NOTES
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  He was last seen in February.  He stated that for the past 2 days, he has noticed worsening swelling in his legs, thighs and scrotum.  He states that he has not been urinating as much.  He is taking his furosemide once a day.  He also reports shortness of breath when he walks.  He walks using a walker.  He denies any paroxysmal nocturnal dyspnea.  He went to see his nephrologist last week and he was told everything was stable.    Medications  Current Outpatient Medications   Medication Sig Dispense Refill    allopurinoL (ZYLOPRIM) 100 MG tablet Take 1 tablet by mouth once daily.      calcitRIOL (ROCALTROL) 0.25 MCG Cap Take 0.25 mcg by mouth 3 (three) times a week.      furosemide (LASIX) 40 MG tablet Take 1 tablet (40 mg total) by mouth 2 (two) times daily. (Patient taking differently: Take 40 mg by mouth once daily.) 60 tablet 3    latanoprost 0.005 % ophthalmic solution Place 1  drop into both eyes every evening.      levocetirizine (XYZAL) 5 MG tablet Take 1 tablet by mouth once daily.      simvastatin (ZOCOR) 40 MG tablet Take 1 tablet (40 mg total) by mouth once daily. 90 tablet 0    aspirin (ECOTRIN) 81 MG EC tablet Take 1 tablet (81 mg total) by mouth once daily. 30 tablet 0    metoprolol succinate (TOPROL-XL) 25 MG 24 hr tablet Take 1 tablet (25 mg total) by mouth every evening. 90 tablet 3     No current facility-administered medications for this visit.      Prior to Admission medications    Medication Sig Start Date End Date Taking? Authorizing Provider   allopurinoL (ZYLOPRIM) 100 MG tablet Take 1 tablet by mouth once daily. 2/23/24  Yes Provider, Historical   calcitRIOL (ROCALTROL) 0.25 MCG Cap Take 0.25 mcg by mouth 3 (three) times a week.   Yes Provider, Historical   furosemide (LASIX) 40 MG tablet Take 1 tablet (40 mg total) by mouth 2 (two) times daily.  Patient taking differently: Take 40 mg by mouth once daily. 1/17/24  Yes Fabio Martinez MD   latanoprost 0.005 % ophthalmic solution Place 1 drop into both eyes every evening. 7/21/23  Yes Provider, Historical   levocetirizine (XYZAL) 5 MG tablet Take 1 tablet by mouth once daily. 2/23/24  Yes Provider, Historical   simvastatin (ZOCOR) 40 MG tablet Take 1 tablet (40 mg total) by mouth once daily. 12/14/18  Yes Mickey Darden MD   aspirin (ECOTRIN) 81 MG EC tablet Take 1 tablet (81 mg total) by mouth once daily. 8/11/22 2/1/24  Mara Brugos PA-C   metoprolol succinate (TOPROL-XL) 25 MG 24 hr tablet Take 1 tablet (25 mg total) by mouth every evening. 3/2/23 3/1/24  Fabio Martinez MD   baclofen (LIORESAL) 10 MG tablet Take 1 tablet (10 mg total) by mouth 3 (three) times daily. 10/3/18 8/11/22  Mickey Darden MD   clopidogrel (PLAVIX) 75 mg tablet Take 1 tablet (75 mg total) by mouth once daily. 12/14/18 8/11/22  Mickey Darden MD   hydroCHLOROthiazide (MICROZIDE) 12.5 mg capsule Take 1 capsule (12.5 mg  total) by mouth once daily.  Patient taking differently: Take 25 mg by mouth once daily. 10/31/18 8/11/22  Mickey Darden MD         History  Past Medical History:   Diagnosis Date    CAD (coronary artery disease)     Chronic combined systolic and diastolic CHF (congestive heart failure)     Chronic idiopathic thrombocytopenia     Disorder of kidney and ureter     History of heart attack     History of stroke     Hyperlipidemia     Hypertension      Past Surgical History:   Procedure Laterality Date    FACIAL RECONSTRUCTION SURGERY      PROSTATE SURGERY       Social History     Socioeconomic History    Marital status:    Tobacco Use    Smoking status: Former     Current packs/day: 0.00     Types: Cigarettes     Quit date: 10/5/2008     Years since quitting: 15.6    Smokeless tobacco: Never   Substance and Sexual Activity    Alcohol use: No     Alcohol/week: 0.0 standard drinks of alcohol    Drug use: No    Sexual activity: Never     Social Determinants of Health     Financial Resource Strain: Low Risk  (8/28/2023)    Overall Financial Resource Strain (CARDIA)     Difficulty of Paying Living Expenses: Not very hard   Food Insecurity: No Food Insecurity (8/28/2023)    Hunger Vital Sign     Worried About Running Out of Food in the Last Year: Never true     Ran Out of Food in the Last Year: Never true   Transportation Needs: No Transportation Needs (8/28/2023)    PRAPARE - Transportation     Lack of Transportation (Medical): No     Lack of Transportation (Non-Medical): No   Physical Activity: Insufficiently Active (8/28/2023)    Exercise Vital Sign     Days of Exercise per Week: 7 days     Minutes of Exercise per Session: 20 min   Stress: No Stress Concern Present (8/28/2023)    [employer] [place] [employer] - Occupational Stress Questionnaire     Feeling of Stress : Not at all   Housing Stability: Unknown (8/28/2023)    Housing Stability Vital Sign     Unable to Pay for Housing in the Last Year:  No     Unstable Housing in the Last Year: No         Allergies  Review of patient's allergies indicates:  No Known Allergies      Review of Systems   Review of Systems   Cardiovascular:  Positive for leg swelling and orthopnea. Negative for chest pain, claudication, cyanosis, dyspnea on exertion, irregular heartbeat, near-syncope, palpitations and paroxysmal nocturnal dyspnea.   Respiratory:  Positive for shortness of breath. Negative for cough, hemoptysis, sleep disturbances due to breathing, snoring, sputum production and wheezing.    All other systems reviewed and are negative.        Physical Exam  Wt Readings from Last 1 Encounters:   05/09/24 86.6 kg (191 lb)     BP Readings from Last 3 Encounters:   05/09/24 112/74   02/22/24 120/80   02/04/24 124/67     Pulse Readings from Last 1 Encounters:   05/09/24 90     Body mass index is 26.64 kg/m².    Physical Exam  Neck:      Vascular: JVD present.   Cardiovascular:      Rate and Rhythm: Normal rate and regular rhythm.      Pulses:           Carotid pulses are 2+ on the right side and 2+ on the left side.       Radial pulses are 2+ on the right side and 2+ on the left side.      Heart sounds: S1 normal and S2 normal. Murmur heard.      Systolic murmur is present.      Comments: Tense edema in legs, thighs.  Reported scrotal edema.  Pulmonary:      Breath sounds: Normal breath sounds and air entry.   Musculoskeletal:      Right lower leg: Edema present.      Left lower leg: Edema present.   Neurological:      Mental Status: He is alert.             Assessment  1. Essential hypertension  Well controlled on current medications, continue medications and monitor    2. Coronary artery disease involving native coronary artery of native heart without angina pectoris  Stable no angina    3. Acute on chronic combined systolic and diastolic heart failure  Decompensated congestive heart failure.  He is got marked edema and anasarca.    4. Stage 3 chronic kidney disease,  unspecified whether stage 3a or 3b CKD  Unchanged        Plan and Discussion  he has not been compliant with low-sodium diet.  Discussed that has tense a day up to his hips and anasarca.  It is concerning that he has not producing much urine.  Discussed with ER provider.  Discussed with patient and he has agreed to go to emergency room for admission for decompensated congestive heart failure.    Follow Up  TBD after hospitalization      Fabio Martinez MD, F.A.C.C, F.S.C.A.I.      Total professional time spent for the encounter: 45 minutes  Time was spent preparing to see the patient, reviewing results of prior testing, obtaining and/or reviewing separately obtained history, performing a medically appropriate examination and interview, counseling and educating the patient/family, ordering medications/tests/procedures, referring and communicating with other health care professionals, documenting clinical information in the electronic health record, and independently interpreting results.    Disclaimer: This document was created using voice recognition software ([employer]). Although it may be edited, this document may contain errors related to incorrect recognition of the spoken word. Please call the physician if clarification is needed.

## 2024-05-09 NOTE — ED NOTES
LOC: The patient is awake, alert, and oriented to self, place, time, and situation. Pt is calm and cooperative. Affect is appropriate.  Speech is appropriate and clear.     APPEARANCE: Patient resting on stretcher in no acute distress.  Patient is clean and well groomed.    SKIN: The skin is warm and dry; color consistent with ethnicity.  Patient has normal skin turgor and moist mucus membranes.  Skin intact with the exception of a large blister to the right lower leg (see photo is media); no additional breakdown or bruising noted.     MUSCULOSKELETAL: Patient moving upper and lower extremities without difficulty; denies pain in the extremities or back.  Denies weakness.     RESPIRATORY: Airway is open and patent. Respirations spontaneous and even but mildly labored.  Patient has a slight increase in respiratory effort and rate.  No accessory muscle use noted. Denies cough. Pt reporting increased SOB; worse with exertion and laying flat.    CARDIAC:  Normal rate noted.  3+ pitting edema noted to the BLE. Moderate swelling to the scrotum noted. No complaints of chest pain.     ABDOMEN: Soft and non tender to palpation.  No distention noted. Pt denies abdominal pain; denies nausea, vomiting, diarrhea, or constipation.    NEUROLOGIC: Eyes open spontaneously.  Behavior appropriate to situation.  Follows commands; facial expression symmetrical.  Purposeful motor response noted; normal sensation in all extremities. Pt denies headache; denies lightheadedness or dizziness; denies visual disturbances; denies loss of balance; denies unilateral weakness.

## 2024-05-09 NOTE — ASSESSMENT & PLAN NOTE
History of PCI  History of CVA  Hyperlipidemia   -history of remote MI with PCI in 2010 and CVA in 2015  -chronic demand ischemia noted with elevated troponins  -admission troponin at baseline and on acute dynamic EKG changes  -continue home metoprolol as detailed above  -continue home ASA  -continue home statin per pharmacy formulary alternative  -dose/medication adjustment as appropriate   -continue tele monitoring  -EKG PRN concerns  -trend labs, address/replete electrolytes as indicated

## 2024-05-09 NOTE — SUBJECTIVE & OBJECTIVE
Past Medical History:   Diagnosis Date    CAD (coronary artery disease)     Chronic combined systolic and diastolic CHF (congestive heart failure)     Chronic idiopathic thrombocytopenia     Disorder of kidney and ureter     History of heart attack     History of stroke     Hyperlipidemia     Hypertension        Past Surgical History:   Procedure Laterality Date    FACIAL RECONSTRUCTION SURGERY      PROSTATE SURGERY         Review of patient's allergies indicates:  No Known Allergies    No current facility-administered medications on file prior to encounter.     Current Outpatient Medications on File Prior to Encounter   Medication Sig    allopurinoL (ZYLOPRIM) 100 MG tablet Take 1 tablet by mouth once daily.    aspirin (ECOTRIN) 81 MG EC tablet Take 1 tablet (81 mg total) by mouth once daily.    calcitRIOL (ROCALTROL) 0.25 MCG Cap Take 0.25 mcg by mouth 3 (three) times a week.    fluticasone propionate (FLONASE) 50 mcg/actuation nasal spray 1 spray by Each Nostril route once daily.    furosemide (LASIX) 40 MG tablet Take 1 tablet (40 mg total) by mouth 2 (two) times daily. (Patient taking differently: Take 40 mg by mouth once daily.)    latanoprost 0.005 % ophthalmic solution Place 1 drop into both eyes every evening.    levocetirizine (XYZAL) 5 MG tablet Take 1 tablet by mouth once daily.    metoprolol succinate (TOPROL-XL) 25 MG 24 hr tablet Take 1 tablet (25 mg total) by mouth every evening.    simvastatin (ZOCOR) 40 MG tablet Take 1 tablet (40 mg total) by mouth once daily.    [DISCONTINUED] baclofen (LIORESAL) 10 MG tablet Take 1 tablet (10 mg total) by mouth 3 (three) times daily.    [DISCONTINUED] clopidogrel (PLAVIX) 75 mg tablet Take 1 tablet (75 mg total) by mouth once daily.    [DISCONTINUED] hydroCHLOROthiazide (MICROZIDE) 12.5 mg capsule Take 1 capsule (12.5 mg total) by mouth once daily. (Patient taking differently: Take 25 mg by mouth once daily.)     Family History       Problem Relation (Age of  Onset)    Hypertension Mother    No Known Problems Father          Tobacco Use    Smoking status: Former     Current packs/day: 0.00     Types: Cigarettes     Quit date: 10/5/2008     Years since quitting: 15.6    Smokeless tobacco: Never   Substance and Sexual Activity    Alcohol use: No     Alcohol/week: 0.0 standard drinks of alcohol    Drug use: No    Sexual activity: Never     Review of Systems   Constitutional:  Negative for activity change, appetite change, chills, diaphoresis, fatigue and fever.   Respiratory:  Positive for shortness of breath. Negative for cough, chest tightness and wheezing.    Cardiovascular:  Positive for leg swelling. Negative for chest pain and palpitations.   Gastrointestinal:  Negative for abdominal distention, abdominal pain, constipation, diarrhea, nausea and vomiting.   Genitourinary:  Positive for decreased urine volume and scrotal swelling. Negative for difficulty urinating, dysuria, hematuria and urgency.   Musculoskeletal:  Positive for gait problem (chronic).   Skin:  Positive for wound (RLE).   Neurological:  Negative for dizziness, syncope, weakness, light-headedness and headaches.     Objective:     Vital Signs (Most Recent):  Temp: 97.9 °F (36.6 °C) (05/09/24 1222)  Pulse: 75 (05/09/24 1604)  Resp: 19 (05/09/24 1401)  BP: 122/66 (05/09/24 1604)  SpO2: 98 % (05/09/24 1432) Vital Signs (24h Range):  Temp:  [97.9 °F (36.6 °C)] 97.9 °F (36.6 °C)  Pulse:  [72-90] 75  Resp:  [18-19] 19  SpO2:  [96 %-99 %] 98 %  BP: (106-122)/(59-74) 122/66     Weight: 85.7 kg (189 lb)  Body mass index is 25.63 kg/m².     Physical Exam  Vitals and nursing note reviewed.   Constitutional:       General: He is not in acute distress.     Appearance: He is well-developed. He is ill-appearing. He is not toxic-appearing.   HENT:      Head: Normocephalic and atraumatic.      Mouth/Throat:      Dentition: Normal dentition.   Eyes:      General: Lids are normal.      Extraocular Movements: Extraocular  movements intact.      Conjunctiva/sclera: Conjunctivae normal.   Cardiovascular:      Rate and Rhythm: Normal rate and regular rhythm.      Heart sounds: Murmur heard.   Pulmonary:      Effort: Pulmonary effort is normal.      Comments: Decreased breath sounds bilateral bases. No conversational dyspnea or increased WOB. No cough during interview or exam.   Abdominal:      General: Bowel sounds are normal. There is distension (bilateral flank anasarca.).      Palpations: Abdomen is soft.      Tenderness: There is no abdominal tenderness.   Genitourinary:     Comments: +scrotal edema.  Musculoskeletal:      Cervical back: Neck supple.      Right lower leg: Edema present.      Left lower leg: Edema present.      Comments: Anasarca from bilateral flanks to feet with weeping edema of BLEs. Blister rupture to RLE. See media.    Skin:     General: Skin is warm and dry.      Findings: Lesion present. No rash.      Comments: See media.    Neurological:      Mental Status: He is alert and oriented to person, place, and time.                               Significant Labs: All pertinent labs within the past 24 hours have been reviewed.  CBC:   Recent Labs   Lab 05/09/24  1311   WBC 5.41   HGB 15.6   HCT 49.2   PLT 92*     CMP:   Recent Labs   Lab 05/09/24  1405      K 4.2      CO2 24   GLU 79   BUN 40*   CREATININE 2.6*   CALCIUM 9.1   PROT 6.9   ALBUMIN 3.0*   BILITOT 1.1*   ALKPHOS 83   AST 19   ALT 14   ANIONGAP 13     Cardiac Markers:   Recent Labs   Lab 05/09/24  1311   BNP 2,691*     Troponin:   Recent Labs   Lab 05/09/24  1311   TROPONINI 0.189*     Urine Studies:   Recent Labs   Lab 05/09/24  1407   COLORU Yellow   APPEARANCEUA Clear   PHUR 6.0   SPECGRAV 1.010   PROTEINUA Trace*   GLUCUA Negative   KETONESU Negative   BILIRUBINUA Negative   OCCULTUA Negative   NITRITE Negative   UROBILINOGEN 2.0-3.0*   LEUKOCYTESUR Negative       Significant Imaging: I have reviewed all pertinent imaging results/findings  within the past 24 hours.

## 2024-05-09 NOTE — ASSESSMENT & PLAN NOTE
Anasarca  -acute CHF exacerbation in setting of dietary and medication noncompliance with associated ARELI on CKD and chronic demand ischemia   -at admission HDS and afebrile  -admission BNP 2691, troponin 0.189 (at baseline), CXR with pulmonary edema and effusions, EKG with SR, PVCs and TWA similar to prior studies   -recent TTE reviewed noting EF 35-40% with regional WMAs, LV dilation, diastolic dysfunction, mild AVS, mild MR  -CHF pathway initiated  -updated TTE pending >>> please follow   -continue IV diuresis with furosemide >>> monitor for toleration   -hold home PO lasix  -continue home metoprolol dosing with transition to lopressor during acute CHF exacerbation  -dose/medication adjustment as appropriate   -elevate LEs and scrotum as tolerated  -Wound Care consulted due to altered skin integrity with blister rupture and weeping edema  -continue tele monitoring  -EKG PRN concerns   -cardiac diet with fluid restriction  -strict I&Os and daily weights  -trend labs, address/replete electrolytes as indicated  -outpt follow up with Cardiology at [facility]

## 2024-05-09 NOTE — ED PROVIDER NOTES
"Encounter Date: 5/9/2024       History     Chief Complaint   Patient presents with    Leg Swelling     Pt. States his leg have been swollen x 1 week. Pt. reports SOB x 1 week. Pt. Denies chest pain. Pt. Is alert and ABC's are intact.     78-year-old male presents with complaint of "fluid. " He states that over the last 2.5 weeks he has had increasing swelling to his legs, which has now gone up into his thighs and scrotum.  He has a history of CHF, but has been taking all medications as prescribed and denies any medication or dietary noncompliance.  There is associated shortness of breath described as dyspnea on exertion, but no recent fever, cough, or chest pain.    The history is provided by the patient.     Review of patient's allergies indicates:  No Known Allergies  Past Medical History:   Diagnosis Date    CAD (coronary artery disease)     Chronic combined systolic and diastolic CHF (congestive heart failure)     Chronic idiopathic thrombocytopenia     Disorder of kidney and ureter     History of heart attack     History of stroke     Hyperlipidemia     Hypertension      Past Surgical History:   Procedure Laterality Date    FACIAL RECONSTRUCTION SURGERY      PROSTATE SURGERY       Family History   Problem Relation Name Age of Onset    Hypertension Mother      No Known Problems Father       Social History     Tobacco Use    Smoking status: Former     Current packs/day: 0.00     Types: Cigarettes     Quit date: 10/5/2008     Years since quitting: 15.6    Smokeless tobacco: Never   Substance Use Topics    Alcohol use: No     Alcohol/week: 0.0 standard drinks of alcohol    Drug use: No     Review of Systems   Constitutional:  Negative for chills and fever.   HENT:  Negative for congestion and sore throat.    Eyes:  Negative for visual disturbance.   Respiratory:  Positive for shortness of breath. Negative for cough.    Cardiovascular:  Positive for leg swelling. Negative for chest pain and palpitations. "   Gastrointestinal:  Negative for abdominal pain, diarrhea and vomiting.   Genitourinary:  Negative for decreased urine volume, dysuria and frequency.   Musculoskeletal:  Negative for neck pain and neck stiffness.   Skin:  Positive for wound (To right lower leg x2 weeks). Negative for rash.   Neurological:  Negative for weakness, numbness and headaches.   Psychiatric/Behavioral:  Negative for behavioral problems and confusion.        Physical Exam     Initial Vitals [05/09/24 1222]   BP Pulse Resp Temp SpO2   112/70 76 18 97.9 °F (36.6 °C) 98 %      MAP       --         Physical Exam    Constitutional: He appears well-developed and well-nourished. He has a sickly appearance.   HENT:   Head: Normocephalic and atraumatic.   Nose: Nose normal.   Mouth/Throat: Oropharynx is clear and moist.   Eyes: Conjunctivae are normal.   Cardiovascular:  Normal rate and regular rhythm.     Exam reveals no gallop and no friction rub.       No murmur heard.  Lower extremities are shiny and hairless, cool to the touch.  No palpable DP or PT pulses bilaterally.   Pulmonary/Chest: No respiratory distress. He has no wheezes. He has no rales.   Bilateral breath sounds decreased at bases.   Abdominal: Abdomen is soft. Bowel sounds are normal. There is no abdominal tenderness. There is no rebound and no guarding.   Musculoskeletal:         General: Edema (3+ pitting up to thighs.) present. No tenderness.     Neurological: He is alert and oriented to person, place, and time. He has normal strength. No cranial nerve deficit or sensory deficit. Gait normal. GCS score is 15. GCS eye subscore is 4. GCS verbal subscore is 5. GCS motor subscore is 6.   Skin: Skin is warm and dry. No rash noted.   5 cm circular open wound to right lower leg, see image below.  This has a granular base and some devitalized blister tissue at margins.  No surrounding erythema.   Psychiatric: He has a normal mood and affect. His speech is normal and behavior is normal.          ED Course   Critical Care    Date/Time: 5/9/2024 3:27 PM    Performed by: Kathleen Pratt MD  Authorized by: Kathleen Pratt MD  Direct patient critical care time: 18 minutes  Additional history critical care time: 6 minutes  Ordering / reviewing critical care time: 6 minutes  Documentation critical care time: 6 minutes  Consulting other physicians critical care time: 6 minutes  Total critical care time (exclusive of procedural time) : 42 minutes  Critical care time was exclusive of separately billable procedures and treating other patients.  Critical care was necessary to treat or prevent imminent or life-threatening deterioration of the following conditions: cardiac failure.  Critical care was time spent personally by me on the following activities: blood draw for specimens, development of treatment plan with patient or surrogate, discussions with consultants, interpretation of cardiac output measurements, evaluation of patient's response to treatment, examination of patient, obtaining history from patient or surrogate, ordering and performing treatments and interventions, ordering and review of laboratory studies, ordering and review of radiographic studies, re-evaluation of patient's condition, review of old charts and pulse oximetry.        Labs Reviewed   CBC W/ AUTO DIFFERENTIAL - Abnormal; Notable for the following components:       Result Value    MCHC 31.7 (*)     RDW 21.8 (*)     Platelets 92 (*)     All other components within normal limits    Narrative:     Release to patient->Immediate   TROPONIN I - Abnormal; Notable for the following components:    Troponin I 0.189 (*)     All other components within normal limits    Narrative:     Release to patient->Immediate   B-TYPE NATRIURETIC PEPTIDE - Abnormal; Notable for the following components:    BNP 2,691 (*)     All other components within normal limits    Narrative:     Release to patient->Immediate   URINALYSIS, REFLEX TO URINE CULTURE -  Abnormal; Notable for the following components:    Protein, UA Trace (*)     Urobilinogen, UA 2.0-3.0 (*)     All other components within normal limits    Narrative:     Specimen Source->Urine   COMPREHENSIVE METABOLIC PANEL - Abnormal; Notable for the following components:    BUN 40 (*)     Creatinine 2.6 (*)     Albumin 3.0 (*)     Total Bilirubin 1.1 (*)     eGFR 24 (*)     All other components within normal limits   HIV 1 / 2 ANTIBODY    Narrative:     Release to patient->Immediate   HEPATITIS C ANTIBODY    Narrative:     Release to patient->Immediate          Imaging Results              X-Ray Chest AP Portable (Final result)  Result time 05/09/24 13:27:19      Final result by Ej Goel MD (05/09/24 13:27:19)                   Impression:      1. Pulmonary findings suggest pulmonary edema with pleural effusions, developing left lower lung zone consolidation not excluded.      Electronically signed by: Ej Goel MD  Date:    05/09/2024  Time:    13:27               Narrative:    EXAMINATION:  XR CHEST AP PORTABLE    CLINICAL HISTORY:  CHF;    TECHNIQUE:  Single frontal view of the chest was performed.    COMPARISON:  02/01/2024    FINDINGS:  The cardiomediastinal silhouette is prominent, similar to the previous exam noting calcification of the aorta..  There is obscuration of the costophrenic angles, left greater than right suggesting effusions..  The trachea is midline.  The lungs are symmetrically expanded bilaterally with coarse interstitial attenuation bilaterally.  There is increased parenchymal attenuation projected over the left lower lung zone, may reflect a combination of atelectasis and pleural fluid however developing consolidation not excluded..  There is no pneumothorax.  The osseous structures are remarkable for degenerative change and osteopenia..                                    X-Rays:   Independently Interpreted Readings:   Chest X-Ray: Increased vascular markings consistent  with CHF are present.  Cardiomegaly present. Left pleural effusion present.     Medications   allopurinoL tablet 100 mg (has no administration in time range)   aspirin EC tablet 81 mg (has no administration in time range)   calcitRIOL capsule 0.25 mcg (has no administration in time range)   cetirizine tablet 10 mg (has no administration in time range)   metoprolol tartrate (LOPRESSOR) split tablet 12.5 mg (has no administration in time range)   atorvastatin tablet 20 mg (has no administration in time range)   sodium chloride 0.9% flush 10 mL (has no administration in time range)   heparin (porcine) injection 5,000 Units (has no administration in time range)   furosemide injection 60 mg (has no administration in time range)   ondansetron disintegrating tablet 4 mg (has no administration in time range)   ondansetron injection 4 mg (has no administration in time range)   polyethylene glycol packet 17 g (has no administration in time range)   senna-docusate 8.6-50 mg per tablet 1 tablet (has no administration in time range)   acetaminophen tablet 650 mg (has no administration in time range)   HYDROcodone-acetaminophen 5-325 mg per tablet 1 tablet (has no administration in time range)   morphine injection 2 mg (has no administration in time range)   melatonin tablet 6 mg (has no administration in time range)   furosemide injection 80 mg (80 mg Intravenous Given 5/9/24 1317)     Medical Decision Making  Emergent evaluation of 78-year-old male with history of CHF who presents with complaint of leg swelling and shortness of breath.  On exam there is no respiratory distress, breath sounds are decreased at bilateral bases.  There is significant peripheral edema.  He reports scrotal edema.  Physical exam findings are in keeping with chest x-ray showing bilateral pleural effusions and evidence of CHF.  Labs show elevated BNP.  He is treated with IV Lasix, admitted to the hospitalist service for further diuresis and  care.    Amount and/or Complexity of Data Reviewed  Labs: ordered. Decision-making details documented in ED Course.  Radiology: ordered.  Discussion of management or test interpretation with external provider(s): I discussed the case with Dr. Aaron, hospitalist.    Risk  Prescription drug management.  Decision regarding hospitalization.               ED Course as of 05/09/24 1528   u May 09, 2024   1346 Troponin I(!)  Elevated, but similar to baseline values on file. [AK]   1346 Brain natriuretic peptide(!)  Elevated.  Slightly increased from typical baseline values. [AK]   1423 CBC auto differential(!)  Reviewed.  No leukocytosis.  No anemia.  There is thrombocytopenia which is chronic but slightly worse than typical baseline when compared to previous labs on file. [AK]   1424 Hepatitis C Antibody  Reviewed and negative. [AK]   1424 HIV 1/2 Ag/Ab (4th Gen) [AK]   1424 I discussed the case with the hospitalist, Dr. Aaron [AK]   1527 Comprehensive metabolic panel(!)  Elevated BUN and creatinine.  Creatinine is mildly elevated from his baseline.  No major electrolyte disturbance.  Potassium levels 4.2, within normal limits. [AK]   1528 Urinalysis, Reflex to Urine Culture Urine, Clean Catch(!)  Reviewed and benign.  No evidence of a UTI. [AK]      ED Course User Index  [AK] Kathleen Pratt MD               Medical Decision Making:   Differential Diagnosis:   Includes but not limited to CHF exacerbation, liver failure, dependent edema, pneumonia, blood clots             Clinical Impression:  Final diagnoses:  [R06.02] Shortness of breath  [I50.9] Acute on chronic congestive heart failure, unspecified heart failure type (Primary)  [N18.9] Chronic kidney disease, unspecified CKD stage          ED Disposition Condition    Admit Stable                Kathleen Pratt MD  05/09/24 1529       Kathleen Pratt MD  05/09/24 1530

## 2024-05-09 NOTE — ASSESSMENT & PLAN NOTE
-chronic  -controlled  -continue IV diuresis with furosemide >>> monitor for toleration   -hold home PO lasix  -continue home metoprolol dosing with transition to lopressor during acute CHF exacerbation  -dose/medication adjustment as appropriate   -monitor

## 2024-05-09 NOTE — ED TRIAGE NOTES
Pt presents to the ER with complaints of increased swelling to the entire lower body including the scrotum and bilateral legs. Pt states swelling started approximately two weeks ago and has progressively gotten worse. Pt also reporting increased SOB; worse with exertion.

## 2024-05-09 NOTE — H&P
[facility] Emergency [facility] Medicine  History & Physical    Patient Name: Felicia Lopez  MRN: [id number]  Patient Class: IP- Inpatient  Admission Date: 5/9/2024  Attending Physician: Jeanine Montoya MD   Primary Care Provider: Mickey Darden MD    Patient information was obtained from patient, past medical records, and ER records.     Subjective:     Principal Problem:Acute on chronic combined systolic and diastolic congestive heart failure    Chief Complaint:   Chief Complaint   Patient presents with    Leg Swelling     Pt. States his leg have been swollen x 1 week. Pt. reports SOB x 1 week. Pt. Denies chest pain. Pt. Is alert and ABC's are intact.        HPI: Mr. Lopez is a 78 YOM with PMHx of CAD with history of MI s/p PCI (RCA x 2 JOSE C 2010), combined systolic and diastolic CHF, HTN, HLD, history of CVA (2015), preDM, CKD III (baseline SCr 2.1-2.3, follows with Dr. Matson), secondary hyperparathyroidism, MARLENI, chronic idiopathic thrombocytopenia, and history of prostate CA s/p TURP.     He presents to ED from Cardiology clinic due to worsening edema in thighs, legs, and scrotum with associated HARTMAN, orthopnea, PND, and decreased UOP despite taking home PO lasix daily. Symptoms have progressively worsened over the last couple of weeks. He notes some noncompliance with low sodium diet including take out pizza and [employer] meals recently. He denies fever, chills, recent illness, CP, abdominal pain, N/V/D, constipation, hematuria, changes in bowel habits or blood in stool, decreased appetite, changes in PO intake, light headiness, dizziness, seizures, or syncope. He lives with spouse, uses a walker at baseline, and is independent in ADLs.     In the ED he is HDS and afebrile. CBC with WBC 5, H/H 15.6/49.2, platelets 92 (baseline 110-133). Chemistry with , K 4.2, chloride 105, CO2 24, BUN 40, SCr 2.6 (baseline 2.1-2.3), glucose 79. LFTs WNL. BNP 2691. Troponin 0.189 (similar to priors). Hep C and HIV non  reactive. UA noninfectious. CXR with pulmonary edema and effusions. EKG with SR, PACs, and T wave abnormalities similar to priors.     The patient was placed in observation to the Hospital Medicine Service for further evaluation and treatment.     Past Medical History:   Diagnosis Date    CAD (coronary artery disease)     Chronic combined systolic and diastolic CHF (congestive heart failure)     Chronic idiopathic thrombocytopenia     Disorder of kidney and ureter     History of heart attack     History of stroke     Hyperlipidemia     Hypertension        Past Surgical History:   Procedure Laterality Date    FACIAL RECONSTRUCTION SURGERY      PROSTATE SURGERY         Review of patient's allergies indicates:  No Known Allergies    No current facility-administered medications on file prior to encounter.     Current Outpatient Medications on File Prior to Encounter   Medication Sig    allopurinoL (ZYLOPRIM) 100 MG tablet Take 1 tablet by mouth once daily.    aspirin (ECOTRIN) 81 MG EC tablet Take 1 tablet (81 mg total) by mouth once daily.    calcitRIOL (ROCALTROL) 0.25 MCG Cap Take 0.25 mcg by mouth 3 (three) times a week.    fluticasone propionate (FLONASE) 50 mcg/actuation nasal spray 1 spray by Each Nostril route once daily.    furosemide (LASIX) 40 MG tablet Take 1 tablet (40 mg total) by mouth 2 (two) times daily. (Patient taking differently: Take 40 mg by mouth once daily.)    latanoprost 0.005 % ophthalmic solution Place 1 drop into both eyes every evening.    levocetirizine (XYZAL) 5 MG tablet Take 1 tablet by mouth once daily.    metoprolol succinate (TOPROL-XL) 25 MG 24 hr tablet Take 1 tablet (25 mg total) by mouth every evening.    simvastatin (ZOCOR) 40 MG tablet Take 1 tablet (40 mg total) by mouth once daily.    [DISCONTINUED] baclofen (LIORESAL) 10 MG tablet Take 1 tablet (10 mg total) by mouth 3 (three) times daily.    [DISCONTINUED] clopidogrel (PLAVIX) 75 mg tablet Take 1 tablet (75 mg total) by  mouth once daily.    [DISCONTINUED] hydroCHLOROthiazide (MICROZIDE) 12.5 mg capsule Take 1 capsule (12.5 mg total) by mouth once daily. (Patient taking differently: Take 25 mg by mouth once daily.)     Family History       Problem Relation (Age of Onset)    Hypertension Mother    No Known Problems Father          Tobacco Use    Smoking status: Former     Current packs/day: 0.00     Types: Cigarettes     Quit date: 10/5/2008     Years since quitting: 15.6    Smokeless tobacco: Never   Substance and Sexual Activity    Alcohol use: No     Alcohol/week: 0.0 standard drinks of alcohol    Drug use: No    Sexual activity: Never     Review of Systems   Constitutional:  Negative for activity change, appetite change, chills, diaphoresis, fatigue and fever.   Respiratory:  Positive for shortness of breath. Negative for cough, chest tightness and wheezing.    Cardiovascular:  Positive for leg swelling. Negative for chest pain and palpitations.   Gastrointestinal:  Negative for abdominal distention, abdominal pain, constipation, diarrhea, nausea and vomiting.   Genitourinary:  Positive for decreased urine volume and scrotal swelling. Negative for difficulty urinating, dysuria, hematuria and urgency.   Musculoskeletal:  Positive for gait problem (chronic).   Skin:  Positive for wound (RLE).   Neurological:  Negative for dizziness, syncope, weakness, light-headedness and headaches.     Objective:     Vital Signs (Most Recent):  Temp: 97.9 °F (36.6 °C) (05/09/24 1222)  Pulse: 75 (05/09/24 1604)  Resp: 19 (05/09/24 1401)  BP: 122/66 (05/09/24 1604)  SpO2: 98 % (05/09/24 1432) Vital Signs (24h Range):  Temp:  [97.9 °F (36.6 °C)] 97.9 °F (36.6 °C)  Pulse:  [72-90] 75  Resp:  [18-19] 19  SpO2:  [96 %-99 %] 98 %  BP: (106-122)/(59-74) 122/66     Weight: 85.7 kg (189 lb)  Body mass index is 25.63 kg/m².     Physical Exam  Vitals and nursing note reviewed.   Constitutional:       General: He is not in acute distress.     Appearance: He is  well-developed. He is ill-appearing. He is not toxic-appearing.   HENT:      Head: Normocephalic and atraumatic.      Mouth/Throat:      Dentition: Normal dentition.   Eyes:      General: Lids are normal.      Extraocular Movements: Extraocular movements intact.      Conjunctiva/sclera: Conjunctivae normal.   Cardiovascular:      Rate and Rhythm: Normal rate and regular rhythm.      Heart sounds: Murmur heard.   Pulmonary:      Effort: Pulmonary effort is normal.      Comments: Decreased breath sounds bilateral bases. No conversational dyspnea or increased WOB. No cough during interview or exam.   Abdominal:      General: Bowel sounds are normal. There is distension (bilateral flank anasarca.).      Palpations: Abdomen is soft.      Tenderness: There is no abdominal tenderness.   Genitourinary:     Comments: +scrotal edema.  Musculoskeletal:      Cervical back: Neck supple.      Right lower leg: Edema present.      Left lower leg: Edema present.      Comments: Anasarca from bilateral flanks to feet with weeping edema of BLEs. Blister rupture to RLE. See media.    Skin:     General: Skin is warm and dry.      Findings: Lesion present. No rash.      Comments: See media.    Neurological:      Mental Status: He is alert and oriented to person, place, and time.                               Significant Labs: All pertinent labs within the past 24 hours have been reviewed.  CBC:   Recent Labs   Lab 05/09/24  1311   WBC 5.41   HGB 15.6   HCT 49.2   PLT 92*     CMP:   Recent Labs   Lab 05/09/24  1405      K 4.2      CO2 24   GLU 79   BUN 40*   CREATININE 2.6*   CALCIUM 9.1   PROT 6.9   ALBUMIN 3.0*   BILITOT 1.1*   ALKPHOS 83   AST 19   ALT 14   ANIONGAP 13     Cardiac Markers:   Recent Labs   Lab 05/09/24  1311   BNP 2,691*     Troponin:   Recent Labs   Lab 05/09/24  1311   TROPONINI 0.189*     Urine Studies:   Recent Labs   Lab 05/09/24  1407   COLORU Yellow   APPEARANCEUA Clear   PHUR 6.0   SPECGRAV 1.010    PROTEINUA Trace*   GLUCUA Negative   KETONESU Negative   BILIRUBINUA Negative   OCCULTUA Negative   NITRITE Negative   UROBILINOGEN 2.0-3.0*   LEUKOCYTESUR Negative       Significant Imaging: I have reviewed all pertinent imaging results/findings within the past 24 hours.  Assessment/Plan:     * Acute on chronic combined systolic and diastolic congestive heart failure  Anasarca  -acute CHF exacerbation in setting of dietary and medication noncompliance with associated ARELI on CKD and chronic demand ischemia   -at admission HDS and afebrile  -admission BNP 2691, troponin 0.189 (at baseline), CXR with pulmonary edema and effusions, EKG with SR, PVCs and TWA similar to prior studies   -recent TTE reviewed noting EF 35-40% with regional WMAs, LV dilation, diastolic dysfunction, mild AVS, mild MR  -CHF pathway initiated  -updated TTE pending >>> please follow   -continue IV diuresis with furosemide >>> monitor for toleration   -hold home PO lasix  -continue home metoprolol dosing with transition to lopressor during acute CHF exacerbation  -dose/medication adjustment as appropriate   -elevate LEs and scrotum as tolerated  -Wound Care consulted due to altered skin integrity with blister rupture and weeping edema  -continue tele monitoring  -EKG PRN concerns   -cardiac diet with fluid restriction  -strict I&Os and daily weights  -trend labs, address/replete electrolytes as indicated  -outpt follow up with Cardiology at [facility]     Essential hypertension  -chronic  -controlled  -continue IV diuresis with furosemide >>> monitor for toleration   -hold home PO lasix  -continue home metoprolol dosing with transition to lopressor during acute CHF exacerbation  -dose/medication adjustment as appropriate   -monitor     Chronic kidney disease, stage III (moderate)  ARELI on CKD  Secondary hyperparathyroidism   -chronic CKD with admission SCr 2.6   -baseline 2.1-2.3  -likely cardiorenal syndrome in setting of CHF exacerbation  -IV diuresis  as noted above, monitor for toleration  -consider Nephrology consult in AM if indicated  -continue home Calcitrol MWF  -trend labs, address/replete electrolytes as indicated  -avoid nephrotoxins and hypotension as able, renally dose medications    Iron deficiency anemia  Chronic idiopathic thrombocytopenia  -chronic MARLENI and thrombocytopenia  -at admission H/H 15.6/49.2 and platelets 92 >>>at/near baseline  -no overt bleeding  -trend labs over course, address/transfuse as indicated    CAD (coronary artery disease)  History of PCI  History of CVA  Hyperlipidemia   -history of remote MI with PCI in 2010 and CVA in 2015  -chronic demand ischemia noted with elevated troponins  -admission troponin at baseline and on acute dynamic EKG changes  -continue home metoprolol as detailed above  -continue home ASA  -continue home statin per pharmacy formulary alternative  -dose/medication adjustment as appropriate   -continue tele monitoring  -EKG PRN concerns  -trend labs, address/replete electrolytes as indicated      VTE Risk Mitigation (From admission, onward)           Ordered     heparin (porcine) injection 5,000 Units  Every 8 hours         05/09/24 1447     IP VTE HIGH RISK PATIENT  Once         05/09/24 1447     Place sequential compression device  Until discontinued         05/09/24 1447                       Roselia William, DNP, AG-ACNP, BC  Department of Hospital Medicine  [facility]

## 2024-05-10 PROBLEM — M10.9 GOUT: Status: ACTIVE | Noted: 2024-05-10

## 2024-05-10 LAB
ALBUMIN SERPL BCP-MCNC: 2.7 G/DL (ref 3.5–5.2)
ALBUMIN SERPL BCP-MCNC: 3 G/DL (ref 3.5–5.2)
ALP SERPL-CCNC: 88 U/L (ref 55–135)
ALT SERPL W/O P-5'-P-CCNC: 14 U/L (ref 10–44)
ANION GAP SERPL CALC-SCNC: 8 MMOL/L (ref 8–16)
ANION GAP SERPL CALC-SCNC: 9 MMOL/L (ref 8–16)
ASCENDING AORTA: 2.64 CM
AST SERPL-CCNC: 20 U/L (ref 10–40)
AV INDEX (PROSTH): 0.55
AV MEAN GRADIENT: 3 MMHG
AV PEAK GRADIENT: 4 MMHG
AV VALVE AREA BY VELOCITY RATIO: 2.16 CM²
AV VALVE AREA: 2.17 CM²
AV VELOCITY RATIO: 0.54
BASOPHILS # BLD AUTO: 0.02 K/UL (ref 0–0.2)
BASOPHILS NFR BLD: 0.4 % (ref 0–1.9)
BILIRUB SERPL-MCNC: 1.1 MG/DL (ref 0.1–1)
BSA FOR ECHO PROCEDURE: 2.09 M2
BUN SERPL-MCNC: 46 MG/DL (ref 8–23)
BUN SERPL-MCNC: 46 MG/DL (ref 8–23)
CALCIUM SERPL-MCNC: 8.9 MG/DL (ref 8.7–10.5)
CALCIUM SERPL-MCNC: 9.2 MG/DL (ref 8.7–10.5)
CHLORIDE SERPL-SCNC: 101 MMOL/L (ref 95–110)
CHLORIDE SERPL-SCNC: 104 MMOL/L (ref 95–110)
CO2 SERPL-SCNC: 27 MMOL/L (ref 23–29)
CO2 SERPL-SCNC: 30 MMOL/L (ref 23–29)
CREAT SERPL-MCNC: 2.7 MG/DL (ref 0.5–1.4)
CREAT SERPL-MCNC: 2.8 MG/DL (ref 0.5–1.4)
CV ECHO LV RWT: 0.55 CM
DIFFERENTIAL METHOD BLD: ABNORMAL
DOP CALC AO PEAK VEL: 1.05 M/S
DOP CALC AO VTI: 17.2 CM
DOP CALC LVOT AREA: 4 CM2
DOP CALC LVOT DIAMETER: 2.25 CM
DOP CALC LVOT PEAK VEL: 0.57 M/S
DOP CALC LVOT STROKE VOLUME: 37.36 CM3
DOP CALC RVOT PEAK VEL: 0.26 M/S
DOP CALCLVOT PEAK VEL VTI: 9.4 CM
E WAVE DECELERATION TIME: 117.17 MSEC
E/A RATIO: 2.17
E/E' RATIO: 18 M/S
ECHO LV POSTERIOR WALL: 1.43 CM (ref 0.6–1.1)
EOSINOPHIL # BLD AUTO: 0.1 K/UL (ref 0–0.5)
EOSINOPHIL NFR BLD: 1 % (ref 0–8)
ERYTHROCYTE [DISTWIDTH] IN BLOOD BY AUTOMATED COUNT: 21.4 % (ref 11.5–14.5)
EST. GFR  (NO RACE VARIABLE): 22 ML/MIN/1.73 M^2
EST. GFR  (NO RACE VARIABLE): 23 ML/MIN/1.73 M^2
ESTIMATED AVG GLUCOSE: 120 MG/DL (ref 68–131)
FRACTIONAL SHORTENING: 10 % (ref 28–44)
GLUCOSE SERPL-MCNC: 101 MG/DL (ref 70–110)
GLUCOSE SERPL-MCNC: 106 MG/DL (ref 70–110)
HBA1C MFR BLD: 5.8 % (ref 4–5.6)
HCT VFR BLD AUTO: 45.1 % (ref 40–54)
HGB BLD-MCNC: 14.2 G/DL (ref 14–18)
IMM GRANULOCYTES # BLD AUTO: 0.01 K/UL (ref 0–0.04)
IMM GRANULOCYTES NFR BLD AUTO: 0.2 % (ref 0–0.5)
INTERVENTRICULAR SEPTUM: 1.33 CM (ref 0.6–1.1)
IVC DIAMETER: 2.32 CM
LA MAJOR: 6.92 CM
LA MINOR: 6.32 CM
LA WIDTH: 4.5 CM
LEFT ATRIUM SIZE: 3.44 CM
LEFT ATRIUM VOLUME INDEX MOD: 39.8 ML/M2
LEFT ATRIUM VOLUME INDEX: 41.8 ML/M2
LEFT ATRIUM VOLUME MOD: 82.77 CM3
LEFT ATRIUM VOLUME: 86.93 CM3
LEFT INTERNAL DIMENSION IN SYSTOLE: 4.7 CM (ref 2.1–4)
LEFT VENTRICLE DIASTOLIC VOLUME INDEX: 62.5 ML/M2
LEFT VENTRICLE DIASTOLIC VOLUME: 129.99 ML
LEFT VENTRICLE MASS INDEX: 146 G/M2
LEFT VENTRICLE SYSTOLIC VOLUME INDEX: 49.2 ML/M2
LEFT VENTRICLE SYSTOLIC VOLUME: 102.42 ML
LEFT VENTRICULAR INTERNAL DIMENSION IN DIASTOLE: 5.21 CM (ref 3.5–6)
LEFT VENTRICULAR MASS: 304.15 G
LV LATERAL E/E' RATIO: 15.75 M/S
LV SEPTAL E/E' RATIO: 21 M/S
LVOT MG: 0.66 MMHG
LVOT MV: 0.37 CM/S
LYMPHOCYTES # BLD AUTO: 1.1 K/UL (ref 1–4.8)
LYMPHOCYTES NFR BLD: 21.7 % (ref 18–48)
MAGNESIUM SERPL-MCNC: 1.8 MG/DL (ref 1.6–2.6)
MAGNESIUM SERPL-MCNC: 1.9 MG/DL (ref 1.6–2.6)
MCH RBC QN AUTO: 28.1 PG (ref 27–31)
MCHC RBC AUTO-ENTMCNC: 31.5 G/DL (ref 32–36)
MCV RBC AUTO: 89 FL (ref 82–98)
MONOCYTES # BLD AUTO: 0.7 K/UL (ref 0.3–1)
MONOCYTES NFR BLD: 13.8 % (ref 4–15)
MV PEAK A VEL: 0.29 M/S
MV PEAK E VEL: 0.63 M/S
MV STENOSIS PRESSURE HALF TIME: 33.98 MS
MV VALVE AREA P 1/2 METHOD: 6.47 CM2
NEUTROPHILS # BLD AUTO: 3.1 K/UL (ref 1.8–7.7)
NEUTROPHILS NFR BLD: 62.9 % (ref 38–73)
NRBC BLD-RTO: 0 /100 WBC
OHS QRS DURATION: 104 MS
OHS QTC CALCULATION: 479 MS
PHOSPHATE SERPL-MCNC: 4.2 MG/DL (ref 2.7–4.5)
PISA MRMAX VEL: 4.54 M/S
PISA TR MAX VEL: 3.26 M/S
PLATELET # BLD AUTO: 84 K/UL (ref 150–450)
PMV BLD AUTO: ABNORMAL FL (ref 9.2–12.9)
POCT GLUCOSE: 80 MG/DL (ref 70–110)
POTASSIUM SERPL-SCNC: 4.4 MMOL/L (ref 3.5–5.1)
POTASSIUM SERPL-SCNC: 4.7 MMOL/L (ref 3.5–5.1)
PROT SERPL-MCNC: 6.8 G/DL (ref 6–8.4)
PV PEAK GRADIENT: 0 MMHG
PV PEAK VELOCITY: 0.31 M/S
RA MAJOR: 5.51 CM
RA PRESSURE ESTIMATED: 8 MMHG
RA WIDTH: 4 CM
RBC # BLD AUTO: 5.05 M/UL (ref 4.6–6.2)
RV TB RVSP: 11 MMHG
RV TISSUE DOPPLER FREE WALL SYSTOLIC VELOCITY 1 (APICAL 4 CHAMBER VIEW): 6.39 CM/S
SINUS: 3 CM
SODIUM SERPL-SCNC: 139 MMOL/L (ref 136–145)
SODIUM SERPL-SCNC: 140 MMOL/L (ref 136–145)
STJ: 2.61 CM
TDI LATERAL: 0.04 M/S
TDI SEPTAL: 0.03 M/S
TDI: 0.04 M/S
TR MAX PG: 43 MMHG
TR MEAN GRADIENT: 27 MMHG
TRICUSPID ANNULAR PLANE SYSTOLIC EXCURSION: 1.3 CM
TROPONIN I SERPL DL<=0.01 NG/ML-MCNC: 0.17 NG/ML (ref 0–0.03)
TV REST PULMONARY ARTERY PRESSURE: 51 MMHG
WBC # BLD AUTO: 4.92 K/UL (ref 3.9–12.7)
Z-SCORE OF LEFT VENTRICULAR DIMENSION IN END DIASTOLE: -2.01
Z-SCORE OF LEFT VENTRICULAR DIMENSION IN END SYSTOLE: 1.48

## 2024-05-10 PROCEDURE — 63600175 PHARM REV CODE 636 W HCPCS: Performed by: NURSE PRACTITIONER

## 2024-05-10 PROCEDURE — 99223 1ST HOSP IP/OBS HIGH 75: CPT | Mod: ,,, | Performed by: INTERNAL MEDICINE

## 2024-05-10 PROCEDURE — 80069 RENAL FUNCTION PANEL: CPT | Performed by: STUDENT IN AN ORGANIZED HEALTH CARE EDUCATION/TRAINING PROGRAM

## 2024-05-10 PROCEDURE — 36415 COLL VENOUS BLD VENIPUNCTURE: CPT | Performed by: STUDENT IN AN ORGANIZED HEALTH CARE EDUCATION/TRAINING PROGRAM

## 2024-05-10 PROCEDURE — 25000003 PHARM REV CODE 250: Performed by: NURSE PRACTITIONER

## 2024-05-10 PROCEDURE — 63600175 PHARM REV CODE 636 W HCPCS: Performed by: STUDENT IN AN ORGANIZED HEALTH CARE EDUCATION/TRAINING PROGRAM

## 2024-05-10 PROCEDURE — 20000000 HC ICU ROOM

## 2024-05-10 PROCEDURE — 84484 ASSAY OF TROPONIN QUANT: CPT | Performed by: STUDENT IN AN ORGANIZED HEALTH CARE EDUCATION/TRAINING PROGRAM

## 2024-05-10 PROCEDURE — 94761 N-INVAS EAR/PLS OXIMETRY MLT: CPT

## 2024-05-10 PROCEDURE — 25000003 PHARM REV CODE 250: Performed by: STUDENT IN AN ORGANIZED HEALTH CARE EDUCATION/TRAINING PROGRAM

## 2024-05-10 PROCEDURE — 83036 HEMOGLOBIN GLYCOSYLATED A1C: CPT | Performed by: NURSE PRACTITIONER

## 2024-05-10 PROCEDURE — 85025 COMPLETE CBC W/AUTO DIFF WBC: CPT | Performed by: NURSE PRACTITIONER

## 2024-05-10 PROCEDURE — 83735 ASSAY OF MAGNESIUM: CPT | Performed by: NURSE PRACTITIONER

## 2024-05-10 PROCEDURE — 80053 COMPREHEN METABOLIC PANEL: CPT | Performed by: NURSE PRACTITIONER

## 2024-05-10 PROCEDURE — 83735 ASSAY OF MAGNESIUM: CPT | Mod: 91 | Performed by: STUDENT IN AN ORGANIZED HEALTH CARE EDUCATION/TRAINING PROGRAM

## 2024-05-10 RX ORDER — IPRATROPIUM BROMIDE AND ALBUTEROL SULFATE 2.5; .5 MG/3ML; MG/3ML
3 SOLUTION RESPIRATORY (INHALATION) ONCE AS NEEDED
Status: DISCONTINUED | OUTPATIENT
Start: 2024-05-10 | End: 2024-05-16 | Stop reason: HOSPADM

## 2024-05-10 RX ORDER — DOBUTAMINE HYDROCHLORIDE 400 MG/100ML
5 INJECTION INTRAVENOUS CONTINUOUS
Status: DISCONTINUED | OUTPATIENT
Start: 2024-05-10 | End: 2024-05-10

## 2024-05-10 RX ORDER — MUPIROCIN 20 MG/G
OINTMENT TOPICAL 2 TIMES DAILY
Status: COMPLETED | OUTPATIENT
Start: 2024-05-10 | End: 2024-05-15

## 2024-05-10 RX ORDER — SODIUM CHLORIDE 0.9 % (FLUSH) 0.9 %
10 SYRINGE (ML) INJECTION
Status: DISCONTINUED | OUTPATIENT
Start: 2024-05-10 | End: 2024-05-16 | Stop reason: HOSPADM

## 2024-05-10 RX ORDER — DOBUTAMINE HYDROCHLORIDE 400 MG/100ML
2.5 INJECTION INTRAVENOUS CONTINUOUS
Status: DISCONTINUED | OUTPATIENT
Start: 2024-05-10 | End: 2024-05-13

## 2024-05-10 RX ORDER — IPRATROPIUM BROMIDE AND ALBUTEROL SULFATE 2.5; .5 MG/3ML; MG/3ML
3 SOLUTION RESPIRATORY (INHALATION) ONCE
Status: DISCONTINUED | OUTPATIENT
Start: 2024-05-10 | End: 2024-05-10

## 2024-05-10 RX ORDER — TALC
6 POWDER (GRAM) TOPICAL NIGHTLY PRN
Status: DISCONTINUED | OUTPATIENT
Start: 2024-05-10 | End: 2024-05-10 | Stop reason: SDUPTHER

## 2024-05-10 RX ORDER — ONDANSETRON HYDROCHLORIDE 2 MG/ML
4 INJECTION, SOLUTION INTRAVENOUS EVERY 8 HOURS PRN
Status: DISCONTINUED | OUTPATIENT
Start: 2024-05-10 | End: 2024-05-16 | Stop reason: HOSPADM

## 2024-05-10 RX ADMIN — HEPARIN SODIUM 5000 UNITS: 5000 INJECTION INTRAVENOUS; SUBCUTANEOUS at 01:05

## 2024-05-10 RX ADMIN — HEPARIN SODIUM 5000 UNITS: 5000 INJECTION INTRAVENOUS; SUBCUTANEOUS at 06:05

## 2024-05-10 RX ADMIN — MUPIROCIN: 20 OINTMENT TOPICAL at 09:05

## 2024-05-10 RX ADMIN — FUROSEMIDE 60 MG: 10 INJECTION, SOLUTION INTRAMUSCULAR; INTRAVENOUS at 06:05

## 2024-05-10 RX ADMIN — METOPROLOL TARTRATE 12.5 MG: 25 TABLET, FILM COATED ORAL at 09:05

## 2024-05-10 RX ADMIN — FUROSEMIDE 5 MG/HR: 10 INJECTION, SOLUTION INTRAMUSCULAR; INTRAVENOUS at 07:05

## 2024-05-10 RX ADMIN — CALCITRIOL CAPSULES 0.25 MCG 0.25 MCG: 0.25 CAPSULE ORAL at 06:05

## 2024-05-10 RX ADMIN — ASPIRIN 81 MG: 81 TABLET, COATED ORAL at 09:05

## 2024-05-10 RX ADMIN — ATORVASTATIN CALCIUM 20 MG: 20 TABLET, FILM COATED ORAL at 09:05

## 2024-05-10 RX ADMIN — HEPARIN SODIUM 5000 UNITS: 5000 INJECTION INTRAVENOUS; SUBCUTANEOUS at 09:05

## 2024-05-10 RX ADMIN — FUROSEMIDE 60 MG: 10 INJECTION, SOLUTION INTRAMUSCULAR; INTRAVENOUS at 10:05

## 2024-05-10 RX ADMIN — DOBUTAMINE HYDROCHLORIDE 5 MCG/KG/MIN: 400 INJECTION INTRAVENOUS at 06:05

## 2024-05-10 RX ADMIN — ALLOPURINOL 100 MG: 100 TABLET ORAL at 09:05

## 2024-05-10 RX ADMIN — CETIRIZINE HYDROCHLORIDE 10 MG: 10 TABLET, FILM COATED ORAL at 09:05

## 2024-05-10 NOTE — CONSULTS
Food & Nutrition  Education    Diet Education: For education on fluid and salt restriction   Time Spent: 15 min  Learners: patient      Nutrition Education provided with handouts:   Heart Failure Nutrition Therapy    Comments:  Patient had lunch at bedside with good intake of 75% meal consumption. Tolerating meals and stated he ate all of breakfast. Receiving a low sodium diet with 1500 mL fluid restriction. Pt reports wife prepares meals and will make his meals separately from her salted meals. Pt also reported a change in diet recently when he began to notice fluid buildup. Per wound care note: pt has been non compliant with low sodium diet recently and has been eating salty foods. Encouraged patient to return to previous diet prior to eating take out and fast food. Discussed following a low sodium diet post discharge. Encouraged patient to monitor fluid intake, patient states he drinks 2 -12 oz bottled matthews a day. Discussed 48 oz fluid restriction and encouraged patient to include all fluids towards fluid count. Pt then stated he was about to be moved to ICU and wanted to get better as he was worried about his wife worrying about him. Provided support and encouraged compliance moving forward. Provided handout and patient voiced understanding.      All questions and concerns answered. Dietitian's contact information provided.       Follow-Up: yes    Please Re-consult as needed    Thanks!    Jazzy Chaidez, RICARDON, LDN

## 2024-05-10 NOTE — NURSING
"Natasia, PCT took an accucheck for this patient. She stated there was an "issue" with his wrist band, but she  reported that his accucheck was 80.  "

## 2024-05-10 NOTE — ASSESSMENT & PLAN NOTE
ARELI on CKD  Secondary hyperparathyroidism   Chronic CKD with admission SCr 2.6   Baseline 2.1-2.3  Likely cardiorenal syndrome in setting of CHF exacerbation    Plan -   - IV diuresis as noted above, monitor for toleration  - nephrology consult  - continue home Calcitrol MWF  - trend labs, address/replete electrolytes as indicated  - avoid nephrotoxins and hypotension as able, renally dose medications

## 2024-05-10 NOTE — CONSULTS
[facility] Consult Note    Primary Attending Physician: Jeanine Allen MD  Consultant Attending: Charly Fuentes MD  Consultant Fellow: Kelley Carbajal MD    Reason for Consult:     Decompensated heart failure     Subjective:      History of Present Illness:  Felicia Lopez is a 78 y.o. man with a history of CAD s/p PCI, CVA, HFrEF, CKD, chronic thrombocytopenia who presented with progressively worsening shortness of breath and lower extremity edema for the past 2-3 weeks. He reports he noticed gradual worsening in his lower extremity edema. He has chronic SOB and orthopnea requiring him to sleep with a wedged type pillow at night but had noticed this getting progressively worse. He reports good compliance with medications but does eat some salt. His wife does most of the cooking and tries to make his meals low in salt but isn't always able to follow this. He denies any chest pain or recent illness.     Past Medical History:  Past Medical History:   Diagnosis Date    CAD (coronary artery disease)     Chronic combined systolic and diastolic CHF (congestive heart failure)     Chronic idiopathic thrombocytopenia     Disorder of kidney and ureter     History of heart attack     History of stroke     Hyperlipidemia     Hypertension        Past Surgical History:  Past Surgical History:   Procedure Laterality Date    FACIAL RECONSTRUCTION SURGERY      PROSTATE SURGERY         Allergies:  Review of patient's allergies indicates:  No Known Allergies    Medications:   In-Hospital Scheduled Medications:   allopurinoL  100 mg Oral Daily    aspirin  81 mg Oral Daily    atorvastatin  20 mg Oral Daily    calcitRIOL  0.25 mcg Oral Every Mon, Wed, Fri    cetirizine  10 mg Oral QHS    heparin (porcine)  5,000 Units Subcutaneous Q8H    metoprolol tartrate  12.5 mg Oral BID    mupirocin   Nasal BID    senna-docusate 8.6-50 mg  1 tablet Oral BID      In-Hospital PRN Medications:    Current  Facility-Administered Medications:     acetaminophen, 650 mg, Oral, Q4H PRN    albuterol-ipratropium, 3 mL, Nebulization, Once PRN    HYDROcodone-acetaminophen, 1 tablet, Oral, Q4H PRN    melatonin, 6 mg, Oral, Nightly PRN    morphine, 2 mg, Intravenous, Q6H PRN    ondansetron, 4 mg, Oral, Q8H PRN    ondansetron, 4 mg, Intravenous, Q8H PRN    ondansetron, 4 mg, Intravenous, Q8H PRN    polyethylene glycol, 17 g, Oral, BID PRN    sodium chloride 0.9%, 10 mL, Intravenous, PRN    sodium chloride 0.9%, 10 mL, Intravenous, PRN   In-Hospital IV Infusion Medications:   DOBUTamine IV infusion (non-titrating)  5 mcg/kg/min Intravenous Continuous 6.6 mL/hr at 05/10/24 1809 5 mcg/kg/min at 05/10/24 1809    furosemide (Lasix) 200 mg in sodium chloride 0.9% 100 mL infusion (conc: 2 mg/mL)  0-40 mg/hr Intravenous Continuous          Home Medications:  Prior to Admission medications    Medication Sig Start Date End Date Taking? Authorizing Provider   allopurinoL (ZYLOPRIM) 100 MG tablet Take 1 tablet by mouth once daily. 2/23/24  Yes Provider, Historical   aspirin (ECOTRIN) 81 MG EC tablet Take 1 tablet (81 mg total) by mouth once daily. 8/11/22 5/9/24 Yes Mara Burgos PA-C   calcitRIOL (ROCALTROL) 0.25 MCG Cap Take 0.25 mcg by mouth 3 (three) times a week.   Yes Provider, Historical   fluticasone propionate (FLONASE) 50 mcg/actuation nasal spray 1 spray by Each Nostril route once daily.   Yes Provider, Historical   furosemide (LASIX) 40 MG tablet Take 1 tablet (40 mg total) by mouth 2 (two) times daily.  Patient taking differently: Take 40 mg by mouth once daily. 1/17/24  Yes Fabio Martinez MD   latanoprost 0.005 % ophthalmic solution Place 1 drop into both eyes every evening. 7/21/23  Yes Provider, Historical   levocetirizine (XYZAL) 5 MG tablet Take 1 tablet by mouth once daily. 2/23/24  Yes Provider, Historical   metoprolol succinate (TOPROL-XL) 25 MG 24 hr tablet Take 1 tablet (25 mg total) by mouth every evening.  3/2/23 5/9/24 Yes Fabio Martinez MD   simvastatin (ZOCOR) 40 MG tablet Take 1 tablet (40 mg total) by mouth once daily. 18  Yes Mickey Darden MD   baclofen (LIORESAL) 10 MG tablet Take 1 tablet (10 mg total) by mouth 3 (three) times daily. 10/3/18 8/11/22  Mickey Darden MD   clopidogrel (PLAVIX) 75 mg tablet Take 1 tablet (75 mg total) by mouth once daily. 18  Mickey Darden MD   hydroCHLOROthiazide (MICROZIDE) 12.5 mg capsule Take 1 capsule (12.5 mg total) by mouth once daily.  Patient taking differently: Take 25 mg by mouth once daily. 10/31/18 8/11/22  Mickey Darden MD       Family History:  Family History   Problem Relation Name Age of Onset    Hypertension Mother      No Known Problems Father         Social History:  Social History     Tobacco Use    Smoking status: Former     Current packs/day: 0.00     Types: Cigarettes     Quit date: 10/5/2008     Years since quitting: 15.6    Smokeless tobacco: Never   Substance Use Topics    Alcohol use: No     Alcohol/week: 0.0 standard drinks of alcohol    Drug use: No        Objective:   Last 24 Hour Vital Signs:  BP  Min: 97/58  Max: 117/66  Temp  Av °F (36.7 °C)  Min: 96.9 °F (36.1 °C)  Max: 98.9 °F (37.2 °C)  Pulse  Av.3  Min: 62  Max: 74  Resp  Av.7  Min: 15  Max: 37  SpO2  Av.2 %  Min: 92 %  Max: 100 %  Height  Av' (182.9 cm)  Min: 6' (182.9 cm)  Max: 6' (182.9 cm)  Weight  Av.2 kg (194 lb 7.1 oz)  Min: 88.2 kg (194 lb 7.1 oz)  Max: 88.2 kg (194 lb 7.1 oz)  I/O last 3 completed shifts:  In: 480 [P.O.:480]  Out: 350 [Urine:350]    Physical Examination:  GEN: elderly man, resting in bed in NAD  HEENT: face symmetric, conjunctivae anicteric  CV: regular rhythm, normal rate  PULM: bibasilar crackles with decreased breath sounds at left base  Abd: soft, non-tender, non-distended  MSK: moving all extremities  Skin: no rashes  Ext: 2+ pitting edema bilaterally  Neuro: alert, answering questions  appropriately     Laboratory:  Trended Lab Data:  Recent Labs     05/09/24  1311 05/09/24  1405 05/10/24  0923 05/10/24  0924   WBC 5.41  --   --  4.92   HGB 15.6  --   --  14.2   HCT 49.2  --   --  45.1   PLT 92*  --   --  84*   NA  --  142 139  --    K  --  4.2 4.7  --    CL  --  105 104  --    CO2  --  24 27  --    BUN  --  40* 46*  --    CREATININE  --  2.6* 2.7*  --    GLU  --  79 106  --    BILITOT  --  1.1* 1.1*  --    AST  --  19 20  --    ALT  --  14 14  --    ALKPHOS  --  83 88  --    CALCIUM  --  9.1 9.2  --    ALBUMIN  --  3.0* 3.0*  --    PROT  --  6.9 6.8  --    MG  --   --  1.9  --        Cardiac:   Recent Labs   Lab 05/09/24  1311 05/10/24  0923   TROPONINI 0.189* 0.165*   BNP 2,691*  --        Urinalysis:   Lab Results   Component Value Date    COLORU Yellow 05/09/2024    SPECGRAV 1.010 05/09/2024    NITRITE Negative 05/09/2024    GLUCOSEU NEGATIVE 02/01/2019    KETONESU Negative 05/09/2024    UROBILINOGEN 2.0-3.0 (A) 05/09/2024    BILIRUBINUR NEGATIVE 02/01/2019     Radiology:  CXR 5/9 -     I have personally reviewed the above labs and imaging.    Current Medications:     Infusions:   DOBUTamine IV infusion (non-titrating)  5 mcg/kg/min Intravenous Continuous 6.6 mL/hr at 05/10/24 1809 5 mcg/kg/min at 05/10/24 1809    furosemide (Lasix) 200 mg in sodium chloride 0.9% 100 mL infusion (conc: 2 mg/mL)  0-40 mg/hr Intravenous Continuous            Scheduled:   allopurinoL  100 mg Oral Daily    aspirin  81 mg Oral Daily    atorvastatin  20 mg Oral Daily    calcitRIOL  0.25 mcg Oral Every Mon, Wed, Fri    cetirizine  10 mg Oral QHS    heparin (porcine)  5,000 Units Subcutaneous Q8H    metoprolol tartrate  12.5 mg Oral BID    mupirocin   Nasal BID    senna-docusate 8.6-50 mg  1 tablet Oral BID        PRN:    Current Facility-Administered Medications:     acetaminophen, 650 mg, Oral, Q4H PRN    albuterol-ipratropium, 3 mL, Nebulization, Once PRN    HYDROcodone-acetaminophen, 1 tablet, Oral, Q4H PRN     melatonin, 6 mg, Oral, Nightly PRN    morphine, 2 mg, Intravenous, Q6H PRN    ondansetron, 4 mg, Oral, Q8H PRN    ondansetron, 4 mg, Intravenous, Q8H PRN    ondansetron, 4 mg, Intravenous, Q8H PRN    polyethylene glycol, 17 g, Oral, BID PRN    sodium chloride 0.9%, 10 mL, Intravenous, PRN    sodium chloride 0.9%, 10 mL, Intravenous, PRN     Assessment:     Mr. Lopez is a 77 yo man with a history of HFrEF, HTN, CAD s/p PCI in 2010, CVA in 2015, and CKD who presented with shortness of breath and lower extremity edema secondary to decompensated heart failure.     Plan:     # Acute on chronic decompensated systolic heart failure  Appears hypervolemic on exam with bilateral lower extremity edema. TTE with EF 15-20%. No response to diuretics on the floor, cardiology consulted and transferred to ICU for dobutamine and lasix gtt. Given Cr of 2.7 anticipate he will need high doses of diuretics to achieve effective diuresis.   - start fixed dose dobutamine at 5  - start titratable lasix gtt with goal UOP 100cc/hr  - BMP and Mag BID while on aggressive diuretics  - continue home metoprolol     # Chronic thrombocytopenia   Plt at baseline in the 110s, slightly lower this admission ~90. Will continue to monitor.     # CAD s/p PCI  # Prior CVA  - continue home ASA   - continue home statin     Thank you for allowing us to participate in the care of this patient. Please contact me if you have any questions regarding this consult.    Kelley Carbajal MD  [facility] Fellow

## 2024-05-10 NOTE — CONSULTS
Consult Note  Nephrology    Consult Requested By: eJanine Montoya MD  Reason for Consult: Cardiorenal    SUBJECTIVE:     History of Present Illness:  Patient is a 78 y.o. male presents with worsening swelling, shortness of breath, decreased urine output, etcetera over last couple of weeks.  Patient was seen by Dr. Martinez in office yesterday and recommended admission for IV diuresis.  Extensive medical history as outlined below including CKD stage 3 with baseline creatinine around 2.0.  Patient is followed by Dr. Matson as an outpatient and was seen last week.  He had a creatinine of 2.2, urine protein creatinine ratio only 88 mg, and was started on calcitriol for mild secondary hyperparathyroidism.  He was on Lasix 40 mg daily, lisinopril 20 mg twice daily, and allopurinol 100 mg.  Upon initial evaluation emergency room patient had soft blood pressures of 90s to 100s.  Laboratory data consistent with creatinine of 2.6, BUN 40, BNP 26 91, and chest x-ray with marked pulmonary edema.  Patient was admitted to telemetry floor and started on diuresis.  Overnight patient had minimal urine output and creatinine has risen to 2.7 with worsening edema and hypotension.  Seen by Cardiology and plans for transfer to ICU with dobutamine drip and more aggressive diuresis.    Consulted for evaluation.  Epic reviewed in detail and agree with change of plan of care.    Past Medical History:   Diagnosis Date    CAD (coronary artery disease)     Chronic combined systolic and diastolic CHF (congestive heart failure)     Chronic idiopathic thrombocytopenia     Disorder of kidney and ureter     History of heart attack     History of stroke     Hyperlipidemia     Hypertension      Past Surgical History:   Procedure Laterality Date    FACIAL RECONSTRUCTION SURGERY      PROSTATE SURGERY       Family History   Problem Relation Name Age of Onset    Hypertension Mother      No Known Problems Father       Social History     Tobacco Use     "Smoking status: Former     Current packs/day: 0.00     Types: Cigarettes     Quit date: 10/5/2008     Years since quitting: 15.6    Smokeless tobacco: Never   Substance Use Topics    Alcohol use: No     Alcohol/week: 0.0 standard drinks of alcohol    Drug use: No       Review of patient's allergies indicates:  No Known Allergies     Review of Systems:  Constitutional: No fever or chills  Respiratory: shortness of breath  Cardiovascular: No chest pain or palpitations  Gastrointestinal: No nausea or vomiting  Neurological: No confusion or weakness    OBJECTIVE:     Vital Signs (Most Recent)  Temp: 98.9 °F (37.2 °C) (05/10/24 1136)  Pulse: 66 (05/10/24 1136)  Resp: 16 (05/10/24 1136)  BP: (!) 98/58 (05/10/24 1136)  SpO2: 99 % (05/10/24 1136)    Vital Signs Range (Last 24H):  Temp:  [97.5 °F (36.4 °C)-98.9 °F (37.2 °C)]   Pulse:  [62-75]   Resp:  [15-18]   BP: ()/(58-77)   SpO2:  [92 %-100 %]       Intake/Output Summary (Last 24 hours) at 5/10/2024 1448  Last data filed at 5/10/2024 1357  Gross per 24 hour   Intake 800 ml   Output 600 ml   Net 200 ml       Physical Exam:    We will evaluate in ICU      Laboratory:  Recent Labs   Lab 05/10/24  0924   WBC 4.92   RBC 5.05   HGB 14.2   HCT 45.1   PLT 84*   MCV 89   MCH 28.1   MCHC 31.5*     BMP:   Recent Labs   Lab 05/10/24  0923         K 4.7      CO2 27   BUN 46*   CREATININE 2.7*   CALCIUM 9.2   MG 1.9     Lab Results   Component Value Date    CALCIUM 9.2 05/10/2024    PHOS 3.9 08/29/2023     BNP  Recent Labs   Lab 05/09/24  1311   BNP 2,691*   No results found for: "URICACID"  Lab Results   Component Value Date    IRON 57 02/01/2019     Lab Results   Component Value Date    CALCIUM 9.2 05/10/2024    PHOS 3.9 08/29/2023       Diagnostic Results:  X-Ray Chest AP Portable   Final Result      1. Pulmonary findings suggest pulmonary edema with pleural effusions, developing left lower lung zone consolidation not excluded.         Electronically signed " by: Ej Goel MD   Date:    05/09/2024   Time:    13:27      US Retroperitoneal Complete    (Results Pending)       ASSESSMENT/PLAN:     Worsening Oliguric acute kidney injury on progressive CKD 3 with baseline creatinine around 2.0 but recent 2.2 secondary to acute on chronic combined heart failure with dismal ejection fraction of 15% and needing aggressive diuresis but has poor cardiac output with hypotension:  -trends noted over the past few months with creatinine of 2.0 and then kellen to 2.2 and now 2.7 with need for aggressive diuresis  -only 88 mg of proteinuria so edema is not from nephrotic syndrome but mostly from severe heart failure  -hold ACE inhibitor for now  -check renal ultrasound and bladder scan to ensure bladder emptying  -agree with transfer to ICU for dobutamine drip  -may consider Lasix infusion with dual blockade and metolazone if blood pressure improves.  -consider adding midodrine to assist with blood pressure  -no immediate need for renal replacement therapy at this time but will depend on degree of diuresis with above  -see orders  -Renally dose meds, avoid nephrotoxins, and monitor I/O's closely.      Acute on chronic combined heart failure with ejection fraction of 15%:  -poor outlook based on trends and trajectory  -appreciate cardiology input    Hyperuricemia:  -continue allopurinol  -risk reviewed    Mineral bone density:  -continue calcitriol Monday Wednesday Friday    Thrombocytopenia:  -chronic in nature but seems worse over the past month  -defer to primary  -may need to decrease heparin dosing    Thanks for consult  See above  Will follow along.           High MDM complexity necessary to treat or prevent imminent or life-threatening deterioration of the following conditions:  ARELI, cardiorenal syndrome, severe heart failure with ejection fraction 15%  Time spent personally by me on the following activities 60 min: development of treatment plan with patient or surrogate,  discussions with consultants, interpretation of cardiac output measurements, examination of patient, ordering and performing treatments and interventions, evaluation of patient's response to treatment, obtaining history from patient or surrogate, ordering and review of laboratory studies, ordering and review of radiographic studies, re-evaluation of patient's condition, pulse oximetry and blood draw for specimens

## 2024-05-10 NOTE — SUBJECTIVE & OBJECTIVE
Interval History:  Patient educated about accurate urinal usage.  Nurse reporting he is accurately using this.    Review of Systems   Constitutional:  Negative for activity change, appetite change, chills, diaphoresis, fatigue and fever.   Respiratory:  Positive for shortness of breath. Negative for cough, chest tightness and wheezing.    Cardiovascular:  Positive for leg swelling. Negative for chest pain and palpitations.   Gastrointestinal:  Negative for abdominal distention, abdominal pain, constipation, diarrhea, nausea and vomiting.   Genitourinary:  Positive for decreased urine volume and scrotal swelling. Negative for difficulty urinating, dysuria, hematuria and urgency.   Musculoskeletal:  Positive for gait problem (chronic).   Skin:  Positive for wound (RLE).   Neurological:  Negative for dizziness, syncope, weakness, light-headedness and headaches.     Objective:     Vital Signs (Most Recent):  Temp: 98.9 °F (37.2 °C) (05/10/24 1136)  Pulse: 67 (05/10/24 1452)  Resp: 16 (05/10/24 1136)  BP: (!) 98/58 (05/10/24 1136)  SpO2: 99 % (05/10/24 1136) Vital Signs (24h Range):  Temp:  [97.5 °F (36.4 °C)-98.9 °F (37.2 °C)] 98.9 °F (37.2 °C)  Pulse:  [62-74] 67  Resp:  [15-18] 16  SpO2:  [92 %-100 %] 99 %  BP: ()/(58-77) 98/58     Weight: 88.2 kg (194 lb 7.1 oz)  Body mass index is 26.37 kg/m².    Intake/Output Summary (Last 24 hours) at 5/10/2024 1633  Last data filed at 5/10/2024 1357  Gross per 24 hour   Intake 800 ml   Output 600 ml   Net 200 ml         Physical Exam  Constitutional:       General: He is not in acute distress.  Pulmonary:      Effort: No respiratory distress.      Breath sounds: No wheezing.   Abdominal:      General: There is no distension.      Tenderness: There is no abdominal tenderness.   Skin:     Comments: Diffuse anasarca  Right leg ruptured blister - does not appear infected    Neurological:      General: No focal deficit present.      Mental Status: He is oriented to person,  place, and time.             Significant Labs: All pertinent labs within the past 24 hours have been reviewed.    Significant Imaging: I have reviewed all pertinent imaging results/findings within the past 24 hours.

## 2024-05-10 NOTE — ASSESSMENT & PLAN NOTE
Anasarca  Cardiorenal syndrome    Acute CHF exacerbation in setting of dietary and medication noncompliance with associated ARELI on CKD and chronic demand ischemia   At admission HDS and afebrile  Admission BNP 2691, troponin 0.189 (at baseline), CXR with pulmonary edema and effusions, EKG with SR, PVCs and TWA similar to prior studies   Recent TTE reviewed noting EF 35-40% with regional WMAs, LV dilation, diastolic dysfunction, mild AVS, mild MR    Current echo - with worsening systolic function 15-20%     Plan -   -CHF pathway initiated  -continue IV diuresis with furosemide >>> monitor for toleration   -continue home metoprolol dosing with transition to lopressor during acute CHF exacerbation  -dose/medication adjustment as appropriate   -elevate LEs and scrotum as tolerated  -Wound Care consulted due to altered skin integrity with blister rupture and weeping edema  -continue tele monitoring  -EKG PRN concerns   -cardiac diet with fluid restriction  -strict I&Os and daily weights  -trend labs, address/replete electrolytes as indicated  - cardiology consult - recommending transfer to ICU and dobutamine drip  - nephrology consult

## 2024-05-10 NOTE — CONSULTS
[facility] - Med Surg ([address])  Wound Care    Patient Name:  Felicia Lopez   MRN:  [id number]  Date: 5/10/2024  Diagnosis: Acute on chronic combined systolic and diastolic congestive heart failure    History:     Past Medical History:   Diagnosis Date    CAD (coronary artery disease)     Chronic combined systolic and diastolic CHF (congestive heart failure)     Chronic idiopathic thrombocytopenia     Disorder of kidney and ureter     History of heart attack     History of stroke     Hyperlipidemia     Hypertension        Social History     Socioeconomic History    Marital status:    Tobacco Use    Smoking status: Former     Current packs/day: 0.00     Types: Cigarettes     Quit date: 10/5/2008     Years since quitting: 15.6    Smokeless tobacco: Never   Substance and Sexual Activity    Alcohol use: No     Alcohol/week: 0.0 standard drinks of alcohol    Drug use: No    Sexual activity: Never     Social Determinants of Health     Financial Resource Strain: Low Risk  (8/28/2023)    Overall Financial Resource Strain (CARDIA)     Difficulty of Paying Living Expenses: Not very hard   Food Insecurity: No Food Insecurity (8/28/2023)    Hunger Vital Sign     Worried About Running Out of Food in the Last Year: Never true     Ran Out of Food in the Last Year: Never true   Transportation Needs: No Transportation Needs (8/28/2023)    PRAPARE - Transportation     Lack of Transportation (Medical): No     Lack of Transportation (Non-Medical): No   Physical Activity: Insufficiently Active (8/28/2023)    Exercise Vital Sign     Days of Exercise per Week: 7 days     Minutes of Exercise per Session: 20 min   Stress: No Stress Concern Present (8/28/2023)    [employer] [place] [employer] - Occupational Stress Questionnaire     Feeling of Stress : Not at all   Housing Stability: Unknown (8/28/2023)    Housing Stability Vital Sign     Unable to Pay for Housing in the Last Year: No     Unstable Housing in the Last Year:  No       Precautions:     Allergies as of 05/09/2024    (No Known Allergies)       [facility] Assessment Details/Treatment     Wound care consult received for assessment of bilateral lower extremities. Patient is a 78 year old male  with PMHx of CAD with history of MI s/p PCI (RCA x 2 JOSE C 2010), combined systolic and diastolic CHF, HTN, HLD, history of CVA (2015), preDM, CKD III (baseline SCr 2.1-2.3, follows with Dr. Matson), secondary hyperparathyroidism, MARLENI, chronic idiopathic thrombocytopenia, and history of prostate CA s/p TURP. He presents to ED from Cardiology clinic due to worsening edema in thighs, legs, and scrotum with associated HARTMAN, orthopnea, PND, and decreased UOP despite taking home PO lasix daily. Symptoms have progressively worsened over the last couple of weeks. He notes some noncompliance with low sodium diet including take out pizza and [employer] meals recently.He lives with spouse, uses a walker at baseline, and is independent in ADLs.     Upon assessment to bilateral lower extremities noted edema and large de-roofed blister to right lower leg. Completed wound care. Patient tolerated dressing change well.     Recommendations:   Every 3 days dressing change: cleanse well with vashe and apply triad paste to wound bed and covered with foam dressing.     Nursing and MD team notified. Orders placed. Nursing to maintain pressure injury prevention interventions. Wound care will follow.        05/10/24 0933        Wound 05/09/24 Blister(s) Right anterior;lower Leg #1   Date First Assessed: 05/09/24   Present on Original Admission: Yes  Primary Wound Type: Blister(s)  Side: Right  Orientation: anterior;lower  Location: Leg  Wound Number: #1  Is this injury device related?: No   Wound Image    Dressing Appearance Dry;Intact;Clean   Drainage Amount Scant   Drainage Characteristics/Odor Serosanguineous;No odor   Appearance Pink;Moist;Blistered   Tissue loss description Partial thickness   Periwound Area  Intact;Dry;Edematous   Wound Edges Open   Wound Length (cm) 8 cm   Wound Width (cm) 7 cm   Wound Depth (cm) 0.1 cm   Wound Volume (cm^3) 5.6 cm^3   Wound Surface Area (cm^2) 56 cm^2   Care Cleansed with:;Antimicrobial agent;Applied:  (triad paste)   Dressing Applied;Silicone;Foam        05/10/2024

## 2024-05-10 NOTE — PROGRESS NOTES
[address] Medicine  Progress Note    Patient Name: Felicia Lopez  MRN: [id number]  Patient Class: IP- Inpatient   Admission Date: 5/9/2024  Length of Stay: 1 days  Attending Physician: Jeanine Montoya MD  Primary Care Provider: Mickey Darden MD        Subjective:     Principal Problem:Acute on chronic combined systolic and diastolic congestive heart failure        HPI:  Mr. Lopez is a 78 YOM with PMHx of CAD with history of MI s/p PCI (RCA x 2 JOSE C 2010), combined systolic and diastolic CHF, HTN, HLD, history of CVA (2015), preDM, CKD III (baseline SCr 2.1-2.3, follows with Dr. Matson), secondary hyperparathyroidism, MARLENI, chronic idiopathic thrombocytopenia, and history of prostate CA s/p TURP.     He presents to ED from Cardiology clinic due to worsening edema in thighs, legs, and scrotum with associated HARTMAN, orthopnea, PND, and decreased UOP despite taking home PO lasix daily. Symptoms have progressively worsened over the last couple of weeks. He notes some noncompliance with low sodium diet including take out pizza and [employer] meals recently. He denies fever, chills, recent illness, CP, abdominal pain, N/V/D, constipation, hematuria, changes in bowel habits or blood in stool, decreased appetite, changes in PO intake, light headiness, dizziness, seizures, or syncope. He lives with spouse, uses a walker at baseline, and is independent in ADLs.     In the ED he is HDS and afebrile. CBC with WBC 5, H/H 15.6/49.2, platelets 92 (baseline 110-133). Chemistry with , K 4.2, chloride 105, CO2 24, BUN 40, SCr 2.6 (baseline 2.1-2.3), glucose 79. LFTs WNL. BNP 2691. Troponin 0.189 (similar to priors). Hep C and HIV non reactive. UA noninfectious. CXR with pulmonary edema and effusions. EKG with SR, PACs, and T wave abnormalities similar to priors.     The patient was placed in observation to the Hospital Medicine Service for further evaluation and treatment.     Overview/Hospital  Course:  Pt with minimal urine output with lasix (no retention on bladder scan). Echo with worsening systolic function to 15-20%.  Cardiology recommended transfer to ICU and initiation of dobutamine drip.  Nephrology consulted for cardiorenal syndrome.    Interval History:  Patient educated about accurate urinal usage.  Nurse reporting he is accurately using this.    Review of Systems   Constitutional:  Negative for activity change, appetite change, chills, diaphoresis, fatigue and fever.   Respiratory:  Positive for shortness of breath. Negative for cough, chest tightness and wheezing.    Cardiovascular:  Positive for leg swelling. Negative for chest pain and palpitations.   Gastrointestinal:  Negative for abdominal distention, abdominal pain, constipation, diarrhea, nausea and vomiting.   Genitourinary:  Positive for decreased urine volume and scrotal swelling. Negative for difficulty urinating, dysuria, hematuria and urgency.   Musculoskeletal:  Positive for gait problem (chronic).   Skin:  Positive for wound (RLE).   Neurological:  Negative for dizziness, syncope, weakness, light-headedness and headaches.     Objective:     Vital Signs (Most Recent):  Temp: 98.9 °F (37.2 °C) (05/10/24 1136)  Pulse: 67 (05/10/24 1452)  Resp: 16 (05/10/24 1136)  BP: (!) 98/58 (05/10/24 1136)  SpO2: 99 % (05/10/24 1136) Vital Signs (24h Range):  Temp:  [97.5 °F (36.4 °C)-98.9 °F (37.2 °C)] 98.9 °F (37.2 °C)  Pulse:  [62-74] 67  Resp:  [15-18] 16  SpO2:  [92 %-100 %] 99 %  BP: ()/(58-77) 98/58     Weight: 88.2 kg (194 lb 7.1 oz)  Body mass index is 26.37 kg/m².    Intake/Output Summary (Last 24 hours) at 5/10/2024 1633  Last data filed at 5/10/2024 1357  Gross per 24 hour   Intake 800 ml   Output 600 ml   Net 200 ml         Physical Exam  Constitutional:       General: He is not in acute distress.  Pulmonary:      Effort: No respiratory distress.      Breath sounds: No wheezing.   Abdominal:      General: There is no  distension.      Tenderness: There is no abdominal tenderness.   Skin:     Comments: Diffuse anasarca  Right leg ruptured blister - does not appear infected    Neurological:      General: No focal deficit present.      Mental Status: He is oriented to person, place, and time.             Significant Labs: All pertinent labs within the past 24 hours have been reviewed.    Significant Imaging: I have reviewed all pertinent imaging results/findings within the past 24 hours.    Assessment/Plan:      * Acute on chronic combined systolic and diastolic congestive heart failure  Anasarca  Cardiorenal syndrome    Acute CHF exacerbation in setting of dietary and medication noncompliance with associated ARELI on CKD and chronic demand ischemia   At admission HDS and afebrile  Admission BNP 2691, troponin 0.189 (at baseline), CXR with pulmonary edema and effusions, EKG with SR, PVCs and TWA similar to prior studies   Recent TTE reviewed noting EF 35-40% with regional WMAs, LV dilation, diastolic dysfunction, mild AVS, mild MR    Current echo - with worsening systolic function 15-20%     Plan -   -CHF pathway initiated  -continue IV diuresis with furosemide >>> monitor for toleration   -continue home metoprolol dosing with transition to lopressor during acute CHF exacerbation  -dose/medication adjustment as appropriate   -elevate LEs and scrotum as tolerated  -Wound Care consulted due to altered skin integrity with blister rupture and weeping edema  -continue tele monitoring  -EKG PRN concerns   -cardiac diet with fluid restriction  -strict I&Os and daily weights  -trend labs, address/replete electrolytes as indicated  - cardiology consult - recommending transfer to ICU and dobutamine drip  - nephrology consult    Gout  - continue home allopurinol      CAD (coronary artery disease)  History of PCI  History of CVA  Hyperlipidemia   History of remote MI with PCI in 2010 and CVA in 2015  Chronic demand ischemia noted with elevated  troponins  Admission troponin at baseline and on acute dynamic EKG changes    Plan -   -continue home metoprolol as detailed above  -continue home ASA  -continue home statin per pharmacy formulary alternative  -dose/medication adjustment as appropriate   -continue tele monitoring  -EKG PRN concerns  -trend labs, address/replete electrolytes as indicated    Iron deficiency anemia  Chronic idiopathic thrombocytopenia  -chronic MARLENI and thrombocytopenia  -at admission H/H 15.6/49.2 and platelets 92 >>>at/near baseline  -no overt bleeding  -trend labs over course, address/transfuse as indicated    Hyperlipidemia  - continue simvastatin alternative      Chronic kidney disease, stage III (moderate)  ARELI on CKD  Secondary hyperparathyroidism   Chronic CKD with admission SCr 2.6   Baseline 2.1-2.3  Likely cardiorenal syndrome in setting of CHF exacerbation    Plan -   - IV diuresis as noted above, monitor for toleration  - nephrology consult  - continue home Calcitrol MWF  - trend labs, address/replete electrolytes as indicated  - avoid nephrotoxins and hypotension as able, renally dose medications    Essential hypertension  Plan -   -continue IV diuresis with furosemide >>> monitor for toleration   -hold home PO lasix  -continue home metoprolol dosing with transition to lopressor during acute CHF exacerbation          VTE Risk Mitigation (From admission, onward)           Ordered     heparin (porcine) injection 5,000 Units  Every 8 hours         05/09/24 1447     IP VTE HIGH RISK PATIENT  Once         05/09/24 1447     Place sequential compression device  Until discontinued         05/09/24 1447                    Discharge Planning   SHANICE:      Code Status: Full Code   Is the patient medically ready for discharge?:     Reason for patient still in hospital (select all that apply): Treatment                     Jeanine Canseco MD  Department of Hospital Medicine   [facility] ([address])

## 2024-05-10 NOTE — CONSULTS
Cardiology Consult  5/10/2024  1:01 PM    Attending Cardiologist: Fabio Martinez M.D.  Primary Care Provider: Mickey Darden MD  Chief Complaint/Reason For Consultation:  CHF      Problem list  Patient Active Problem List   Diagnosis    Essential hypertension    Hyperglycemia    Chronic kidney disease, stage III (moderate)    Chronic idiopathic thrombocytopenia    CKD (chronic kidney disease) stage 2, GFR 60-89 ml/min    CKD (chronic kidney disease) stage 3, GFR 30-59 ml/min    BMI 27.0-27.9,adult    Pain of left hand    Dizziness    Left-sided low back pain without sciatica    Anemia    Allergic rhinitis due to pollen    Hyperlipidemia    Neck pain, acute    Depression    Anxiolytic dependence    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    Gastroesophageal reflux disease    History of malignant neoplasm of prostate    Hypercoagulable state    Iron deficiency anemia    Chest pain    CAD (coronary artery disease)    Acute on chronic combined systolic and diastolic congestive heart failure    History of heart attack    History of CVA (cerebrovascular accident)    Supratherapeutic INR    Acute on chronic combined systolic and diastolic heart failure    ACP (advance care planning)    History of percutaneous coronary intervention    Anasarca    Secondary hyperparathyroidism       CC:  Swelling    HPI:  Felicia Lopez is a 78 y.o.year-old male PMHx of CAD with history of MI s/p PCI (RCA x 2 JOSE C 2010), combined systolic and diastolic CHF, HTN, HLD, history of CVA (2015), preDM, CKD III (baseline SCr 2.1-2.3, follows with Dr. Matson), secondary hyperparathyroidism, MARLENI, chronic idiopathic thrombocytopenia, and history of prostate CA s/p TURP.   He was sent to ER from clinic yesterday with anasarca.  He stated that he has not been compliant with low Na diet.  His BNP was 2691.  Since yesterday, he has received Lasix 80 mg IV in the emergency room.  He is currently getting 60  mg IV twice daily.  He has not had good response to IV Lasix.  In addition, his creatinine is higher at 2.7 (baseline of 1.7 in Feb).  Still has SOB and marked edema/anasarca.    Medications  Current Facility-Administered Medications   Medication Dose Route Frequency Provider Last Rate Last Admin    acetaminophen tablet 650 mg  650 mg Oral Q4H PRN Roselia William, NP        albuterol-ipratropium 2.5 mg-0.5 mg/3 mL nebulizer solution 3 mL  3 mL Nebulization Once PRN Mis Edge, PALeloC        allopurinoL tablet 100 mg  100 mg Oral Daily Roselia William, NP   100 mg at 05/10/24 0904    aspirin EC tablet 81 mg  81 mg Oral Daily Roselia William, NP   81 mg at 05/10/24 0903    atorvastatin tablet 20 mg  20 mg Oral Daily Roselia William, NP   20 mg at 05/10/24 0903    calcitRIOL capsule 0.25 mcg  0.25 mcg Oral Every Mon, Wed, Fri Roselia William, NP        cetirizine tablet 10 mg  10 mg Oral QHS Roselia William, NP        furosemide injection 60 mg  60 mg Intravenous BID Roselia William, NP   60 mg at 05/10/24 1034    heparin (porcine) injection 5,000 Units  5,000 Units Subcutaneous Q8H Roselia William, NP   5,000 Units at 05/10/24 0612    HYDROcodone-acetaminophen 5-325 mg per tablet 1 tablet  1 tablet Oral Q4H PRN Roselia William, NP        melatonin tablet 6 mg  6 mg Oral Nightly PRN Roselia William, NP   6 mg at 05/09/24 2140    metoprolol tartrate (LOPRESSOR) split tablet 12.5 mg  12.5 mg Oral BID Roselia William, NP   12.5 mg at 05/10/24 0903    morphine injection 2 mg  2 mg Intravenous Q6H PRN Roselia William, NP        ondansetron disintegrating tablet 4 mg  4 mg Oral Q8H PRN Roselia William, NP        ondansetron injection 4 mg  4 mg Intravenous Q8H PRN Roselia William, NP        polyethylene glycol packet 17 g  17 g Oral BID PRN Roselia William, NP        senna-docusate 8.6-50 mg per tablet 1 tablet  1 tablet Oral BID Roselia William, NP         sodium chloride 0.9% flush 10 mL  10 mL Intravenous PRN Roselia William NP          Prior to Admission medications    Medication Sig Start Date End Date Taking? Authorizing Provider   allopurinoL (ZYLOPRIM) 100 MG tablet Take 1 tablet by mouth once daily. 2/23/24  Yes Provider, Historical   aspirin (ECOTRIN) 81 MG EC tablet Take 1 tablet (81 mg total) by mouth once daily. 8/11/22 5/9/24 Yes Mara Burgos PA-C   calcitRIOL (ROCALTROL) 0.25 MCG Cap Take 0.25 mcg by mouth 3 (three) times a week.   Yes Provider, Historical   fluticasone propionate (FLONASE) 50 mcg/actuation nasal spray 1 spray by Each Nostril route once daily.   Yes Provider, Historical   furosemide (LASIX) 40 MG tablet Take 1 tablet (40 mg total) by mouth 2 (two) times daily.  Patient taking differently: Take 40 mg by mouth once daily. 1/17/24  Yes Fabio Martinez MD   latanoprost 0.005 % ophthalmic solution Place 1 drop into both eyes every evening. 7/21/23  Yes Provider, Historical   levocetirizine (XYZAL) 5 MG tablet Take 1 tablet by mouth once daily. 2/23/24  Yes Provider, Historical   metoprolol succinate (TOPROL-XL) 25 MG 24 hr tablet Take 1 tablet (25 mg total) by mouth every evening. 3/2/23 5/9/24 Yes Fabio Martinez MD   simvastatin (ZOCOR) 40 MG tablet Take 1 tablet (40 mg total) by mouth once daily. 12/14/18  Yes Mickey Darden MD   baclofen (LIORESAL) 10 MG tablet Take 1 tablet (10 mg total) by mouth 3 (three) times daily. 10/3/18 8/11/22  Mickey Darden MD   clopidogrel (PLAVIX) 75 mg tablet Take 1 tablet (75 mg total) by mouth once daily. 12/14/18 8/11/22  Mickey Darden MD   hydroCHLOROthiazide (MICROZIDE) 12.5 mg capsule Take 1 capsule (12.5 mg total) by mouth once daily.  Patient taking differently: Take 25 mg by mouth once daily. 10/31/18 8/11/22  Mickey Darden MD         History  Past Medical History:   Diagnosis Date    CAD (coronary artery disease)     Chronic combined systolic and diastolic  CHF (congestive heart failure)     Chronic idiopathic thrombocytopenia     Disorder of kidney and ureter     History of heart attack     History of stroke     Hyperlipidemia     Hypertension      Past Surgical History:   Procedure Laterality Date    FACIAL RECONSTRUCTION SURGERY      PROSTATE SURGERY       Social History     Socioeconomic History    Marital status:    Tobacco Use    Smoking status: Former     Current packs/day: 0.00     Types: Cigarettes     Quit date: 10/5/2008     Years since quitting: 15.6    Smokeless tobacco: Never   Substance and Sexual Activity    Alcohol use: No     Alcohol/week: 0.0 standard drinks of alcohol    Drug use: No    Sexual activity: Never     Social Determinants of Health     Financial Resource Strain: Low Risk  (8/28/2023)    Overall Financial Resource Strain (CARDIA)     Difficulty of Paying Living Expenses: Not very hard   Food Insecurity: No Food Insecurity (8/28/2023)    Hunger Vital Sign     Worried About Running Out of Food in the Last Year: Never true     Ran Out of Food in the Last Year: Never true   Transportation Needs: No Transportation Needs (8/28/2023)    PRAPARE - Transportation     Lack of Transportation (Medical): No     Lack of Transportation (Non-Medical): No   Physical Activity: Insufficiently Active (8/28/2023)    Exercise Vital Sign     Days of Exercise per Week: 7 days     Minutes of Exercise per Session: 20 min   Stress: No Stress Concern Present (8/28/2023)    [employer] - Occupational Stress Questionnaire     Feeling of Stress : Not at all   Housing Stability: Unknown (8/28/2023)    Housing Stability Vital Sign     Unable to Pay for Housing in the Last Year: No     Unstable Housing in the Last Year: No         Allergies  Review of patient's allergies indicates:  No Known Allergies      Review of Systems   Review of Systems   Cardiovascular:  Positive for dyspnea on exertion and leg swelling.   Respiratory:  Positive  "for shortness of breath.          Physical Exam  Wt Readings from Last 1 Encounters:   05/09/24 88.2 kg (194 lb 7.1 oz)     BP Readings from Last 3 Encounters:   05/10/24 (!) 98/58   05/09/24 112/74   02/22/24 120/80     Pulse Readings from Last 1 Encounters:   05/10/24 66     Body mass index is 26.37 kg/m².    Physical Exam  Neck:      Vascular: JVD present.   Cardiovascular:      Rate and Rhythm: Normal rate and regular rhythm.      Pulses:           Carotid pulses are 2+ on the right side and 2+ on the left side.       Radial pulses are 2+ on the right side and 2+ on the left side.      Heart sounds: S1 normal and S2 normal. Murmur heard.      Systolic murmur is present.      Comments: Tense edema in legs, thighs.  Reported scrotal edema.  Pulmonary:      Breath sounds: Normal breath sounds and air entry.   Musculoskeletal:      Right lower leg: Edema present.      Left lower leg: Edema present.   Neurological:      Mental Status: He is alert.           Laboratory:  Trended Lab Data:  Recent Labs   Lab 05/09/24  1311 05/10/24  0924   WBC 5.41 4.92   HGB 15.6 14.2   HCT 49.2 45.1   PLT 92* 84*       Recent Labs   Lab 05/09/24  1405 05/10/24  0923    139   K 4.2 4.7    104   CO2 24 27   BUN 40* 46*   GLU 79 106   CALCIUM 9.1 9.2   MG  --  1.9       Recent Labs   Lab 05/09/24  1405 05/10/24  0923   PROT 6.9 6.8   ALBUMIN 3.0* 3.0*   BILITOT 1.1* 1.1*   AST 19 20   ALT 14 14   ALKPHOS 83 88       No results for input(s): "PROTIME", "PTT", "INR" in the last 168 hours.    Cardiac:   Recent Labs   Lab 05/09/24  1311 05/10/24  0923   TROPONINI 0.189* 0.165*   BNP 2,691*  --        FLP:   Lab Results   Component Value Date    CHOL 123 08/09/2022    HDL 35 (L) 08/09/2022    LDLCALC 78.2 08/09/2022    TRIG 49 08/09/2022    CHOLHDL 28.5 08/09/2022     DM:   Lab Results   Component Value Date    HGBA1C 5.7 (H) 08/28/2023    HGBA1C 5.7 (H) 08/09/2022    HGBA1C 5.7 (H) 02/01/2019    MICROALBUR 2.4 02/15/2016    " "LDLCALC 78.2 08/09/2022    CREATININE 2.7 (H) 05/10/2024     Thyroid: No results found for: "TSH", "FREET4", "M1VMJBP", "X2KIGCB", "THYROIDAB"  Anemia:   Lab Results   Component Value Date    IRON 57 02/01/2019    TXFZFKVY24 1045 (H) 02/02/2024    FOLATE 9.3 02/02/2024     Urinalysis:   Lab Results   Component Value Date    COLORU Yellow 05/09/2024    SPECGRAV 1.010 05/09/2024    NITRITE Negative 05/09/2024    GLUCOSEU NEGATIVE 02/01/2019    KETONESU Negative 05/09/2024    UROBILINOGEN 2.0-3.0 (A) 05/09/2024    BILIRUBINUR NEGATIVE 02/01/2019     @    Other Results:  EKG (my interpretation):    TELEMETRY:      Echo:   Results for orders placed or performed during the hospital encounter of 05/09/24   Echo   Result Value Ref Range    BSA 2.09 m2    LVOT stroke volume 37.36 cm3    LVIDd 5.21 3.5 - 6.0 cm    LV Systolic Volume 102.42 mL    LV Systolic Volume Index 49.2 mL/m2    LVIDs 4.70 (A) 2.1 - 4.0 cm    LV Diastolic Volume 129.99 mL    LV Diastolic Volume Index 62.50 mL/m2    IVS 1.33 (A) 0.6 - 1.1 cm    LVOT diameter 2.25 cm    LVOT area 4.0 cm2    FS 10 (A) 28 - 44 %    Left Ventricle Relative Wall Thickness 0.55 cm    Posterior Wall 1.43 (A) 0.6 - 1.1 cm    LV mass 304.15 g    LV Mass Index 146 g/m2    MV Peak E Boby 0.63 m/s    TDI LATERAL 0.04 m/s    TDI SEPTAL 0.03 m/s    E/E' ratio 18.00 m/s    MV Peak A Boby 0.29 m/s    TR Max Boby 3.26 m/s    E/A ratio 2.17     E wave deceleration time 117.17 msec    LV SEPTAL E/E' RATIO 21.00 m/s    LV LATERAL E/E' RATIO 15.75 m/s    LVOT peak boby 0.57 m/s    Left Ventricular Outflow Tract Mean Velocity 0.37 cm/s    Left Ventricular Outflow Tract Mean Gradient 0.66 mmHg    RV S' 6.39 cm/s    LA size 3.44 cm    Left Atrium Minor Axis 6.32 cm    Left Atrium Major Axis 6.92 cm    LA volume (mod) 82.77 cm3    LA Volume Index (Mod) 39.8 mL/m2    RA Major Axis 5.51 cm    AV mean gradient 3 mmHg    AV peak gradient 4 mmHg    Ao peak boby 1.05 m/s    Ao VTI 17.20 cm    LVOT peak VTI " 9.40 cm    AV valve area 2.17 cm²    AV Velocity Ratio 0.54     AV index (prosthetic) 0.55     MICHEL by Velocity Ratio 2.16 cm²    Mr max leora 4.54 m/s    MV stenosis pressure 1/2 time 33.98 ms    MV valve area p 1/2 method 6.47 cm2    TV mean gradient 27 mmHg    Triscuspid Valve Regurgitation Peak Gradient 43 mmHg    PV PEAK VELOCITY 0.31 m/s    PV peak gradient 0 mmHg    RVOT peak leora 0.26 m/s    STJ 2.61 cm    Ascending aorta 2.64 cm    IVC diameter 2.32 cm    Mean e' 0.04 m/s    ZLVIDS 1.48     ZLVIDD -2.01     LA Volume Index 41.8 mL/m2    LA volume 86.93 cm3    TAPSE 1.30 cm    LA WIDTH 4.5 cm    RA Width 4.0 cm    Sinus 3.0 cm    TV resting pulmonary artery pressure 51 mmHg    RV TB RVSP 11 mmHg    Est. RA pres 8 mmHg    Narrative      Left Ventricle: The left ventricle is mildly dilated. Mildly increased   wall thickness. Severe global hypokinesis present. There is severely   reduced systolic function with a visually estimated ejection fraction of   15 - 20%. Global longitudinal strain is reduced; -4%. Grade II diastolic   dysfunction. MV e' lateral velocity is 0.04 m/s.    Right Ventricle: Mild right ventricular enlargement. Wall thickness is   normal. Right ventricle wall motion has global hypokinesis. Systolic   function is severely reduced.    Left Atrium: Left atrium is moderately dilated.    Mitral Valve: There is mild regurgitation.    Tricuspid Valve: There is mild regurgitation.    Pulmonary Artery: The estimated pulmonary artery systolic pressure is   51 mmHg.    IVC/SVC: Intermediate venous pressure at 8 mmHg.         Radiology:  Echo    Result Date: 5/10/2024    Left Ventricle: The left ventricle is mildly dilated. Mildly increased wall thickness. Severe global hypokinesis present. There is severely reduced systolic function with a visually estimated ejection fraction of 15 - 20%. Global longitudinal strain is reduced; -4%. Grade II diastolic dysfunction. MV e' lateral velocity is 0.04 m/s.   Right  Ventricle: Mild right ventricular enlargement. Wall thickness is normal. Right ventricle wall motion has global hypokinesis. Systolic function is severely reduced.   Left Atrium: Left atrium is moderately dilated.   Mitral Valve: There is mild regurgitation.   Tricuspid Valve: There is mild regurgitation.   Pulmonary Artery: The estimated pulmonary artery systolic pressure is 51 mmHg.   IVC/SVC: Intermediate venous pressure at 8 mmHg.     X-Ray Chest AP Portable    Result Date: 5/9/2024  EXAMINATION: XR CHEST AP PORTABLE CLINICAL HISTORY: CHF; TECHNIQUE: Single frontal view of the chest was performed. COMPARISON: 02/01/2024 FINDINGS: The cardiomediastinal silhouette is prominent, similar to the previous exam noting calcification of the aorta..  There is obscuration of the costophrenic angles, left greater than right suggesting effusions..  The trachea is midline.  The lungs are symmetrically expanded bilaterally with coarse interstitial attenuation bilaterally.  There is increased parenchymal attenuation projected over the left lower lung zone, may reflect a combination of atelectasis and pleural fluid however developing consolidation not excluded..  There is no pneumothorax.  The osseous structures are remarkable for degenerative change and osteopenia..     1. Pulmonary findings suggest pulmonary edema with pleural effusions, developing left lower lung zone consolidation not excluded. Electronically signed by: Ej Goel MD Date:    05/09/2024 Time:    13:27        Current Medications:     Infusions:       Scheduled:   allopurinoL  100 mg Oral Daily    aspirin  81 mg Oral Daily    atorvastatin  20 mg Oral Daily    calcitRIOL  0.25 mcg Oral Every Mon, Wed, Fri    cetirizine  10 mg Oral QHS    furosemide (LASIX) injection  60 mg Intravenous BID    heparin (porcine)  5,000 Units Subcutaneous Q8H    metoprolol tartrate  12.5 mg Oral BID    senna-docusate 8.6-50 mg  1 tablet Oral BID        PRN:    Current  Facility-Administered Medications:     acetaminophen, 650 mg, Oral, Q4H PRN    albuterol-ipratropium, 3 mL, Nebulization, Once PRN    HYDROcodone-acetaminophen, 1 tablet, Oral, Q4H PRN    melatonin, 6 mg, Oral, Nightly PRN    morphine, 2 mg, Intravenous, Q6H PRN    ondansetron, 4 mg, Oral, Q8H PRN    ondansetron, 4 mg, Intravenous, Q8H PRN    polyethylene glycol, 17 g, Oral, BID PRN    sodium chloride 0.9%, 10 mL, Intravenous, PRN      Assessment and Plan:  Acute on chronic combined systolic and diastolic congestive heart failure with anasarca.  No significant response to IV lasix.  Likely has cardio-renal syndrome.  Echo with worsening biventricular dysfunction.  -discussed with patient and Dr. Montoya.  Transfer to ICU for dobutamine.  Consult renal for assistance.  Consult palliative care medicine.    CAD, stable, no angina  -on aspirin, statin and metoprolol    HTN, low BP  -hold home BP meds    CKD  -consult renal team    MARLENI    Chronic idiopathic thrombocytopenia  -as per  team    Thank you for allowing me to participate in the care of Felicia Lopez.      Fabio Martinez MD, F.A.C.C, F.S.C.A.I.    Disclaimer: This document was created using voice recognition software (M*Modal Fluency Direct). Although it may be edited, this document may contain errors related to incorrect recognition of the spoken word. Please call the physician if clarification is needed.

## 2024-05-10 NOTE — ASSESSMENT & PLAN NOTE
Plan -   -continue IV diuresis with furosemide >>> monitor for toleration   -hold home PO lasix  -continue home metoprolol dosing with transition to lopressor during acute CHF exacerbation

## 2024-05-10 NOTE — ASSESSMENT & PLAN NOTE
History of PCI  History of CVA  Hyperlipidemia   History of remote MI with PCI in 2010 and CVA in 2015  Chronic demand ischemia noted with elevated troponins  Admission troponin at baseline and on acute dynamic EKG changes    Plan -   -continue home metoprolol as detailed above  -continue home ASA  -continue home statin per pharmacy formulary alternative  -dose/medication adjustment as appropriate   -continue tele monitoring  -EKG PRN concerns  -trend labs, address/replete electrolytes as indicated

## 2024-05-10 NOTE — HOSPITAL COURSE
Patient had minimal urine output with furosemide and echo showed worsening systolic function to 15-20%.  Cardiology recommended transfer to ICU and initiation of dobutamine drip 5/10, continued until 5/13 and then he was transferred back to the floor on a furosemide drip.  Nephrology was consulted for potential cardiorenal syndrome.  He was continued on a furosemide drip and urine output improved.  Electrolytes monitored and replaced as needed.  Rate of drip was decreased due to contraction alkalosis.  Oral furosemide was to have been started 5/15 but he still had contraction alkalosis so it was held one day with improvement.  Patient discharged home 5/16 with fluid balance -13 liters.  He was seen and examined on the day of discharge and was feeling well enough to go home.  Home health was ordered with a consult to palliative care nursing.

## 2024-05-11 LAB
ALBUMIN SERPL BCP-MCNC: 2.5 G/DL (ref 3.5–5.2)
ALBUMIN SERPL BCP-MCNC: 2.7 G/DL (ref 3.5–5.2)
ANION GAP SERPL CALC-SCNC: 11 MMOL/L (ref 8–16)
ANION GAP SERPL CALC-SCNC: 9 MMOL/L (ref 8–16)
BUN SERPL-MCNC: 44 MG/DL (ref 8–23)
BUN SERPL-MCNC: 47 MG/DL (ref 8–23)
CALCIUM SERPL-MCNC: 8.6 MG/DL (ref 8.7–10.5)
CALCIUM SERPL-MCNC: 9.1 MG/DL (ref 8.7–10.5)
CHLORIDE SERPL-SCNC: 101 MMOL/L (ref 95–110)
CHLORIDE SERPL-SCNC: 97 MMOL/L (ref 95–110)
CO2 SERPL-SCNC: 29 MMOL/L (ref 23–29)
CO2 SERPL-SCNC: 30 MMOL/L (ref 23–29)
CREAT SERPL-MCNC: 2.6 MG/DL (ref 0.5–1.4)
CREAT SERPL-MCNC: 2.7 MG/DL (ref 0.5–1.4)
ERYTHROCYTE [DISTWIDTH] IN BLOOD BY AUTOMATED COUNT: 20.5 % (ref 11.5–14.5)
EST. GFR  (NO RACE VARIABLE): 23 ML/MIN/1.73 M^2
EST. GFR  (NO RACE VARIABLE): 24 ML/MIN/1.73 M^2
GLUCOSE SERPL-MCNC: 112 MG/DL (ref 70–110)
GLUCOSE SERPL-MCNC: 127 MG/DL (ref 70–110)
HCT VFR BLD AUTO: 40 % (ref 40–54)
HGB BLD-MCNC: 12.9 G/DL (ref 14–18)
MAGNESIUM SERPL-MCNC: 1.8 MG/DL (ref 1.6–2.6)
MAGNESIUM SERPL-MCNC: 2.1 MG/DL (ref 1.6–2.6)
MCH RBC QN AUTO: 28.2 PG (ref 27–31)
MCHC RBC AUTO-ENTMCNC: 32.3 G/DL (ref 32–36)
MCV RBC AUTO: 88 FL (ref 82–98)
PHOSPHATE SERPL-MCNC: 3.5 MG/DL (ref 2.7–4.5)
PHOSPHATE SERPL-MCNC: 4 MG/DL (ref 2.7–4.5)
PLATELET # BLD AUTO: 78 K/UL (ref 150–450)
PMV BLD AUTO: ABNORMAL FL (ref 9.2–12.9)
POTASSIUM SERPL-SCNC: 4.2 MMOL/L (ref 3.5–5.1)
POTASSIUM SERPL-SCNC: 4.6 MMOL/L (ref 3.5–5.1)
RBC # BLD AUTO: 4.57 M/UL (ref 4.6–6.2)
SODIUM SERPL-SCNC: 138 MMOL/L (ref 136–145)
SODIUM SERPL-SCNC: 139 MMOL/L (ref 136–145)
WBC # BLD AUTO: 5.75 K/UL (ref 3.9–12.7)

## 2024-05-11 PROCEDURE — 63600175 PHARM REV CODE 636 W HCPCS: Performed by: INTERNAL MEDICINE

## 2024-05-11 PROCEDURE — 63600175 PHARM REV CODE 636 W HCPCS: Performed by: STUDENT IN AN ORGANIZED HEALTH CARE EDUCATION/TRAINING PROGRAM

## 2024-05-11 PROCEDURE — 25000003 PHARM REV CODE 250: Performed by: STUDENT IN AN ORGANIZED HEALTH CARE EDUCATION/TRAINING PROGRAM

## 2024-05-11 PROCEDURE — 20000000 HC ICU ROOM

## 2024-05-11 PROCEDURE — 25000003 PHARM REV CODE 250: Performed by: NURSE PRACTITIONER

## 2024-05-11 PROCEDURE — 99291 CRITICAL CARE FIRST HOUR: CPT | Mod: ,,, | Performed by: INTERNAL MEDICINE

## 2024-05-11 PROCEDURE — 83735 ASSAY OF MAGNESIUM: CPT | Mod: 91 | Performed by: STUDENT IN AN ORGANIZED HEALTH CARE EDUCATION/TRAINING PROGRAM

## 2024-05-11 PROCEDURE — 99900035 HC TECH TIME PER 15 MIN (STAT)

## 2024-05-11 PROCEDURE — 99223 1ST HOSP IP/OBS HIGH 75: CPT | Mod: GC,,, | Performed by: INTERNAL MEDICINE

## 2024-05-11 PROCEDURE — 94761 N-INVAS EAR/PLS OXIMETRY MLT: CPT

## 2024-05-11 PROCEDURE — 80069 RENAL FUNCTION PANEL: CPT | Performed by: STUDENT IN AN ORGANIZED HEALTH CARE EDUCATION/TRAINING PROGRAM

## 2024-05-11 PROCEDURE — 63600175 PHARM REV CODE 636 W HCPCS: Performed by: NURSE PRACTITIONER

## 2024-05-11 PROCEDURE — 27000221 HC OXYGEN, UP TO 24 HOURS

## 2024-05-11 PROCEDURE — 85027 COMPLETE CBC AUTOMATED: CPT | Performed by: STUDENT IN AN ORGANIZED HEALTH CARE EDUCATION/TRAINING PROGRAM

## 2024-05-11 PROCEDURE — 36415 COLL VENOUS BLD VENIPUNCTURE: CPT | Performed by: STUDENT IN AN ORGANIZED HEALTH CARE EDUCATION/TRAINING PROGRAM

## 2024-05-11 RX ORDER — MAGNESIUM SULFATE HEPTAHYDRATE 40 MG/ML
2 INJECTION, SOLUTION INTRAVENOUS ONCE
Status: COMPLETED | OUTPATIENT
Start: 2024-05-11 | End: 2024-05-11

## 2024-05-11 RX ADMIN — ATORVASTATIN CALCIUM 20 MG: 20 TABLET, FILM COATED ORAL at 08:05

## 2024-05-11 RX ADMIN — MAGNESIUM SULFATE HEPTAHYDRATE 2 G: 40 INJECTION, SOLUTION INTRAVENOUS at 11:05

## 2024-05-11 RX ADMIN — MUPIROCIN: 20 OINTMENT TOPICAL at 09:05

## 2024-05-11 RX ADMIN — FUROSEMIDE 5 MG/HR: 10 INJECTION, SOLUTION INTRAMUSCULAR; INTRAVENOUS at 09:05

## 2024-05-11 RX ADMIN — ASPIRIN 81 MG: 81 TABLET, COATED ORAL at 08:05

## 2024-05-11 RX ADMIN — HEPARIN SODIUM 5000 UNITS: 5000 INJECTION INTRAVENOUS; SUBCUTANEOUS at 01:05

## 2024-05-11 RX ADMIN — METOPROLOL TARTRATE 12.5 MG: 25 TABLET, FILM COATED ORAL at 08:05

## 2024-05-11 RX ADMIN — HEPARIN SODIUM 5000 UNITS: 5000 INJECTION INTRAVENOUS; SUBCUTANEOUS at 09:05

## 2024-05-11 RX ADMIN — ALLOPURINOL 100 MG: 100 TABLET ORAL at 08:05

## 2024-05-11 RX ADMIN — MUPIROCIN: 20 OINTMENT TOPICAL at 08:05

## 2024-05-11 RX ADMIN — HEPARIN SODIUM 5000 UNITS: 5000 INJECTION INTRAVENOUS; SUBCUTANEOUS at 05:05

## 2024-05-11 NOTE — PROGRESS NOTES
[facility] Intensive Care [facility] Medicine  Progress Note    Patient Name: Felicia Lopez  MRN: [id number]  Patient Class: IP- Inpatient   Admission Date: 5/9/2024  Length of Stay: 2 days  Attending Physician: Jeanine Montoya MD  Primary Care Provider: Mickey Darden MD        Subjective:     Principal Problem:Acute on chronic combined systolic and diastolic congestive heart failure        HPI:  Mr. Lopez is a 78 YOM with PMHx of CAD with history of MI s/p PCI (RCA x 2 JOSE C 2010), combined systolic and diastolic CHF, HTN, HLD, history of CVA (2015), preDM, CKD III (baseline SCr 2.1-2.3, follows with Dr. Matson), secondary hyperparathyroidism, MARLENI, chronic idiopathic thrombocytopenia, and history of prostate CA s/p TURP.     He presents to ED from Cardiology clinic due to worsening edema in thighs, legs, and scrotum with associated HARTMAN, orthopnea, PND, and decreased UOP despite taking home PO lasix daily. Symptoms have progressively worsened over the last couple of weeks. He notes some noncompliance with low sodium diet including take out pizza and [employer] meals recently. He denies fever, chills, recent illness, CP, abdominal pain, N/V/D, constipation, hematuria, changes in bowel habits or blood in stool, decreased appetite, changes in PO intake, light headiness, dizziness, seizures, or syncope. He lives with spouse, uses a walker at baseline, and is independent in ADLs.     In the ED he is HDS and afebrile. CBC with WBC 5, H/H 15.6/49.2, platelets 92 (baseline 110-133). Chemistry with , K 4.2, chloride 105, CO2 24, BUN 40, SCr 2.6 (baseline 2.1-2.3), glucose 79. LFTs WNL. BNP 2691. Troponin 0.189 (similar to priors). Hep C and HIV non reactive. UA noninfectious. CXR with pulmonary edema and effusions. EKG with SR, PACs, and T wave abnormalities similar to priors.     The patient was placed in observation to the Hospital Medicine Service for further evaluation and treatment.     Overview/Hospital  Course:  Pt with minimal urine output with lasix (no retention on bladder scan). Echo with worsening systolic function to 15-20%.  Cardiology recommended transfer to ICU and initiation of dobutamine drip.  Nephrology consulted for cardiorenal syndrome. Continue lasix drip with good urine output.     Interval History:  Patient using urinal accurately.  Having good urine output.    Review of Systems   Constitutional:  Negative for activity change, appetite change, chills, diaphoresis, fatigue and fever.   Respiratory:  Positive for shortness of breath. Negative for cough, chest tightness and wheezing.    Cardiovascular:  Positive for leg swelling. Negative for chest pain and palpitations.   Gastrointestinal:  Negative for abdominal distention, abdominal pain, constipation, diarrhea, nausea and vomiting.   Genitourinary:  Positive for decreased urine volume and scrotal swelling. Negative for difficulty urinating, dysuria, hematuria and urgency.   Musculoskeletal:  Positive for gait problem (chronic).   Skin:  Positive for wound (RLE).   Neurological:  Negative for dizziness, syncope, weakness, light-headedness and headaches.     Objective:     Vital Signs (Most Recent):  Temp: 97.9 °F (36.6 °C) (05/11/24 0701)  Pulse: 77 (05/11/24 1015)  Resp: 14 (05/11/24 1015)  BP: (!) 116/56 (05/11/24 1015)  SpO2: 97 % (05/11/24 1015) Vital Signs (24h Range):  Temp:  [96.9 °F (36.1 °C)-98.9 °F (37.2 °C)] 97.9 °F (36.6 °C)  Pulse:  [] 77  Resp:  [11-37] 14  SpO2:  [95 %-100 %] 97 %  BP: ()/(50-96) 116/56     Weight: 88.2 kg (194 lb 7.1 oz)  Body mass index is 26.37 kg/m².    Intake/Output Summary (Last 24 hours) at 5/11/2024 1037  Last data filed at 5/11/2024 0830  Gross per 24 hour   Intake 786.05 ml   Output 2625 ml   Net -1838.95 ml         Physical Exam  Constitutional:       General: He is not in acute distress.  Pulmonary:      Effort: No respiratory distress.      Breath sounds: No wheezing.   Abdominal:       General: There is no distension.      Tenderness: There is no abdominal tenderness.   Skin:     Comments: Diffuse anasarca  Right leg ruptured blister - does not appear infected    Neurological:      General: No focal deficit present.      Mental Status: He is oriented to person, place, and time.             Significant Labs: All pertinent labs within the past 24 hours have been reviewed.    Significant Imaging: I have reviewed all pertinent imaging results/findings within the past 24 hours.    Assessment/Plan:      * Acute on chronic combined systolic and diastolic congestive heart failure  Anasarca  Cardiorenal syndrome    Acute CHF exacerbation in setting of dietary and medication noncompliance with associated ARELI on CKD and chronic demand ischemia   At admission HDS and afebrile  Admission BNP 2691, troponin 0.189 (at baseline), CXR with pulmonary edema and effusions, EKG with SR, PVCs and TWA similar to prior studies   Recent TTE reviewed noting EF 35-40% with regional WMAs, LV dilation, diastolic dysfunction, mild AVS, mild MR    Current echo - with worsening systolic function 15-20%     Plan -   -elevate LEs and scrotum as tolerated  -Wound Care consulted due to altered skin integrity with blister rupture and weeping edema  -cardiac diet with fluid restriction  -strict I&Os and daily weights  -trend labs, address/replete electrolytes as indicated  - cardiology consult - recommending transfer to ICU and dobutamine drip - transferred 5/10   -hold home metoprolol while on dobutamine drip  - nephrology consult  - pulm crit on consult - 5/10 started lasix drip with good urine output     Gout  - continue home allopurinol      CAD (coronary artery disease)  History of PCI  History of CVA  Hyperlipidemia   History of remote MI with PCI in 2010 and CVA in 2015  Chronic demand ischemia noted with elevated troponins  Admission troponin at baseline and on acute dynamic EKG changes    Plan -   -continue home metoprolol as  detailed above  -continue home ASA  -continue home statin per pharmacy formulary alternative  -dose/medication adjustment as appropriate   -continue tele monitoring  -EKG PRN concerns  -trend labs, address/replete electrolytes as indicated    Iron deficiency anemia  Chronic idiopathic thrombocytopenia  -chronic MARLENI and thrombocytopenia  -no overt bleeding  -trend labs over course, address/transfuse as indicated  -hold DVT prophylaxis if platelets drop less than 50          Hyperlipidemia  - continue simvastatin alternative      Chronic kidney disease, stage III (moderate)  ARELI on CKD  Secondary hyperparathyroidism   Chronic CKD with admission SCr 2.6 -->2.8->2.7  Baseline 2.1-2.3  Likely cardiorenal syndrome in setting of CHF exacerbation    Plan -   - cont lasix drip with good urine output (aim for 100ml/hr)   - nephrology consult  - continue home Calcitrol MWF  - trend labs, address/replete electrolytes as indicated  - avoid nephrotoxins and hypotension as able, renally dose medications    Essential hypertension  Plan -   -hold home metoprolol while on dobutamine          VTE Risk Mitigation (From admission, onward)           Ordered     heparin (porcine) injection 5,000 Units  Every 8 hours         05/09/24 1447     IP VTE HIGH RISK PATIENT  Once         05/09/24 1447     Place sequential compression device  Until discontinued         05/09/24 1447                    Discharge Planning   SHANICE:      Code Status: Full Code   Is the patient medically ready for discharge?:     Reason for patient still in hospital (select all that apply): Treatment  Discharge Plan A: Home with family                  Jeanine Canseco MD  Department of Hospital Medicine   [facility], [place] - Intensive Care ([place])

## 2024-05-11 NOTE — PLAN OF CARE
05/11/24 0959   Medicare Message   Important Message from Medicare regarding Discharge Appeal Rights Given to patient/caregiver;Explained to patient/caregiver;Signed/date by patient/caregiver   Date IMM was signed 05/11/24   Time IMM was signed 0959

## 2024-05-11 NOTE — ASSESSMENT & PLAN NOTE
ARELI on CKD  Secondary hyperparathyroidism   Chronic CKD with admission SCr 2.6 -->2.8->2.7  Baseline 2.1-2.3  Likely cardiorenal syndrome in setting of CHF exacerbation    Plan -   - cont lasix drip with good urine output (aim for 100ml/hr)   - nephrology consult  - continue home Calcitrol MWF  - trend labs, address/replete electrolytes as indicated  - avoid nephrotoxins and hypotension as able, renally dose medications

## 2024-05-11 NOTE — CARE UPDATE
05/11/24 1218   PRE-TX-O2   Device (Oxygen Therapy) nasal cannula   $ Is the patient on Low Flow Oxygen? Yes   Flow (L/min) (Oxygen Therapy) 1   SpO2 99 %   Pulse Oximetry Type Continuous   $ Pulse Oximetry - Multiple Charge Pulse Oximetry - Multiple   Pulse 82   Resp (!) 22   BP (!) 89/53   Preset CPAP/BiPAP Settings   Mode Of Delivery BiPAP S/T;Standby

## 2024-05-11 NOTE — SUBJECTIVE & OBJECTIVE
Interval History: Patient denies any complaints this am, sitting comfortably in bed watching TV. Reports breathing feels fine. UOP overnight 2L. Remains on dobutamine 5 mcg/kg/min and lasix gtt 5 mg/h this am.     Objective:     Vital Signs (Most Recent):  Temp: 97.9 °F (36.6 °C) (05/11/24 0701)  Pulse: 88 (05/11/24 0800)  Resp: 19 (05/11/24 0800)  BP: 105/81 (05/11/24 0857)  SpO2: 95 % (05/11/24 0800) Vital Signs (24h Range):  Temp:  [96.9 °F (36.1 °C)-98.9 °F (37.2 °C)] 97.9 °F (36.6 °C)  Pulse:  [] 88  Resp:  [11-37] 19  SpO2:  [95 %-100 %] 95 %  BP: ()/(50-96) 105/81     Weight: 88.2 kg (194 lb 7.1 oz)  Body mass index is 26.37 kg/m².      Intake/Output Summary (Last 24 hours) at 5/11/2024 0902  Last data filed at 5/11/2024 0830  Gross per 24 hour   Intake 906.05 ml   Output 2775 ml   Net -1868.95 ml        Physical Exam  Vitals reviewed.   Constitutional:       General: He is not in acute distress.     Appearance: He is not toxic-appearing.   HENT:      Head: Normocephalic and atraumatic.      Nose: Nose normal.      Mouth/Throat:      Mouth: Mucous membranes are moist.      Pharynx: Oropharynx is clear.   Eyes:      Extraocular Movements: Extraocular movements intact.      Conjunctiva/sclera: Conjunctivae normal.   Cardiovascular:      Rate and Rhythm: Normal rate and regular rhythm.      Pulses: Normal pulses.   Pulmonary:      Effort: No respiratory distress.      Breath sounds: No wheezing.   Abdominal:      General: There is no distension.      Tenderness: There is no abdominal tenderness.   Musculoskeletal:      Cervical back: Normal range of motion and neck supple.      Right lower leg: Edema present.      Left lower leg: Edema present.   Skin:     Capillary Refill: Capillary refill takes less than 2 seconds.   Neurological:      Mental Status: He is alert and oriented to person, place, and time. Mental status is at baseline.           Review of Systems   Constitutional:  Negative for chills  and fever.   Respiratory:  Negative for cough and shortness of breath.    Cardiovascular:  Positive for leg swelling. Negative for chest pain.   Gastrointestinal:  Negative for nausea and vomiting.   Neurological:  Negative for dizziness and headaches.   Psychiatric/Behavioral:  Negative for confusion. The patient is not nervous/anxious.    All other systems reviewed and are negative.      Vents:       Lines/Drains/Airways       Peripheral Intravenous Line  Duration                  Peripheral IV - Single Lumen 05/09/24 1254 20 G Anterior;Right;Lateral Forearm 1 day         Peripheral IV - Single Lumen 05/10/24 1816 20 G Left;Posterior Forearm <1 day                    Significant Labs:    CBC/Anemia Profile:  Recent Labs   Lab 05/09/24  1311 05/10/24  0924 05/11/24  0507   WBC 5.41 4.92 5.75   HGB 15.6 14.2 12.9*   HCT 49.2 45.1 40.0   PLT 92* 84* 78*   MCV 89 89 88   RDW 21.8* 21.4* 20.5*        Chemistries:  Recent Labs   Lab 05/09/24  1405 05/10/24  0923 05/10/24  1909 05/11/24  0717    139 140 139   K 4.2 4.7 4.4 4.2    104 101 101   CO2 24 27 30* 29   BUN 40* 46* 46* 47*   CREATININE 2.6* 2.7* 2.8* 2.7*   CALCIUM 9.1 9.2 8.9 8.6*   ALBUMIN 3.0* 3.0* 2.7* 2.5*   PROT 6.9 6.8  --   --    BILITOT 1.1* 1.1*  --   --    ALKPHOS 83 88  --   --    ALT 14 14  --   --    AST 19 20  --   --    MG  --  1.9 1.8 1.8   PHOS  --   --  4.2 4.0       All pertinent labs within the past 24 hours have been reviewed.    Significant Imaging:  I have reviewed all pertinent imaging results/findings within the past 24 hours.

## 2024-05-11 NOTE — ASSESSMENT & PLAN NOTE
Chronic idiopathic thrombocytopenia  -chronic MARLENI and thrombocytopenia  -no overt bleeding  -trend labs over course, address/transfuse as indicated  -hold DVT prophylaxis if platelets drop less than 50

## 2024-05-11 NOTE — PROGRESS NOTES
[facility] Intensive Care [facility]  Pulmonology  Progress Note    Patient Name: Felicia Lopez  MRN: [id number]  Admission Date: 5/9/2024  Hospital Length of Stay: 2 days  Code Status: Full Code  Attending Provider: Jeanine Montoya MD  Primary Care Provider: Mickey Darden MD   Principal Problem: Acute on chronic combined systolic and diastolic congestive heart failure    Subjective:     Interval History: Patient denies any complaints this am, sitting comfortably in bed watching TV. Reports breathing feels fine. UOP overnight 2L. Remains on dobutamine 5 mcg/kg/min and lasix gtt 5 mg/h this am.     Objective:     Vital Signs (Most Recent):  Temp: 97.9 °F (36.6 °C) (05/11/24 0701)  Pulse: 88 (05/11/24 0800)  Resp: 19 (05/11/24 0800)  BP: 105/81 (05/11/24 0857)  SpO2: 95 % (05/11/24 0800) Vital Signs (24h Range):  Temp:  [96.9 °F (36.1 °C)-98.9 °F (37.2 °C)] 97.9 °F (36.6 °C)  Pulse:  [] 88  Resp:  [11-37] 19  SpO2:  [95 %-100 %] 95 %  BP: ()/(50-96) 105/81     Weight: 88.2 kg (194 lb 7.1 oz)  Body mass index is 26.37 kg/m².      Intake/Output Summary (Last 24 hours) at 5/11/2024 0902  Last data filed at 5/11/2024 0830  Gross per 24 hour   Intake 906.05 ml   Output 2775 ml   Net -1868.95 ml        Physical Exam  Vitals reviewed.   Constitutional:       General: He is not in acute distress.     Appearance: He is not toxic-appearing.   HENT:      Head: Normocephalic and atraumatic.      Nose: Nose normal.      Mouth/Throat:      Mouth: Mucous membranes are moist.      Pharynx: Oropharynx is clear.   Eyes:      Extraocular Movements: Extraocular movements intact.      Conjunctiva/sclera: Conjunctivae normal.   Cardiovascular:      Rate and Rhythm: Normal rate and regular rhythm.      Pulses: Normal pulses.   Pulmonary:      Effort: No respiratory distress.      Breath sounds: No wheezing.   Abdominal:      General: There is no distension.      Tenderness: There is no abdominal tenderness.    Musculoskeletal:      Cervical back: Normal range of motion and neck supple.      Right lower leg: Edema present.      Left lower leg: Edema present.   Skin:     Capillary Refill: Capillary refill takes less than 2 seconds.   Neurological:      Mental Status: He is alert and oriented to person, place, and time. Mental status is at baseline.           Review of Systems   Constitutional:  Negative for chills and fever.   Respiratory:  Negative for cough and shortness of breath.    Cardiovascular:  Positive for leg swelling. Negative for chest pain.   Gastrointestinal:  Negative for nausea and vomiting.   Neurological:  Negative for dizziness and headaches.   Psychiatric/Behavioral:  Negative for confusion. The patient is not nervous/anxious.    All other systems reviewed and are negative.      Vents:       Lines/Drains/Airways       Peripheral Intravenous Line  Duration                  Peripheral IV - Single Lumen 05/09/24 1254 20 G Anterior;Right;Lateral Forearm 1 day         Peripheral IV - Single Lumen 05/10/24 1816 20 G Left;Posterior Forearm <1 day                    Significant Labs:    CBC/Anemia Profile:  Recent Labs   Lab 05/09/24  1311 05/10/24  0924 05/11/24  0507   WBC 5.41 4.92 5.75   HGB 15.6 14.2 12.9*   HCT 49.2 45.1 40.0   PLT 92* 84* 78*   MCV 89 89 88   RDW 21.8* 21.4* 20.5*        Chemistries:  Recent Labs   Lab 05/09/24  1405 05/10/24  0923 05/10/24  1909 05/11/24  0717    139 140 139   K 4.2 4.7 4.4 4.2    104 101 101   CO2 24 27 30* 29   BUN 40* 46* 46* 47*   CREATININE 2.6* 2.7* 2.8* 2.7*   CALCIUM 9.1 9.2 8.9 8.6*   ALBUMIN 3.0* 3.0* 2.7* 2.5*   PROT 6.9 6.8  --   --    BILITOT 1.1* 1.1*  --   --    ALKPHOS 83 88  --   --    ALT 14 14  --   --    AST 19 20  --   --    MG  --  1.9 1.8 1.8   PHOS  --   --  4.2 4.0       All pertinent labs within the past 24 hours have been reviewed.    Significant Imaging:  I have reviewed all pertinent imaging results/findings within the past 24  hours.  Assessment/Plan:     Neuro/CNS  - mentation at baseline, no acute issues    Hx CVA 2015  - continue ASA and statin    Cardiovascular  Acute on Chronic HFrEF Exacerbation  - TTE EF 15-20% with DD, BNP > 2600, mild trop elevation  - UOP 2L overnight after starting drips  - continue dobutamine and lasix gtt, UOP goal 100 ml/h  - BMP and Mg BID while on aggressive diuretic regimen  - cardiology following    CAD s/p PCI  - RCA JOSE C x2 2010  - continue home ASA, statin, BB    Pulmonary  Pulmonary Edema  Pleural Effusions  - noted on CXR in setting of volume overload, diuresis as above  - on 1L NC satting 98%, okay to remove  - goal sat > 90%    Renal  ARELI on CKD 3b  Cardio-Renal Syndrome  - baseline creatinine around 2.0  - follows with Dr. Matson outpatient  - creatinine rise in setting of volume overload, diuresis as above  - nephrology following    GI  - no active issues     ID  - no active issues    Endocrine  - BG goal 140-180s    Heme/Onc  Thrombocytopenia  - chronically low platelets to 110s, currently in 70s  - no signs of active bleed  - continue to monitor    Hx Prostate Cancer  - s/p TURP  - no active issues    Feeding: cardiac diet  Analgesia: tylenol prn, norco prn  Sedation: none  Thrombo PPX: hep TID  Head of Bed: > 30 degrees  Ulcer PPX: N/A  Glucose: goal 140-180s  SBT/SAT: N/A  Bowel Regimen: senna-colace  Indwelling Lines: PIV x2  Deescalation Abx: N/A       Florence Cooper MD  PCCM Fellow PGY-6  [facility] - Intensive Care ([place])

## 2024-05-11 NOTE — ASSESSMENT & PLAN NOTE
Anasarca  Cardiorenal syndrome    Acute CHF exacerbation in setting of dietary and medication noncompliance with associated ARELI on CKD and chronic demand ischemia   At admission HDS and afebrile  Admission BNP 2691, troponin 0.189 (at baseline), CXR with pulmonary edema and effusions, EKG with SR, PVCs and TWA similar to prior studies   Recent TTE reviewed noting EF 35-40% with regional WMAs, LV dilation, diastolic dysfunction, mild AVS, mild MR    Current echo - with worsening systolic function 15-20%     Plan -   -elevate LEs and scrotum as tolerated  -Wound Care consulted due to altered skin integrity with blister rupture and weeping edema  -cardiac diet with fluid restriction  -strict I&Os and daily weights  -trend labs, address/replete electrolytes as indicated  - cardiology consult - recommending transfer to ICU and dobutamine drip - transferred 5/10   -hold home metoprolol while on dobutamine drip  - nephrology consult  - pulm crit on consult - 5/10 started lasix drip with good urine output

## 2024-05-11 NOTE — PROGRESS NOTES
[facility]    Admission date:  5/9/2024     Assessment    Acute on chronic combined systolic and diastolic heart failure   Diuresing better with dobutamine and Lasix infusions.    Plan and Discussion    Will stop metoprolol while he is decompensated and requiring dobutamine.  Continue with current efforts at diuresis.    Subjective    Feels better.  No complaints.    Medications  Current Facility-Administered Medications   Medication Dose Route Frequency Provider Last Rate Last Admin    acetaminophen tablet 650 mg  650 mg Oral Q4H PRN Roselia William, NP        albuterol-ipratropium 2.5 mg-0.5 mg/3 mL nebulizer solution 3 mL  3 mL Nebulization Once PRN Mis Edge, PALeloC        allopurinoL tablet 100 mg  100 mg Oral Daily Roselia William, NP   100 mg at 05/11/24 0857    aspirin EC tablet 81 mg  81 mg Oral Daily Roselia William, NP   81 mg at 05/11/24 0857    atorvastatin tablet 20 mg  20 mg Oral Daily Roselia William, NP   20 mg at 05/11/24 0857    calcitRIOL capsule 0.25 mcg  0.25 mcg Oral Every Mon, Wed, Fri Roselia William, NP   0.25 mcg at 05/10/24 1804    cetirizine tablet 10 mg  10 mg Oral QHS Roselia William, NP   10 mg at 05/10/24 2119    DOBUtamine 1000 mg in D5W 250 mL infusion  5 mcg/kg/min Intravenous Continuous Jeanine Montoya MD 6.6 mL/hr at 05/11/24 0600 5 mcg/kg/min at 05/11/24 0600    furosemide (Lasix) 200 mg in sodium chloride 0.9% 100 mL infusion (conc: 2 mg/mL)  0-40 mg/hr Intravenous Continuous Kelley Carbajal MD 2.5 mL/hr at 05/11/24 0600 5 mg/hr at 05/11/24 0600    heparin (porcine) injection 5,000 Units  5,000 Units Subcutaneous Q8H Roselia William, NP   5,000 Units at 05/11/24 0506    HYDROcodone-acetaminophen 5-325 mg per tablet 1 tablet  1 tablet Oral Q4H PRN Roselia William, NP        melatonin tablet 6 mg  6 mg Oral Nightly PRN Roselia William, NP   6 mg at 05/09/24 2140    mupirocin 2 % ointment   Nasal BID Jan  MD Jeanine   Given at 05/11/24 0858    ondansetron disintegrating tablet 4 mg  4 mg Oral Q8H PRN Roselia William, NP        ondansetron injection 4 mg  4 mg Intravenous Q8H PRN Roselia William, NP        ondansetron injection 4 mg  4 mg Intravenous Q8H PRN Kathleen Pratt MD        polyethylene glycol packet 17 g  17 g Oral BID PRN Roselia William, SUSAN        senna-docusate 8.6-50 mg per tablet 1 tablet  1 tablet Oral BID Roselia William, NP        sodium chloride 0.9% flush 10 mL  10 mL Intravenous PRN Roselia William, NP        sodium chloride 0.9% flush 10 mL  10 mL Intravenous PRN Kathleen Pratt MD            Physical Exam    Temp:  [96.9 °F (36.1 °C)-98.9 °F (37.2 °C)]   Pulse:  []   Resp:  [11-37]   BP: ()/(50-96)   SpO2:  [95 %-100 %]    Wt Readings from Last 3 Encounters:   05/09/24 88.2 kg (194 lb 7.1 oz)   05/09/24 86.6 kg (191 lb)   02/22/24 80.9 kg (178 lb 6.4 oz)     Physical Exam  Constitutional:       General: He is not in acute distress.  Neck:      Vascular: Hepatojugular reflux and JVD present.   Cardiovascular:      Rate and Rhythm: Normal rate and regular rhythm. Occasional Extrasystoles are present.     Heart sounds: S1 normal and S2 normal. Murmur heard.      Systolic murmur is present.      Gallop present. S3 sounds present. No S4 sounds.   Pulmonary:      Effort: Pulmonary effort is normal.      Breath sounds: Normal air entry. Examination of the right-lower field reveals decreased breath sounds. Examination of the left-lower field reveals decreased breath sounds. Decreased breath sounds present.   Abdominal:      General: Bowel sounds are normal.      Palpations: Abdomen is soft. There is hepatomegaly.      Tenderness: There is no abdominal tenderness.   Musculoskeletal:      Right lower leg: Edema present.      Left lower leg: Edema present.   Skin:     Coloration: Skin is not pale.   Neurological:      Mental Status: He is alert.   Psychiatric:          Behavior: Behavior is cooperative.         Telemetry  Sinus rhythm with supraventricular and ventricular ectopy    Labs  Recent Results (from the past 24 hour(s))   Renal function panel    Collection Time: 05/10/24  7:09 PM   Result Value Ref Range    Glucose 101 70 - 110 mg/dL    Sodium 140 136 - 145 mmol/L    Potassium 4.4 3.5 - 5.1 mmol/L    Chloride 101 95 - 110 mmol/L    CO2 30 (H) 23 - 29 mmol/L    BUN 46 (H) 8 - 23 mg/dL    Calcium 8.9 8.7 - 10.5 mg/dL    Creatinine 2.8 (H) 0.5 - 1.4 mg/dL    Albumin 2.7 (L) 3.5 - 5.2 g/dL    Phosphorus 4.2 2.7 - 4.5 mg/dL    eGFR 22 (A) >60 mL/min/1.73 m^2    Anion Gap 9 8 - 16 mmol/L   Magnesium    Collection Time: 05/10/24  7:09 PM   Result Value Ref Range    Magnesium 1.8 1.6 - 2.6 mg/dL   CBC Without Differential    Collection Time: 05/11/24  5:07 AM   Result Value Ref Range    WBC 5.75 3.90 - 12.70 K/uL    RBC 4.57 (L) 4.60 - 6.20 M/uL    Hemoglobin 12.9 (L) 14.0 - 18.0 g/dL    Hematocrit 40.0 40.0 - 54.0 %    MCV 88 82 - 98 fL    MCH 28.2 27.0 - 31.0 pg    MCHC 32.3 32.0 - 36.0 g/dL    RDW 20.5 (H) 11.5 - 14.5 %    Platelets 78 (L) 150 - 450 K/uL    MPV SEE COMMENT 9.2 - 12.9 fL   Magnesium    Collection Time: 05/11/24  7:17 AM   Result Value Ref Range    Magnesium 1.8 1.6 - 2.6 mg/dL   Renal function panel    Collection Time: 05/11/24  7:17 AM   Result Value Ref Range    Glucose 112 (H) 70 - 110 mg/dL    Sodium 139 136 - 145 mmol/L    Potassium 4.2 3.5 - 5.1 mmol/L    Chloride 101 95 - 110 mmol/L    CO2 29 23 - 29 mmol/L    BUN 47 (H) 8 - 23 mg/dL    Calcium 8.6 (L) 8.7 - 10.5 mg/dL    Creatinine 2.7 (H) 0.5 - 1.4 mg/dL    Albumin 2.5 (L) 3.5 - 5.2 g/dL    Phosphorus 4.0 2.7 - 4.5 mg/dL    eGFR 23 (A) >60 mL/min/1.73 m^2    Anion Gap 9 8 - 16 mmol/L             Aneesh Borjas MD

## 2024-05-11 NOTE — CARE UPDATE
05/11/24 0857   PRE-TX-O2   Device (Oxygen Therapy) nasal cannula   $ Is the patient on Low Flow Oxygen? Yes   Flow (L/min) (Oxygen Therapy) 1   SpO2 95 %   Pulse Oximetry Type Continuous   $ Pulse Oximetry - Multiple Charge Pulse Oximetry - Multiple   /81   Preset CPAP/BiPAP Settings   Mode Of Delivery BiPAP S/T;Standby

## 2024-05-11 NOTE — PLAN OF CARE
MSW met with the patient at the bedside.    Patient is alert and oriented with no communication barriers.     Patient reported having DME at home. (RW)     Patients PCP is correct on the face sheet. Patient choice pharmacy is bedside delivery.     Patients' family will transport the patient home at discharge.     CM team will continue to follow.      05/11/24 1000   Discharge Assessment   Assessment Type Discharge Planning Assessment   Confirmed/corrected address, phone number and insurance Yes   Confirmed Demographics Correct on Facesheet   Source of Information patient;health record   People in Home significant other   Do you expect to return to your current living situation? Yes   Do you have help at home or someone to help you manage your care at home? Yes   Who are your caregiver(s) and their phone number(s)? Spouse   Prior to hospitilization cognitive status: Alert/Oriented   Current cognitive status: Alert/Oriented   Walking or Climbing Stairs Difficulty no   Dressing/Bathing Difficulty no   Equipment Currently Used at Home walker, rolling   Readmission within 30 days? No   Patient currently being followed by outpatient case management? No   Do you currently have service(s) that help you manage your care at home? No   Do you take prescription medications? Yes   Do you have prescription coverage? Yes   Do you have any problems affording any of your prescribed medications? No   Is the patient taking medications as prescribed? yes   How do you get to doctors appointments? family or friend will provide   Are you on dialysis? No   Discharge Plan A Home with family   Discharge Plan B Home Health   DME Needed Upon Discharge  none   Discharge Plan discussed with: Patient   Transition of Care Barriers None   Physical Activity   On average, how many days per week do you engage in moderate to strenuous exercise (like a brisk walk)? Pt Declined   On average, how many minutes do you engage in exercise at this level? Pt  Declined   Financial Resource Strain   How hard is it for you to pay for the very basics like food, housing, medical care, and heating? Pt Declined   Housing Stability   In the last 12 months, was there a time when you were not able to pay the mortgage or rent on time? Pt Declined   At any time in the past 12 months, were you homeless or living in a shelter (including now)? Pt Declined   Transportation Needs   Has the lack of transportation kept you from medical appointments, meetings, work or from getting things needed for daily living? Patient declined   Food Insecurity   Within the past 12 months, you worried that your food would run out before you got the money to buy more. Pt Declined   Within the past 12 months, the food you bought just didn't last and you didn't have money to get more. Pt Declined   Stress   Do you feel stress - tense, restless, nervous, or anxious, or unable to sleep at night because your mind is troubled all the time - these days? Pt Declined   Social Isolation   How often do you feel lonely or isolated from those around you?  Patient declined   Alcohol Use   Q1: How often do you have a drink containing alcohol? Pt Declined   Q2: How many drinks containing alcohol do you have on a typical day when you are drinking? Pt Declined   Q3: How often do you have six or more drinks on one occasion? Pt Declined   Utilities   In the past 12 months has the electric, gas, oil, or water company threatened to shut off services in your home? Pt Declined   Health Literacy   How often do you need to have someone help you when you read instructions, pamphlets, or other written material from your doctor or pharmacy? Patient declines to respond

## 2024-05-11 NOTE — SUBJECTIVE & OBJECTIVE
Interval History:  Patient using urinal accurately.  Having good urine output.    Review of Systems   Constitutional:  Negative for activity change, appetite change, chills, diaphoresis, fatigue and fever.   Respiratory:  Positive for shortness of breath. Negative for cough, chest tightness and wheezing.    Cardiovascular:  Positive for leg swelling. Negative for chest pain and palpitations.   Gastrointestinal:  Negative for abdominal distention, abdominal pain, constipation, diarrhea, nausea and vomiting.   Genitourinary:  Positive for decreased urine volume and scrotal swelling. Negative for difficulty urinating, dysuria, hematuria and urgency.   Musculoskeletal:  Positive for gait problem (chronic).   Skin:  Positive for wound (RLE).   Neurological:  Negative for dizziness, syncope, weakness, light-headedness and headaches.     Objective:     Vital Signs (Most Recent):  Temp: 97.9 °F (36.6 °C) (05/11/24 0701)  Pulse: 77 (05/11/24 1015)  Resp: 14 (05/11/24 1015)  BP: (!) 116/56 (05/11/24 1015)  SpO2: 97 % (05/11/24 1015) Vital Signs (24h Range):  Temp:  [96.9 °F (36.1 °C)-98.9 °F (37.2 °C)] 97.9 °F (36.6 °C)  Pulse:  [] 77  Resp:  [11-37] 14  SpO2:  [95 %-100 %] 97 %  BP: ()/(50-96) 116/56     Weight: 88.2 kg (194 lb 7.1 oz)  Body mass index is 26.37 kg/m².    Intake/Output Summary (Last 24 hours) at 5/11/2024 1037  Last data filed at 5/11/2024 0830  Gross per 24 hour   Intake 786.05 ml   Output 2625 ml   Net -1838.95 ml         Physical Exam  Constitutional:       General: He is not in acute distress.  Pulmonary:      Effort: No respiratory distress.      Breath sounds: No wheezing.   Abdominal:      General: There is no distension.      Tenderness: There is no abdominal tenderness.   Skin:     Comments: Diffuse anasarca  Right leg ruptured blister - does not appear infected    Neurological:      General: No focal deficit present.      Mental Status: He is oriented to person, place, and time.              Significant Labs: All pertinent labs within the past 24 hours have been reviewed.    Significant Imaging: I have reviewed all pertinent imaging results/findings within the past 24 hours.

## 2024-05-11 NOTE — ASSESSMENT & PLAN NOTE
Chronic idiopathic thrombocytopenia  Chronic MARLENI and thrombocytopenia  Thrombocytopenia etiology unclear.  B12 and folate within normal limits and HIV negative.  No overt bleeding    Plan -   -trend labs over course, address/transfuse as indicated  -hold DVT prophylaxis if platelets drop less than 50

## 2024-05-11 NOTE — PROGRESS NOTES
ICU Progress Note  Nephrology    Consult Requested By: Jeanine Montoya MD  Reason for Consult: Cardiorenal    SUBJECTIVE:     History of Present Illness:  Patient is a 78 y.o. male presents with worsening swelling, shortness of breath, decreased urine output, etcetera over last couple of weeks.  Patient was seen by Dr. Martinez in office yesterday and recommended admission for IV diuresis.  Extensive medical history as outlined below including CKD stage 3 with baseline creatinine around 2.0.  Patient is followed by Dr. Matson as an outpatient and was seen last week.  He had a creatinine of 2.2, urine protein creatinine ratio only 88 mg, and was started on calcitriol for mild secondary hyperparathyroidism.  He was on Lasix 40 mg daily, lisinopril 20 mg twice daily, and allopurinol 100 mg.  Upon initial evaluation emergency room patient had soft blood pressures of 90s to 100s.  Laboratory data consistent with creatinine of 2.6, BUN 40, BNP 26 91, and chest x-ray with marked pulmonary edema.  Patient was admitted to telemetry floor and started on diuresis.  Overnight patient had minimal urine output and creatinine has risen to 2.7 with worsening edema and hypotension.  Seen by Cardiology and plans for transfer to ICU with dobutamine drip and more aggressive diuresis.    Interval History:  Diuresis and Dobutamine overnight 540 in/2050 UOP.      Past Medical History:   Diagnosis Date    CAD (coronary artery disease)     Chronic combined systolic and diastolic CHF (congestive heart failure)     Chronic idiopathic thrombocytopenia     Disorder of kidney and ureter     History of heart attack     History of stroke     Hyperlipidemia     Hypertension      Past Surgical History:   Procedure Laterality Date    FACIAL RECONSTRUCTION SURGERY      PROSTATE SURGERY       Family History   Problem Relation Name Age of Onset    Hypertension Mother      No Known Problems Father       Social History     Tobacco Use    Smoking status:  "Former     Current packs/day: 0.00     Types: Cigarettes     Quit date: 10/5/2008     Years since quitting: 15.6    Smokeless tobacco: Never   Substance Use Topics    Alcohol use: No     Alcohol/week: 0.0 standard drinks of alcohol    Drug use: No       Review of patient's allergies indicates:  No Known Allergies     Review of Systems:  Constitutional: No fever or chills  Respiratory: shortness of breath  Cardiovascular: No chest pain or palpitations  Gastrointestinal: No nausea or vomiting  Neurological: No confusion or weakness    OBJECTIVE:     Vital Signs (Most Recent)  Temp: 97.5 °F (36.4 °C) (05/10/24 1945)  Pulse: 77 (05/11/24 0630)  Resp: 12 (05/11/24 0630)  BP: 128/60 (05/11/24 0630)  SpO2: 99 % (05/11/24 0630)    Vital Signs Range (Last 24H):  Temp:  [96.9 °F (36.1 °C)-98.9 °F (37.2 °C)]   Pulse:  []   Resp:  [11-37]   BP: ()/(50-96)   SpO2:  [95 %-100 %]       Intake/Output Summary (Last 24 hours) at 5/11/2024 0729  Last data filed at 5/11/2024 0600  Gross per 24 hour   Intake 546.05 ml   Output 2050 ml   Net -1503.95 ml       Physical Exam:  NAD A and O  RRR 3/6 STEPHAN +JVD present  CTAB  Abdomen soft NT ND +BS  Ext severe 4+ edema to thighs +scrotal edema    Laboratory:  Recent Labs   Lab 05/11/24  0507   WBC 5.75   RBC 4.57*   HGB 12.9*   HCT 40.0   PLT 78*   MCV 88   MCH 28.2   MCHC 32.3     BMP:   Recent Labs   Lab 05/10/24  1909         K 4.4      CO2 30*   BUN 46*   CREATININE 2.8*   CALCIUM 8.9   MG 1.8   PHOS 4.2     Lab Results   Component Value Date    CALCIUM 8.9 05/10/2024    PHOS 4.2 05/10/2024     BNP  Recent Labs   Lab 05/09/24  1311   BNP 2,691*   No results found for: "URICACID"  Lab Results   Component Value Date    IRON 57 02/01/2019     Lab Results   Component Value Date    CALCIUM 8.9 05/10/2024    PHOS 4.2 05/10/2024       Diagnostic Results:  US Retroperitoneal Complete   Final Result      1. No evidence of hydronephrosis bilaterally.   2. Findings " suggestive of underlying medical renal disease.   3. Incidentally noted right lower quadrant ascites.         Electronically signed by: Kaelyn Damon MD   Date:    05/11/2024   Time:    06:45      X-Ray Chest AP Portable   Final Result      1. Pulmonary findings suggest pulmonary edema with pleural effusions, developing left lower lung zone consolidation not excluded.         Electronically signed by: Ej Goel MD   Date:    05/09/2024   Time:    13:27          ASSESSMENT/PLAN:     Worsening Oliguric acute kidney injury on progressive CKD 3b with baseline creatinine around 2.0 but recent 2.2 secondary to acute on chronic combined heart failure with dismal ejection fraction of 15% and needing aggressive diuresis but has poor cardiac output with hypotension:  -trends noted over the past few months with creatinine of 2.0 and then kellen to 2.2  -now 2.8 with need for aggressive diuresis  -only 88 mg of proteinuria so edema is not from nephrotic syndrome but mostly from severe heart failure  -hold ACE inhibitor for now  -Renal US noted  -Transferred to ICU for dobutamine and lasix drip  -Can add metolazone if this combo doesn't result in adequate diuresis  -consider adding midodrine to assist with blood pressure  -no immediate need for renal replacement therapy at this time but will depend on degree of diuresis achieved by efforts above.  -Renally dose meds, avoid nephrotoxins, and monitor I/O's closely.    Acute on chronic combined heart failure with ejection fraction of 15%:  -poor outlook based on trends and trajectory  -appreciate cardiology input    Hyperuricemia:  -continue allopurinol  -risks reviewed    Mineral bone density:  -continue calcitriol Monday Wednesday Friday    Thrombocytopenia:  -No active bleeding  -slow downward trend, monitor  -defer to primary      High MDM complexity necessary to treat or prevent imminent or life-threatening deterioration of the following conditions:  ARELI, cardiorenal  syndrome, severe heart failure with ejection fraction 15%  Time spent personally by me on the following activities 60 min: development of treatment plan with patient or surrogate, discussions with consultants, interpretation of cardiac output measurements, examination of patient, ordering and performing treatments and interventions, evaluation of patient's response to treatment, obtaining history from patient or surrogate, ordering and review of laboratory studies, ordering and review of radiographic studies, re-evaluation of patient's condition, pulse oximetry and blood draw for specimens

## 2024-05-12 LAB
ALBUMIN SERPL BCP-MCNC: 2.4 G/DL (ref 3.5–5.2)
ALBUMIN SERPL BCP-MCNC: 3 G/DL (ref 3.5–5.2)
ANION GAP SERPL CALC-SCNC: 10 MMOL/L (ref 8–16)
ANION GAP SERPL CALC-SCNC: 12 MMOL/L (ref 8–16)
BUN SERPL-MCNC: 43 MG/DL (ref 8–23)
BUN SERPL-MCNC: 45 MG/DL (ref 8–23)
CALCIUM SERPL-MCNC: 8.4 MG/DL (ref 8.7–10.5)
CALCIUM SERPL-MCNC: 9.1 MG/DL (ref 8.7–10.5)
CHLORIDE SERPL-SCNC: 95 MMOL/L (ref 95–110)
CHLORIDE SERPL-SCNC: 98 MMOL/L (ref 95–110)
CO2 SERPL-SCNC: 30 MMOL/L (ref 23–29)
CO2 SERPL-SCNC: 31 MMOL/L (ref 23–29)
CREAT SERPL-MCNC: 2.4 MG/DL (ref 0.5–1.4)
CREAT SERPL-MCNC: 2.4 MG/DL (ref 0.5–1.4)
ERYTHROCYTE [DISTWIDTH] IN BLOOD BY AUTOMATED COUNT: 20.3 % (ref 11.5–14.5)
EST. GFR  (NO RACE VARIABLE): 27 ML/MIN/1.73 M^2
EST. GFR  (NO RACE VARIABLE): 27 ML/MIN/1.73 M^2
GLUCOSE SERPL-MCNC: 111 MG/DL (ref 70–110)
GLUCOSE SERPL-MCNC: 97 MG/DL (ref 70–110)
HCT VFR BLD AUTO: 43.5 % (ref 40–54)
HGB BLD-MCNC: 13.8 G/DL (ref 14–18)
MAGNESIUM SERPL-MCNC: 1.8 MG/DL (ref 1.6–2.6)
MAGNESIUM SERPL-MCNC: 2 MG/DL (ref 1.6–2.6)
MCH RBC QN AUTO: 27.4 PG (ref 27–31)
MCHC RBC AUTO-ENTMCNC: 31.7 G/DL (ref 32–36)
MCV RBC AUTO: 86 FL (ref 82–98)
PHOSPHATE SERPL-MCNC: 3.2 MG/DL (ref 2.7–4.5)
PHOSPHATE SERPL-MCNC: 3.2 MG/DL (ref 2.7–4.5)
PLATELET # BLD AUTO: 84 K/UL (ref 150–450)
PMV BLD AUTO: ABNORMAL FL (ref 9.2–12.9)
POTASSIUM SERPL-SCNC: 4.1 MMOL/L (ref 3.5–5.1)
POTASSIUM SERPL-SCNC: 4.3 MMOL/L (ref 3.5–5.1)
RBC # BLD AUTO: 5.04 M/UL (ref 4.6–6.2)
SODIUM SERPL-SCNC: 137 MMOL/L (ref 136–145)
SODIUM SERPL-SCNC: 139 MMOL/L (ref 136–145)
WBC # BLD AUTO: 8.48 K/UL (ref 3.9–12.7)

## 2024-05-12 PROCEDURE — 63600175 PHARM REV CODE 636 W HCPCS: Performed by: NURSE PRACTITIONER

## 2024-05-12 PROCEDURE — 80069 RENAL FUNCTION PANEL: CPT | Performed by: STUDENT IN AN ORGANIZED HEALTH CARE EDUCATION/TRAINING PROGRAM

## 2024-05-12 PROCEDURE — 83735 ASSAY OF MAGNESIUM: CPT | Mod: 91 | Performed by: STUDENT IN AN ORGANIZED HEALTH CARE EDUCATION/TRAINING PROGRAM

## 2024-05-12 PROCEDURE — 99291 CRITICAL CARE FIRST HOUR: CPT | Mod: GC,,, | Performed by: INTERNAL MEDICINE

## 2024-05-12 PROCEDURE — 20000000 HC ICU ROOM

## 2024-05-12 PROCEDURE — 85027 COMPLETE CBC AUTOMATED: CPT | Performed by: STUDENT IN AN ORGANIZED HEALTH CARE EDUCATION/TRAINING PROGRAM

## 2024-05-12 PROCEDURE — 99291 CRITICAL CARE FIRST HOUR: CPT | Mod: ,,, | Performed by: INTERNAL MEDICINE

## 2024-05-12 PROCEDURE — 94761 N-INVAS EAR/PLS OXIMETRY MLT: CPT

## 2024-05-12 PROCEDURE — 36415 COLL VENOUS BLD VENIPUNCTURE: CPT | Performed by: STUDENT IN AN ORGANIZED HEALTH CARE EDUCATION/TRAINING PROGRAM

## 2024-05-12 PROCEDURE — 25000003 PHARM REV CODE 250: Performed by: STUDENT IN AN ORGANIZED HEALTH CARE EDUCATION/TRAINING PROGRAM

## 2024-05-12 PROCEDURE — 25000003 PHARM REV CODE 250: Performed by: NURSE PRACTITIONER

## 2024-05-12 RX ADMIN — SENNOSIDES AND DOCUSATE SODIUM 1 TABLET: 8.6; 5 TABLET ORAL at 09:05

## 2024-05-12 RX ADMIN — ATORVASTATIN CALCIUM 20 MG: 20 TABLET, FILM COATED ORAL at 09:05

## 2024-05-12 RX ADMIN — HYDROCODONE BITARTRATE AND ACETAMINOPHEN 1 TABLET: 5; 325 TABLET ORAL at 04:05

## 2024-05-12 RX ADMIN — HYDROCODONE BITARTRATE AND ACETAMINOPHEN 1 TABLET: 5; 325 TABLET ORAL at 09:05

## 2024-05-12 RX ADMIN — HEPARIN SODIUM 5000 UNITS: 5000 INJECTION INTRAVENOUS; SUBCUTANEOUS at 06:05

## 2024-05-12 RX ADMIN — HEPARIN SODIUM 5000 UNITS: 5000 INJECTION INTRAVENOUS; SUBCUTANEOUS at 02:05

## 2024-05-12 RX ADMIN — ALLOPURINOL 100 MG: 100 TABLET ORAL at 09:05

## 2024-05-12 RX ADMIN — MUPIROCIN: 20 OINTMENT TOPICAL at 09:05

## 2024-05-12 RX ADMIN — HEPARIN SODIUM 5000 UNITS: 5000 INJECTION INTRAVENOUS; SUBCUTANEOUS at 09:05

## 2024-05-12 RX ADMIN — ASPIRIN 81 MG: 81 TABLET, COATED ORAL at 09:05

## 2024-05-12 NOTE — PLAN OF CARE
Problem: Gas Exchange Impaired  Goal: Optimal Gas Exchange  Outcome: Progressing  Intervention: Optimize Oxygenation and Ventilation  Flowsheets (Taken 5/11/2024 2001)  Airway/Ventilation Management: airway patency maintained  Head of Bed (HOB) Positioning: HOB elevated   Patient in no apparent distress. Sat's 94  % on room air. PRN treatments not needed . Will continue to monitor.

## 2024-05-12 NOTE — ASSESSMENT & PLAN NOTE
ARELI on CKD - improving with diuresis   Secondary hyperparathyroidism   Chronic CKD with admission SCr 2.6 -->2.8->2.7  Baseline 2.1-2.3  Likely cardiorenal syndrome in setting of CHF exacerbation    Plan -   - cont lasix drip with good urine output (aim for 100ml/hr)   - nephrology consult  - continue home Calcitrol MWF  - trend labs, address/replete electrolytes as indicated  - avoid nephrotoxins and hypotension as able, renally dose medications

## 2024-05-12 NOTE — PROGRESS NOTES
ICU Progress Note  Nephrology    Consult Requested By: Jeanine Montoya MD  Reason for Consult: Cardiorenal    SUBJECTIVE:     History of Present Illness:  Patient is a 78 y.o. male presents with worsening swelling, shortness of breath, decreased urine output, etcetera over last couple of weeks.  Patient was seen by Dr. Martinez in office yesterday and recommended admission for IV diuresis.  Extensive medical history as outlined below including CKD stage 3 with baseline creatinine around 2.0.  Patient is followed by Dr. Matson as an outpatient and was seen last week.  He had a creatinine of 2.2, urine protein creatinine ratio only 88 mg, and was started on calcitriol for mild secondary hyperparathyroidism.  He was on Lasix 40 mg daily, lisinopril 20 mg twice daily, and allopurinol 100 mg.  Upon initial evaluation emergency room patient had soft blood pressures of 90s to 100s.  Laboratory data consistent with creatinine of 2.6, BUN 40, BNP 26 91, and chest x-ray with marked pulmonary edema.  Patient was admitted to telemetry floor and started on diuresis.  Overnight patient had minimal urine output and creatinine has risen to 2.7 with worsening edema and hypotension.  Seen by Cardiology and plans for transfer to ICU with dobutamine drip and more aggressive diuresis.    Interval History:  Diuresis and Dobutamine with excellent response.  1236 in/4920 out.  Complaining of left knee pain, requests pain meds.    Past Medical History:   Diagnosis Date    CAD (coronary artery disease)     Chronic combined systolic and diastolic CHF (congestive heart failure)     Chronic idiopathic thrombocytopenia     Disorder of kidney and ureter     History of heart attack     History of stroke     Hyperlipidemia     Hypertension      Past Surgical History:   Procedure Laterality Date    FACIAL RECONSTRUCTION SURGERY      PROSTATE SURGERY       Family History   Problem Relation Name Age of Onset    Hypertension Mother      No Known  "Problems Father       Social History     Tobacco Use    Smoking status: Former     Current packs/day: 0.00     Types: Cigarettes     Quit date: 10/5/2008     Years since quitting: 15.6    Smokeless tobacco: Never   Substance Use Topics    Alcohol use: No     Alcohol/week: 0.0 standard drinks of alcohol    Drug use: No       Review of patient's allergies indicates:  No Known Allergies     Review of Systems:  Constitutional: No fever or chills  Respiratory: shortness of breath  Cardiovascular: No chest pain or palpitations  Gastrointestinal: No nausea or vomiting  Neurological: No confusion or weakness    OBJECTIVE:     Vital Signs (Most Recent)  Temp: 99 °F (37.2 °C) (05/12/24 0701)  Pulse: 105 (05/12/24 0701)  Resp: 18 (05/12/24 0701)  BP: 116/60 (05/12/24 0701)  SpO2: (!) 93 % (05/12/24 0701)    Vital Signs Range (Last 24H):  Temp:  [97.8 °F (36.6 °C)-99 °F (37.2 °C)]   Pulse:  []   Resp:  [14-35]   BP: ()/(49-81)   SpO2:  [92 %-100 %]       Intake/Output Summary (Last 24 hours) at 5/12/2024 0912  Last data filed at 5/12/2024 0730  Gross per 24 hour   Intake 876.08 ml   Output 4545 ml   Net -3668.92 ml       Physical Exam:  NAD A and O  RRR 3/6 STEPHAN +JVD present  CTAB  Abdomen soft NT ND +BS  Ext severe 4+ edema to thighs +scrotal edema now 3+ much improved    Laboratory:  Recent Labs   Lab 05/12/24  0429   WBC 8.48   RBC 5.04   HGB 13.8*   HCT 43.5   PLT 84*   MCV 86   MCH 27.4   MCHC 31.7*     BMP:   Recent Labs   Lab 05/12/24  0429   GLU 97      K 4.1   CL 98   CO2 31*   BUN 43*   CREATININE 2.4*   CALCIUM 9.1   MG 2.0   PHOS 3.2     Lab Results   Component Value Date    CALCIUM 9.1 05/12/2024    PHOS 3.2 05/12/2024     BNP  Recent Labs   Lab 05/09/24  1311   BNP 2,691*   No results found for: "URICACID"  Lab Results   Component Value Date    IRON 57 02/01/2019     Lab Results   Component Value Date    CALCIUM 9.1 05/12/2024    PHOS 3.2 05/12/2024       Diagnostic Results:  US Retroperitoneal " Complete   Final Result      1. No evidence of hydronephrosis bilaterally.   2. Findings suggestive of underlying medical renal disease.   3. Incidentally noted right lower quadrant ascites.         Electronically signed by: Kaelyn Damon MD   Date:    05/11/2024   Time:    06:45      X-Ray Chest AP Portable   Final Result      1. Pulmonary findings suggest pulmonary edema with pleural effusions, developing left lower lung zone consolidation not excluded.         Electronically signed by: Ej Goel MD   Date:    05/09/2024   Time:    13:27          ASSESSMENT/PLAN:     Worsening Oliguric acute kidney injury on progressive CKD 3b with baseline creatinine around 2.0 but recent 2.2 secondary to acute on chronic combined heart failure with dismal ejection fraction of 15% and needing aggressive diuresis but has poor cardiac output with hypotension:  -trends noted over the past few months with creatinine of 2.0 and then kellen to 2.2  -now 2.8 with need for aggressive diuresis > trending down with diuresis 2.4  -only 88 mg of proteinuria so edema is not from nephrotic syndrome but mostly from severe heart failure  -hold ACE inhibitor for now  -Renal US noted  -Transferred to ICU for dobutamine and lasix drip  -Can add metolazone if this combo doesn't result in adequate diuresis  -consider adding midodrine to assist with blood pressure but he appears to be tolerating well.  -no immediate need for renal replacement therapy at this time but will depend on degree of diuresis achieved by efforts above.  -Renally dose meds, avoid nephrotoxins, and monitor I/O's closely.    Acute on chronic combined heart failure with ejection fraction of 15%:  -poor outlook based on trends and trajectory  -appreciate cardiology input    Hyperuricemia:  -continue allopurinol  -risks reviewed    Mineral bone density:  -continue calcitriol Monday Wednesday Friday    Thrombocytopenia:  -No active bleeding  -slow downward trend, monitor  -defer  to primary      High MDM complexity necessary to treat or prevent imminent or life-threatening deterioration of the following conditions:  ARELI, cardiorenal syndrome, severe heart failure with ejection fraction 15%  Time spent personally by me on the following activities 60 min: development of treatment plan with patient or surrogate, discussions with consultants, interpretation of cardiac output measurements, examination of patient, ordering and performing treatments and interventions, evaluation of patient's response to treatment, obtaining history from patient or surrogate, ordering and review of laboratory studies, ordering and review of radiographic studies, re-evaluation of patient's condition, pulse oximetry and blood draw for specimens

## 2024-05-12 NOTE — SUBJECTIVE & OBJECTIVE
Interval History:  Patient using urinal accurately.  Having very good urine output. Edema has improved a lot in upper thigh areas - pt feels a relief too he reports. Still with edema in lower legs.     Review of Systems   Constitutional:  Negative for activity change, appetite change, chills, diaphoresis, fatigue and fever.   Respiratory:  Positive for shortness of breath. Negative for cough, chest tightness and wheezing.    Cardiovascular:  Positive for leg swelling. Negative for chest pain and palpitations.   Gastrointestinal:  Negative for abdominal distention, abdominal pain, constipation, diarrhea, nausea and vomiting.   Genitourinary:  Positive for decreased urine volume and scrotal swelling. Negative for difficulty urinating, dysuria, hematuria and urgency.   Musculoskeletal:  Positive for gait problem (chronic).   Skin:  Positive for wound (RLE).   Neurological:  Negative for dizziness, syncope, weakness, light-headedness and headaches.     Objective:     Vital Signs (Most Recent):  Temp: 99 °F (37.2 °C) (05/12/24 0701)  Pulse: 84 (05/12/24 1015)  Resp: 16 (05/12/24 1015)  BP: (!) 110/55 (05/12/24 1015)  SpO2: 95 % (05/12/24 1015) Vital Signs (24h Range):  Temp:  [97.8 °F (36.6 °C)-99 °F (37.2 °C)] 99 °F (37.2 °C)  Pulse:  [] 84  Resp:  [14-35] 16  SpO2:  [91 %-100 %] 95 %  BP: ()/(49-81) 110/55     Weight: 88.2 kg (194 lb 7.1 oz)  Body mass index is 26.37 kg/m².    Intake/Output Summary (Last 24 hours) at 5/12/2024 1032  Last data filed at 5/12/2024 0926  Gross per 24 hour   Intake 896.02 ml   Output 4570 ml   Net -3673.98 ml         Physical Exam  Constitutional:       General: He is not in acute distress.  Pulmonary:      Effort: No respiratory distress.      Breath sounds: No wheezing.   Abdominal:      General: There is no distension.      Tenderness: There is no abdominal tenderness.   Skin:     Comments: Diffuse anasarca - improving diuresis. Less edema in upper legs   Right leg ruptured  blister - does not appear infected    Neurological:      General: No focal deficit present.      Mental Status: He is oriented to person, place, and time.             Significant Labs: All pertinent labs within the past 24 hours have been reviewed.    Significant Imaging: I have reviewed all pertinent imaging results/findings within the past 24 hours.

## 2024-05-12 NOTE — PLAN OF CARE
Problem: Adult Inpatient Plan of Care  Goal: Plan of Care Review  Outcome: Progressing  Goal: Patient-Specific Goal (Individualized)  Outcome: Progressing  Goal: Absence of Hospital-Acquired Illness or Injury  Outcome: Progressing  Goal: Optimal Comfort and Wellbeing  Outcome: Progressing     Problem: Wound  Goal: Optimal Coping  Outcome: Progressing  Goal: Absence of Infection Signs and Symptoms  Outcome: Progressing  Goal: Improved Oral Intake  Outcome: Progressing  Goal: Optimal Pain Control and Function  Outcome: Progressing  Goal: Skin Health and Integrity  Outcome: Progressing     Problem: Fall Injury Risk  Goal: Absence of Fall and Fall-Related Injury  Outcome: Progressing     Problem: Gas Exchange Impaired  Goal: Optimal Gas Exchange  Outcome: Progressing

## 2024-05-12 NOTE — PROGRESS NOTES
[facility] Intensive Care [facility]  Pulmonology  Progress Note    Patient Name: Felicia Lopez  MRN: [id number]  Admission Date: 5/9/2024  Hospital Length of Stay: 3 days  Code Status: Full Code  Attending Provider: Jeanine Montoya MD  Primary Care Provider: Mickey Darden MD   Principal Problem: Acute on chronic combined systolic and diastolic congestive heart failure    Subjective:     Interval History: Patient reports he feels fine this morning, no complaints about breathing and is on room air. Notes he is having some L knee pain. UOP 4.9L in past 24 hours.     Objective:     Vital Signs (Most Recent):  Temp: 99 °F (37.2 °C) (05/12/24 0701)  Pulse: 105 (05/12/24 0701)  Resp: 20 (05/12/24 0922)  BP: 116/60 (05/12/24 0701)  SpO2: (!) 93 % (05/12/24 0701) Vital Signs (24h Range):  Temp:  [97.8 °F (36.6 °C)-99 °F (37.2 °C)] 99 °F (37.2 °C)  Pulse:  [] 105  Resp:  [14-35] 20  SpO2:  [92 %-100 %] 93 %  BP: ()/(49-81) 116/60     Weight: 88.2 kg (194 lb 7.1 oz)  Body mass index is 26.37 kg/m².      Intake/Output Summary (Last 24 hours) at 5/12/2024 0929  Last data filed at 5/12/2024 0926  Gross per 24 hour   Intake 896.02 ml   Output 4920 ml   Net -4023.98 ml        Physical Exam  Vitals reviewed.   Constitutional:       General: He is not in acute distress.     Appearance: He is not toxic-appearing.   HENT:      Head: Normocephalic and atraumatic.      Mouth/Throat:      Mouth: Mucous membranes are moist.      Pharynx: Oropharynx is clear.   Eyes:      Extraocular Movements: Extraocular movements intact.      Conjunctiva/sclera: Conjunctivae normal.   Cardiovascular:      Rate and Rhythm: Normal rate and regular rhythm.      Heart sounds: Normal heart sounds.   Pulmonary:      Effort: No respiratory distress.      Breath sounds: No wheezing or rhonchi.   Abdominal:      General: There is no distension.      Tenderness: There is no abdominal tenderness.   Musculoskeletal:      Cervical back: Normal  range of motion and neck supple.      Right lower leg: Edema present.      Left lower leg: Edema present.   Skin:     Capillary Refill: Capillary refill takes less than 2 seconds.   Neurological:      Mental Status: He is alert and oriented to person, place, and time. Mental status is at baseline.           Review of Systems   Constitutional:  Negative for chills and fever.   HENT:  Negative for congestion and sore throat.    Respiratory:  Negative for shortness of breath and wheezing.    Cardiovascular:  Positive for leg swelling. Negative for chest pain.   Gastrointestinal:  Negative for abdominal distention and abdominal pain.   Musculoskeletal:  Positive for arthralgias (L knee pain). Negative for back pain and neck pain.   Neurological:  Negative for dizziness and headaches.       Vents:       Lines/Drains/Airways       Peripheral Intravenous Line  Duration                  Peripheral IV - Single Lumen 05/09/24 1254 20 G Anterior;Right;Lateral Forearm 2 days         Peripheral IV - Single Lumen 05/10/24 1816 20 G Left;Posterior Forearm 1 day                    Significant Labs:    CBC/Anemia Profile:  Recent Labs   Lab 05/11/24  0507 05/12/24  0429   WBC 5.75 8.48   HGB 12.9* 13.8*   HCT 40.0 43.5   PLT 78* 84*   MCV 88 86   RDW 20.5* 20.3*        Chemistries:  Recent Labs   Lab 05/11/24  0717 05/11/24  2030 05/12/24  0429    138 139   K 4.2 4.6 4.1    97 98   CO2 29 30* 31*   BUN 47* 44* 43*   CREATININE 2.7* 2.6* 2.4*   CALCIUM 8.6* 9.1 9.1   ALBUMIN 2.5* 2.7* 3.0*   MG 1.8 2.1 2.0   PHOS 4.0 3.5 3.2       All pertinent labs within the past 24 hours have been reviewed.    Significant Imaging:  I have reviewed all pertinent imaging results/findings within the past 24 hours.  Assessment/Plan:     Neuro/CNS  - mentation at baseline, no acute issues     Hx CVA 2015  - continue ASA and statin     Cardiovascular  Acute on Chronic HFrEF Exacerbation  - TTE EF 15-20% with DD, BNP > 2600, mild trop  elevation  - UOP 5L in past 24h  - continue dobutamine and lasix gtt, UOP goal 100 ml/h  - BMP and Mg BID while on aggressive diuretic regimen  - cardiology and nephro following     CAD s/p PCI  - RCA JOSE C x2 2010  - continue home ASA, statin, BB     Pulmonary  Pulmonary Edema  Pleural Effusions  - noted on CXR in setting of volume overload, diuresis as above  - on room air, no respiratory complaints     Renal  ARELI on CKD 3b  Cardio-Renal Syndrome  - baseline creatinine around 2.0  - follows with Dr. Matson outpatient  - creatinine rise in setting of volume overload, diuresis as above  - nephrology following     GI  - no active issues      ID  - no active issues     Endocrine  - BG goal 140-180s     Heme/Onc  Thrombocytopenia  - chronically low platelets to 110s, currently in 70-80s  - no signs of active bleed  - continue to monitor     Hx Prostate Cancer  - s/p TURP  - no active issues     Feeding: cardiac diet  Analgesia: tylenol prn, norco prn  Sedation: none  Thrombo PPX: hep TID  Head of Bed: > 30 degrees  Ulcer PPX: N/A  Glucose: goal 140-180s  SBT/SAT: N/A  Bowel Regimen: senna-colace  Indwelling Lines: PIV x2  Deescalation Abx: N/A    Florence Cooper MD  PCCM Fellow PGY-6  [facility] - Intensive [facility] ([place])

## 2024-05-12 NOTE — NURSING
Pt with 21 beat run of radha. MD notified. New orders placed. 2g mag infused and dobutamine drip rate cut in half. Pt on RA, no acute distress noted. UOP 2700cc.     DRIPS  Dobutamine @ 2.5mcg/kg/min  Lasix @6mg/hr

## 2024-05-12 NOTE — PROGRESS NOTES
[facility]    Admission date:  5/9/2024     Assessment    Acute on chronic combined systolic and diastolic heart failure   Continues to diurese well    Plan and Discussion    Will continue with present management since he is diuresing well with renal function and blood pressure tolerating.    Subjective    No complaints.  Denies dyspnea.    Medications  Current Facility-Administered Medications   Medication Dose Route Frequency Provider Last Rate Last Admin    acetaminophen tablet 650 mg  650 mg Oral Q4H PRN Roselia William, NP        albuterol-ipratropium 2.5 mg-0.5 mg/3 mL nebulizer solution 3 mL  3 mL Nebulization Once PRN Mis Edge PA-C        allopurinoL tablet 100 mg  100 mg Oral Daily Roselia William, NP   100 mg at 05/12/24 0921    aspirin EC tablet 81 mg  81 mg Oral Daily Roselia William, NP   81 mg at 05/12/24 0921    atorvastatin tablet 20 mg  20 mg Oral Daily Roselia William, NP   20 mg at 05/12/24 0922    calcitRIOL capsule 0.25 mcg  0.25 mcg Oral Every Mon, Wed, Fri Roselia William, NP   0.25 mcg at 05/10/24 1804    cetirizine tablet 10 mg  10 mg Oral QHS Roselia William, NP   10 mg at 05/10/24 2119    DOBUtamine 1000 mg in D5W 250 mL infusion  2.5 mcg/kg/min Intravenous Continuous Aneesh Borjas MD 3.3 mL/hr at 05/12/24 0926 2.5 mcg/kg/min at 05/12/24 0926    furosemide (Lasix) 200 mg in sodium chloride 0.9% 100 mL infusion (conc: 2 mg/mL)  0-40 mg/hr Intravenous Continuous Kelley Carbajal MD 2.5 mL/hr at 05/12/24 0926 5 mg/hr at 05/12/24 0926    heparin (porcine) injection 5,000 Units  5,000 Units Subcutaneous Q8H Roselia William, NP   5,000 Units at 05/12/24 0604    HYDROcodone-acetaminophen 5-325 mg per tablet 1 tablet  1 tablet Oral Q4H PRN Roselia William, NP   1 tablet at 05/12/24 0922    melatonin tablet 6 mg  6 mg Oral Nightly PRN Roselia William NP   6 mg at 05/09/24 2140    mupirocin 2 % ointment   Nasal BID Jeanine Montoya,  MD   Given at 05/12/24 0922    ondansetron disintegrating tablet 4 mg  4 mg Oral Q8H PRN Roselia William, NP        ondansetron injection 4 mg  4 mg Intravenous Q8H PRN Roselia William, NP        ondansetron injection 4 mg  4 mg Intravenous Q8H PRN Kathleen Pratt MD        polyethylene glycol packet 17 g  17 g Oral BID PRN Roselia William, SUSAN        senna-docusate 8.6-50 mg per tablet 1 tablet  1 tablet Oral BID Roselia William, NP   1 tablet at 05/12/24 0922    sodium chloride 0.9% flush 10 mL  10 mL Intravenous PRN Roselia William, NP        sodium chloride 0.9% flush 10 mL  10 mL Intravenous PRN Kathleen Pratt MD            Physical Exam    Temp:  [97.8 °F (36.6 °C)-99 °F (37.2 °C)]   Pulse:  []   Resp:  [14-35]   BP: ()/(49-81)   SpO2:  [91 %-100 %]    Wt Readings from Last 3 Encounters:   05/09/24 88.2 kg (194 lb 7.1 oz)   05/09/24 86.6 kg (191 lb)   02/22/24 80.9 kg (178 lb 6.4 oz)     Physical Exam  Constitutional:       General: He is not in acute distress.  Neck:      Vascular: Hepatojugular reflux and JVD present.   Cardiovascular:      Rate and Rhythm: Normal rate and regular rhythm. Occasional Extrasystoles are present.     Heart sounds: S1 normal and S2 normal. Murmur heard.      Systolic murmur is present.      Gallop present. S3 sounds present. No S4 sounds.   Pulmonary:      Effort: Pulmonary effort is normal.      Breath sounds: Normal air entry. Examination of the right-lower field reveals decreased breath sounds. Examination of the left-lower field reveals decreased breath sounds. Decreased breath sounds present.   Abdominal:      General: Bowel sounds are normal.      Palpations: Abdomen is soft. There is hepatomegaly.      Tenderness: There is no abdominal tenderness.   Musculoskeletal:      Right lower leg: Edema present.      Left lower leg: Edema present.   Skin:     Coloration: Skin is not pale.   Neurological:      Mental Status: He is alert.    Psychiatric:         Behavior: Behavior is cooperative.         Telemetry  Sinus rhythm with isolated ectopy    Labs  Recent Results (from the past 24 hour(s))   Magnesium    Collection Time: 05/11/24  8:30 PM   Result Value Ref Range    Magnesium 2.1 1.6 - 2.6 mg/dL   Renal function panel    Collection Time: 05/11/24  8:30 PM   Result Value Ref Range    Glucose 127 (H) 70 - 110 mg/dL    Sodium 138 136 - 145 mmol/L    Potassium 4.6 3.5 - 5.1 mmol/L    Chloride 97 95 - 110 mmol/L    CO2 30 (H) 23 - 29 mmol/L    BUN 44 (H) 8 - 23 mg/dL    Calcium 9.1 8.7 - 10.5 mg/dL    Creatinine 2.6 (H) 0.5 - 1.4 mg/dL    Albumin 2.7 (L) 3.5 - 5.2 g/dL    Phosphorus 3.5 2.7 - 4.5 mg/dL    eGFR 24 (A) >60 mL/min/1.73 m^2    Anion Gap 11 8 - 16 mmol/L   Magnesium    Collection Time: 05/12/24  4:29 AM   Result Value Ref Range    Magnesium 2.0 1.6 - 2.6 mg/dL   Renal function panel    Collection Time: 05/12/24  4:29 AM   Result Value Ref Range    Glucose 97 70 - 110 mg/dL    Sodium 139 136 - 145 mmol/L    Potassium 4.1 3.5 - 5.1 mmol/L    Chloride 98 95 - 110 mmol/L    CO2 31 (H) 23 - 29 mmol/L    BUN 43 (H) 8 - 23 mg/dL    Calcium 9.1 8.7 - 10.5 mg/dL    Creatinine 2.4 (H) 0.5 - 1.4 mg/dL    Albumin 3.0 (L) 3.5 - 5.2 g/dL    Phosphorus 3.2 2.7 - 4.5 mg/dL    eGFR 27 (A) >60 mL/min/1.73 m^2    Anion Gap 10 8 - 16 mmol/L   CBC Without Differential    Collection Time: 05/12/24  4:29 AM   Result Value Ref Range    WBC 8.48 3.90 - 12.70 K/uL    RBC 5.04 4.60 - 6.20 M/uL    Hemoglobin 13.8 (L) 14.0 - 18.0 g/dL    Hematocrit 43.5 40.0 - 54.0 %    MCV 86 82 - 98 fL    MCH 27.4 27.0 - 31.0 pg    MCHC 31.7 (L) 32.0 - 36.0 g/dL    RDW 20.3 (H) 11.5 - 14.5 %    Platelets 84 (L) 150 - 450 K/uL    MPV SEE COMMENT 9.2 - 12.9 fL             Aneesh Borjas MD

## 2024-05-12 NOTE — SUBJECTIVE & OBJECTIVE
Interval History: Patient reports he feels fine this morning, no complaints about breathing and is on room air. Notes he is having some L knee pain. UOP 4.9L in past 24 hours.     Objective:     Vital Signs (Most Recent):  Temp: 99 °F (37.2 °C) (05/12/24 0701)  Pulse: 105 (05/12/24 0701)  Resp: 20 (05/12/24 0922)  BP: 116/60 (05/12/24 0701)  SpO2: (!) 93 % (05/12/24 0701) Vital Signs (24h Range):  Temp:  [97.8 °F (36.6 °C)-99 °F (37.2 °C)] 99 °F (37.2 °C)  Pulse:  [] 105  Resp:  [14-35] 20  SpO2:  [92 %-100 %] 93 %  BP: ()/(49-81) 116/60     Weight: 88.2 kg (194 lb 7.1 oz)  Body mass index is 26.37 kg/m².      Intake/Output Summary (Last 24 hours) at 5/12/2024 0929  Last data filed at 5/12/2024 0926  Gross per 24 hour   Intake 896.02 ml   Output 4920 ml   Net -4023.98 ml        Physical Exam  Vitals reviewed.   Constitutional:       General: He is not in acute distress.     Appearance: He is not toxic-appearing.   HENT:      Head: Normocephalic and atraumatic.      Mouth/Throat:      Mouth: Mucous membranes are moist.      Pharynx: Oropharynx is clear.   Eyes:      Extraocular Movements: Extraocular movements intact.      Conjunctiva/sclera: Conjunctivae normal.   Cardiovascular:      Rate and Rhythm: Normal rate and regular rhythm.      Heart sounds: Normal heart sounds.   Pulmonary:      Effort: No respiratory distress.      Breath sounds: No wheezing or rhonchi.   Abdominal:      General: There is no distension.      Tenderness: There is no abdominal tenderness.   Musculoskeletal:      Cervical back: Normal range of motion and neck supple.      Right lower leg: Edema present.      Left lower leg: Edema present.   Skin:     Capillary Refill: Capillary refill takes less than 2 seconds.   Neurological:      Mental Status: He is alert and oriented to person, place, and time. Mental status is at baseline.           Review of Systems   Constitutional:  Negative for chills and fever.   HENT:  Negative for  congestion and sore throat.    Respiratory:  Negative for shortness of breath and wheezing.    Cardiovascular:  Positive for leg swelling. Negative for chest pain.   Gastrointestinal:  Negative for abdominal distention and abdominal pain.   Musculoskeletal:  Positive for arthralgias (L knee pain). Negative for back pain and neck pain.   Neurological:  Negative for dizziness and headaches.       Vents:       Lines/Drains/Airways       Peripheral Intravenous Line  Duration                  Peripheral IV - Single Lumen 05/09/24 1254 20 G Anterior;Right;Lateral Forearm 2 days         Peripheral IV - Single Lumen 05/10/24 1816 20 G Left;Posterior Forearm 1 day                    Significant Labs:    CBC/Anemia Profile:  Recent Labs   Lab 05/11/24  0507 05/12/24  0429   WBC 5.75 8.48   HGB 12.9* 13.8*   HCT 40.0 43.5   PLT 78* 84*   MCV 88 86   RDW 20.5* 20.3*        Chemistries:  Recent Labs   Lab 05/11/24  0717 05/11/24  2030 05/12/24  0429    138 139   K 4.2 4.6 4.1    97 98   CO2 29 30* 31*   BUN 47* 44* 43*   CREATININE 2.7* 2.6* 2.4*   CALCIUM 8.6* 9.1 9.1   ALBUMIN 2.5* 2.7* 3.0*   MG 1.8 2.1 2.0   PHOS 4.0 3.5 3.2       All pertinent labs within the past 24 hours have been reviewed.    Significant Imaging:  I have reviewed all pertinent imaging results/findings within the past 24 hours.

## 2024-05-12 NOTE — PROGRESS NOTES
[facility] Intensive Care [facility] Medicine  Progress Note    Patient Name: Felicia Lopez  MRN: [id number]  Patient Class: IP- Inpatient   Admission Date: 5/9/2024  Length of Stay: 3 days  Attending Physician: Jeanine Montoya MD  Primary Care Provider: Mickye Darden MD        Subjective:     Principal Problem:Acute on chronic combined systolic and diastolic congestive heart failure        HPI:  Mr. Lopez is a 78 YOM with PMHx of CAD with history of MI s/p PCI (RCA x 2 JSOE C 2010), combined systolic and diastolic CHF, HTN, HLD, history of CVA (2015), preDM, CKD III (baseline SCr 2.1-2.3, follows with Dr. Matson), secondary hyperparathyroidism, MARLENI, chronic idiopathic thrombocytopenia, and history of prostate CA s/p TURP.     He presents to ED from Cardiology clinic due to worsening edema in thighs, legs, and scrotum with associated HARTMAN, orthopnea, PND, and decreased UOP despite taking home PO lasix daily. Symptoms have progressively worsened over the last couple of weeks. He notes some noncompliance with low sodium diet including take out pizza and [employer] meals recently. He denies fever, chills, recent illness, CP, abdominal pain, N/V/D, constipation, hematuria, changes in bowel habits or blood in stool, decreased appetite, changes in PO intake, light headiness, dizziness, seizures, or syncope. He lives with spouse, uses a walker at baseline, and is independent in ADLs.     In the ED he is HDS and afebrile. CBC with WBC 5, H/H 15.6/49.2, platelets 92 (baseline 110-133). Chemistry with , K 4.2, chloride 105, CO2 24, BUN 40, SCr 2.6 (baseline 2.1-2.3), glucose 79. LFTs WNL. BNP 2691. Troponin 0.189 (similar to priors). Hep C and HIV non reactive. UA noninfectious. CXR with pulmonary edema and effusions. EKG with SR, PACs, and T wave abnormalities similar to priors.     The patient was placed in observation to the Hospital Medicine Service for further evaluation and treatment.     Overview/Hospital  Course:  Pt with minimal urine output with lasix (no retention on bladder scan). Echo with worsening systolic function to 15-20%.  Cardiology recommended transfer to ICU and initiation of dobutamine drip 5/10.  Nephrology consulted for cardiorenal syndrome. Continue lasix drip with good urine output.  Monitoring electrolytes.    Interval History:  Patient using urinal accurately.  Having very good urine output. Edema has improved a lot in upper thigh areas - pt feels a relief too he reports. Still with edema in lower legs.     Review of Systems   Constitutional:  Negative for activity change, appetite change, chills, diaphoresis, fatigue and fever.   Respiratory:  Positive for shortness of breath. Negative for cough, chest tightness and wheezing.    Cardiovascular:  Positive for leg swelling. Negative for chest pain and palpitations.   Gastrointestinal:  Negative for abdominal distention, abdominal pain, constipation, diarrhea, nausea and vomiting.   Genitourinary:  Positive for decreased urine volume and scrotal swelling. Negative for difficulty urinating, dysuria, hematuria and urgency.   Musculoskeletal:  Positive for gait problem (chronic).   Skin:  Positive for wound (RLE).   Neurological:  Negative for dizziness, syncope, weakness, light-headedness and headaches.     Objective:     Vital Signs (Most Recent):  Temp: 99 °F (37.2 °C) (05/12/24 0701)  Pulse: 84 (05/12/24 1015)  Resp: 16 (05/12/24 1015)  BP: (!) 110/55 (05/12/24 1015)  SpO2: 95 % (05/12/24 1015) Vital Signs (24h Range):  Temp:  [97.8 °F (36.6 °C)-99 °F (37.2 °C)] 99 °F (37.2 °C)  Pulse:  [] 84  Resp:  [14-35] 16  SpO2:  [91 %-100 %] 95 %  BP: ()/(49-81) 110/55     Weight: 88.2 kg (194 lb 7.1 oz)  Body mass index is 26.37 kg/m².    Intake/Output Summary (Last 24 hours) at 5/12/2024 1032  Last data filed at 5/12/2024 0926  Gross per 24 hour   Intake 896.02 ml   Output 4570 ml   Net -3673.98 ml         Physical Exam  Constitutional:        General: He is not in acute distress.  Pulmonary:      Effort: No respiratory distress.      Breath sounds: No wheezing.   Abdominal:      General: There is no distension.      Tenderness: There is no abdominal tenderness.   Skin:     Comments: Diffuse anasarca - improving diuresis. Less edema in upper legs   Right leg ruptured blister - does not appear infected    Neurological:      General: No focal deficit present.      Mental Status: He is oriented to person, place, and time.             Significant Labs: All pertinent labs within the past 24 hours have been reviewed.    Significant Imaging: I have reviewed all pertinent imaging results/findings within the past 24 hours.    Assessment/Plan:      * Acute on chronic combined systolic and diastolic congestive heart failure  Anasarca  Cardiorenal syndrome    Acute CHF exacerbation in setting of dietary and medication noncompliance with associated ARELI on CKD and chronic demand ischemia   At admission HDS and afebrile  Admission BNP 2691, troponin 0.189 (at baseline), CXR with pulmonary edema and effusions, EKG with SR, PVCs and TWA similar to prior studies   Recent TTE reviewed noting EF 35-40% with regional WMAs, LV dilation, diastolic dysfunction, mild AVS, mild MR    Current echo - with worsening systolic function 15-20%     Plan -   -elevate LEs and scrotum as tolerated  -Wound Care consulted due to altered skin integrity with blister rupture and weeping edema  -cardiac diet with fluid restriction  -strict I&Os and daily weights  -trend labs, address/replete electrolytes as indicated  - cardiology consult - recommending transfer to ICU and dobutamine drip - transferred 5/10   -hold home metoprolol while on dobutamine drip  - nephrology consult  - pulm crit on consult - 5/10 started lasix drip with good urine output     Gout  - continue home allopurinol      CAD (coronary artery disease)  History of PCI  History of CVA  Hyperlipidemia   History of remote MI with  PCI in 2010 and CVA in 2015  Chronic demand ischemia noted with elevated troponins  Admission troponin at baseline and on acute dynamic EKG changes    Plan -   -continue home metoprolol as detailed above  -continue home ASA  -continue home statin per pharmacy formulary alternative  -dose/medication adjustment as appropriate   -continue tele monitoring  -EKG PRN concerns  -trend labs, address/replete electrolytes as indicated    Iron deficiency anemia  Chronic idiopathic thrombocytopenia  Chronic MARLENI and thrombocytopenia  Thrombocytopenia etiology unclear.  B12 and folate within normal limits and HIV negative.  No overt bleeding    Plan -   -trend labs over course, address/transfuse as indicated  -hold DVT prophylaxis if platelets drop less than 50          Hyperlipidemia  - continue simvastatin alternative      Chronic kidney disease, stage III (moderate)  ARELI on CKD - improving with diuresis   Secondary hyperparathyroidism   Chronic CKD with admission SCr 2.6 -->2.8->2.7  Baseline 2.1-2.3  Likely cardiorenal syndrome in setting of CHF exacerbation    Plan -   - cont lasix drip with good urine output (aim for 100ml/hr)   - nephrology consult  - continue home Calcitrol MWF  - trend labs, address/replete electrolytes as indicated  - avoid nephrotoxins and hypotension as able, renally dose medications    Essential hypertension  Plan -   -hold home metoprolol while on dobutamine          VTE Risk Mitigation (From admission, onward)           Ordered     heparin (porcine) injection 5,000 Units  Every 8 hours         05/09/24 1447     IP VTE HIGH RISK PATIENT  Once         05/09/24 1447     Place sequential compression device  Until discontinued         05/09/24 1447                    Discharge Planning   SHANICE:      Code Status: Full Code   Is the patient medically ready for discharge?:     Reason for patient still in hospital (select all that apply): Treatment  Discharge Plan A: Home with family                  Jeanine  MD Harleen  Department of [facility] Medicine   [facility] - Intensive Care ([place])

## 2024-05-13 PROBLEM — Z51.5 ENCOUNTER FOR PALLIATIVE CARE: Status: ACTIVE | Noted: 2024-05-13

## 2024-05-13 LAB
ALBUMIN SERPL BCP-MCNC: 2.4 G/DL (ref 3.5–5.2)
ALBUMIN SERPL BCP-MCNC: 2.4 G/DL (ref 3.5–5.2)
ANION GAP SERPL CALC-SCNC: 10 MMOL/L (ref 8–16)
ANION GAP SERPL CALC-SCNC: 9 MMOL/L (ref 8–16)
BUN SERPL-MCNC: 45 MG/DL (ref 8–23)
BUN SERPL-MCNC: 46 MG/DL (ref 8–23)
CALCIUM SERPL-MCNC: 8.6 MG/DL (ref 8.7–10.5)
CALCIUM SERPL-MCNC: 8.7 MG/DL (ref 8.7–10.5)
CHLORIDE SERPL-SCNC: 92 MMOL/L (ref 95–110)
CHLORIDE SERPL-SCNC: 95 MMOL/L (ref 95–110)
CO2 SERPL-SCNC: 31 MMOL/L (ref 23–29)
CO2 SERPL-SCNC: 32 MMOL/L (ref 23–29)
CREAT SERPL-MCNC: 2.1 MG/DL (ref 0.5–1.4)
CREAT SERPL-MCNC: 2.2 MG/DL (ref 0.5–1.4)
ERYTHROCYTE [DISTWIDTH] IN BLOOD BY AUTOMATED COUNT: 20 % (ref 11.5–14.5)
EST. GFR  (NO RACE VARIABLE): 30 ML/MIN/1.73 M^2
EST. GFR  (NO RACE VARIABLE): 31 ML/MIN/1.73 M^2
GLUCOSE SERPL-MCNC: 136 MG/DL (ref 70–110)
GLUCOSE SERPL-MCNC: 86 MG/DL (ref 70–110)
HCT VFR BLD AUTO: 39.3 % (ref 40–54)
HGB BLD-MCNC: 12.8 G/DL (ref 14–18)
MAGNESIUM SERPL-MCNC: 1.7 MG/DL (ref 1.6–2.6)
MAGNESIUM SERPL-MCNC: 2.1 MG/DL (ref 1.6–2.6)
MCH RBC QN AUTO: 27.8 PG (ref 27–31)
MCHC RBC AUTO-ENTMCNC: 32.6 G/DL (ref 32–36)
MCV RBC AUTO: 85 FL (ref 82–98)
PHOSPHATE SERPL-MCNC: 3.3 MG/DL (ref 2.7–4.5)
PHOSPHATE SERPL-MCNC: 3.7 MG/DL (ref 2.7–4.5)
PLATELET # BLD AUTO: 79 K/UL (ref 150–450)
PMV BLD AUTO: ABNORMAL FL (ref 9.2–12.9)
POTASSIUM SERPL-SCNC: 4.4 MMOL/L (ref 3.5–5.1)
POTASSIUM SERPL-SCNC: 4.5 MMOL/L (ref 3.5–5.1)
RBC # BLD AUTO: 4.61 M/UL (ref 4.6–6.2)
SODIUM SERPL-SCNC: 133 MMOL/L (ref 136–145)
SODIUM SERPL-SCNC: 136 MMOL/L (ref 136–145)
URATE SERPL-MCNC: 5.9 MG/DL (ref 3.4–7)
WBC # BLD AUTO: 8.63 K/UL (ref 3.9–12.7)

## 2024-05-13 PROCEDURE — 25000003 PHARM REV CODE 250: Performed by: NURSE PRACTITIONER

## 2024-05-13 PROCEDURE — 84550 ASSAY OF BLOOD/URIC ACID: CPT | Performed by: STUDENT IN AN ORGANIZED HEALTH CARE EDUCATION/TRAINING PROGRAM

## 2024-05-13 PROCEDURE — 83735 ASSAY OF MAGNESIUM: CPT | Performed by: STUDENT IN AN ORGANIZED HEALTH CARE EDUCATION/TRAINING PROGRAM

## 2024-05-13 PROCEDURE — 63600175 PHARM REV CODE 636 W HCPCS: Performed by: NURSE PRACTITIONER

## 2024-05-13 PROCEDURE — 85027 COMPLETE CBC AUTOMATED: CPT | Performed by: STUDENT IN AN ORGANIZED HEALTH CARE EDUCATION/TRAINING PROGRAM

## 2024-05-13 PROCEDURE — 99232 SBSQ HOSP IP/OBS MODERATE 35: CPT | Mod: ,,, | Performed by: INTERNAL MEDICINE

## 2024-05-13 PROCEDURE — 97165 OT EVAL LOW COMPLEX 30 MIN: CPT

## 2024-05-13 PROCEDURE — 99900035 HC TECH TIME PER 15 MIN (STAT)

## 2024-05-13 PROCEDURE — 36415 COLL VENOUS BLD VENIPUNCTURE: CPT | Performed by: STUDENT IN AN ORGANIZED HEALTH CARE EDUCATION/TRAINING PROGRAM

## 2024-05-13 PROCEDURE — 80069 RENAL FUNCTION PANEL: CPT | Mod: 91 | Performed by: STUDENT IN AN ORGANIZED HEALTH CARE EDUCATION/TRAINING PROGRAM

## 2024-05-13 PROCEDURE — 63600175 PHARM REV CODE 636 W HCPCS: Performed by: STUDENT IN AN ORGANIZED HEALTH CARE EDUCATION/TRAINING PROGRAM

## 2024-05-13 PROCEDURE — 25000003 PHARM REV CODE 250: Performed by: STUDENT IN AN ORGANIZED HEALTH CARE EDUCATION/TRAINING PROGRAM

## 2024-05-13 PROCEDURE — 99232 SBSQ HOSP IP/OBS MODERATE 35: CPT | Mod: GC,,, | Performed by: INTERNAL MEDICINE

## 2024-05-13 PROCEDURE — 97116 GAIT TRAINING THERAPY: CPT

## 2024-05-13 PROCEDURE — 99223 1ST HOSP IP/OBS HIGH 75: CPT | Mod: ,,, | Performed by: FAMILY MEDICINE

## 2024-05-13 PROCEDURE — 97535 SELF CARE MNGMENT TRAINING: CPT

## 2024-05-13 PROCEDURE — 94761 N-INVAS EAR/PLS OXIMETRY MLT: CPT

## 2024-05-13 PROCEDURE — 63600175 PHARM REV CODE 636 W HCPCS: Performed by: INTERNAL MEDICINE

## 2024-05-13 PROCEDURE — 97162 PT EVAL MOD COMPLEX 30 MIN: CPT

## 2024-05-13 PROCEDURE — 21400001 HC TELEMETRY ROOM

## 2024-05-13 RX ORDER — COLCHICINE 0.6 MG/1
1.2 TABLET, FILM COATED ORAL ONCE
Status: COMPLETED | OUTPATIENT
Start: 2024-05-13 | End: 2024-05-13

## 2024-05-13 RX ORDER — MIDODRINE HYDROCHLORIDE 5 MG/1
5 TABLET ORAL 3 TIMES DAILY PRN
Status: DISCONTINUED | OUTPATIENT
Start: 2024-05-13 | End: 2024-05-16 | Stop reason: HOSPADM

## 2024-05-13 RX ORDER — MAGNESIUM SULFATE HEPTAHYDRATE 40 MG/ML
2 INJECTION, SOLUTION INTRAVENOUS ONCE
Status: COMPLETED | OUTPATIENT
Start: 2024-05-13 | End: 2024-05-13

## 2024-05-13 RX ADMIN — CALCITRIOL CAPSULES 0.25 MCG 0.25 MCG: 0.25 CAPSULE ORAL at 05:05

## 2024-05-13 RX ADMIN — HEPARIN SODIUM 5000 UNITS: 5000 INJECTION INTRAVENOUS; SUBCUTANEOUS at 01:05

## 2024-05-13 RX ADMIN — ATORVASTATIN CALCIUM 20 MG: 20 TABLET, FILM COATED ORAL at 08:05

## 2024-05-13 RX ADMIN — HEPARIN SODIUM 5000 UNITS: 5000 INJECTION INTRAVENOUS; SUBCUTANEOUS at 06:05

## 2024-05-13 RX ADMIN — HYDROCODONE BITARTRATE AND ACETAMINOPHEN 1 TABLET: 5; 325 TABLET ORAL at 08:05

## 2024-05-13 RX ADMIN — FUROSEMIDE 10 MG/HR: 10 INJECTION, SOLUTION INTRAMUSCULAR; INTRAVENOUS at 08:05

## 2024-05-13 RX ADMIN — ASPIRIN 81 MG: 81 TABLET, COATED ORAL at 08:05

## 2024-05-13 RX ADMIN — CETIRIZINE HYDROCHLORIDE 10 MG: 10 TABLET, FILM COATED ORAL at 09:05

## 2024-05-13 RX ADMIN — MAGNESIUM SULFATE HEPTAHYDRATE 2 G: 40 INJECTION, SOLUTION INTRAVENOUS at 11:05

## 2024-05-13 RX ADMIN — ALLOPURINOL 100 MG: 100 TABLET ORAL at 08:05

## 2024-05-13 RX ADMIN — FUROSEMIDE 10 MG/HR: 10 INJECTION, SOLUTION INTRAMUSCULAR; INTRAVENOUS at 06:05

## 2024-05-13 RX ADMIN — MUPIROCIN: 20 OINTMENT TOPICAL at 08:05

## 2024-05-13 RX ADMIN — HYDROCODONE BITARTRATE AND ACETAMINOPHEN 1 TABLET: 5; 325 TABLET ORAL at 01:05

## 2024-05-13 RX ADMIN — MUPIROCIN: 20 OINTMENT TOPICAL at 09:05

## 2024-05-13 RX ADMIN — COLCHICINE 1.2 MG: 0.6 TABLET, FILM COATED ORAL at 03:05

## 2024-05-13 RX ADMIN — ACETAMINOPHEN 650 MG: 325 TABLET, FILM COATED ORAL at 01:05

## 2024-05-13 RX ADMIN — SENNOSIDES AND DOCUSATE SODIUM 1 TABLET: 8.6; 5 TABLET ORAL at 09:05

## 2024-05-13 RX ADMIN — HEPARIN SODIUM 5000 UNITS: 5000 INJECTION INTRAVENOUS; SUBCUTANEOUS at 09:05

## 2024-05-13 RX ADMIN — DOBUTAMINE HYDROCHLORIDE 2.5 MCG/KG/MIN: 400 INJECTION INTRAVENOUS at 01:05

## 2024-05-13 NOTE — ASSESSMENT & PLAN NOTE
- creatinine of 2.6, BUN 40, BNP 2691, and chest x-ray with marked pulmonary edema.   - Nephrology consulted

## 2024-05-13 NOTE — PROGRESS NOTES
[facility] - Intensive Care [facility]  Pulmonology  Progress Note    Patient Name: Felicia Lopez  MRN: [id number]  Admission Date: 5/9/2024  Hospital Length of Stay: 4 days  Code Status: Full Code  Attending Provider: Jeanine Montoya MD  Primary Care Provider: Mickey Darden MD   Principal Problem: Acute on chronic combined systolic and diastolic congestive heart failure    Subjective:     Mr. Lopez is a 79 yo man with a history of combined diastolic and systolic heart failure, prostate cancer, prior CVA, CAD s/p PCI who presented with dyspnea in the setting of decompensated heart failure.     Interval History:    24-hour events:  - net negative ~2.3L  - on RA    Patient reports his breathing has improved as well as his LE edema. Is wondering about getting out of bed today. Denies any SOB.       Objective:     Vital Signs (Most Recent):  Temp: 98 °F (36.7 °C) (05/13/24 0702)  Pulse: 110 (05/13/24 1030)  Resp: 13 (05/13/24 1030)  BP: (!) 104/57 (05/13/24 1030)  SpO2: 95 % (05/13/24 1030) Vital Signs (24h Range):  Temp:  [98 °F (36.7 °C)-98.8 °F (37.1 °C)] 98 °F (36.7 °C)  Pulse:  [] 110  Resp:  [10-44] 13  SpO2:  [87 %-98 %] 95 %  BP: ()/(49-72) 104/57     Weight: 88.2 kg (194 lb 7.1 oz)  Body mass index is 26.37 kg/m².      Intake/Output Summary (Last 24 hours) at 5/13/2024 1108  Last data filed at 5/13/2024 1026  Gross per 24 hour   Intake 220.26 ml   Output 3025 ml   Net -2804.74 ml        Physical Exam   GEN: older man, sitting comfortably in bed in NAD  HEENT: face symmetric, conjunctivae anicteric  Neck: JVP ~15cc  CV: regular rhythm, normal rate, no audible murmurs  PULM: bibasilar crackles, no wheezing, no increased WOB, speaking in full sentences  Abd: soft  Ext: 2+ pitting edema to mid-shins bilaterally  MSK: moving all extremities  Neuro: alert, answering questions appropriately       Significant Labs:    CBC/Anemia Profile:  Recent Labs   Lab 05/12/24  0429 05/13/24  0408   WBC 8.48 8.63    HGB 13.8* 12.8*   HCT 43.5 39.3*   PLT 84* 79*   MCV 86 85   RDW 20.3* 20.0*        Chemistries:  Recent Labs   Lab 05/12/24 0429 05/12/24 2057 05/13/24  0408    137 136   K 4.1 4.3 4.4   CL 98 95 95   CO2 31* 30* 32*   BUN 43* 45* 46*   CREATININE 2.4* 2.4* 2.2*   CALCIUM 9.1 8.4* 8.6*   ALBUMIN 3.0* 2.4* 2.4*   MG 2.0 1.8 1.7   PHOS 3.2 3.2 3.3       Significant Imaging:  No new imaging    I have reviewed all pertinent imaging results/findings within the past 24 hours.  Assessment/Plan:     Neuro/CNS  - mentation at baseline, no acute issues     Hx CVA 2015  - continue ASA and statin     Cardiovascular  Acute on Chronic HFrEF Exacerbation  - TTE EF 15-20% with DD, BNP > 2600, mild trop elevation  - started on dobutamine and lasix gtt with good response  - dobutamine stopped 5/13  - remains on lasix gtt with goal UOP 100ml/h, can likely switch to intermittent dosing (furosemide 60mg BID)  - BMP and Mg BID while on aggressive diuretic regimen  - cardiology and nephro following     CAD s/p PCI  - RCA JOSE C x2 2010  - continue home ASA, statin  - can likely resume home beta-blocker once off dobutamine, defer to cardiology      Pulmonary  Pulmonary Edema  Pleural Effusions  - noted on CXR in setting of volume overload, diuresis as above  - on room air     Renal  ARELI on CKD 3b  Cardio-Renal Syndrome  - baseline creatinine around 2.0  - follows with Dr. Matson outpatient  - creatinine rise in setting of volume overload, peaked at 2.7 and is improving with diuresis    - nephrology following     GI  - no active issues      ID  - no active issues     Endocrine  - BG goal 140-180s  - blood sugars within goal      Heme/Onc  Thrombocytopenia  - chronically low platelets to 110s, currently in 70-80s  - no signs of active bleed  - continue to monitor     Hx Prostate Cancer       Feeding: cardiac diet  Analgesia: tylenol prn, norco prn  Sedation: none  Thrombo PPX: hep TID  Head of Bed: > 30 degrees  Ulcer PPX:  N/A  Glucose: goal 140-180s  SBT/SAT: N/A  Bowel Regimen: senna-colace  Indwelling Lines: PIV x2  Deescalation Abx: N/A         Kelley Carbajal MD  Pulmonology  [facility] - Intensive Care ([place])

## 2024-05-13 NOTE — PLAN OF CARE
Problem: Physical Therapy  Goal: Physical Therapy Goal  Description: Goals to be met by: 24    Patient will increase functional independence with mobility by performin. Supine<>sit with supervision without use of HB features.   2. Sit<>stand with supervision with RW.  3. Gait x 25 feet with supervision with RW.  4. Standing balance x3 min with UE support and no increase in L knee pain and supervision.  Outcome: Progressing     Patient evaluated by PT and goals established. Patient with new onset L knee pain limiting his (I) with transfers and gait and only able to take steps along EOB despite use of RW. PT will continue to follow and progress as tolerated. Rec for dc to Moderate Intensity Therapy. Please see progress note for detailed plan of care and recommendations.

## 2024-05-13 NOTE — CONSULTS
[facility] - Intensive Care ([place])  Palliative Medicine  Consult Note    Patient Name: Felicia Lopez  MRN: [id number]  Admission Date: 5/9/2024  Hospital Length of Stay: 4 days  Code Status: Full Code   Attending Provider: Jeanine Montoya MD  Consulting Provider: Rosa Bacon DNP  Primary Care Physician: Mickey Darden MD  Principal Problem:Acute on chronic combined systolic and diastolic congestive heart failure    Patient information was obtained from patient, spouse/SO, and primary team.      Inpatient consult to Palliative Care  Consult performed by: Rosa Bacon DNP  Consult ordered by: Jeanine Montoya MD        Assessment/Plan:     Cardiac/Vascular  * Acute on chronic combined systolic and diastolic congestive heart failure  Latest ECHO performed and demonstrates- Results for orders placed during the hospital encounter of 05/09/24    Echo    Interpretation Summary    Left Ventricle: The left ventricle is mildly dilated. Mildly increased wall thickness. Severe global hypokinesis present. There is severely reduced systolic function with a visually estimated ejection fraction of 15 - 20%. Global longitudinal strain is reduced; -4%. Grade II diastolic dysfunction. MV e' lateral velocity is 0.04 m/s.    Right Ventricle: Mild right ventricular enlargement. Wall thickness is normal. Right ventricle wall motion has global hypokinesis. Systolic function is severely reduced.    Left Atrium: Left atrium is moderately dilated.    Mitral Valve: There is mild regurgitation.    Tricuspid Valve: There is mild regurgitation.    Pulmonary Artery: The estimated pulmonary artery systolic pressure is 51 mmHg.    IVC/SVC: Intermediate venous pressure at 8 mmHg.    Recent Labs   Lab 05/09/24  1311   BNP 2,691*       CAD (coronary artery disease)  - Noted; cardiology consulted   - Currently on dobutamine drip     Renal/  Chronic kidney disease, stage III (moderate)  - creatinine of 2.6, BUN 40, BNP  "2691, and chest x-ray with marked pulmonary edema.   - Nephrology consulted     Palliative Care  Encounter for palliative care  - Consult for advance care planning/ goals of care in chronically ill patient with CHF and CKD admitted to the ICU on dobutamine drip. Of note, patient is known to me from previous hospitalization on 2/2/24.  Extensive chart review performed.  - Along with Vangie Lara (RAIN), visited with patient in the ICU. He was lying in bed, he is chronically ill appearing. He appears very weak with temporal lobe wasting. We reflected on Mr. Lopez outside of the hospital. He lives with his wife, Lauryn. She is elderly and sick herself and has a caregiver with her at home. He reports he is independent at home. He worked as a . He has 1 son who lives out of state.   - Today, Mr. Lopez is frustrated with being unable to get out of bed. He stated "I just want to stand up for a second". He further stated lying in the bed makes him very sore. He reports at home he is able to move around as he wishes.  - I was very transparent with Mr. Lopez that I am concerned that he is in the hospital again for similar presentation and he said "yes I know, but I just have to go with the flow". This is the patients fourth time coming to the ED for SOB related to CHF in the last 6 months. He is very weak appearing with temporal lobe wasting. Albumin 2.4. I was very transparent with Mr. Lopez that his heart is very weak and he stated "that may be why this is happening". I was transparent that this is likely the cause of presentation along with CKD.   - He reports little social support at home besides his wife's caregiver. He does have a sister and niece who call to check in on him  - Along with Vangie Lara RN, we did contact his very support wife, Lauryn. They have been  for 54 years. She reports Mr. Lopez had a heart attack in 2011 and did relatively well. She reports since he started " "taking lasix (which she reports has been some time) he has been weak and losing weight. She reports he has a very poor appetite and she is concerned about how weak he is. Lauryn stated that she has not been feeling well herself. She further stated that Mr. Lopez had home health and he seemed to be accumulating more fluid in his lower extremities. We were very transparent that Mr. Arvealos heart is very weak and she said "oh please dont tell me that right now". She is understandably overwhelmed with her own health. We did discuss CHF and high probability for more hospitalizations given the degree of his heart failure. We discussed use of dobutamine.   - Mrs. Combs remains hopeful that Mr. Lopez's status improves and she has been asking God to take care of him. She further stated she is hopeful he is able to work with PT/OT and return to his prior level of mobility.  - I would recommend home based pall care. Mr. Lopez would qualify for home hospice, however, it seems as though Mr. Lopez and his wife have poor insight and I would recommend further discussion regarding severity of disease.       Other  Anasarca  - Patient noted to have Lower extremity swelling and generalized edema         Thank you for your consult.     Subjective:     HPI:   Per H&P: "HPI: Mr. Lopez is a 78 YOM with PMHx of CAD with history of MI s/p PCI (RCA x 2 JOSE C 2010), combined systolic and diastolic CHF, HTN, HLD, history of CVA (2015), preDM, CKD III (baseline SCr 2.1-2.3, follows with Dr. Matson), secondary hyperparathyroidism, MARLENI, chronic idiopathic thrombocytopenia, and history of prostate CA s/p TURP.      He presents to ED from Cardiology clinic due to worsening edema in thighs, legs, and scrotum with associated HARTMAN, orthopnea, PND, and decreased UOP despite taking home PO lasix daily. Symptoms have progressively worsened over the last couple of weeks. He notes some noncompliance with low sodium diet including take out pizza and [employer] " "meals recently. He denies fever, chills, recent illness, CP, abdominal pain, N/V/D, constipation, hematuria, changes in bowel habits or blood in stool, decreased appetite, changes in PO intake, light headiness, dizziness, seizures, or syncope. He lives with spouse, uses a walker at baseline, and is independent in ADLs.      In the ED he is HDS and afebrile. CBC with WBC 5, H/H 15.6/49.2, platelets 92 (baseline 110-133). Chemistry with , K 4.2, chloride 105, CO2 24, BUN 40, SCr 2.6 (baseline 2.1-2.3), glucose 79. LFTs WNL. BNP 2691. Troponin 0.189 (similar to priors). Hep C and HIV non reactive. UA noninfectious. CXR with pulmonary edema and effusions. EKG with SR, PACs, and T wave abnormalities similar to priors.      The patient was placed in observation to the Hospital Medicine Service for further evaluation and treatment."    At time of initial consult, patient seen in the ICU. He is chronically ill appearing. Patient is known to me from previous hospitalization. Palliative medicine consulted for goals of care/ advance care planning.     Hospital Course:  No notes on file    Interval History: Seen in the ICU, reports feeling "better" with SOB improving     Past Medical History:   Diagnosis Date    CAD (coronary artery disease)     Chronic combined systolic and diastolic CHF (congestive heart failure)     Chronic idiopathic thrombocytopenia     Disorder of kidney and ureter     History of heart attack     History of stroke     Hyperlipidemia     Hypertension        Past Surgical History:   Procedure Laterality Date    FACIAL RECONSTRUCTION SURGERY      PROSTATE SURGERY         Review of patient's allergies indicates:  No Known Allergies    Medications:  Continuous Infusions:   DOBUTamine IV infusion (non-titrating)  2.5 mcg/kg/min Intravenous Continuous 3.3 mL/hr at 05/13/24 0610 2.5 mcg/kg/min at 05/13/24 0610    furosemide (Lasix) 200 mg in sodium chloride 0.9% SolP 100 mL continuous infusion (conc: 2 " mg/mL)  10 mg/hr Intravenous Continuous 5 mL/hr at 05/13/24 0836 10 mg/hr at 05/13/24 0836     Scheduled Meds:   allopurinoL  100 mg Oral Daily    aspirin  81 mg Oral Daily    atorvastatin  20 mg Oral Daily    calcitRIOL  0.25 mcg Oral Every Mon, Wed, Fri    cetirizine  10 mg Oral QHS    heparin (porcine)  5,000 Units Subcutaneous Q8H    mupirocin   Nasal BID    senna-docusate 8.6-50 mg  1 tablet Oral BID     PRN Meds:  Current Facility-Administered Medications:     acetaminophen, 650 mg, Oral, Q4H PRN    albuterol-ipratropium, 3 mL, Nebulization, Once PRN    HYDROcodone-acetaminophen, 1 tablet, Oral, Q4H PRN    melatonin, 6 mg, Oral, Nightly PRN    ondansetron, 4 mg, Oral, Q8H PRN    ondansetron, 4 mg, Intravenous, Q8H PRN    ondansetron, 4 mg, Intravenous, Q8H PRN    polyethylene glycol, 17 g, Oral, BID PRN    sodium chloride 0.9%, 10 mL, Intravenous, PRN    sodium chloride 0.9%, 10 mL, Intravenous, PRN    Family History       Problem Relation (Age of Onset)    Hypertension Mother    No Known Problems Father          Tobacco Use    Smoking status: Former     Current packs/day: 0.00     Types: Cigarettes     Quit date: 10/5/2008     Years since quitting: 15.6    Smokeless tobacco: Never   Substance and Sexual Activity    Alcohol use: No     Alcohol/week: 0.0 standard drinks of alcohol    Drug use: No    Sexual activity: Never       Review of Systems   Constitutional:  Positive for activity change. Negative for fatigue.   Respiratory:  Positive for cough and shortness of breath.    Cardiovascular:  Positive for leg swelling.   Neurological:  Negative for weakness.     Objective:     Vital Signs (Most Recent):  Temp: 98 °F (36.7 °C) (05/13/24 0702)  Pulse: 108 (05/13/24 1000)  Resp: 12 (05/13/24 1000)  BP: (!) 103/55 (05/13/24 1000)  SpO2: 95 % (05/13/24 1000) Vital Signs (24h Range):  Temp:  [98 °F (36.7 °C)-98.8 °F (37.1 °C)] 98 °F (36.7 °C)  Pulse:  [] 108  Resp:  [10-44] 12  SpO2:  [87 %-98 %] 95 %  BP:  "()/(49-72) 103/55     Weight: 88.2 kg (194 lb 7.1 oz)  Body mass index is 26.37 kg/m².       Physical Exam  Constitutional:       Appearance: He is ill-appearing (Chronically ill appearing).      Comments: Temporal lobe wasting    Cardiovascular:      Rate and Rhythm: Tachycardia present.   Pulmonary:      Effort: No respiratory distress.   Neurological:      Mental Status: He is alert and oriented to person, place, and time.      Comments: Frustrated             Review of Symptoms      Symptom Assessment (ESAS 0-10 Scale)  Pain:  0  Dyspnea:  0  Anorexia:  5  Fatigue:  5         Living Arrangements:  Lives with spouse    Psychosocial/Cultural:   See Palliative Psychosocial Note: Yes  - Lives with wife, Lauryn     **Primary  to Follow**  Palliative Care  Consult: No        Advance Care Planning  Advance Directives:     Decision Making:  Patient answered questions  Goals of Care: What is most important right now is to focus on spending time at home, remaining as independent as possible. Accordingly, we have decided that the best plan to meet the patient's goals includes continuing with treatment.         Significant Labs: All pertinent labs within the past 24 hours have been reviewed.  CBC:   Recent Labs   Lab 05/13/24  0408   WBC 8.63   HGB 12.8*   HCT 39.3*   MCV 85   PLT 79*     BMP:  Recent Labs   Lab 05/13/24 0408   GLU 86      K 4.4   CL 95   CO2 32*   BUN 46*   CREATININE 2.2*   CALCIUM 8.6*   MG 1.7     LFT:  Lab Results   Component Value Date    AST 20 05/10/2024    ALKPHOS 88 05/10/2024    BILITOT 1.1 (H) 05/10/2024     Albumin:   Albumin   Date Value Ref Range Status   05/13/2024 2.4 (L) 3.5 - 5.2 g/dL Final     Protein:   Total Protein   Date Value Ref Range Status   05/10/2024 6.8 6.0 - 8.4 g/dL Final     Lactic acid:   No results found for: "LACTATE"    Significant Imaging: I have reviewed all pertinent imaging results/findings within the past 24 hours.      I " spent a total of 70 minutes on the day of the visit. This includes face to face time in discussion of goals of care, symptom assessment, coordination of care and emotional support.  This also includes non-face to face time preparing to see the patient (eg, review of tests/imaging), obtaining and/or reviewing separately obtained history, documenting clinical information in the electronic or other health record, independently interpreting results and communicating results to the patient/family/caregiver, or care coordinator.    Rosa Bacon, DK  Palliative Medicine  [facility] ([place])

## 2024-05-13 NOTE — HPI
"Per H&P: "HPI: Mr. Lopez is a 78 YOM with PMHx of CAD with history of MI s/p PCI (RCA x 2 JOSE C 2010), combined systolic and diastolic CHF, HTN, HLD, history of CVA (2015), preDM, CKD III (baseline SCr 2.1-2.3, follows with Dr. Matson), secondary hyperparathyroidism, MARLENI, chronic idiopathic thrombocytopenia, and history of prostate CA s/p TURP.      He presents to ED from Cardiology clinic due to worsening edema in thighs, legs, and scrotum with associated HARTMAN, orthopnea, PND, and decreased UOP despite taking home PO lasix daily. Symptoms have progressively worsened over the last couple of weeks. He notes some noncompliance with low sodium diet including take out pizza and [employer] meals recently. He denies fever, chills, recent illness, CP, abdominal pain, N/V/D, constipation, hematuria, changes in bowel habits or blood in stool, decreased appetite, changes in PO intake, light headiness, dizziness, seizures, or syncope. He lives with spouse, uses a walker at baseline, and is independent in ADLs.      In the ED he is HDS and afebrile. CBC with WBC 5, H/H 15.6/49.2, platelets 92 (baseline 110-133). Chemistry with , K 4.2, chloride 105, CO2 24, BUN 40, SCr 2.6 (baseline 2.1-2.3), glucose 79. LFTs WNL. BNP 2691. Troponin 0.189 (similar to priors). Hep C and HIV non reactive. UA noninfectious. CXR with pulmonary edema and effusions. EKG with SR, PACs, and T wave abnormalities similar to priors.      The patient was placed in observation to the Hospital Medicine Service for further evaluation and treatment."    At time of initial consult, patient seen in the ICU. He is chronically ill appearing. Patient is known to me from previous hospitalization. Palliative medicine consulted for goals of care/ advance care planning.   "

## 2024-05-13 NOTE — PROGRESS NOTES
Patient arrived to unit. VSS.NAD. patient oriented to room. Safety precautions initiated. Instructed patient to press call light for any concerns. Will continue to monitor.

## 2024-05-13 NOTE — PROGRESS NOTES
ICU Progress Note  Nephrology    Consult Requested By: Jeanine Montoya MD  Reason for Consult: Cardiorenal    SUBJECTIVE:     History of Present Illness:  Patient is a 78 y.o. male presents with worsening swelling, shortness of breath, decreased urine output, etcetera over last couple of weeks.  Patient was seen by Dr. Martinez in office yesterday and recommended admission for IV diuresis.  Extensive medical history as outlined below including CKD stage 3 with baseline creatinine around 2.0.  Patient is followed by Dr. Matson as an outpatient and was seen last week.  He had a creatinine of 2.2, urine protein creatinine ratio only 88 mg, and was started on calcitriol for mild secondary hyperparathyroidism.  He was on Lasix 40 mg daily, lisinopril 20 mg twice daily, and allopurinol 100 mg.  Upon initial evaluation emergency room patient had soft blood pressures of 90s to 100s.  Laboratory data consistent with creatinine of 2.6, BUN 40, BNP 26 91, and chest x-ray with marked pulmonary edema.  Patient was admitted to telemetry floor and started on diuresis.  Overnight patient had minimal urine output and creatinine has risen to 2.7 with worsening edema and hypotension.  Seen by Cardiology and plans for transfer to ICU with dobutamine drip and more aggressive diuresis.    Interval History:    Slow improvements.  Diuresing but still loads of volume on board.  See below.  Labs stable considering.  Still some SOB/cough.     Past Medical History:   Diagnosis Date    CAD (coronary artery disease)     Chronic combined systolic and diastolic CHF (congestive heart failure)     Chronic idiopathic thrombocytopenia     Disorder of kidney and ureter     History of heart attack     History of stroke     Hyperlipidemia     Hypertension      Past Surgical History:   Procedure Laterality Date    FACIAL RECONSTRUCTION SURGERY      PROSTATE SURGERY       Family History   Problem Relation Name Age of Onset    Hypertension Mother      No  "Known Problems Father       Social History     Tobacco Use    Smoking status: Former     Current packs/day: 0.00     Types: Cigarettes     Quit date: 10/5/2008     Years since quitting: 15.6    Smokeless tobacco: Never   Substance Use Topics    Alcohol use: No     Alcohol/week: 0.0 standard drinks of alcohol    Drug use: No       Review of patient's allergies indicates:  No Known Allergies     Review of Systems:  Constitutional: No fever or chills  Respiratory: shortness of breath  Cardiovascular: No chest pain or palpitations  Gastrointestinal: No nausea or vomiting  Neurological: No confusion or weakness    OBJECTIVE:     Vital Signs (Most Recent)  Temp: 98 °F (36.7 °C) (05/13/24 0702)  Pulse: (!) 112 (05/13/24 0808)  Resp: 20 (05/13/24 0817)  BP: 131/72 (05/13/24 0730)  SpO2: (!) 94 % (05/13/24 0808)    Vital Signs Range (Last 24H):  Temp:  [98 °F (36.7 °C)-98.8 °F (37.1 °C)]   Pulse:  []   Resp:  [10-44]   BP: ()/(49-72)   SpO2:  [87 %-98 %]       Intake/Output Summary (Last 24 hours) at 5/13/2024 0838  Last data filed at 5/13/2024 0837  Gross per 24 hour   Intake 480.2 ml   Output 3205 ml   Net -2724.8 ml       Physical Exam:  NAD A and O  RRR 3/6 STEPHAN +JVD present  Pulm:  diminished   Abdomen soft NT ND +BS  Ext severe 4+ edema to thighs +scrotal edema now 3+ much improved    Laboratory:  Recent Labs   Lab 05/13/24  0408   WBC 8.63   RBC 4.61   HGB 12.8*   HCT 39.3*   PLT 79*   MCV 85   MCH 27.8   MCHC 32.6     BMP:   Recent Labs   Lab 05/13/24  0408   GLU 86      K 4.4   CL 95   CO2 32*   BUN 46*   CREATININE 2.2*   CALCIUM 8.6*   MG 1.7   PHOS 3.3     Lab Results   Component Value Date    CALCIUM 8.6 (L) 05/13/2024    PHOS 3.3 05/13/2024     BNP  Recent Labs   Lab 05/09/24  1311   BNP 2,691*   No results found for: "URICACID"  Lab Results   Component Value Date    IRON 57 02/01/2019     Lab Results   Component Value Date    CALCIUM 8.6 (L) 05/13/2024    PHOS 3.3 05/13/2024       Diagnostic " Results:  US Retroperitoneal Complete   Final Result      1. No evidence of hydronephrosis bilaterally.   2. Findings suggestive of underlying medical renal disease.   3. Incidentally noted right lower quadrant ascites.         Electronically signed by: Kaelyn Damon MD   Date:    05/11/2024   Time:    06:45      X-Ray Chest AP Portable   Final Result      1. Pulmonary findings suggest pulmonary edema with pleural effusions, developing left lower lung zone consolidation not excluded.         Electronically signed by: Ej Goel MD   Date:    05/09/2024   Time:    13:27          ASSESSMENT/PLAN:     Initially Oliguric and now nonoliguric acute kidney injury on progressive CKD 3b with baseline creatinine around 2.0 but recent 2.2 secondary to acute on chronic combined heart failure with dismal ejection fraction of 15% and needing aggressive diuresis but has poor cardiac output with hypotension:  -trends noted over the past few months with creatinine of 2.0 and then kellen to 2.2  -now 2.8 with need for aggressive diuresis > trending down with diuresis 2.4  -only 88 mg of proteinuria so edema is not from nephrotic syndrome but mostly from severe heart failure  -hold ACE inhibitor for now  -Renal US noted  -Transferred to ICU for dobutamine and lasix drip  -Can add metolazone if this combo doesn't result in adequate diuresis  -consider adding midodrine to assist with blood pressure but he appears to be tolerating well.  5/13:  will change lasix drip to 10mg/hr as hard to titrate hourly w/o mccauley.  Labs stable considering.   -no immediate need for renal replacement therapy at this time but will depend on degree of diuresis achieved by efforts above.  -Renally dose meds, avoid nephrotoxins, and monitor I/O's closely.    Acute on chronic combined heart failure with ejection fraction of 15%:  -poor outlook based on trends and trajectory  -appreciate cardiology input    Hyperuricemia:  -continue allopurinol  -risks  reviewed    Mineral bone density:  -continue calcitriol Monday Wednesday Friday    Thrombocytopenia:  -No active bleeding  -slow downward trend, monitor  -defer to primary          Total critical care time (exclusive of procedural time) : 60 minutes  Critical care was necessary to treat or prevent imminent or life-threatening deterioration of the following conditions: as below     Critical care was time spent personally by me on the following activities: development of treatment plan with patient or surrogate, discussions with consultants, interpretation of cardiac output measurements, examination of patient, ordering and performing treatments and interventions, evaluation of patient's response to treatment, obtaining history from patient or surrogate, ordering and review of laboratory studies, ordering and review of radiographic studies, re-evaluation of patient's condition, pulse oximetry and blood draw for specimens         High MDM complexity necessary to treat or prevent imminent or life-threatening deterioration of the following conditions:  ARELI, cardiorenal syndrome, severe heart failure with ejection fraction 15%  Time spent personally by me on the following activities 60 min: development of treatment plan with patient or surrogate, discussions with consultants, interpretation of cardiac output measurements, examination of patient, ordering and performing treatments and interventions, evaluation of patient's response to treatment, obtaining history from patient or surrogate, ordering and review of laboratory studies, ordering and review of radiographic studies, re-evaluation of patient's condition, pulse oximetry and blood draw for specimens

## 2024-05-13 NOTE — SUBJECTIVE & OBJECTIVE
Interval History:    Patient reports his breathing has improved as well as his LE edema. Is wondering about getting out of bed today. Denies any SOB.       Objective:     Vital Signs (Most Recent):  Temp: 98 °F (36.7 °C) (05/13/24 0702)  Pulse: 110 (05/13/24 1030)  Resp: 13 (05/13/24 1030)  BP: (!) 104/57 (05/13/24 1030)  SpO2: 95 % (05/13/24 1030) Vital Signs (24h Range):  Temp:  [98 °F (36.7 °C)-98.8 °F (37.1 °C)] 98 °F (36.7 °C)  Pulse:  [] 110  Resp:  [10-44] 13  SpO2:  [87 %-98 %] 95 %  BP: ()/(49-72) 104/57     Weight: 88.2 kg (194 lb 7.1 oz)  Body mass index is 26.37 kg/m².      Intake/Output Summary (Last 24 hours) at 5/13/2024 1108  Last data filed at 5/13/2024 1026  Gross per 24 hour   Intake 220.26 ml   Output 3025 ml   Net -2804.74 ml        Physical Exam   GEN: older man, sitting comfortably in bed in NAD  HEENT: face symmetric, conjunctivae anicteric  Neck: JVP ~15cc  CV: regular rhythm, normal rate, no audible murmurs  PULM: bibasilar crackles, no wheezing, no increased WOB, speaking in full sentences  Abd: soft  Ext: 2+ pitting edema to mid-shins bilaterally  MSK: moving all extremities  Neuro: alert, answering questions appropriately       Significant Labs:    CBC/Anemia Profile:  Recent Labs   Lab 05/12/24 0429 05/13/24  0408   WBC 8.48 8.63   HGB 13.8* 12.8*   HCT 43.5 39.3*   PLT 84* 79*   MCV 86 85   RDW 20.3* 20.0*        Chemistries:  Recent Labs   Lab 05/12/24 0429 05/12/24 2057 05/13/24  0408    137 136   K 4.1 4.3 4.4   CL 98 95 95   CO2 31* 30* 32*   BUN 43* 45* 46*   CREATININE 2.4* 2.4* 2.2*   CALCIUM 9.1 8.4* 8.6*   ALBUMIN 3.0* 2.4* 2.4*   MG 2.0 1.8 1.7   PHOS 3.2 3.2 3.3       Significant Imaging:  No new imaging    I have reviewed all pertinent imaging results/findings within the past 24 hours.

## 2024-05-13 NOTE — ASSESSMENT & PLAN NOTE
"- Consult for advance care planning/ goals of care in chronically ill patient with CHF and CKD admitted to the ICU on dobutamine drip. Of note, patient is known to me from previous hospitalization on 2/2/24.  Extensive chart review performed.  - Along with Vangie Lara (RN), visited with patient in the ICU. He was lying in bed, he is chronically ill appearing. He appears very weak with temporal lobe wasting. We reflected on Mr. Lopez outside of the hospital. He lives with his wife, Lauryn. She is elderly and sick herself and has a caregiver with her at home. He reports he is independent at home. He worked as a . He has 1 son who lives out of state.   - Today, Mr. Lopez is frustrated with being unable to get out of bed. He stated "I just want to stand up for a second". He further stated lying in the bed makes him very sore. He reports at home he is able to move around as he wishes.  - I was very transparent with Mr. Lopez that I am concerned that he is in the hospital again for similar presentation and he said "yes I know, but I just have to go with the flow". This is the patients fourth time coming to the ED for SOB related to CHF in the last 6 months. He is very weak appearing with temporal lobe wasting. Albumin 2.4. I was very transparent with Mr. Lopez that his heart is very weak and he stated "that may be why this is happening". I was transparent that this is likely the cause of presentation along with CKD.   - He reports little social support at home besides his wife's caregiver. He does have a sister and niece who call to check in on him  - Along with Vangie Lara RN, we did contact his very support wife, Lauryn. They have been  for 54 years. She reports Mr. Lopez had a heart attack in 2011 and did relatively well. She reports since he started taking lasix (which she reports has been some time) he has been weak and losing weight. She reports he has a very poor appetite and " "she is concerned about how weak he is. Lauryn stated that she has not been feeling well herself. She further stated that Mr. Lopez had home health and he seemed to be accumulating more fluid in his lower extremities. We were very transparent that Mr. Arevalos heart is very weak and she said "oh please dont tell me that right now". She is understandably overwhelmed with her own health. We did discuss CHF and high probability for more hospitalizations given the degree of his heart failure. We discussed use of dobutamine.   - Mrs. Combs remains hopeful that Mr. Lopez's status improves and she has been asking God to take care of him. She further stated she is hopeful he is able to work with PT/OT and return to his prior level of mobility.  - I would recommend home based pall care. Mr. Lopez would qualify for home hospice, however, it seems as though Mr. Lopez and his wife have poor insight and I would recommend further discussion regarding severity of disease.     "

## 2024-05-13 NOTE — PROGRESS NOTES
[facility] Intensive Care [facility] Medicine  Progress Note    Patient Name: Felicia Lopez  MRN: [id number]  Patient Class: IP- Inpatient   Admission Date: 5/9/2024  Length of Stay: 4 days  Attending Physician: Jeanine Montoya MD  Primary Care Provider: Mickey Darden MD        Subjective:     Principal Problem:Acute on chronic combined systolic and diastolic congestive heart failure        HPI:  Mr. Lopez is a 78 YOM with PMHx of CAD with history of MI s/p PCI (RCA x 2 JOSE C 2010), combined systolic and diastolic CHF, HTN, HLD, history of CVA (2015), preDM, CKD III (baseline SCr 2.1-2.3, follows with Dr. Matson), secondary hyperparathyroidism, MARLENI, chronic idiopathic thrombocytopenia, and history of prostate CA s/p TURP.     He presents to ED from Cardiology clinic due to worsening edema in thighs, legs, and scrotum with associated HARTMAN, orthopnea, PND, and decreased UOP despite taking home PO lasix daily. Symptoms have progressively worsened over the last couple of weeks. He notes some noncompliance with low sodium diet including take out pizza and [employer] meals recently. He denies fever, chills, recent illness, CP, abdominal pain, N/V/D, constipation, hematuria, changes in bowel habits or blood in stool, decreased appetite, changes in PO intake, light headiness, dizziness, seizures, or syncope. He lives with spouse, uses a walker at baseline, and is independent in ADLs.     In the ED he is HDS and afebrile. CBC with WBC 5, H/H 15.6/49.2, platelets 92 (baseline 110-133). Chemistry with , K 4.2, chloride 105, CO2 24, BUN 40, SCr 2.6 (baseline 2.1-2.3), glucose 79. LFTs WNL. BNP 2691. Troponin 0.189 (similar to priors). Hep C and HIV non reactive. UA noninfectious. CXR with pulmonary edema and effusions. EKG with SR, PACs, and T wave abnormalities similar to priors.     The patient was placed in observation to the Hospital Medicine Service for further evaluation and treatment.     Overview/Hospital  Course:  Pt with minimal urine output with lasix (no retention on bladder scan). Echo with worsening systolic function to 15-20%.  Cardiology recommended transfer to ICU and initiation of dobutamine drip 5/10.  Nephrology consulted for cardiorenal syndrome. Continue lasix drip with good urine output.  Monitoring electrolytes.    Interval History:  Continues to diurese well.  No complaints today.  Happy that he is getting rid the fluid and getting better.    Review of Systems   Constitutional:  Negative for activity change, appetite change, chills, diaphoresis, fatigue and fever.   Respiratory:  Positive for shortness of breath. Negative for cough, chest tightness and wheezing.    Cardiovascular:  Positive for leg swelling. Negative for chest pain and palpitations.   Gastrointestinal:  Negative for abdominal distention, abdominal pain, constipation, diarrhea, nausea and vomiting.   Genitourinary:  Positive for decreased urine volume and scrotal swelling. Negative for difficulty urinating, dysuria, hematuria and urgency.   Musculoskeletal:  Positive for gait problem (chronic).   Skin:  Positive for wound (RLE).   Neurological:  Negative for dizziness, syncope, weakness, light-headedness and headaches.     Objective:     Vital Signs (Most Recent):  Temp: 98 °F (36.7 °C) (05/13/24 0702)  Pulse: (!) 111 (05/13/24 0915)  Resp: 20 (05/13/24 0915)  BP: (!) 114/55 (05/13/24 0915)  SpO2: 97 % (05/13/24 0915) Vital Signs (24h Range):  Temp:  [98 °F (36.7 °C)-98.8 °F (37.1 °C)] 98 °F (36.7 °C)  Pulse:  [] 111  Resp:  [10-44] 20  SpO2:  [87 %-98 %] 97 %  BP: ()/(49-72) 114/55     Weight: 88.2 kg (194 lb 7.1 oz)  Body mass index is 26.37 kg/m².    Intake/Output Summary (Last 24 hours) at 5/13/2024 0946  Last data filed at 5/13/2024 0903  Gross per 24 hour   Intake 460.26 ml   Output 2955 ml   Net -2494.74 ml         Physical Exam  Constitutional:       General: He is not in acute distress.  Pulmonary:      Effort: No  respiratory distress.      Breath sounds: No wheezing.   Abdominal:      General: There is no distension.      Tenderness: There is no abdominal tenderness.   Skin:     Comments: Diffuse anasarca - improving diuresis. Less edema in upper legs   Right leg ruptured blister - does not appear infected    Neurological:      General: No focal deficit present.      Mental Status: He is oriented to person, place, and time.             Significant Labs: All pertinent labs within the past 24 hours have been reviewed.    Significant Imaging: I have reviewed all pertinent imaging results/findings within the past 24 hours.    Assessment/Plan:      * Acute on chronic combined systolic and diastolic congestive heart failure  Anasarca  Cardiorenal syndrome    Acute CHF exacerbation in setting of dietary and medication noncompliance with associated ARELI on CKD and chronic demand ischemia   At admission HDS and afebrile  Admission BNP 2691, troponin 0.189 (at baseline), CXR with pulmonary edema and effusions, EKG with SR, PVCs and TWA similar to prior studies   Recent TTE reviewed noting EF 35-40% with regional WMAs, LV dilation, diastolic dysfunction, mild AVS, mild MR    Current echo - with worsening systolic function 15-20%     Plan -   -elevate LEs and scrotum as tolerated  -Wound Care consulted due to altered skin integrity with blister rupture and weeping edema  -cardiac diet with fluid restriction  -strict I&Os and daily weights  -trend labs, address/replete electrolytes as indicated  - cardiology consult - recommending transfer to ICU and dobutamine drip - transferred 5/10   -hold home metoprolol while on dobutamine drip  - nephrology consult  - pulm crit on consult   - 5/10 started lasix drip with good urine output     Gout  - continue home allopurinol      CAD (coronary artery disease)  History of PCI  History of CVA  Hyperlipidemia   History of remote MI with PCI in 2010 and CVA in 2015  Chronic demand ischemia noted with  elevated troponins  Admission troponin at baseline and on acute dynamic EKG changes    Plan -   -continue home metoprolol as detailed above  -continue home ASA  -continue home statin per pharmacy formulary alternative  -dose/medication adjustment as appropriate   -continue tele monitoring  -EKG PRN concerns  -trend labs, address/replete electrolytes as indicated    Iron deficiency anemia  Chronic idiopathic thrombocytopenia  Chronic MARLENI and thrombocytopenia  Thrombocytopenia etiology unclear.  B12 and folate within normal limits and HIV negative.  No overt bleeding    Plan -   -trend labs over course, address/transfuse as indicated  -hold DVT prophylaxis if platelets drop less than 50          Hyperlipidemia  - continue simvastatin alternative      Chronic kidney disease, stage III (moderate)  ARELI on CKD - improving with diuresis   Secondary hyperparathyroidism   Chronic CKD with admission SCr 2.6 -->2.8->2.7  Baseline 2.1-2.3  Likely cardiorenal syndrome in setting of CHF exacerbation    Plan -   - cont lasix drip with good urine output (aim for 100ml/hr)   - nephrology consult  - continue home Calcitrol MWF  - trend labs, address/replete electrolytes as indicated  - avoid nephrotoxins and hypotension as able, renally dose medications    Essential hypertension  Plan -   -hold home metoprolol while on dobutamine          VTE Risk Mitigation (From admission, onward)           Ordered     heparin (porcine) injection 5,000 Units  Every 8 hours         05/09/24 1447     IP VTE HIGH RISK PATIENT  Once         05/09/24 1447     Place sequential compression device  Until discontinued         05/09/24 1447                    Discharge Planning   SHANICE:      Code Status: Full Code   Is the patient medically ready for discharge?:     Reason for patient still in hospital (select all that apply): Treatment  Discharge Plan A: Home with family                  Jeanine Canseco MD  Department of Hospital Medicine   [facility]  Intensive Care ([place])

## 2024-05-13 NOTE — SUBJECTIVE & OBJECTIVE
Interval History:  Continues to diurese well.  No complaints today.  Happy that he is getting rid the fluid and getting better.    Review of Systems   Constitutional:  Negative for activity change, appetite change, chills, diaphoresis, fatigue and fever.   Respiratory:  Positive for shortness of breath. Negative for cough, chest tightness and wheezing.    Cardiovascular:  Positive for leg swelling. Negative for chest pain and palpitations.   Gastrointestinal:  Negative for abdominal distention, abdominal pain, constipation, diarrhea, nausea and vomiting.   Genitourinary:  Positive for decreased urine volume and scrotal swelling. Negative for difficulty urinating, dysuria, hematuria and urgency.   Musculoskeletal:  Positive for gait problem (chronic).   Skin:  Positive for wound (RLE).   Neurological:  Negative for dizziness, syncope, weakness, light-headedness and headaches.     Objective:     Vital Signs (Most Recent):  Temp: 98 °F (36.7 °C) (05/13/24 0702)  Pulse: (!) 111 (05/13/24 0915)  Resp: 20 (05/13/24 0915)  BP: (!) 114/55 (05/13/24 0915)  SpO2: 97 % (05/13/24 0915) Vital Signs (24h Range):  Temp:  [98 °F (36.7 °C)-98.8 °F (37.1 °C)] 98 °F (36.7 °C)  Pulse:  [] 111  Resp:  [10-44] 20  SpO2:  [87 %-98 %] 97 %  BP: ()/(49-72) 114/55     Weight: 88.2 kg (194 lb 7.1 oz)  Body mass index is 26.37 kg/m².    Intake/Output Summary (Last 24 hours) at 5/13/2024 0946  Last data filed at 5/13/2024 0903  Gross per 24 hour   Intake 460.26 ml   Output 2955 ml   Net -2494.74 ml         Physical Exam  Constitutional:       General: He is not in acute distress.  Pulmonary:      Effort: No respiratory distress.      Breath sounds: No wheezing.   Abdominal:      General: There is no distension.      Tenderness: There is no abdominal tenderness.   Skin:     Comments: Diffuse anasarca - improving diuresis. Less edema in upper legs   Right leg ruptured blister - does not appear infected    Neurological:      General:  No focal deficit present.      Mental Status: He is oriented to person, place, and time.             Significant Labs: All pertinent labs within the past 24 hours have been reviewed.    Significant Imaging: I have reviewed all pertinent imaging results/findings within the past 24 hours.

## 2024-05-13 NOTE — SUBJECTIVE & OBJECTIVE
"Interval History: Seen in the ICU, reports feeling "better" with SOB improving     Past Medical History:   Diagnosis Date    CAD (coronary artery disease)     Chronic combined systolic and diastolic CHF (congestive heart failure)     Chronic idiopathic thrombocytopenia     Disorder of kidney and ureter     History of heart attack     History of stroke     Hyperlipidemia     Hypertension        Past Surgical History:   Procedure Laterality Date    FACIAL RECONSTRUCTION SURGERY      PROSTATE SURGERY         Review of patient's allergies indicates:  No Known Allergies    Medications:  Continuous Infusions:   DOBUTamine IV infusion (non-titrating)  2.5 mcg/kg/min Intravenous Continuous 3.3 mL/hr at 05/13/24 0610 2.5 mcg/kg/min at 05/13/24 0610    furosemide (Lasix) 200 mg in sodium chloride 0.9% SolP 100 mL continuous infusion (conc: 2 mg/mL)  10 mg/hr Intravenous Continuous 5 mL/hr at 05/13/24 0836 10 mg/hr at 05/13/24 0836     Scheduled Meds:   allopurinoL  100 mg Oral Daily    aspirin  81 mg Oral Daily    atorvastatin  20 mg Oral Daily    calcitRIOL  0.25 mcg Oral Every Mon, Wed, Fri    cetirizine  10 mg Oral QHS    heparin (porcine)  5,000 Units Subcutaneous Q8H    mupirocin   Nasal BID    senna-docusate 8.6-50 mg  1 tablet Oral BID     PRN Meds:  Current Facility-Administered Medications:     acetaminophen, 650 mg, Oral, Q4H PRN    albuterol-ipratropium, 3 mL, Nebulization, Once PRN    HYDROcodone-acetaminophen, 1 tablet, Oral, Q4H PRN    melatonin, 6 mg, Oral, Nightly PRN    ondansetron, 4 mg, Oral, Q8H PRN    ondansetron, 4 mg, Intravenous, Q8H PRN    ondansetron, 4 mg, Intravenous, Q8H PRN    polyethylene glycol, 17 g, Oral, BID PRN    sodium chloride 0.9%, 10 mL, Intravenous, PRN    sodium chloride 0.9%, 10 mL, Intravenous, PRN    Family History       Problem Relation (Age of Onset)    Hypertension Mother    No Known Problems Father          Tobacco Use    Smoking status: Former     Current packs/day: 0.00     " Types: Cigarettes     Quit date: 10/5/2008     Years since quitting: 15.6    Smokeless tobacco: Never   Substance and Sexual Activity    Alcohol use: No     Alcohol/week: 0.0 standard drinks of alcohol    Drug use: No    Sexual activity: Never       Review of Systems   Constitutional:  Positive for activity change. Negative for fatigue.   Respiratory:  Positive for cough and shortness of breath.    Cardiovascular:  Positive for leg swelling.   Neurological:  Negative for weakness.     Objective:     Vital Signs (Most Recent):  Temp: 98 °F (36.7 °C) (05/13/24 0702)  Pulse: 108 (05/13/24 1000)  Resp: 12 (05/13/24 1000)  BP: (!) 103/55 (05/13/24 1000)  SpO2: 95 % (05/13/24 1000) Vital Signs (24h Range):  Temp:  [98 °F (36.7 °C)-98.8 °F (37.1 °C)] 98 °F (36.7 °C)  Pulse:  [] 108  Resp:  [10-44] 12  SpO2:  [87 %-98 %] 95 %  BP: ()/(49-72) 103/55     Weight: 88.2 kg (194 lb 7.1 oz)  Body mass index is 26.37 kg/m².       Physical Exam  Constitutional:       Appearance: He is ill-appearing (Chronically ill appearing).      Comments: Temporal lobe wasting    Cardiovascular:      Rate and Rhythm: Tachycardia present.   Pulmonary:      Effort: No respiratory distress.   Neurological:      Mental Status: He is alert and oriented to person, place, and time.      Comments: Frustrated             Review of Symptoms      Symptom Assessment (ESAS 0-10 Scale)  Pain:  0  Dyspnea:  0  Anorexia:  5  Fatigue:  5         Living Arrangements:  Lives with spouse    Psychosocial/Cultural:   See Palliative Psychosocial Note: Yes  - Lives with wife, Lauryn     **Primary  to Follow**  Palliative Care  Consult: No        Advance Care Planning   Advance Directives:     Decision Making:  Patient answered questions  Goals of Care: What is most important right now is to focus on spending time at home, remaining as independent as possible. Accordingly, we have decided that the best plan to meet the patient's  "goals includes continuing with treatment.         Significant Labs: All pertinent labs within the past 24 hours have been reviewed.  CBC:   Recent Labs   Lab 05/13/24 0408   WBC 8.63   HGB 12.8*   HCT 39.3*   MCV 85   PLT 79*     BMP:  Recent Labs   Lab 05/13/24 0408   GLU 86      K 4.4   CL 95   CO2 32*   BUN 46*   CREATININE 2.2*   CALCIUM 8.6*   MG 1.7     LFT:  Lab Results   Component Value Date    AST 20 05/10/2024    ALKPHOS 88 05/10/2024    BILITOT 1.1 (H) 05/10/2024     Albumin:   Albumin   Date Value Ref Range Status   05/13/2024 2.4 (L) 3.5 - 5.2 g/dL Final     Protein:   Total Protein   Date Value Ref Range Status   05/10/2024 6.8 6.0 - 8.4 g/dL Final     Lactic acid:   No results found for: "LACTATE"    Significant Imaging: I have reviewed all pertinent imaging results/findings within the past 24 hours.    "

## 2024-05-13 NOTE — ASSESSMENT & PLAN NOTE
Latest ECHO performed and demonstrates- Results for orders placed during the hospital encounter of 05/09/24    Echo    Interpretation Summary    Left Ventricle: The left ventricle is mildly dilated. Mildly increased wall thickness. Severe global hypokinesis present. There is severely reduced systolic function with a visually estimated ejection fraction of 15 - 20%. Global longitudinal strain is reduced; -4%. Grade II diastolic dysfunction. MV e' lateral velocity is 0.04 m/s.    Right Ventricle: Mild right ventricular enlargement. Wall thickness is normal. Right ventricle wall motion has global hypokinesis. Systolic function is severely reduced.    Left Atrium: Left atrium is moderately dilated.    Mitral Valve: There is mild regurgitation.    Tricuspid Valve: There is mild regurgitation.    Pulmonary Artery: The estimated pulmonary artery systolic pressure is 51 mmHg.    IVC/SVC: Intermediate venous pressure at 8 mmHg.    Recent Labs   Lab 05/09/24  1311   BNP 2,691*

## 2024-05-13 NOTE — PLAN OF CARE
Problem: Adult Inpatient Plan of Care  Goal: Plan of Care Review  Outcome: Progressing  Goal: Absence of Hospital-Acquired Illness or Injury  Outcome: Progressing  Goal: Optimal Comfort and Wellbeing  Outcome: Progressing  Goal: Readiness for Transition of Care  Outcome: Progressing     Problem: Wound  Goal: Optimal Coping  Outcome: Progressing  Goal: Optimal Functional Ability  Outcome: Progressing  Goal: Absence of Infection Signs and Symptoms  Outcome: Progressing  Goal: Improved Oral Intake  Outcome: Progressing  Goal: Optimal Pain Control and Function  Outcome: Progressing  Goal: Skin Health and Integrity  Outcome: Progressing  Goal: Optimal Wound Healing  Outcome: Progressing

## 2024-05-13 NOTE — PROGRESS NOTES
Cardiology Progress Note    5/13/2024  10:14 AM    Subjective/Interim:      Feels much better.  Diuresed well over weekend with dobutamine and lasix drip.  Anasarca is much improved.       Objective:   24-hour Vitals:  Temp:  [98 °F (36.7 °C)-98.8 °F (37.1 °C)] 98 °F (36.7 °C)  Pulse:  [] 108  Resp:  [10-44] 12  SpO2:  [87 %-98 %] 95 %  BP: ()/(49-72) 103/55    Physical Examination:  Vitals: BP (!) 103/55   Pulse 108   Temp 98 °F (36.7 °C) (Oral)   Resp 12   Ht 6' (1.829 m)   Wt 88.2 kg (194 lb 7.1 oz)   SpO2 95%   BMI 26.37 kg/m²     Physical Exam  Constitutional:       General: He is not in acute distress.  Neck:      Vascular: Hepatojugular reflux and JVD present.   Cardiovascular:      Rate and Rhythm: Normal rate and regular rhythm. Occasional Extrasystoles are present.     Heart sounds: S1 normal and S2 normal. Murmur heard.      Systolic murmur is present.      Gallop present. S3 sounds present. No S4 sounds.   Pulmonary:      Effort: Pulmonary effort is normal.      Breath sounds: Normal air entry. Examination of the right-lower field reveals decreased breath sounds. Examination of the left-lower field reveals decreased breath sounds. Decreased breath sounds present.   Abdominal:      General: Bowel sounds are normal.      Palpations: Abdomen is soft. There is hepatomegaly.      Tenderness: There is no abdominal tenderness.   Musculoskeletal:      Right lower leg: Edema present.      Left lower leg: Edema present.   Skin:     Coloration: Skin is not pale.   Neurological:      Mental Status: He is alert.   Psychiatric:         Behavior: Behavior is cooperative.           Intake/Output Summary (Last 24 hours) at 5/13/2024 1014  Last data filed at 5/13/2024 0903  Gross per 24 hour   Intake 460.26 ml   Output 2955 ml   Net -2494.74 ml       Laboratory:  Trended Lab Data:  Recent Labs   Lab 05/11/24  0507 05/12/24  0429 05/13/24  0408   WBC 5.75 8.48 8.63   HGB 12.9* 13.8* 12.8*   HCT 40.0  "43.5 39.3*   PLT 78* 84* 79*       Recent Labs   Lab 05/12/24  0429 05/12/24 2057 05/13/24 0408    137 136   K 4.1 4.3 4.4   CL 98 95 95   CO2 31* 30* 32*   BUN 43* 45* 46*   GLU 97 111* 86   CALCIUM 9.1 8.4* 8.6*   MG 2.0 1.8 1.7   PHOS 3.2 3.2 3.3       Recent Labs   Lab 05/09/24  1405 05/10/24  0923 05/10/24  1909 05/12/24 0429 05/12/24 2057 05/13/24 0408   PROT 6.9 6.8  --   --   --   --    ALBUMIN 3.0* 3.0*   < > 3.0* 2.4* 2.4*   BILITOT 1.1* 1.1*  --   --   --   --    AST 19 20  --   --   --   --    ALT 14 14  --   --   --   --    ALKPHOS 83 88  --   --   --   --     < > = values in this interval not displayed.       No results for input(s): "PROTIME", "PTT", "INR" in the last 168 hours.    Cardiac:   Recent Labs   Lab 05/09/24  1311 05/10/24  0923   TROPONINI 0.189* 0.165*   BNP 2,691*  --        FLP:   Lab Results   Component Value Date    CHOL 123 08/09/2022    HDL 35 (L) 08/09/2022    LDLCALC 78.2 08/09/2022    TRIG 49 08/09/2022    CHOLHDL 28.5 08/09/2022     DM:   Lab Results   Component Value Date    HGBA1C 5.8 (H) 05/10/2024    HGBA1C 5.7 (H) 08/28/2023    HGBA1C 5.7 (H) 08/09/2022    MICROALBUR 2.4 02/15/2016    LDLCALC 78.2 08/09/2022    CREATININE 2.2 (H) 05/13/2024     Thyroid: No results found for: "TSH", "FREET4", "O1HCOHW", "G7KOVLL", "THYROIDAB"  Anemia:   Lab Results   Component Value Date    IRON 57 02/01/2019    LIVHLABG55 1045 (H) 02/02/2024    FOLATE 9.3 02/02/2024     Urinalysis:   Lab Results   Component Value Date    COLORU Yellow 05/09/2024    SPECGRAV 1.010 05/09/2024    NITRITE Negative 05/09/2024    GLUCOSEU NEGATIVE 02/01/2019    KETONESU Negative 05/09/2024    UROBILINOGEN 2.0-3.0 (A) 05/09/2024    BILIRUBINUR NEGATIVE 02/01/2019     @      Other Results:  EKG (my interpretation):    TELEMETRY:  sinus    Echo:   Results for orders placed or performed during the hospital encounter of 05/09/24   Echo   Result Value Ref Range    BSA 2.09 m2    LVOT stroke volume 37.36 cm3 "    LVIDd 5.21 3.5 - 6.0 cm    LV Systolic Volume 102.42 mL    LV Systolic Volume Index 49.2 mL/m2    LVIDs 4.70 (A) 2.1 - 4.0 cm    LV Diastolic Volume 129.99 mL    LV Diastolic Volume Index 62.50 mL/m2    IVS 1.33 (A) 0.6 - 1.1 cm    LVOT diameter 2.25 cm    LVOT area 4.0 cm2    FS 10 (A) 28 - 44 %    Left Ventricle Relative Wall Thickness 0.55 cm    Posterior Wall 1.43 (A) 0.6 - 1.1 cm    LV mass 304.15 g    LV Mass Index 146 g/m2    MV Peak E Boby 0.63 m/s    TDI LATERAL 0.04 m/s    TDI SEPTAL 0.03 m/s    E/E' ratio 18.00 m/s    MV Peak A Boby 0.29 m/s    TR Max Boby 3.26 m/s    E/A ratio 2.17     E wave deceleration time 117.17 msec    LV SEPTAL E/E' RATIO 21.00 m/s    LV LATERAL E/E' RATIO 15.75 m/s    LVOT peak boby 0.57 m/s    Left Ventricular Outflow Tract Mean Velocity 0.37 cm/s    Left Ventricular Outflow Tract Mean Gradient 0.66 mmHg    RV S' 6.39 cm/s    LA size 3.44 cm    Left Atrium Minor Axis 6.32 cm    Left Atrium Major Axis 6.92 cm    LA volume (mod) 82.77 cm3    LA Volume Index (Mod) 39.8 mL/m2    RA Major Axis 5.51 cm    AV mean gradient 3 mmHg    AV peak gradient 4 mmHg    Ao peak boby 1.05 m/s    Ao VTI 17.20 cm    LVOT peak VTI 9.40 cm    AV valve area 2.17 cm²    AV Velocity Ratio 0.54     AV index (prosthetic) 0.55     MICHEL by Velocity Ratio 2.16 cm²    Mr max boby 4.54 m/s    MV stenosis pressure 1/2 time 33.98 ms    MV valve area p 1/2 method 6.47 cm2    TV mean gradient 27 mmHg    Triscuspid Valve Regurgitation Peak Gradient 43 mmHg    PV PEAK VELOCITY 0.31 m/s    PV peak gradient 0 mmHg    RVOT peak boby 0.26 m/s    STJ 2.61 cm    Ascending aorta 2.64 cm    IVC diameter 2.32 cm    Mean e' 0.04 m/s    ZLVIDS 1.48     ZLVIDD -2.01     LA Volume Index 41.8 mL/m2    LA volume 86.93 cm3    TAPSE 1.30 cm    LA WIDTH 4.5 cm    RA Width 4.0 cm    Sinus 3.0 cm    TV resting pulmonary artery pressure 51 mmHg    RV TB RVSP 11 mmHg    Est. RA pres 8 mmHg    Narrative      Left Ventricle: The left ventricle  is mildly dilated. Mildly increased   wall thickness. Severe global hypokinesis present. There is severely   reduced systolic function with a visually estimated ejection fraction of   15 - 20%. Global longitudinal strain is reduced; -4%. Grade II diastolic   dysfunction. MV e' lateral velocity is 0.04 m/s.    Right Ventricle: Mild right ventricular enlargement. Wall thickness is   normal. Right ventricle wall motion has global hypokinesis. Systolic   function is severely reduced.    Left Atrium: Left atrium is moderately dilated.    Mitral Valve: There is mild regurgitation.    Tricuspid Valve: There is mild regurgitation.    Pulmonary Artery: The estimated pulmonary artery systolic pressure is   51 mmHg.    IVC/SVC: Intermediate venous pressure at 8 mmHg.         Radiology:  US Retroperitoneal Complete    Result Date: 5/11/2024  EXAMINATION: US RETROPERITONEAL COMPLETE CLINICAL HISTORY: ARELI; TECHNIQUE: Ultrasound of the kidneys and urinary bladder was performed including color flow and Doppler evaluation of the kidneys. COMPARISON: None. FINDINGS: Right kidney: The right kidney measures 9.3 cm. No significant cortical thinning.  Increased cortical echogenicity.  Resistive index measures 0.81.  No renal stone. No hydronephrosis. Left kidney: The left kidney measures 8.0 cm. No significant cortical thinning.  Increased cortical echogenicity.  Resistive index measures 0.82.  No renal stone. No hydronephrosis. The bladder is partially distended at the time of scanning and has an unremarkable appearance.  Incidentally noted ascites in the right lower quadrant.     1. No evidence of hydronephrosis bilaterally. 2. Findings suggestive of underlying medical renal disease. 3. Incidentally noted right lower quadrant ascites. Electronically signed by: Kaelyn Damon MD Date:    05/11/2024 Time:    06:45    Echo    Result Date: 5/10/2024    Left Ventricle: The left ventricle is mildly dilated. Mildly increased wall thickness.  Severe global hypokinesis present. There is severely reduced systolic function with a visually estimated ejection fraction of 15 - 20%. Global longitudinal strain is reduced; -4%. Grade II diastolic dysfunction. MV e' lateral velocity is 0.04 m/s.   Right Ventricle: Mild right ventricular enlargement. Wall thickness is normal. Right ventricle wall motion has global hypokinesis. Systolic function is severely reduced.   Left Atrium: Left atrium is moderately dilated.   Mitral Valve: There is mild regurgitation.   Tricuspid Valve: There is mild regurgitation.   Pulmonary Artery: The estimated pulmonary artery systolic pressure is 51 mmHg.   IVC/SVC: Intermediate venous pressure at 8 mmHg.     X-Ray Chest AP Portable    Result Date: 5/9/2024  EXAMINATION: XR CHEST AP PORTABLE CLINICAL HISTORY: CHF; TECHNIQUE: Single frontal view of the chest was performed. COMPARISON: 02/01/2024 FINDINGS: The cardiomediastinal silhouette is prominent, similar to the previous exam noting calcification of the aorta..  There is obscuration of the costophrenic angles, left greater than right suggesting effusions..  The trachea is midline.  The lungs are symmetrically expanded bilaterally with coarse interstitial attenuation bilaterally.  There is increased parenchymal attenuation projected over the left lower lung zone, may reflect a combination of atelectasis and pleural fluid however developing consolidation not excluded..  There is no pneumothorax.  The osseous structures are remarkable for degenerative change and osteopenia..     1. Pulmonary findings suggest pulmonary edema with pleural effusions, developing left lower lung zone consolidation not excluded. Electronically signed by: Ej Goel MD Date:    05/09/2024 Time:    13:27        Current Medications:     Infusions:   furosemide (Lasix) 200 mg in sodium chloride 0.9% SolP 100 mL continuous infusion (conc: 2 mg/mL)  10 mg/hr Intravenous Continuous 5 mL/hr at 05/13/24 0836 10  mg/hr at 05/13/24 0836        Scheduled:   allopurinoL  100 mg Oral Daily    aspirin  81 mg Oral Daily    atorvastatin  20 mg Oral Daily    calcitRIOL  0.25 mcg Oral Every Mon, Wed, Fri    cetirizine  10 mg Oral QHS    heparin (porcine)  5,000 Units Subcutaneous Q8H    mupirocin   Nasal BID    senna-docusate 8.6-50 mg  1 tablet Oral BID        PRN:    Current Facility-Administered Medications:     acetaminophen, 650 mg, Oral, Q4H PRN    albuterol-ipratropium, 3 mL, Nebulization, Once PRN    HYDROcodone-acetaminophen, 1 tablet, Oral, Q4H PRN    melatonin, 6 mg, Oral, Nightly PRN    midodrine, 5 mg, Oral, TID PRN    ondansetron, 4 mg, Oral, Q8H PRN    ondansetron, 4 mg, Intravenous, Q8H PRN    ondansetron, 4 mg, Intravenous, Q8H PRN    polyethylene glycol, 17 g, Oral, BID PRN    sodium chloride 0.9%, 10 mL, Intravenous, PRN    sodium chloride 0.9%, 10 mL, Intravenous, PRN     Assessment and Plan:     Felicia Lopez is a 78 y.o.male with  Acute on chronic combined systolic and diastolic congestive heart failure with anasarca.  No significant response to IV lasix.  Likely has cardio-renal syndrome.  Echo with worsening biventricular dysfunction.  -discussed with patient and Dr. Montoya.  Transfer to ICU for dobutamine.  Consult renal for assistance.  Consult palliative care medicine.  -5/13:  responded well past 3 days with dobutamine and lasix drip.  Will stop dobutamine today.  Add prn midodrine for BP.  Continue lasix drip today?     CAD, stable, no angina  -on aspirin, statin and metoprolol     HTN, low BP  -hold home BP meds     CKD  -consult renal team     MARLENI     Chronic idiopathic thrombocytopenia  -as per  team         Fabio Martinez MD        Disclaimer: This document was created using voice recognition software (M*Modal Fluency Direct). Although it may be edited, this document may contain errors related to incorrect recognition of the spoken word. Please call the physician if clarification is needed.

## 2024-05-13 NOTE — PLAN OF CARE
Problem: Occupational Therapy  Goal: Occupational Therapy Goal  Description: Goals to be met by: 5/25/2024     Patient will increase functional independence with ADLs by performing:    UE Dressing with Supervision.  LE Dressing with Minimal Assistance.  Grooming while standing at sink with Contact Guard Assistance.  Toileting from toilet with Contact Guard Assistance for hygiene and clothing management.   Toilet transfer to toilet with Contact Guard Assistance.    Outcome: Progressing     OT evaluation complete and POC established.  Moderate intensity therapy is recommended upon d/c from acute care to further address deficits and help pt improve overall functional independence.     AUBREE Meyers  5/13/2024

## 2024-05-13 NOTE — PT/OT/SLP EVAL
Physical Therapy Evaluation and Treatment    Patient Name:  Felicia Lopez   MRN:  [id number]    Recommendations:     Discharge Recommendations: Moderate Intensity Therapy   Discharge Equipment Recommendations: to be determined by next level of care (potential need for WC if unable to walk at DC; other DME per OT)   Barriers to discharge: Inaccessible home and Decreased caregiver support    Assessment:     Felicia Lopez is a 78 y.o. male admitted with a medical diagnosis of Acute on chronic combined systolic and diastolic congestive heart failure (EF 15-20%). Pmhx pertinent for CAD, preDM, CVA (2015), CKD, prostate ca s/p TURP, gout.  He presents with the following impairments/functional limitations: weakness, impaired self care skills, impaired sensation, impaired functional mobility, gait instability, impaired balance, decreased coordination, decreased lower extremity function, pain, decreased ROM, edema, impaired cardiopulmonary response to activity.    Patient evaluated by PT and goals established. Patient with new onset L knee pain limiting his (I) with transfers and gait and only able to take steps along EOB despite use of RW. PT will continue to follow and progress as tolerated. Rec for dc to Moderate Intensity Therapy.      Rehab Prognosis: Good; patient would benefit from acute skilled PT services to address these deficits and reach maximum level of function.    Recent Surgery: * No surgery found *      Plan:     During this hospitalization, patient to be seen 5 x/week to address the identified rehab impairments via gait training, therapeutic activities, therapeutic exercises, neuromuscular re-education and progress toward the following goals:    Plan of Care Expires:  06/12/24    Subjective     Chief Complaint: L knee pain, weakness from being stuck in bed for 6 days  Patient/Family Comments/goals: Goal to be able to walk; Patient agreeable to evaluation.  Pain/Comfort:  Pain Rating 1: 0/10  Location - Side  "1: Left  Location 1: knee  Pain Addressed 1: Reposition, Distraction, Cessation of Activity  Pain Rating Post-Intervention 1:  (increased with standing and knee flexion)    Patients cultural, spiritual, Christian conflicts given the current situation: no    Living Environment:  Pt lives with his wife in a single story home with no steps to enter  Upon discharge, patient will have assistance from unknown - his wife has a caregiver who on previous admission pt reports "might" be able to help him as well.  Prior level of function:  Ambulation: Mod(I) with RW, prior to Feb was mod(I) with SPC  ADL's: Mod(I)  Falls: Denies  Equipment used at home: walker, rolling.     Objective:     Communicated with RAIN Bustillo prior to session.  Patient found HOB elevated with peripheral IV, telemetry, blood pressure cuff, pulse ox (continuous)  upon PT entry to room.    General Precautions: Standard, fall (strict I&O)  Orthopedic Precautions:N/A   Braces: N/A  Respiratory Status: Room air    Patient donned non slip socks and gait belt for OOB mobility.    Exams:  Cognition:   Patient is oriented x4.  Pt follows approximately 100% of one and two step commands.    Mood: Cooperative.  Safety Awareness: Impaired  Musculoskeletal:  BMI: 26.37  Posture:  Forward head, rounded shoulders, posterior pelvic tilt  LE ROM/Strength:   R ROM: WFL  L ROM: WFL, limited knee flexion (~100 deg) due to pain  R Strength:   Knee extension: 5/5  Dorsiflexion: 5/5   L Strength:   Knee extension: 3/5, p!  Dorsiflexion: 5/5   Neuromuscular:  Sensation: Intact to light touch bilateral LEs. Reports paresthesias to BLE  Coordination/Tone/Reflexes: No impairments identified with functional mobility. No formal testing performed.   Balance:   Static sitting: Good  Static standing: Fair-  Dynamic standing: Fair-  Visual-vestibular: No impairments identified with functional mobility. No formal testing performed.  Integument: Visible skin " intact  Cardiopulmonary:  Vital signs:   BP stable, -115 bpm  Edema: 2+ pitting edema BLE      Functional Mobility:  Bed Mobility:     Supine to Sit: contact guard assistance  Sit to Supine: minimum assistance and at BLE  Transfers:     Sit to Stand:  minimum assistance with rolling walker  Multiple attempts to come to standing  Cueing for hand placement  Increased knee pain with return to sitting 2/2 increased knee flexion of LLE  Gait: x1 ft laterally with RW and Cathy.   Decreased step length or foot clearance, minimal stance time tolerated on LLE 2/2 knee pain.   No overt LOB or difficulty with RW management noted.   Balance:  Sitting balance x8 min with supervision  Extended time in sitting for increased endurance and participation in self care tasks      AM-PAC 6 CLICK MOBILITY  Total Score:14       Treatment & Education:  Cueing for gait, use of RW, and importance of early mobility/WB    Patient left HOB elevated with all lines intact, call button in reach, and RN Iwona notified.    GOALS:   Multidisciplinary Problems       Physical Therapy Goals          Problem: Physical Therapy    Goal Priority Disciplines Outcome Goal Variances Interventions   Physical Therapy Goal     PT, PT/OT Progressing     Description: Goals to be met by: 24    Patient will increase functional independence with mobility by performin. Supine<>sit with supervision without use of HB features.   2. Sit<>stand with supervision with RW.  3. Gait x 25 feet with supervision with RW.  4. Standing balance x3 min with UE support and no increase in L knee pain and supervision.                       History:     Past Medical History:   Diagnosis Date    CAD (coronary artery disease)     Chronic combined systolic and diastolic CHF (congestive heart failure)     Chronic idiopathic thrombocytopenia     Disorder of kidney and ureter     History of heart attack     History of stroke     Hyperlipidemia     Hypertension        Past  Surgical History:   Procedure Laterality Date    FACIAL RECONSTRUCTION SURGERY      PROSTATE SURGERY         Time Tracking:     PT Received On: 05/13/24  PT Start Time: 1231     PT Stop Time: 1258  PT Total Time (min): 27 min     Overlap with OT for portions of session due to complex nature of patient, tolerance for therapeutic modalities, and safety with mobility to decrease fall risk for patient and caregiver injury requiring two skilled therapists to provide interventions.    Billable Minutes: Evaluation 15 and Gait Training 8      05/13/2024

## 2024-05-13 NOTE — PT/OT/SLP EVAL
Occupational Therapy   Evaluation    Name: Felicia Lopez  MRN: [id number]  Admitting Diagnosis: Acute on chronic combined systolic and diastolic congestive heart failure  Recent Surgery: * No surgery found *      Recommendations:     Discharge Recommendations: Moderate Intensity Therapy  Discharge Equipment Recommendations:  to be determined by next level of care, bath bench (potential need for WC if unable to walk at DC)  Barriers to discharge:  Decreased caregiver support    Assessment:     Felicia Lopez is a 78 y.o. male with a medical diagnosis of Acute on chronic combined systolic and diastolic congestive heart failure.  He presents with edema in (B) LE. Performance deficits affecting function: weakness, impaired endurance, impaired self care skills, impaired functional mobility, gait instability, impaired balance, decreased coordination, decreased lower extremity function, decreased upper extremity function, decreased ROM, impaired cardiopulmonary response to activity, impaired sensation, edema.  Pt agreeable to participating in therapy upon arrival to room.  Pt demonstrates strength and ROM in (B) UE needed for ADLs that is WNL.  While supine with HOB elevated pt required Total A For donning socks, but states at home he can perform activity independently.  Pt required Total A for cleaning back with SBA required to clean front side while seated at EOB.    Overall, pt tolerated therapy well.  PTA pt reports being (I) with ADLs, and was Mod I for mobility with RW.  Pt would benefit from skilled OT services to address problems listed above and increase independence with ADLs.  Moderate intensity therapy is recommended upon d/c from acute care to further address deficits and help pt improve overall functional independence.       Rehab Prognosis: Good; patient would benefit from acute skilled OT services to address these deficits and reach maximum level of function.       Plan:     Patient to be seen 5 x/week to  address the above listed problems via self-care/home management, therapeutic activities, therapeutic exercises, neuromuscular re-education  Plan of Care Expires:    Plan of Care Reviewed with: patient    Subjective     *Pt said it felt nice to sit up on side of bed, but declined sitting up in chair at end of session.    Chief Complaint: edema in (B) LE, being in bed for last 5 days  Patient/Family Comments/goals: increase strength    Occupational Profile:  Living Environment: Pt lives with wife in [facility], [address].  Bathroom has tub/shower.  Previous level of function:   -ADLs: Independent  -Mobility: Mod I with RW  Roles and Routines: , denies having particular hobbies  Equipment Used at Home: walker, rolling  Assistance upon Discharge: Wife has caregiver present at times; no consistent assist reported for pt    Pain/Comfort:  Pain Rating 1: increased pain in L knee; did not rate  Pain Rating Post-Intervention 1: L knee (increased in standing with knee flexion)    Patients cultural, spiritual, [employer]ian conflicts given the current situation: no    Objective:     Communicated with: RN (Iwona) prior to session.  Patient found HOB elevated with peripheral IV, telemetry, blood pressure cuff, pulse ox (continuous) upon OT entry to room.    General Precautions: Standard, fall  Orthopedic Precautions: N/A  Braces: N/A  Respiratory Status: Room air    Occupational Performance:    Bed Mobility:    Supine <> sit: CGA  Sit <> supine: Min A for (B) LE management    Functional Mobility/Transfers:  Sit <> stand: Min A, RW x 1 trial from EOB  Cues for technique provided  Pt stood for ~1.5 minutes  Functional Mobility: Pt took 3 side steps to right towards HOB with Min A, RW  Decreased postural stability and weakness noted  No overt LOB observed    Activities of Daily Living:  Bathing: Total A A for cleaning back; SBA for cleaning front while seated at EOB.  Upper Body Dressing: Min A for doffing/donning gown while seated at  "EOB 2* multiple IV lines present.  Lower Body Dressing: Total A for donning socks while supine with HOB elevated.  Grooming: Set up for rinsing mouth with mouth wash while seated at EOB.    Cognitive/Visual Perceptual:  Cognitive/Psychosocial Skills:    -       Oriented to: Person, Place, Time, and Situation   -       Follows Commands/attention: Follows 100% of one step commands  -       Communication: clear/fluent  -       Memory: No Deficits noted  -       Safety awareness/insight to disability: intact   -       Mood/Affect/Coping skills/emotional control: Cooperative, appeared somewhat frustrated/annoyed at times with questions but participated fully.  At end requested to apologize to the nurse for being "asinine" earlier    Physical Exam:  Postural examination/scapula alignment:   -       Rounded shoulders  -       Forward head  Skin integrity: Visible skin intact  Edema:  Mild in (B) LE; L appears to have more  Sensation: Intact for (B) UE; impaired for both feet  Motor Planning: WNL  Dominant hand: both  Upper Extremity Range of Motion:    -       Right Upper Extremity: WNL  -       Left Upper Extremity: WNL  Upper Extremity Strength:   -       Right Upper Extremity: WNL; grossly 4+/5 all muscle groups  -       Left Upper Extremity: WNL; grossly 4+/5 all muscle groups   Strength: WNL  Fine Motor Coordination: Intact  Gross motor coordination: WFL  Balance:     AMPAC 6 Click ADL:  AMPAC Total Score: 17    Treatment & Education:  *Session focused on promoting increased endurance, strength, posture, balance, coordination, and ROM needed for ADLs and functional transfers as part of daily routine.   -pt educated on role of OT in acute care setting  -pt performed sit <> stand transfer and stood for ~1.5 minutes; cues for technique provided  -pt performed bed bath while seated at EOB  -pt took 3 side steps to right toward HOB; cues for technique provided  -POC reviewed with pt      Patient left HOB elevated with " all lines intact, call button in reach, and RN (Iwona) notified  *Pt requested to return to bed    GOALS:   Multidisciplinary Problems       Occupational Therapy Goals          Problem: Occupational Therapy    Goal Priority Disciplines Outcome Interventions   Occupational Therapy Goal     OT, PT/OT Progressing    Description: Goals to be met by: 5/25/2024     Patient will increase functional independence with ADLs by performing:    UE Dressing with Supervision.  LE Dressing with Minimal Assistance.  Grooming while standing at sink with Contact Guard Assistance.  Toileting from toilet with Contact Guard Assistance for hygiene and clothing management.   Toilet transfer to toilet with Contact Guard Assistance.                         History:     Past Medical History:   Diagnosis Date    CAD (coronary artery disease)     Chronic combined systolic and diastolic CHF (congestive heart failure)     Chronic idiopathic thrombocytopenia     Disorder of kidney and ureter     History of heart attack     History of stroke     Hyperlipidemia     Hypertension          Past Surgical History:   Procedure Laterality Date    FACIAL RECONSTRUCTION SURGERY      PROSTATE SURGERY         Time Tracking:     OT Date of Treatment: 05/13/24  OT Start Time: 1231  OT Stop Time: 1256  OT Total Time (min): 25 min    Billable Minutes:Evaluation 12  Self Care/Home Management 13    *co-tx with PT 2* complexity of pt    AUBREE Meyers  5/13/2024

## 2024-05-14 LAB
ALBUMIN SERPL BCP-MCNC: 2.6 G/DL (ref 3.5–5.2)
ALBUMIN SERPL BCP-MCNC: 2.6 G/DL (ref 3.5–5.2)
ANION GAP SERPL CALC-SCNC: 14 MMOL/L (ref 8–16)
ANION GAP SERPL CALC-SCNC: 9 MMOL/L (ref 8–16)
BUN SERPL-MCNC: 45 MG/DL (ref 8–23)
BUN SERPL-MCNC: 47 MG/DL (ref 8–23)
CALCIUM SERPL-MCNC: 9 MG/DL (ref 8.7–10.5)
CALCIUM SERPL-MCNC: 9.1 MG/DL (ref 8.7–10.5)
CHLORIDE SERPL-SCNC: 89 MMOL/L (ref 95–110)
CHLORIDE SERPL-SCNC: 92 MMOL/L (ref 95–110)
CO2 SERPL-SCNC: 31 MMOL/L (ref 23–29)
CO2 SERPL-SCNC: 34 MMOL/L (ref 23–29)
CREAT SERPL-MCNC: 2 MG/DL (ref 0.5–1.4)
CREAT SERPL-MCNC: 2 MG/DL (ref 0.5–1.4)
ERYTHROCYTE [DISTWIDTH] IN BLOOD BY AUTOMATED COUNT: 20.3 % (ref 11.5–14.5)
EST. GFR  (NO RACE VARIABLE): 33 ML/MIN/1.73 M^2
EST. GFR  (NO RACE VARIABLE): 33 ML/MIN/1.73 M^2
GLUCOSE SERPL-MCNC: 107 MG/DL (ref 70–110)
GLUCOSE SERPL-MCNC: 93 MG/DL (ref 70–110)
HCT VFR BLD AUTO: 42.8 % (ref 40–54)
HGB BLD-MCNC: 14 G/DL (ref 14–18)
MAGNESIUM SERPL-MCNC: 2 MG/DL (ref 1.6–2.6)
MAGNESIUM SERPL-MCNC: 2.1 MG/DL (ref 1.6–2.6)
MCH RBC QN AUTO: 28 PG (ref 27–31)
MCHC RBC AUTO-ENTMCNC: 32.7 G/DL (ref 32–36)
MCV RBC AUTO: 86 FL (ref 82–98)
PHOSPHATE SERPL-MCNC: 3.8 MG/DL (ref 2.7–4.5)
PHOSPHATE SERPL-MCNC: 4.1 MG/DL (ref 2.7–4.5)
PLATELET # BLD AUTO: 93 K/UL (ref 150–450)
PMV BLD AUTO: ABNORMAL FL (ref 9.2–12.9)
POTASSIUM SERPL-SCNC: 4.2 MMOL/L (ref 3.5–5.1)
POTASSIUM SERPL-SCNC: 4.4 MMOL/L (ref 3.5–5.1)
RBC # BLD AUTO: 5 M/UL (ref 4.6–6.2)
SODIUM SERPL-SCNC: 134 MMOL/L (ref 136–145)
SODIUM SERPL-SCNC: 135 MMOL/L (ref 136–145)
WBC # BLD AUTO: 9.46 K/UL (ref 3.9–12.7)

## 2024-05-14 PROCEDURE — 94761 N-INVAS EAR/PLS OXIMETRY MLT: CPT

## 2024-05-14 PROCEDURE — 80069 RENAL FUNCTION PANEL: CPT | Performed by: STUDENT IN AN ORGANIZED HEALTH CARE EDUCATION/TRAINING PROGRAM

## 2024-05-14 PROCEDURE — 63600175 PHARM REV CODE 636 W HCPCS: Performed by: NURSE PRACTITIONER

## 2024-05-14 PROCEDURE — 36415 COLL VENOUS BLD VENIPUNCTURE: CPT | Performed by: STUDENT IN AN ORGANIZED HEALTH CARE EDUCATION/TRAINING PROGRAM

## 2024-05-14 PROCEDURE — 97530 THERAPEUTIC ACTIVITIES: CPT

## 2024-05-14 PROCEDURE — 21400001 HC TELEMETRY ROOM

## 2024-05-14 PROCEDURE — 25000003 PHARM REV CODE 250: Performed by: NURSE PRACTITIONER

## 2024-05-14 PROCEDURE — 99900035 HC TECH TIME PER 15 MIN (STAT)

## 2024-05-14 PROCEDURE — 27000221 HC OXYGEN, UP TO 24 HOURS

## 2024-05-14 PROCEDURE — 99232 SBSQ HOSP IP/OBS MODERATE 35: CPT | Mod: ,,, | Performed by: INTERNAL MEDICINE

## 2024-05-14 PROCEDURE — 83735 ASSAY OF MAGNESIUM: CPT | Performed by: STUDENT IN AN ORGANIZED HEALTH CARE EDUCATION/TRAINING PROGRAM

## 2024-05-14 PROCEDURE — 85027 COMPLETE CBC AUTOMATED: CPT | Performed by: STUDENT IN AN ORGANIZED HEALTH CARE EDUCATION/TRAINING PROGRAM

## 2024-05-14 RX ADMIN — MUPIROCIN: 20 OINTMENT TOPICAL at 08:05

## 2024-05-14 RX ADMIN — ALLOPURINOL 100 MG: 100 TABLET ORAL at 08:05

## 2024-05-14 RX ADMIN — ATORVASTATIN CALCIUM 20 MG: 20 TABLET, FILM COATED ORAL at 08:05

## 2024-05-14 RX ADMIN — FUROSEMIDE 5 MG/HR: 10 INJECTION, SOLUTION INTRAMUSCULAR; INTRAVENOUS at 04:05

## 2024-05-14 RX ADMIN — HEPARIN SODIUM 5000 UNITS: 5000 INJECTION INTRAVENOUS; SUBCUTANEOUS at 09:05

## 2024-05-14 RX ADMIN — CETIRIZINE HYDROCHLORIDE 10 MG: 10 TABLET, FILM COATED ORAL at 09:05

## 2024-05-14 RX ADMIN — HEPARIN SODIUM 5000 UNITS: 5000 INJECTION INTRAVENOUS; SUBCUTANEOUS at 02:05

## 2024-05-14 RX ADMIN — MUPIROCIN: 20 OINTMENT TOPICAL at 09:05

## 2024-05-14 RX ADMIN — HEPARIN SODIUM 5000 UNITS: 5000 INJECTION INTRAVENOUS; SUBCUTANEOUS at 05:05

## 2024-05-14 RX ADMIN — ASPIRIN 81 MG: 81 TABLET, COATED ORAL at 08:05

## 2024-05-14 NOTE — PROGRESS NOTES
[facility] - Med Surg ([address])  Wound Care    Patient Name:  Felicia Lopez   MRN:  [id number]  Date: 5/14/2024  Diagnosis: Acute on chronic combined systolic and diastolic congestive heart failure    History:     Past Medical History:   Diagnosis Date    CAD (coronary artery disease)     Chronic combined systolic and diastolic CHF (congestive heart failure)     Chronic idiopathic thrombocytopenia     Disorder of kidney and ureter     History of heart attack     History of stroke     Hyperlipidemia     Hypertension        Social History     Socioeconomic History    Marital status:    Tobacco Use    Smoking status: Former     Current packs/day: 0.00     Types: Cigarettes     Quit date: 10/5/2008     Years since quitting: 15.6    Smokeless tobacco: Never   Substance and Sexual Activity    Alcohol use: No     Alcohol/week: 0.0 standard drinks of alcohol    Drug use: No    Sexual activity: Never     Social Determinants of Health     Financial Resource Strain: Patient Declined (5/11/2024)    Overall Financial Resource Strain (CARDIA)     Difficulty of Paying Living Expenses: Patient declined   Food Insecurity: Patient Declined (5/11/2024)    Hunger Vital Sign     Worried About Running Out of Food in the Last Year: Patient declined     Ran Out of Food in the Last Year: Patient declined   Transportation Needs: Patient Declined (5/11/2024)    TRANSPORTATION NEEDS     Transportation : Patient declined   Physical Activity: Patient Declined (5/11/2024)    Exercise Vital Sign     Days of Exercise per Week: Patient declined     Minutes of Exercise per Session: Patient declined   Stress: Patient Declined (5/11/2024)    [employer] - Occupational Stress Questionnaire     Feeling of Stress : Patient declined       Precautions:     Allergies as of 05/09/2024    (No Known Allergies)       WOC Assessment Details/Treatment     Wound care follow up:     Patient stated wound care completed early this  morning. Dressing was rolling up. Redness to wound edges and triad paste to wound bed. Placed new foam dressing. Will continue to follow.               05/14/2024

## 2024-05-14 NOTE — SUBJECTIVE & OBJECTIVE
Interval History: Assumed care of patient today.  He has no new complaints.  Tolerating Lasix drip, uses the urinal.  Says his lower extremities are not swollen (although they are).    Review of Systems   Constitutional:  Positive for fatigue. Negative for chills and fever.   Respiratory:  Positive for shortness of breath. Negative for cough.    Cardiovascular:  Negative for chest pain and palpitations.     Objective:     Vital Signs (Most Recent):  Temp: 98.2 °F (36.8 °C) (05/14/24 1554)  Pulse: (!) 113 (05/14/24 1554)  Resp: 18 (05/14/24 1554)  BP: 113/63 (05/14/24 1554)  SpO2: 97 % (05/14/24 1554) Vital Signs (24h Range):  Temp:  [97.6 °F (36.4 °C)-98.6 °F (37 °C)] 98.2 °F (36.8 °C)  Pulse:  [] 113  Resp:  [14-18] 18  SpO2:  [93 %-98 %] 97 %  BP: ()/(58-72) 113/63     Weight: 78.2 kg (172 lb 6.4 oz)  Body mass index is 23.38 kg/m².    Intake/Output Summary (Last 24 hours) at 5/14/2024 1658  Last data filed at 5/14/2024 0759  Gross per 24 hour   Intake 675.34 ml   Output 2950 ml   Net -2274.66 ml         Physical Exam  Cardiovascular:      Rate and Rhythm: Regular rhythm. Tachycardia present.      Pulses: Normal pulses.      Heart sounds: Murmur heard.      Gallop present.   Pulmonary:      Effort: Pulmonary effort is normal.      Breath sounds: Normal breath sounds.   Abdominal:      General: Bowel sounds are normal.      Palpations: Abdomen is soft.   Musculoskeletal:      Right lower leg: Edema present.      Left lower leg: Edema present.      Comments: Bilateral pitting edema   Skin:     General: Skin is warm and dry.   Neurological:      General: No focal deficit present.      Mental Status: He is alert and oriented to person, place, and time.             Significant Labs: All pertinent labs within the past 24 hours have been reviewed.    Significant Imaging: I have reviewed all pertinent imaging results/findings within the past 24 hours.

## 2024-05-14 NOTE — PROGRESS NOTES
[facility]urg ([address] Medicine  Progress Note    Patient Name: Felicia Lopez  MRN: [id number]  Patient Class: IP- Inpatient   Admission Date: 5/9/2024  Length of Stay: 5 days  Attending Physician: Miriam Field MD  Primary Care Provider: Mickey Darden MD        Subjective:     Principal Problem:Acute on chronic combined systolic and diastolic congestive heart failure        HPI:  HPI by Roselia William NP:  Mr. Lopez is a 78 YOM with PMHx of CAD with history of MI s/p PCI (RCA x 2 JOSE C 2010), combined systolic and diastolic CHF, HTN, HLD, history of CVA (2015), preDM, CKD III (baseline SCr 2.1-2.3, follows with Dr. Matson), secondary hyperparathyroidism, MARLENI, chronic idiopathic thrombocytopenia, and history of prostate CA s/p TURP.     He presents to ED from Cardiology clinic due to worsening edema in thighs, legs, and scrotum with associated HARTMAN, orthopnea, PND, and decreased UOP despite taking home PO lasix daily. Symptoms have progressively worsened over the last couple of weeks. He notes some noncompliance with low sodium diet including take out pizza and [employer] meals recently. He denies fever, chills, recent illness, CP, abdominal pain, N/V/D, constipation, hematuria, changes in bowel habits or blood in stool, decreased appetite, changes in PO intake, light headiness, dizziness, seizures, or syncope. He lives with spouse, uses a walker at baseline, and is independent in ADLs.     In the ED he is HDS and afebrile. CBC with WBC 5, H/H 15.6/49.2, platelets 92 (baseline 110-133). Chemistry with , K 4.2, chloride 105, CO2 24, BUN 40, SCr 2.6 (baseline 2.1-2.3), glucose 79. LFTs WNL. BNP 2691. Troponin 0.189 (similar to priors). Hep C and HIV non reactive. UA noninfectious. CXR with pulmonary edema and effusions. EKG with SR, PACs, and T wave abnormalities similar to priors.     The patient was placed in observation to the Hospital Medicine Service for further evaluation and  treatment.     Overview/Hospital Course:  Patient had minimal urine output with furosemide and echo showed worsening systolic function to 15-20%.  Cardiology recommended transfer to ICU and initiation of dobutamine drip 5/10.  Nephrology was consulted for potential cardiorenal syndrome.  He was continued on a furosemide drip and urine output improved.  Electrolytes monitored.    Interval History: Assumed care of patient today.  He has no new complaints.  Tolerating Lasix drip, uses the urinal.  Says his lower extremities are not swollen (although they are).    Review of Systems   Constitutional:  Positive for fatigue. Negative for chills and fever.   Respiratory:  Positive for shortness of breath. Negative for cough.    Cardiovascular:  Negative for chest pain and palpitations.     Objective:     Vital Signs (Most Recent):  Temp: 98.2 °F (36.8 °C) (05/14/24 1554)  Pulse: (!) 113 (05/14/24 1554)  Resp: 18 (05/14/24 1554)  BP: 113/63 (05/14/24 1554)  SpO2: 97 % (05/14/24 1554) Vital Signs (24h Range):  Temp:  [97.6 °F (36.4 °C)-98.6 °F (37 °C)] 98.2 °F (36.8 °C)  Pulse:  [] 113  Resp:  [14-18] 18  SpO2:  [93 %-98 %] 97 %  BP: ()/(58-72) 113/63     Weight: 78.2 kg (172 lb 6.4 oz)  Body mass index is 23.38 kg/m².    Intake/Output Summary (Last 24 hours) at 5/14/2024 1658  Last data filed at 5/14/2024 0759  Gross per 24 hour   Intake 675.34 ml   Output 2950 ml   Net -2274.66 ml         Physical Exam  Cardiovascular:      Rate and Rhythm: Regular rhythm. Tachycardia present.      Pulses: Normal pulses.      Heart sounds: Murmur heard.      Gallop present.   Pulmonary:      Effort: Pulmonary effort is normal.      Breath sounds: Normal breath sounds.   Abdominal:      General: Bowel sounds are normal.      Palpations: Abdomen is soft.   Musculoskeletal:      Right lower leg: Edema present.      Left lower leg: Edema present.      Comments: Bilateral pitting edema   Skin:     General: Skin is warm and dry.    Neurological:      General: No focal deficit present.      Mental Status: He is alert and oriented to person, place, and time.             Significant Labs: All pertinent labs within the past 24 hours have been reviewed.    Significant Imaging: I have reviewed all pertinent imaging results/findings within the past 24 hours.    Assessment/Plan:      * Acute on chronic combined systolic and diastolic congestive heart failure  Anasarca  Cardiorenal syndrome    - Acute heart failure exacerbation in setting of dietary and medication noncompliance with associated ARELI on CKD and chronic demand ischemia   - At admission HDS and afebrile  - Admission BNP 2691, troponin 0.189 (at baseline), CXR with pulmonary edema and effusions, EKG with SR, PVCs and TWA similar to prior studies   - Recent TTE reviewed noting EF 35-40% with regional WMAs, LV dilation, diastolic dysfunction, mild AVS, mild MR    Current echo - with worsening systolic function 15-20%     Plan -   -elevate LEs and scrotum as tolerated  -Wound Care consulted due to altered skin integrity with blister rupture and weeping edema  -cardiac diet with fluid restriction  -strict I&Os and daily weights  -trend labs, address/replete electrolytes as indicated  -cardiology consult - recommending transfer to ICU and dobutamine drip - transferred 5/10   -hold home metoprolol while on dobutamine drip  - nephrology consulted  - pulm crit followed while patient was in ICU, OK to sign off   - 5/10 started lasix drip with good urine output     Encounter for palliative care        Gout  - continue home allopurinol      Secondary hyperparathyroidism        Anasarca        History of percutaneous coronary intervention        History of CVA (cerebrovascular accident)        CAD (coronary artery disease)  History of PCI  History of CVA  Hyperlipidemia   History of remote MI with PCI in 2010 and CVA in 2015  Chronic demand ischemia noted with elevated troponins  Admission troponin at  baseline and on acute dynamic EKG changes    Plan -   -continue home metoprolol as detailed above  -continue home ASA  -continue home statin per pharmacy formulary alternative  -dose/medication adjustment as appropriate   -continue tele monitoring  -EKG PRN concerns  -trend labs, address/replete electrolytes as indicated    Iron deficiency anemia  Chronic idiopathic thrombocytopenia  Chronic MARLENI and thrombocytopenia  Thrombocytopenia etiology unclear.  B12 and folate within normal limits and HIV negative.  No overt bleeding    Plan -   -trend labs over course, address/transfuse as indicated  -hold DVT prophylaxis if platelets drop less than 50          Hyperlipidemia  - continue simvastatin alternative      Chronic kidney disease, stage III (moderate)  ARELI on CKD - improving with diuresis   Secondary hyperparathyroidism   Chronic CKD with admission SCr 2.6 -->2.8->2.7  Baseline 2.1-2.3  Likely cardiorenal syndrome in setting of CHF exacerbation    Plan -   - cont lasix drip with good urine output (aim for 100ml/hr)   - nephrology consult  - continue home Calcitrol MWF  - trend labs, address/replete electrolytes as indicated  - avoid nephrotoxins and hypotension as able, renally dose medications    Essential hypertension  Plan -   -hold home metoprolol while on dobutamine          VTE Risk Mitigation (From admission, onward)           Ordered     heparin (porcine) injection 5,000 Units  Every 8 hours         05/09/24 1447     IP VTE HIGH RISK PATIENT  Once         05/09/24 1447     Place sequential compression device  Until discontinued         05/09/24 1447                    Discharge Planning   SHANICE:      Code Status: Full Code   Is the patient medically ready for discharge?:     Reason for patient still in hospital (select all that apply): Patient unstable, Patient trending condition, Laboratory test, Treatment, and Consult recommendations  Discharge Plan A: Home with family                  Miriam Rod,  MD  Department of Hospital Medicine   [facility] - Med Surg ([address])

## 2024-05-14 NOTE — ASSESSMENT & PLAN NOTE
Anasarca  Cardiorenal syndrome    - Acute heart failure exacerbation in setting of dietary and medication noncompliance with associated ARELI on CKD and chronic demand ischemia   - At admission HDS and afebrile  - Admission BNP 2691, troponin 0.189 (at baseline), CXR with pulmonary edema and effusions, EKG with SR, PVCs and TWA similar to prior studies   - Recent TTE reviewed noting EF 35-40% with regional WMAs, LV dilation, diastolic dysfunction, mild AVS, mild MR    Current echo - with worsening systolic function 15-20%     Plan -   -elevate LEs and scrotum as tolerated  -Wound Care consulted due to altered skin integrity with blister rupture and weeping edema  -cardiac diet with fluid restriction  -strict I&Os and daily weights  -trend labs, address/replete electrolytes as indicated  -cardiology consult - recommending transfer to ICU and dobutamine drip - transferred 5/10   -hold home metoprolol while on dobutamine drip  - nephrology consulted  - pulm crit followed while patient was in ICU, OK to sign off   - 5/10 started lasix drip with good urine output

## 2024-05-14 NOTE — PT/OT/SLP PROGRESS
Occupational Therapy   Treatment    Name: Felicia Lopez  MRN: [id number]  Admitting Diagnosis:  Acute on chronic combined systolic and diastolic congestive heart failure       Recommendations:     Discharge Recommendations: Moderate Intensity Therapy  Discharge Equipment Recommendations:  bath bench, wheelchair (if pt chooses to d/c home then bath bench and wheelchair are recommended)  Barriers to discharge:  Decreased caregiver support    Assessment:     Felicia Lopez is a 78 y.o. male with a medical diagnosis of Acute on chronic combined systolic and diastolic congestive heart failure.  He presents with pain in L knee. Performance deficits affecting function are weakness, impaired endurance, impaired self care skills, impaired functional mobility, gait instability, impaired balance, decreased coordination, decreased lower extremity function, decreased upper extremity function, decreased ROM, impaired cardiopulmonary response to activity, impaired sensation, edema.  With encouragement pt agreeable to participating in therapy upon arrival to room.  Pt required CGA to perform supine <> sit transfer.  Min A, RW required to perform sit <> stand transfer and take side steps.  Pt declined further activity 2* pain in L knee.    Pt somewhat self limiting during session requiring increased encouragement.  Pt requires significant assist for many ADLs and functional mobility at this time, and is at high risk for falling and readmission.  Pt is unsafe to return home and resume regular routine at this time. Pt would continue to benefit from skilled OT services to address problems listed above and increase independence with ADLs.  Pt is adamant about returning home.  Education provided regarding benefits of post acute therapy services; however, reinforcement needed.  Moderate intensity therapy is recommended upon d/c from acute care to further address deficits and help pt improve overall functional independence.         Rehab  "Prognosis:  Good; patient would benefit from acute skilled OT services to address these deficits and reach maximum level of function.       Plan:     Patient to be seen 5 x/week to address the above listed problems via self-care/home management, therapeutic activities, therapeutic exercises  Plan of Care Expires:    Plan of Care Reviewed with: patient    Subjective     OT asked pt if he would like to sit up in chair at end of session.  Pt stated, "You keep wanting me to do more and more.  You said we were just going to stand and now this."  OT explained benefits of sitting up in chair and reasoning behind suggesting it. Pt verbalized understanding, but requested to return to bed at end of session.    Chief Complaint: pain in L knee  Patient/Family Comments/goals: return home  Pain/Comfort:  Pain Rating 1:  (pt reported incresed pain in left knee; did not rate)  Pain Rating Post-Intervention 1:  (pain increased with standing; L knee)  Pain Addressed 2: Cessation of Activity, Reposition    Objective:     Communicated with:  Patient found HOB elevated with peripheral IV , bed alarm upon OT entry to room.    General Precautions: Standard, fall    Orthopedic Precautions:N/A  Braces: N/A  Respiratory Status: Nasal cannula, flow 1 L/min     Occupational Performance:     Bed Mobility:    Supine <> sit: CGA   going towards right side per pt request  Scooting: SBA seated towards EOB  Sit <> supine: Min A for LLE management     Functional Mobility/Transfers:  Sit <> stand: Min A, RW x 1 trial from EOB  Cues for technique provided  Functional Mobility: Pt took 3 side steps to right towards HOB; cues for RW management provided  Pt displayed difficulty placing weight through LLE 2* pain  Difficulty weight shifting noted; decreased postural stability observed  Pt declined further mobility after taking side steps    Activities of Daily Living:  Pt declined ADLs      [facility] 6 Click ADL: 18    Treatment & Education:  *Pt educated on " role of OT in acute care setting and benefits of participating in post acute therapy services  *Pt performed supine <> sit transfer; increased time required  *Pt performed sit <> stand transfer x 1 trial from EOB; cues for technique provided  *Pt took side steps to right towards HOB; cues for technique provided  *POC reviewed with pt    Patient left HOB elevated with all lines intact, call button in reach, and bed alarm on  *Pt declined sitting up in chair at end of session    GOALS:   Multidisciplinary Problems       Occupational Therapy Goals          Problem: Occupational Therapy    Goal Priority Disciplines Outcome Interventions   Occupational Therapy Goal     OT, PT/OT Progressing    Description: Goals to be met by: 5/25/2024     Patient will increase functional independence with ADLs by performing:    UE Dressing with Supervision.  LE Dressing with Minimal Assistance.  Grooming while standing at sink with Contact Guard Assistance.  Toileting from toilet with Contact Guard Assistance for hygiene and clothing management.   Toilet transfer to toilet with Contact Guard Assistance.                         Time Tracking:     OT Date of Treatment: 05/14/24  OT Start Time: 1139  OT Stop Time: 1154  OT Total Time (min): 15 min    Billable Minutes:Therapeutic Activity 15    OT/NADEEM: OT        AUBREE Meyers  5/14/2024

## 2024-05-14 NOTE — PLAN OF CARE
Problem: Adult Inpatient Plan of Care  Goal: Plan of Care Review  Outcome: Progressing  Goal: Patient-Specific Goal (Individualized)  Outcome: Progressing  Goal: Absence of Hospital-Acquired Illness or Injury  Outcome: Progressing  Goal: Optimal Comfort and Wellbeing  Outcome: Progressing  Goal: Readiness for Transition of Care  Outcome: Progressing     Problem: Wound  Goal: Optimal Coping  Outcome: Progressing  Goal: Optimal Functional Ability  Outcome: Progressing  Goal: Absence of Infection Signs and Symptoms  Outcome: Progressing  Goal: Improved Oral Intake  Outcome: Progressing  Goal: Optimal Pain Control and Function  Outcome: Progressing  Goal: Skin Health and Integrity  Outcome: Progressing  Goal: Optimal Wound Healing  Outcome: Progressing     Problem: Fall Injury Risk  Goal: Absence of Fall and Fall-Related Injury  Outcome: Progressing     Problem: Gas Exchange Impaired  Goal: Optimal Gas Exchange  Outcome: Progressing   Patient AAOx.4 VSS.NAD. IV lasix infusing per mar. No complaints of pain. Urinal within reach. Q2 purposeful rounding completed this shift. Safety maintained. Will continue to monitor.

## 2024-05-14 NOTE — PROGRESS NOTES
Cardiology Progress Note    5/14/2024  3:24 PM    Subjective/Interim:      Feels much better.  Transfer out of ICU.  Dobutamine was stopped yesterday.  He remains on Lasix at 0.5 milligrams/hour.       Objective:   24-hour Vitals:  Temp:  [97.6 °F (36.4 °C)-98.6 °F (37 °C)] 98 °F (36.7 °C)  Pulse:  [] 108  Resp:  [12-18] 14  SpO2:  [93 %-98 %] 98 %  BP: ()/(57-72) 113/58    Physical Examination:  Vitals: BP (!) 113/58 (BP Location: Left arm, Patient Position: Lying)   Pulse 108   Temp 98 °F (36.7 °C) (Oral)   Resp 14   Ht 6' (1.829 m)   Wt 78.2 kg (172 lb 6.4 oz)   SpO2 98%   BMI 23.38 kg/m²     Physical Exam  Constitutional:       General: He is not in acute distress.  Neck:      Vascular: Hepatojugular reflux and JVD (improved) present.   Cardiovascular:      Rate and Rhythm: Normal rate and regular rhythm. Occasional Extrasystoles are present.     Heart sounds: S1 normal and S2 normal. Murmur heard.      Systolic murmur is present.      Gallop present. S3 sounds present. No S4 sounds.   Pulmonary:      Effort: Pulmonary effort is normal.      Breath sounds: Normal air entry. Examination of the right-lower field reveals decreased breath sounds. Examination of the left-lower field reveals decreased breath sounds. Decreased breath sounds present.   Abdominal:      General: Bowel sounds are normal.      Palpations: Abdomen is soft. There is hepatomegaly.      Tenderness: There is no abdominal tenderness.   Musculoskeletal:      Right lower leg: Edema (improved) present.      Left lower leg: Edema (improved) present.   Skin:     Coloration: Skin is not pale.   Neurological:      Mental Status: He is alert.   Psychiatric:         Behavior: Behavior is cooperative.           Intake/Output Summary (Last 24 hours) at 5/14/2024 1524  Last data filed at 5/14/2024 0759  Gross per 24 hour   Intake 675.34 ml   Output 3475 ml   Net -2799.66 ml       Laboratory:  Trended Lab Data:  Recent Labs   Lab  "05/12/24  0429 05/13/24 0408 05/14/24  0556   WBC 8.48 8.63 9.46   HGB 13.8* 12.8* 14.0   HCT 43.5 39.3* 42.8   PLT 84* 79* 93*       Recent Labs   Lab 05/13/24  0408 05/13/24 2016 05/14/24  0556    133* 135*   K 4.4 4.5 4.4   CL 95 92* 92*   CO2 32* 31* 34*   BUN 46* 45* 45*   GLU 86 136* 93   CALCIUM 8.6* 8.7 9.0   MG 1.7 2.1 2.0   PHOS 3.3 3.7 3.8       Recent Labs   Lab 05/09/24  1405 05/10/24  0923 05/10/24  1909 05/13/24 0408 05/13/24 2016 05/14/24  0556   PROT 6.9 6.8  --   --   --   --    ALBUMIN 3.0* 3.0*   < > 2.4* 2.4* 2.6*   BILITOT 1.1* 1.1*  --   --   --   --    AST 19 20  --   --   --   --    ALT 14 14  --   --   --   --    ALKPHOS 83 88  --   --   --   --     < > = values in this interval not displayed.       No results for input(s): "PROTIME", "PTT", "INR" in the last 168 hours.    Cardiac:   Recent Labs   Lab 05/09/24  1311 05/10/24  0923   TROPONINI 0.189* 0.165*   BNP 2,691*  --        FLP:   Lab Results   Component Value Date    CHOL 123 08/09/2022    HDL 35 (L) 08/09/2022    LDLCALC 78.2 08/09/2022    TRIG 49 08/09/2022    CHOLHDL 28.5 08/09/2022     DM:   Lab Results   Component Value Date    HGBA1C 5.8 (H) 05/10/2024    HGBA1C 5.7 (H) 08/28/2023    HGBA1C 5.7 (H) 08/09/2022    MICROALBUR 2.4 02/15/2016    LDLCALC 78.2 08/09/2022    CREATININE 2.0 (H) 05/14/2024     Thyroid: No results found for: "TSH", "FREET4", "A1MWDCY", "D1WKNUB", "THYROIDAB"  Anemia:   Lab Results   Component Value Date    IRON 57 02/01/2019    JAILNLXH68 1045 (H) 02/02/2024    FOLATE 9.3 02/02/2024     Urinalysis:   Lab Results   Component Value Date    COLORU Yellow 05/09/2024    SPECGRAV 1.010 05/09/2024    NITRITE Negative 05/09/2024    GLUCOSEU NEGATIVE 02/01/2019    KETONESU Negative 05/09/2024    UROBILINOGEN 2.0-3.0 (A) 05/09/2024    BILIRUBINUR NEGATIVE 02/01/2019     @      Other Results:  EKG (my interpretation):    TELEMETRY:  sinus    Echo:   Results for orders placed or performed during the " hospital encounter of 05/09/24   Echo   Result Value Ref Range    BSA 2.09 m2    LVOT stroke volume 37.36 cm3    LVIDd 5.21 3.5 - 6.0 cm    LV Systolic Volume 102.42 mL    LV Systolic Volume Index 49.2 mL/m2    LVIDs 4.70 (A) 2.1 - 4.0 cm    LV Diastolic Volume 129.99 mL    LV Diastolic Volume Index 62.50 mL/m2    IVS 1.33 (A) 0.6 - 1.1 cm    LVOT diameter 2.25 cm    LVOT area 4.0 cm2    FS 10 (A) 28 - 44 %    Left Ventricle Relative Wall Thickness 0.55 cm    Posterior Wall 1.43 (A) 0.6 - 1.1 cm    LV mass 304.15 g    LV Mass Index 146 g/m2    MV Peak E Boby 0.63 m/s    TDI LATERAL 0.04 m/s    TDI SEPTAL 0.03 m/s    E/E' ratio 18.00 m/s    MV Peak A Boby 0.29 m/s    TR Max Boby 3.26 m/s    E/A ratio 2.17     E wave deceleration time 117.17 msec    LV SEPTAL E/E' RATIO 21.00 m/s    LV LATERAL E/E' RATIO 15.75 m/s    LVOT peak boby 0.57 m/s    Left Ventricular Outflow Tract Mean Velocity 0.37 cm/s    Left Ventricular Outflow Tract Mean Gradient 0.66 mmHg    RV S' 6.39 cm/s    LA size 3.44 cm    Left Atrium Minor Axis 6.32 cm    Left Atrium Major Axis 6.92 cm    LA volume (mod) 82.77 cm3    LA Volume Index (Mod) 39.8 mL/m2    RA Major Axis 5.51 cm    AV mean gradient 3 mmHg    AV peak gradient 4 mmHg    Ao peak boby 1.05 m/s    Ao VTI 17.20 cm    LVOT peak VTI 9.40 cm    AV valve area 2.17 cm²    AV Velocity Ratio 0.54     AV index (prosthetic) 0.55     MICHEL by Velocity Ratio 2.16 cm²    Mr max boby 4.54 m/s    MV stenosis pressure 1/2 time 33.98 ms    MV valve area p 1/2 method 6.47 cm2    TV mean gradient 27 mmHg    Triscuspid Valve Regurgitation Peak Gradient 43 mmHg    PV PEAK VELOCITY 0.31 m/s    PV peak gradient 0 mmHg    RVOT peak boby 0.26 m/s    STJ 2.61 cm    Ascending aorta 2.64 cm    IVC diameter 2.32 cm    Mean e' 0.04 m/s    ZLVIDS 1.48     ZLVIDD -2.01     LA Volume Index 41.8 mL/m2    LA volume 86.93 cm3    TAPSE 1.30 cm    LA WIDTH 4.5 cm    RA Width 4.0 cm    Sinus 3.0 cm    TV resting pulmonary artery  pressure 51 mmHg    RV TB RVSP 11 mmHg    Est. RA pres 8 mmHg    Narrative      Left Ventricle: The left ventricle is mildly dilated. Mildly increased   wall thickness. Severe global hypokinesis present. There is severely   reduced systolic function with a visually estimated ejection fraction of   15 - 20%. Global longitudinal strain is reduced; -4%. Grade II diastolic   dysfunction. MV e' lateral velocity is 0.04 m/s.    Right Ventricle: Mild right ventricular enlargement. Wall thickness is   normal. Right ventricle wall motion has global hypokinesis. Systolic   function is severely reduced.    Left Atrium: Left atrium is moderately dilated.    Mitral Valve: There is mild regurgitation.    Tricuspid Valve: There is mild regurgitation.    Pulmonary Artery: The estimated pulmonary artery systolic pressure is   51 mmHg.    IVC/SVC: Intermediate venous pressure at 8 mmHg.         Radiology:  US Retroperitoneal Complete    Result Date: 5/11/2024  EXAMINATION: US RETROPERITONEAL COMPLETE CLINICAL HISTORY: ARELI; TECHNIQUE: Ultrasound of the kidneys and urinary bladder was performed including color flow and Doppler evaluation of the kidneys. COMPARISON: None. FINDINGS: Right kidney: The right kidney measures 9.3 cm. No significant cortical thinning.  Increased cortical echogenicity.  Resistive index measures 0.81.  No renal stone. No hydronephrosis. Left kidney: The left kidney measures 8.0 cm. No significant cortical thinning.  Increased cortical echogenicity.  Resistive index measures 0.82.  No renal stone. No hydronephrosis. The bladder is partially distended at the time of scanning and has an unremarkable appearance.  Incidentally noted ascites in the right lower quadrant.     1. No evidence of hydronephrosis bilaterally. 2. Findings suggestive of underlying medical renal disease. 3. Incidentally noted right lower quadrant ascites. Electronically signed by: Kaelyn Damon MD Date:    05/11/2024  Time:    06:45    Echo    Result Date: 5/10/2024    Left Ventricle: The left ventricle is mildly dilated. Mildly increased wall thickness. Severe global hypokinesis present. There is severely reduced systolic function with a visually estimated ejection fraction of 15 - 20%. Global longitudinal strain is reduced; -4%. Grade II diastolic dysfunction. MV e' lateral velocity is 0.04 m/s.   Right Ventricle: Mild right ventricular enlargement. Wall thickness is normal. Right ventricle wall motion has global hypokinesis. Systolic function is severely reduced.   Left Atrium: Left atrium is moderately dilated.   Mitral Valve: There is mild regurgitation.   Tricuspid Valve: There is mild regurgitation.   Pulmonary Artery: The estimated pulmonary artery systolic pressure is 51 mmHg.   IVC/SVC: Intermediate venous pressure at 8 mmHg.     X-Ray Chest AP Portable    Result Date: 5/9/2024  EXAMINATION: XR CHEST AP PORTABLE CLINICAL HISTORY: CHF; TECHNIQUE: Single frontal view of the chest was performed. COMPARISON: 02/01/2024 FINDINGS: The cardiomediastinal silhouette is prominent, similar to the previous exam noting calcification of the aorta..  There is obscuration of the costophrenic angles, left greater than right suggesting effusions..  The trachea is midline.  The lungs are symmetrically expanded bilaterally with coarse interstitial attenuation bilaterally.  There is increased parenchymal attenuation projected over the left lower lung zone, may reflect a combination of atelectasis and pleural fluid however developing consolidation not excluded..  There is no pneumothorax.  The osseous structures are remarkable for degenerative change and osteopenia..     1. Pulmonary findings suggest pulmonary edema with pleural effusions, developing left lower lung zone consolidation not excluded. Electronically signed by: Ej Goel MD Date:    05/09/2024 Time:    13:27        Current Medications:     Infusions:   furosemide (Lasix)  200 mg in sodium chloride 0.9% SolP 100 mL continuous infusion (conc: 2 mg/mL)  5 mg/hr Intravenous Continuous 2.5 mL/hr at 05/14/24 0852 5 mg/hr at 05/14/24 0852        Scheduled:   allopurinoL  100 mg Oral Daily    aspirin  81 mg Oral Daily    atorvastatin  20 mg Oral Daily    calcitRIOL  0.25 mcg Oral Every Mon, Wed, Fri    cetirizine  10 mg Oral QHS    heparin (porcine)  5,000 Units Subcutaneous Q8H    mupirocin   Nasal BID    senna-docusate 8.6-50 mg  1 tablet Oral BID        PRN:    Current Facility-Administered Medications:     acetaminophen, 650 mg, Oral, Q4H PRN    albuterol-ipratropium, 3 mL, Nebulization, Once PRN    HYDROcodone-acetaminophen, 1 tablet, Oral, Q4H PRN    melatonin, 6 mg, Oral, Nightly PRN    midodrine, 5 mg, Oral, TID PRN    ondansetron, 4 mg, Oral, Q8H PRN    ondansetron, 4 mg, Intravenous, Q8H PRN    ondansetron, 4 mg, Intravenous, Q8H PRN    polyethylene glycol, 17 g, Oral, BID PRN    sodium chloride 0.9%, 10 mL, Intravenous, PRN    sodium chloride 0.9%, 10 mL, Intravenous, PRN     Assessment and Plan:     Felicia Lopez is a 78 y.o.male with  Acute on chronic combined systolic and diastolic congestive heart failure with anasarca.  No significant response to IV lasix.  Likely has cardio-renal syndrome.  Echo with worsening biventricular dysfunction.  -discussed with patient and Dr. Montoya.  Transfer to ICU for dobutamine.  Consult renal for assistance.  Consult palliative care medicine.  -5/13:  responded well past 3 days with dobutamine and lasix drip.  Will stop dobutamine today.  Add prn midodrine for BP.  Continue lasix drip today?  -5/14:  Transferred out of the ICU.  Continues to do well.  Decreased Lasix drip 0.5 milligrams/hour.  Aware of plans to transition to oral Lasix tomorrow.     CAD, stable, no angina  -on aspirin, statin and metoprolol     HTN, low BP  -hold home BP meds     CKD  -consult renal team     MARLENI     Chronic idiopathic thrombocytopenia  -as per [facility] team          Fabio Martinez MD        Disclaimer: This document was created using voice recognition software ([employer]). Although it may be edited, this document may contain errors related to incorrect recognition of the spoken word. Please call the physician if clarification is needed.

## 2024-05-14 NOTE — PT/OT/SLP PROGRESS
Physical Therapy  Not Seen    Patient Name:  Felicia Lopez   MRN:  [id number]    Patient not seen today secondary to Fatigue and having already done therapy (did OT earlier in day). Encouraged pt to participate in PT but continues to defer. Will follow-up tomorrow, 5/15.

## 2024-05-14 NOTE — PROGRESS NOTES
ICU Progress Note  Nephrology    Consult Requested By: Miriam Field MD  Reason for Consult: Cardiorenal    SUBJECTIVE:     History of Present Illness:  Patient is a 78 y.o. male presents with worsening swelling, shortness of breath, decreased urine output, etcetera over last couple of weeks.  Patient was seen by Dr. Martinez in office yesterday and recommended admission for IV diuresis.  Extensive medical history as outlined below including CKD stage 3 with baseline creatinine around 2.0.  Patient is followed by Dr. Matson as an outpatient and was seen last week.  He had a creatinine of 2.2, urine protein creatinine ratio only 88 mg, and was started on calcitriol for mild secondary hyperparathyroidism.  He was on Lasix 40 mg daily, lisinopril 20 mg twice daily, and allopurinol 100 mg.  Upon initial evaluation emergency room patient had soft blood pressures of 90s to 100s.  Laboratory data consistent with creatinine of 2.6, BUN 40, BNP 26 91, and chest x-ray with marked pulmonary edema.  Patient was admitted to telemetry floor and started on diuresis.  Overnight patient had minimal urine output and creatinine has risen to 2.7 with worsening edema and hypotension.  Seen by Cardiology and plans for transfer to ICU with dobutamine drip and more aggressive diuresis.    Interval History:    Transferred to floor.  Uneventful night.  Doing well this am.  Continues to diurese well.  Less SOB. Eating breakfast currently.     Past Medical History:   Diagnosis Date    CAD (coronary artery disease)     Chronic combined systolic and diastolic CHF (congestive heart failure)     Chronic idiopathic thrombocytopenia     Disorder of kidney and ureter     History of heart attack     History of stroke     Hyperlipidemia     Hypertension      Past Surgical History:   Procedure Laterality Date    FACIAL RECONSTRUCTION SURGERY      PROSTATE SURGERY       Family History   Problem Relation Name Age of Onset    Hypertension Mother      No  Known Problems Father       Social History     Tobacco Use    Smoking status: Former     Current packs/day: 0.00     Types: Cigarettes     Quit date: 10/5/2008     Years since quitting: 15.6    Smokeless tobacco: Never   Substance Use Topics    Alcohol use: No     Alcohol/week: 0.0 standard drinks of alcohol    Drug use: No       Review of patient's allergies indicates:  No Known Allergies     Review of Systems:  Constitutional: No fever or chills  Respiratory: shortness of breath better  Cardiovascular: No chest pain or palpitations  Gastrointestinal: No nausea or vomiting  Neurological: No confusion or weakness    OBJECTIVE:     Vital Signs (Most Recent)  Temp: 98.1 °F (36.7 °C) (05/14/24 0735)  Pulse: 91 (05/14/24 0735)  Resp: 18 (05/14/24 0735)  BP: 123/71 (05/14/24 0735)  SpO2: 97 % (05/14/24 0735)    Vital Signs Range (Last 24H):  Temp:  [97.6 °F (36.4 °C)-98.6 °F (37 °C)]   Pulse:  []   Resp:  [12-23]   BP: ()/(51-79)   SpO2:  [93 %-97 %]       Intake/Output Summary (Last 24 hours) at 5/14/2024 0922  Last data filed at 5/14/2024 0759  Gross per 24 hour   Intake 1049.19 ml   Output 4105 ml   Net -3055.81 ml       Physical Exam:  NAD A and O  RRR 3/6 STEPHAN +JVD present  Pulm:  diminished   Abdomen soft NT ND +BS  Initial Ext severe 4+ edema to thighs +scrotal edema now 2+ much improved    Laboratory:  Recent Labs   Lab 05/14/24  0556   WBC 9.46   RBC 5.00   HGB 14.0   HCT 42.8   PLT 93*   MCV 86   MCH 28.0   MCHC 32.7     BMP:   Recent Labs   Lab 05/14/24  0556   GLU 93   *   K 4.4   CL 92*   CO2 34*   BUN 45*   CREATININE 2.0*   CALCIUM 9.0   MG 2.0   PHOS 3.8     Lab Results   Component Value Date    CALCIUM 9.0 05/14/2024    PHOS 3.8 05/14/2024     BNP  Recent Labs   Lab 05/09/24  1311   BNP 2,691*     Lab Results   Component Value Date    URICACID 5.9 05/13/2024     Lab Results   Component Value Date    IRON 57 02/01/2019     Lab Results   Component Value Date    CALCIUM 9.0 05/14/2024     PHOS 3.8 05/14/2024       Diagnostic Results:  X-Ray Knee 1 or 2 View Left   Final Result      Please see above.         Electronically signed by: Rosa Adler   Date:    05/13/2024   Time:    15:32      US Retroperitoneal Complete   Final Result      1. No evidence of hydronephrosis bilaterally.   2. Findings suggestive of underlying medical renal disease.   3. Incidentally noted right lower quadrant ascites.         Electronically signed by: Kaelyn Damon MD   Date:    05/11/2024   Time:    06:45      X-Ray Chest AP Portable   Final Result      1. Pulmonary findings suggest pulmonary edema with pleural effusions, developing left lower lung zone consolidation not excluded.         Electronically signed by: Ej Goel MD   Date:    05/09/2024   Time:    13:27          ASSESSMENT/PLAN:     Initially Oliguric and now nonoliguric acute kidney injury on progressive CKD 3b with baseline creatinine around 2.0 but recent 2.2 secondary to acute on chronic combined heart failure with dismal ejection fraction of 15% and needing aggressive diuresis but has poor cardiac output with hypotension:  -trends noted over the past few months with creatinine of 2.0 and then kellen to 2.2  -now 2.8 with need for aggressive diuresis > trending down with diuresis 2.4  -only 88 mg of proteinuria so edema is not from nephrotic syndrome but mostly from severe heart failure  -hold ACE inhibitor for now  -Renal US noted  -Transferred to ICU for dobutamine and lasix drip  -Can add metolazone if this combo doesn't result in adequate diuresis  -consider adding midodrine to assist with blood pressure but he appears to be tolerating well.  5/13:  will change lasix drip to 10mg/hr as hard to titrate hourly w/o mccauley.  Labs stable considering.   -no immediate need for renal replacement therapy at this time but will depend on degree of diuresis achieved by efforts above.  5/14:  back off to 5 mg/hr on lasix drip and developing some contraction  alkalosis.  Plan on transitioning to oral regimen by tomorrow.   -Renally dose meds, avoid nephrotoxins, and monitor I/O's closely.    Acute on chronic combined heart failure with ejection fraction of 15%:  -poor outlook based on trends and trajectory  -appreciate cardiology input    Hyperuricemia:  -continue allopurinol  -risks reviewed    Mineral bone density:  -continue calcitriol Monday Wednesday Friday    Thrombocytopenia:  -No active bleeding  -monitor  -defer to primary      As above    High MDM complexity necessary to treat or prevent imminent or life-threatening deterioration of the following conditions:  ARELI, cardiorenal syndrome, severe heart failure with ejection fraction 15%  Time spent personally by me on the following activities 60 min: development of treatment plan with patient or surrogate, discussions with consultants, interpretation of cardiac output measurements, examination of patient, ordering and performing treatments and interventions, evaluation of patient's response to treatment, obtaining history from patient or surrogate, ordering and review of laboratory studies, ordering and review of radiographic studies, re-evaluation of patient's condition, pulse oximetry and blood draw for specimens

## 2024-05-14 NOTE — PROGRESS NOTES
Nurses Note -- 4 Eyes      5/14/2024   12:23 PM      Skin assessed during: Admit      [] No Altered Skin Integrity Present    []Prevention Measures Documented      [] Yes- Altered Skin Integrity Present or Discovered   [] LDA Added if Not in Epic (Describe Wound)   [x] New Altered Skin Integrity was Present on Admit and Documented in LDA   [] Wound Image Taken    Wound Care Consulted? No    Attending Nurse:  RAIN Hoang    Second RN/Staff Member:  RAIN Burns

## 2024-05-15 LAB
ALBUMIN SERPL BCP-MCNC: 2.5 G/DL (ref 3.5–5.2)
ALBUMIN SERPL BCP-MCNC: 2.6 G/DL (ref 3.5–5.2)
ANION GAP SERPL CALC-SCNC: 13 MMOL/L (ref 8–16)
ANION GAP SERPL CALC-SCNC: 9 MMOL/L (ref 8–16)
BUN SERPL-MCNC: 50 MG/DL (ref 8–23)
BUN SERPL-MCNC: 53 MG/DL (ref 8–23)
CALCIUM SERPL-MCNC: 9 MG/DL (ref 8.7–10.5)
CALCIUM SERPL-MCNC: 9 MG/DL (ref 8.7–10.5)
CHLORIDE SERPL-SCNC: 88 MMOL/L (ref 95–110)
CHLORIDE SERPL-SCNC: 92 MMOL/L (ref 95–110)
CO2 SERPL-SCNC: 32 MMOL/L (ref 23–29)
CO2 SERPL-SCNC: 35 MMOL/L (ref 23–29)
CREAT SERPL-MCNC: 2 MG/DL (ref 0.5–1.4)
CREAT SERPL-MCNC: 2.4 MG/DL (ref 0.5–1.4)
EST. GFR  (NO RACE VARIABLE): 27 ML/MIN/1.73 M^2
EST. GFR  (NO RACE VARIABLE): 33 ML/MIN/1.73 M^2
GLUCOSE SERPL-MCNC: 115 MG/DL (ref 70–110)
GLUCOSE SERPL-MCNC: 82 MG/DL (ref 70–110)
MAGNESIUM SERPL-MCNC: 2 MG/DL (ref 1.6–2.6)
MAGNESIUM SERPL-MCNC: 2 MG/DL (ref 1.6–2.6)
PHOSPHATE SERPL-MCNC: 4 MG/DL (ref 2.7–4.5)
PHOSPHATE SERPL-MCNC: 4.2 MG/DL (ref 2.7–4.5)
POTASSIUM SERPL-SCNC: 4.2 MMOL/L (ref 3.5–5.1)
POTASSIUM SERPL-SCNC: 4.5 MMOL/L (ref 3.5–5.1)
SODIUM SERPL-SCNC: 133 MMOL/L (ref 136–145)
SODIUM SERPL-SCNC: 136 MMOL/L (ref 136–145)

## 2024-05-15 PROCEDURE — 25000003 PHARM REV CODE 250: Performed by: NURSE PRACTITIONER

## 2024-05-15 PROCEDURE — 25000003 PHARM REV CODE 250: Performed by: INTERNAL MEDICINE

## 2024-05-15 PROCEDURE — 83735 ASSAY OF MAGNESIUM: CPT | Performed by: STUDENT IN AN ORGANIZED HEALTH CARE EDUCATION/TRAINING PROGRAM

## 2024-05-15 PROCEDURE — 99232 SBSQ HOSP IP/OBS MODERATE 35: CPT | Mod: ,,, | Performed by: INTERNAL MEDICINE

## 2024-05-15 PROCEDURE — 97530 THERAPEUTIC ACTIVITIES: CPT | Mod: CQ

## 2024-05-15 PROCEDURE — 97110 THERAPEUTIC EXERCISES: CPT | Mod: CQ

## 2024-05-15 PROCEDURE — 94761 N-INVAS EAR/PLS OXIMETRY MLT: CPT

## 2024-05-15 PROCEDURE — 63600175 PHARM REV CODE 636 W HCPCS: Performed by: NURSE PRACTITIONER

## 2024-05-15 PROCEDURE — 99900035 HC TECH TIME PER 15 MIN (STAT)

## 2024-05-15 PROCEDURE — 80069 RENAL FUNCTION PANEL: CPT | Mod: 91 | Performed by: STUDENT IN AN ORGANIZED HEALTH CARE EDUCATION/TRAINING PROGRAM

## 2024-05-15 PROCEDURE — 99233 SBSQ HOSP IP/OBS HIGH 50: CPT | Mod: GT,FS,, | Performed by: FAMILY MEDICINE

## 2024-05-15 PROCEDURE — 36415 COLL VENOUS BLD VENIPUNCTURE: CPT | Performed by: STUDENT IN AN ORGANIZED HEALTH CARE EDUCATION/TRAINING PROGRAM

## 2024-05-15 PROCEDURE — 97535 SELF CARE MNGMENT TRAINING: CPT | Mod: CO

## 2024-05-15 PROCEDURE — 21400001 HC TELEMETRY ROOM

## 2024-05-15 RX ADMIN — ALLOPURINOL 100 MG: 100 TABLET ORAL at 09:05

## 2024-05-15 RX ADMIN — CALCITRIOL CAPSULES 0.25 MCG 0.25 MCG: 0.25 CAPSULE ORAL at 05:05

## 2024-05-15 RX ADMIN — ATORVASTATIN CALCIUM 20 MG: 20 TABLET, FILM COATED ORAL at 09:05

## 2024-05-15 RX ADMIN — HYDROCODONE BITARTRATE AND ACETAMINOPHEN 1 TABLET: 5; 325 TABLET ORAL at 07:05

## 2024-05-15 RX ADMIN — ASPIRIN 81 MG: 81 TABLET, COATED ORAL at 09:05

## 2024-05-15 RX ADMIN — CETIRIZINE HYDROCHLORIDE 10 MG: 10 TABLET, FILM COATED ORAL at 09:05

## 2024-05-15 RX ADMIN — HEPARIN SODIUM 5000 UNITS: 5000 INJECTION INTRAVENOUS; SUBCUTANEOUS at 02:05

## 2024-05-15 RX ADMIN — HYDROCODONE BITARTRATE AND ACETAMINOPHEN 1 TABLET: 5; 325 TABLET ORAL at 10:05

## 2024-05-15 RX ADMIN — HYDROCODONE BITARTRATE AND ACETAMINOPHEN 1 TABLET: 5; 325 TABLET ORAL at 02:05

## 2024-05-15 RX ADMIN — METOPROLOL SUCCINATE 12.5 MG: 25 TABLET, EXTENDED RELEASE ORAL at 02:05

## 2024-05-15 RX ADMIN — MUPIROCIN: 20 OINTMENT TOPICAL at 09:05

## 2024-05-15 RX ADMIN — HEPARIN SODIUM 5000 UNITS: 5000 INJECTION INTRAVENOUS; SUBCUTANEOUS at 05:05

## 2024-05-15 RX ADMIN — HEPARIN SODIUM 5000 UNITS: 5000 INJECTION INTRAVENOUS; SUBCUTANEOUS at 09:05

## 2024-05-15 NOTE — SUBJECTIVE & OBJECTIVE
"Interval History: Reports feeling "good" after getting out of bed yesterday     Medications:  Continuous Infusions:  Scheduled Meds:   allopurinoL  100 mg Oral Daily    aspirin  81 mg Oral Daily    atorvastatin  20 mg Oral Daily    calcitRIOL  0.25 mcg Oral Every Mon, Wed, Fri    cetirizine  10 mg Oral QHS    heparin (porcine)  5,000 Units Subcutaneous Q8H    senna-docusate 8.6-50 mg  1 tablet Oral BID     PRN Meds:  Current Facility-Administered Medications:     acetaminophen, 650 mg, Oral, Q4H PRN    albuterol-ipratropium, 3 mL, Nebulization, Once PRN    HYDROcodone-acetaminophen, 1 tablet, Oral, Q4H PRN    melatonin, 6 mg, Oral, Nightly PRN    midodrine, 5 mg, Oral, TID PRN    ondansetron, 4 mg, Oral, Q8H PRN    ondansetron, 4 mg, Intravenous, Q8H PRN    ondansetron, 4 mg, Intravenous, Q8H PRN    polyethylene glycol, 17 g, Oral, BID PRN    sodium chloride 0.9%, 10 mL, Intravenous, PRN    sodium chloride 0.9%, 10 mL, Intravenous, PRN    Objective:     Vital Signs (Most Recent):  Temp: 97.8 °F (36.6 °C) (05/15/24 0832)  Pulse: 87 (05/15/24 0832)  Resp: 18 (05/15/24 1039)  BP: 117/71 (05/15/24 0832)  SpO2: 96 % (05/15/24 0832) Vital Signs (24h Range):  Temp:  [97.8 °F (36.6 °C)-98.8 °F (37.1 °C)] 97.8 °F (36.6 °C)  Pulse:  [] 87  Resp:  [14-20] 18  SpO2:  [96 %-99 %] 96 %  BP: (102-121)/(58-71) 117/71     Weight: 72.6 kg (160 lb 0.9 oz)  Body mass index is 21.71 kg/m².       Physical Exam  Constitutional:       Appearance: He is ill-appearing.      Comments: Temporal lobe wasting    Cardiovascular:      Rate and Rhythm: Normal rate.   Pulmonary:      Effort: No respiratory distress.   Neurological:      Mental Status: He is alert.      Comments: Reports feeling very good today, gets frustrated when discussing worsening HF             Review of Symptoms      Symptom Assessment (ESAS 0-10 Scale)  Pain:  0  Fatigue:  2         Psychosocial/Cultural:   See Palliative Psychosocial Note: Yes  - Lives with wife, " "Lauryn  **Primary  to Follow**  Palliative Care  Consult: No        Advance Care Planning   Advance Directives:   Goals of Care: What is most important right now is to focus on spending time at home, remaining as independent as possible. Accordingly, we have decided that the best plan to meet the patient's goals includes continuing with treatment.         Significant Labs: All pertinent labs within the past 24 hours have been reviewed.  CBC:   Recent Labs   Lab 05/14/24  0556   WBC 9.46   HGB 14.0   HCT 42.8   MCV 86   PLT 93*     BMP:  Recent Labs   Lab 05/15/24  0520   GLU 82      K 4.2   CL 92*   CO2 35*   BUN 50*   CREATININE 2.0*   CALCIUM 9.0   MG 2.0     LFT:  Lab Results   Component Value Date    AST 20 05/10/2024    ALKPHOS 88 05/10/2024    BILITOT 1.1 (H) 05/10/2024     Albumin:   Albumin   Date Value Ref Range Status   05/15/2024 2.5 (L) 3.5 - 5.2 g/dL Final     Protein:   Total Protein   Date Value Ref Range Status   05/10/2024 6.8 6.0 - 8.4 g/dL Final     Lactic acid:   No results found for: "LACTATE"    Significant Imaging: I have reviewed all pertinent imaging results/findings within the past 24 hours.  "

## 2024-05-15 NOTE — ASSESSMENT & PLAN NOTE
History of PCI  History of CVA  Hyperlipidemia   History of remote MI with PCI in 2010 and CVA in 2015  Chronic demand ischemia noted with elevated troponins  Admission troponin at baseline and on acute dynamic EKG changes    Plan -   -continue home metoprolol as detailed above  -continue home ASA  -continue home statin per pharmacy formulary alternative  -dose/medication adjustment as appropriate   -EKG PRN concerns  -trend labs, address/replete electrolytes as indicated

## 2024-05-15 NOTE — ASSESSMENT & PLAN NOTE
Anasarca  Cardiorenal syndrome    - Acute heart failure exacerbation in setting of dietary and medication noncompliance with associated ARELI on CKD and chronic demand ischemia   - At admission HDS and afebrile  - Admission BNP 2691, troponin 0.189 (at baseline), CXR with pulmonary edema and effusions, EKG with SR, PVCs and TWA similar to prior studies   - Recent TTE reviewed noting EF 35-40% with regional WMAs, LV dilation, diastolic dysfunction, mild AVS, mild MR    Current echo - with worsening systolic function 15-20%     Plan -   -elevate LEs and scrotum as tolerated  -Wound Care consulted due to altered skin integrity with blister rupture and weeping edema  -cardiac diet with fluid restriction  -strict I&Os and daily weights  -trend labs, address/replete electrolytes as indicated  -cardiology consult - recommending transfer to ICU and dobutamine drip - transferred 5/10   -hold home metoprolol while on dobutamine drip  - nephrology consulted  - pulm crit followed while patient was in ICU, OK to sign off   - 5/10 started lasix drip with good urine output.  Slowly developing contraction alkalosis however.  - 5/15 >12 liters negative since presentation, plan to transition back to oral Lasix but now held due to contraction alkalosis.

## 2024-05-15 NOTE — PLAN OF CARE
POC reviewed with patient this shift.  A/O x4.  Respirations unlabored on 1L/NC.  Skin w/d.  Continent of b/b.  Urinal at bedside.  X1 large BM noted this shift.  Ambulates to restroom with x1 and walker assist.  Lasix gtt continues as ordered.  Tolerates meds whole with water without difficulty.  VSS.  See flowsheet for full assessment.  Able to verbalize wants/needs.  No s/s of distress.  Fall/safety precautions maintained.       Problem: Adult Inpatient Plan of Care  Goal: Plan of Care Review  Outcome: Progressing     Problem: Wound  Goal: Optimal Coping  Outcome: Progressing     Problem: Fall Injury Risk  Goal: Absence of Fall and Fall-Related Injury  Outcome: Progressing

## 2024-05-15 NOTE — PT/OT/SLP PROGRESS
Physical Therapy Treatment    Patient Name:  Felicia Lopez   MRN:  [id number]    Recommendations:     Discharge Recommendations: Moderate Intensity Therapy  Discharge Equipment Recommendations: to be determined by next level of care (potential need for WC if unable to walk at DC; other DME per OT)  Barriers to discharge: Inaccessible home and Decreased caregiver support    Assessment:     Felicia Lopez is a 78 y.o. male admitted with a medical diagnosis of Acute on chronic combined systolic and diastolic congestive heart failure.  He presents with the following impairments/functional limitations: weakness, gait instability, edema, impaired balance, impaired cardiopulmonary response to activity, impaired endurance, impaired functional mobility, impaired self care skills, impaired sensation, decreased coordination, decreased lower extremity function, decreased ROM, decreased upper extremity function.    Supine>sit with SBA  Sit>stand with RW and CGA   Bed>Chair with RW and CGA, step transfer (a few steps)  Pt with c/o L knee pain, unwilling to ambulate in room at this time  Rec Moderate Intensity Therapy    Rehab Prognosis: Good; patient would benefit from acute skilled PT services to address these deficits and reach maximum level of function.    Recent Surgery: * No surgery found *      Plan:     During this hospitalization, patient to be seen 5 x/week to address the identified rehab impairments via gait training, therapeutic activities, therapeutic exercises, neuromuscular re-education and progress toward the following goals:    Plan of Care Expires:  06/12/24    Subjective     Chief Complaint: knee pain  Patient/Family Comments/goals: I'm not going to a skilled nursing facility, I've heard bad things about those places. You can just send me straight home.  Pain/Comfort:  Pain Rating 1: other (see comments) (unrated pain to L knee)  Location - Side 1: Left  Location 1: knee  Pain Addressed 1: Reposition, Distraction,  Cessation of Activity, Nurse notified  Pain Rating Post-Intervention 1: other (see comments) (unrated)      Objective:     Communicated with nurse Hoang prior to session.  Patient found HOB elevated with peripheral IV, bed alarm, oxygen upon PT entry to room.     General Precautions: Standard, fall (strict I&O)  Orthopedic Precautions: N/A  Braces: N/A  Respiratory Status: Nasal cannula, flow 1 L/min     Functional Mobility:  Bed Mobility:     Supine to Sit: stand by assistance  Transfers:     Sit to Stand:  contact guard assistance with rolling walker  Bed to Chair: contact guard assistance with  rolling walker  using  Step Transfer      AM-PAC 6 CLICK MOBILITY  Turning over in bed (including adjusting bedclothes, sheets and blankets)?: 3  Sitting down on and standing up from a chair with arms (e.g., wheelchair, bedside commode, etc.): 3  Moving from lying on back to sitting on the side of the bed?: 3  Moving to and from a bed to a chair (including a wheelchair)?: 3  Need to walk in hospital room?: 2  Climbing 3-5 steps with a railing?: 1  Basic Mobility Total Score: 15       Treatment & Education:  Supine therex BLE: AP x 10  Supine therex RLE: heel slides, hip abd/add x 10  Seated therex RLE: LAQ x 10  Transfers as noted  Pt educated on importance of working with therapy while in the hospital to progress toward goals    Patient left up in chair with all lines intact, call button in reach, and nurse Hoang notified..    GOALS:   Multidisciplinary Problems       Physical Therapy Goals          Problem: Physical Therapy    Goal Priority Disciplines Outcome Goal Variances Interventions   Physical Therapy Goal     PT, PT/OT Progressing     Description: Goals to be met by: 24    Patient will increase functional independence with mobility by performin. Supine<>sit with supervision without use of HB features.   2. Sit<>stand with supervision with RW.  3. Gait x 25 feet with supervision with RW.  4. Standing  balance x3 min with UE support and no increase in L knee pain and supervision.                       Time Tracking:     PT Received On: 05/15/24  PT Start Time: 0938     PT Stop Time: 1003  PT Total Time (min): 25 min     Billable Minutes: Therapeutic Activity 12 and Therapeutic Exercise 13    Treatment Type: Treatment  PT/PTA: PTA     Number of PTA visits since last PT visit: 1     05/15/2024

## 2024-05-15 NOTE — PROGRESS NOTES
ICU Progress Note  Nephrology    Consult Requested By: Miriam Field MD  Reason for Consult: Cardiorenal    SUBJECTIVE:     History of Present Illness:  Patient is a 78 y.o. male presents with worsening swelling, shortness of breath, decreased urine output, etcetera over last couple of weeks.  Patient was seen by Dr. Martinez in office yesterday and recommended admission for IV diuresis.  Extensive medical history as outlined below including CKD stage 3 with baseline creatinine around 2.0.  Patient is followed by Dr. Matson as an outpatient and was seen last week.  He had a creatinine of 2.2, urine protein creatinine ratio only 88 mg, and was started on calcitriol for mild secondary hyperparathyroidism.  He was on Lasix 40 mg daily, lisinopril 20 mg twice daily, and allopurinol 100 mg.  Upon initial evaluation emergency room patient had soft blood pressures of 90s to 100s.  Laboratory data consistent with creatinine of 2.6, BUN 40, BNP 26 91, and chest x-ray with marked pulmonary edema.  Patient was admitted to telemetry floor and started on diuresis.  Overnight patient had minimal urine output and creatinine has risen to 2.7 with worsening edema and hypotension.  Seen by Cardiology and plans for transfer to ICU with dobutamine drip and more aggressive diuresis.    Interval History:    Continues to diurese.  Labs noted with worsening contraction alkalosis.  Holding lasix drip today.  No CP and SOB much improved.      Past Medical History:   Diagnosis Date    CAD (coronary artery disease)     Chronic combined systolic and diastolic CHF (congestive heart failure)     Chronic idiopathic thrombocytopenia     Disorder of kidney and ureter     History of heart attack     History of stroke     Hyperlipidemia     Hypertension      Past Surgical History:   Procedure Laterality Date    FACIAL RECONSTRUCTION SURGERY      PROSTATE SURGERY       Family History   Problem Relation Name Age of Onset    Hypertension Mother      No  "Known Problems Father       Social History     Tobacco Use    Smoking status: Former     Current packs/day: 0.00     Types: Cigarettes     Quit date: 10/5/2008     Years since quitting: 15.6    Smokeless tobacco: Never   Substance Use Topics    Alcohol use: No     Alcohol/week: 0.0 standard drinks of alcohol    Drug use: No       Review of patient's allergies indicates:  No Known Allergies     Review of Systems:  Constitutional: No fever or chills  Respiratory: shortness of breath better  Cardiovascular: No chest pain or palpitations  Gastrointestinal: No nausea or vomiting  Neurological: No confusion or weakness    OBJECTIVE:     Vital Signs (Most Recent)  Temp: 97.8 °F (36.6 °C) (05/15/24 0832)  Pulse: 87 (05/15/24 0832)  Resp: 16 (05/15/24 0832)  BP: 117/71 (05/15/24 0832)  SpO2: 96 % (05/15/24 0832)    Vital Signs Range (Last 24H):  Temp:  [97.8 °F (36.6 °C)-98.8 °F (37.1 °C)]   Pulse:  []   Resp:  [14-20]   BP: (102-121)/(58-71)   SpO2:  [96 %-99 %]       Intake/Output Summary (Last 24 hours) at 5/15/2024 0958  Last data filed at 5/15/2024 0919  Gross per 24 hour   Intake 1053.9 ml   Output 2700 ml   Net -1646.1 ml       Physical Exam:  NAD A and O  RRR 3/6 STEPHAN Mild+JVD present  Pulm:  diminished   Abdomen soft NT ND +BS  Initial Ext severe 4+ edema to thighs +scrotal edema now 1+ much improved    Laboratory:  No results for input(s): "WBC", "RBC", "HGB", "HCT", "PLT", "MCV", "MCH", "MCHC" in the last 24 hours.    BMP:   Recent Labs   Lab 05/15/24  0520   GLU 82      K 4.2   CL 92*   CO2 35*   BUN 50*   CREATININE 2.0*   CALCIUM 9.0   MG 2.0   PHOS 4.2     Lab Results   Component Value Date    CALCIUM 9.0 05/15/2024    PHOS 4.2 05/15/2024     BNP  Recent Labs   Lab 05/09/24  1311   BNP 2,691*     Lab Results   Component Value Date    URICACID 5.9 05/13/2024     Lab Results   Component Value Date    IRON 57 02/01/2019     Lab Results   Component Value Date    CALCIUM 9.0 05/15/2024    PHOS 4.2 " 05/15/2024       Diagnostic Results:  X-Ray Knee 1 or 2 View Left   Final Result      Please see above.         Electronically signed by: Rosa Adler   Date:    05/13/2024   Time:    15:32      US Retroperitoneal Complete   Final Result      1. No evidence of hydronephrosis bilaterally.   2. Findings suggestive of underlying medical renal disease.   3. Incidentally noted right lower quadrant ascites.         Electronically signed by: Kaelyn Damon MD   Date:    05/11/2024   Time:    06:45      X-Ray Chest AP Portable   Final Result      1. Pulmonary findings suggest pulmonary edema with pleural effusions, developing left lower lung zone consolidation not excluded.         Electronically signed by: Ej Goel MD   Date:    05/09/2024   Time:    13:27          ASSESSMENT/PLAN:     Initially Oliguric and now nonoliguric acute kidney injury on progressive CKD 3b with baseline creatinine around 2.0 but recent 2.2 secondary to acute on chronic combined heart failure with dismal ejection fraction of 15% and needing aggressive diuresis but has poor cardiac output with hypotension:  -trends noted over the past few months with creatinine of 2.0 and then kellen to 2.2  -now 2.8 with need for aggressive diuresis > trending down with diuresis 2.4  -only 88 mg of proteinuria so edema is not from nephrotic syndrome but mostly from severe heart failure  -hold ACE inhibitor for now  -Renal US noted  -Transferred to ICU for dobutamine and lasix drip  -Can add metolazone if this combo doesn't result in adequate diuresis  -consider adding midodrine to assist with blood pressure but he appears to be tolerating well.  5/13:  will change lasix drip to 10mg/hr as hard to titrate hourly w/o mccauley.  Labs stable considering.   -no immediate need for renal replacement therapy at this time but will depend on degree of diuresis achieved by efforts above.  5/14:  back off to 5 mg/hr on lasix drip and developing some contraction   alkalosis.   Plan on transitioning to oral regimen by tomorrow.   5/15:  hold drip today and transition to oral tomorrow.  Home soon.  -Renally dose meds, avoid nephrotoxins, and monitor I/O's closely.    Acute on chronic combined heart failure with ejection fraction of 15%:  -poor outlook based on trends and trajectory  -appreciate cardiology input    Hyperuricemia:  -continue allopurinol  -risks reviewed    Mineral bone density:  -continue calcitriol Monday Wednesday Friday    Thrombocytopenia:  -No active bleeding  -monitor  -defer to primary      As above  Home tomorrow on oral regimen  F/up with Dr. Matson    High [facility] complexity necessary to treat or prevent imminent or life-threatening deterioration of the following conditions:  ARELI, cardiorenal syndrome, severe heart failure with ejection fraction 15%  Time spent personally by me on the following activities 60 min: development of treatment plan with patient or surrogate, discussions with consultants, interpretation of cardiac output measurements, examination of patient, ordering and performing treatments and interventions, evaluation of patient's response to treatment, obtaining history from patient or surrogate, ordering and review of laboratory studies, ordering and review of radiographic studies, re-evaluation of patient's condition, pulse oximetry and blood draw for specimens

## 2024-05-15 NOTE — PROGRESS NOTES
[facility] ([address])  Adult Nutrition  Progress Note    SUMMARY       Recommendations  1) Continue to follow cardiac diet   2) Shane BID to promote wound healing   3) Consider adding a nutrition supplement if pt's weight continues to downtrend  4) Monitor weights and labs   5) RD to follow to monitor changes    Goals: Pt will consume % of EEN/EPN by RD follow-up    Nutrition Goal Status: new  Communication of RD Recs: other (comment) (POC)    Assessment and Plan  Nutrition Problem  Increased nutrient needs    Related to (etiology):   Increased demand for nutrient    Signs and Symptoms (as evidenced by):   Unintentional weight loss of >10% in 6 months  Low albumin level  Delayed wound healing  Mild depletion of temporals, orbitals, and clavicles    Interventions (treatment strategy):  Increased energy diet  Commercial beverage  Collaboration with other providers    Nutrition Diagnosis Status:   New     Malnutrition Assessment             Weight Loss (Malnutrition): greater than 10% in 6 months (17% in ~5 months)   Orbital Region (Subcutaneous Fat Loss): mild depletion   [place] Region (Muscle Loss): mild depletion  Clavicle Bone Region (Muscle Loss): mild depletion  Clavicle and Acromion Bone Region (Muscle Loss): mild depletion                 Reason for Assessment    Reason For Assessment: RD follow-up  Diagnosis: cardiac disease  Patient Active Problem List   Diagnosis    Essential hypertension    Hyperglycemia    Chronic kidney disease, stage III (moderate)    CKD (chronic kidney disease) stage 2, GFR 60-89 ml/min    CKD (chronic kidney disease) stage 3, GFR 30-59 ml/min    BMI 27.0-27.9,adult    Pain of left hand    Dizziness    Left-sided low back pain without sciatica    Anemia    Allergic rhinitis due to pollen    Hyperlipidemia    Neck pain, acute    Depression    Anxiolytic dependence    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    Gastroesophageal  "reflux disease    History of malignant neoplasm of prostate    Hypercoagulable state    Iron deficiency anemia    Chest pain    CAD (coronary artery disease)    Acute on chronic combined systolic and diastolic congestive heart failure    History of heart attack    History of CVA (cerebrovascular accident)    Supratherapeutic INR    ACP (advance care planning)    History of percutaneous coronary intervention    Anasarca    Secondary hyperparathyroidism    Gout    Encounter for palliative care      Interdisciplinary Rounds: did not attend  General Information Comments: Pt is on a cardiac diet and reports a good appetite, consuming % of his meals. Pt ate 75% of grits, toast, coffee and OJ for breakfast this am. No N/V/D/C, abdominal pain, or chewing/swallowing issues. Current body weight: 160 lbs per chart, weight fluctuations between 18-34 lbs in the past yr. Pt was 194 lbs in 2024 (17% in ~5 months). Pt has wound to right anterior lower leg. Deuce 18. PIV. NFPE - mild depletion of clavicles, temporals, and orbitals.  Nutrition Discharge Planning: d/c on cardiac diet    Nutrition Risk Screen    Nutrition Risk Screen: no indicators present    Nutrition/Diet History    Spiritual, Cultural Beliefs, Advent Practices, Values that Affect Care: no  Food Allergies: NKFA    Anthropometrics    Temp: 98.2 °F (36.8 °C)  Height Method: Stated  Height: 6' (182.9 cm)  Height (inches): 72 in  Weight Method: Bed Scale  Weight: 72.6 kg (160 lb 0.9 oz)  Weight (lb): 160.06 lb  Ideal Body Weight (IBW), Male: 178 lb  % Ideal Body Weight, Male (lb): 89.92 %  BMI (Calculated): 21.7  BMI Grade: 18.5-24.9 - normal  Weight Loss: unintentional  Usual Body Weight (UBW), k.1 kg  Weight Change Amount: 34 lb  % Usual Body Weight: 82.58  % Weight Change From Usual Weight: -17.59 %       Lab/Procedures/Meds    Pertinent Labs Reviewed: reviewed  CBC:  No results for input(s): "WBC", "HGB", "HCT", "PLT" in the last 24 " hours.  CMP:  Recent Labs   Lab 05/15/24  0520   CALCIUM 9.0   ALBUMIN 2.5*      K 4.2   CO2 35*   CL 92*   BUN 50*   CREATININE 2.0*     Pertinent Medications Reviewed: reviewed  Scheduled Meds:   allopurinoL  100 mg Oral Daily    aspirin  81 mg Oral Daily    atorvastatin  20 mg Oral Daily    calcitRIOL  0.25 mcg Oral Every Mon, Wed, Fri    cetirizine  10 mg Oral QHS    heparin (porcine)  5,000 Units Subcutaneous Q8H    metoprolol succinate  12.5 mg Oral Daily    senna-docusate 8.6-50 mg  1 tablet Oral BID     Continuous Infusions:  PRN Meds:.  Current Facility-Administered Medications:     acetaminophen, 650 mg, Oral, Q4H PRN    albuterol-ipratropium, 3 mL, Nebulization, Once PRN    HYDROcodone-acetaminophen, 1 tablet, Oral, Q4H PRN    melatonin, 6 mg, Oral, Nightly PRN    midodrine, 5 mg, Oral, TID PRN    ondansetron, 4 mg, Oral, Q8H PRN    ondansetron, 4 mg, Intravenous, Q8H PRN    ondansetron, 4 mg, Intravenous, Q8H PRN    polyethylene glycol, 17 g, Oral, BID PRN    sodium chloride 0.9%, 10 mL, Intravenous, PRN    sodium chloride 0.9%, 10 mL, Intravenous, PRN     Estimated/Assessed Needs    Weight Used For Calorie Calculations: 72.6 kg (160 lb 0.9 oz)  Energy Calorie Requirements (kcal): 2178 kcal (30 kcal/kg)  Energy Need Method: Kcal/kg  Protein Requirements: 65 g (0.9 g/kg)  Weight Used For Protein Calculations: 72.6 kg (160 lb 0.9 oz)  Fluid Requirements (mL): 1 mL/kcal  Estimated Fluid Requirement Method: other (see comments) (or per MD)  RDA Method (mL): 2178         Nutrition Prescription Ordered    Current Diet Order: Cardiac    Evaluation of Received Nutrient/Fluid Intake    I/O: -12.7 L since admit  Energy Calories Required: meeting needs  Protein Required: meeting needs  Fluid Required: meeting needs  Comments: LBM: 5/14/24  Tolerance: tolerating  % Intake of Estimated Energy Needs: 75 - 100 %  % Meal Intake: 75 - 100 %    Nutrition Risk    Level of Risk/Frequency of Follow-up: low     Monitor  and Evaluation    Food and Nutrient Intake: energy intake, food and beverage intake  Food and Nutrient Adminstration: diet order  Knowledge/Beliefs/Attitudes: food and nutrition knowledge/skill, beliefs and attitudes  Physical Activity and Function: nutrition-related ADLs and IADLs, factors affecting access to physical activity  Anthropometric Measurements: weight, weight change, body mass index  Biochemical Data, Medical Tests and Procedures: electrolyte and renal panel, gastrointestinal profile, glucose/endocrine profile, inflammatory profile, lipid profile  Nutrition-Focused Physical Findings: overall appearance, skin     Nutrition Follow-Up    RD Follow-up?: Yes    Catie Clarke Dietetic Intern    Jazzy Chaidez, RDN, LDN

## 2024-05-15 NOTE — PLAN OF CARE
I certify I provided patient choice and a list to the patient/family of CMS rated Home Health, SNF, IRF, LTACH, retirement Nursing Homes.  Patient/Family signed Patient's Choice Disclosure Form choosing the first available HH agency.     05/15/24 1037   Post-Acute Status   Post-Acute Authorization Home Health   Home Health Status Referrals Sent   Patient choice form signed by patient/caregiver List with quality metrics by geographic area provided;List from CMS Compare;List from System Post-Acute Care   Discharge Delays None known at this time   Discharge Plan   Discharge Plan A Home Health   Discharge Plan B Home Health     Patient current with [facility] and refused to go back with that HH agency.     Patient has been picked up by [facility].

## 2024-05-15 NOTE — PROGRESS NOTES
Cardiology Progress Note    5/15/2024  1:56 PM    Subjective/Interim:      Feels much better.  No SOB.  Sitting in chair.  Lasix held due to contraction alkalosis.      Objective:   24-hour Vitals:  Temp:  [97.8 °F (36.6 °C)-98.8 °F (37.1 °C)] 98.2 °F (36.8 °C)  Pulse:  [] 108  Resp:  [16-20] 16  SpO2:  [96 %-99 %] 98 %  BP: (102-121)/(60-71) 115/71    Physical Examination:  Vitals: /71   Pulse 108   Temp 98.2 °F (36.8 °C) (Oral)   Resp 16   Ht 6' (1.829 m)   Wt 72.6 kg (160 lb 0.9 oz)   SpO2 98%   BMI 21.71 kg/m²     Physical Exam  Constitutional:       General: He is not in acute distress.  Neck:      Vascular: Hepatojugular reflux and JVD (improved) present.   Cardiovascular:      Rate and Rhythm: Normal rate and regular rhythm. Occasional Extrasystoles are present.     Heart sounds: S1 normal and S2 normal. Murmur heard.      Systolic murmur is present.      Gallop present. S3 sounds present. No S4 sounds.   Pulmonary:      Effort: Pulmonary effort is normal.      Breath sounds: Normal air entry. Examination of the right-lower field reveals decreased breath sounds. Examination of the left-lower field reveals decreased breath sounds. Decreased breath sounds present.   Abdominal:      General: Bowel sounds are normal.      Palpations: Abdomen is soft. There is hepatomegaly.      Tenderness: There is no abdominal tenderness.   Musculoskeletal:      Right lower leg: Edema (improved) present.      Left lower leg: Edema (improved) present.   Skin:     Coloration: Skin is not pale.   Neurological:      Mental Status: He is alert.   Psychiatric:         Behavior: Behavior is cooperative.           Intake/Output Summary (Last 24 hours) at 5/15/2024 1356  Last data filed at 5/15/2024 0919  Gross per 24 hour   Intake 1053.9 ml   Output 2700 ml   Net -1646.1 ml       Laboratory:  Trended Lab Data:  Recent Labs   Lab 05/12/24  0429 05/13/24  0408 05/14/24  0556   WBC 8.48 8.63 9.46   HGB 13.8* 12.8*  "14.0   HCT 43.5 39.3* 42.8   PLT 84* 79* 93*       Recent Labs   Lab 05/14/24  0556 05/14/24  1824 05/15/24  0520   * 134* 136   K 4.4 4.2 4.2   CL 92* 89* 92*   CO2 34* 31* 35*   BUN 45* 47* 50*   GLU 93 107 82   CALCIUM 9.0 9.1 9.0   MG 2.0 2.1 2.0   PHOS 3.8 4.1 4.2       Recent Labs   Lab 05/09/24  1405 05/10/24  0923 05/10/24  1909 05/14/24  0556 05/14/24  1824 05/15/24  0520   PROT 6.9 6.8  --   --   --   --    ALBUMIN 3.0* 3.0*   < > 2.6* 2.6* 2.5*   BILITOT 1.1* 1.1*  --   --   --   --    AST 19 20  --   --   --   --    ALT 14 14  --   --   --   --    ALKPHOS 83 88  --   --   --   --     < > = values in this interval not displayed.       No results for input(s): "PROTIME", "PTT", "INR" in the last 168 hours.    Cardiac:   Recent Labs   Lab 05/09/24  1311 05/10/24  0923   TROPONINI 0.189* 0.165*   BNP 2,691*  --        FLP:   Lab Results   Component Value Date    CHOL 123 08/09/2022    HDL 35 (L) 08/09/2022    LDLCALC 78.2 08/09/2022    TRIG 49 08/09/2022    CHOLHDL 28.5 08/09/2022     DM:   Lab Results   Component Value Date    HGBA1C 5.8 (H) 05/10/2024    HGBA1C 5.7 (H) 08/28/2023    HGBA1C 5.7 (H) 08/09/2022    MICROALBUR 2.4 02/15/2016    LDLCALC 78.2 08/09/2022    CREATININE 2.0 (H) 05/15/2024     Thyroid: No results found for: "TSH", "FREET4", "G5XMSJT", "W8RMEOB", "THYROIDAB"  Anemia:   Lab Results   Component Value Date    IRON 57 02/01/2019    EYOMYYGX97 1045 (H) 02/02/2024    FOLATE 9.3 02/02/2024     Urinalysis:   Lab Results   Component Value Date    COLORU Yellow 05/09/2024    SPECGRAV 1.010 05/09/2024    NITRITE Negative 05/09/2024    GLUCOSEU NEGATIVE 02/01/2019    KETONESU Negative 05/09/2024    UROBILINOGEN 2.0-3.0 (A) 05/09/2024    BILIRUBINUR NEGATIVE 02/01/2019     @      Other Results:  EKG (my interpretation):    TELEMETRY:  sinus    Echo:   Results for orders placed or performed during the hospital encounter of 05/09/24   Echo   Result Value Ref Range    BSA 2.09 m2    LVOT stroke " volume 37.36 cm3    LVIDd 5.21 3.5 - 6.0 cm    LV Systolic Volume 102.42 mL    LV Systolic Volume Index 49.2 mL/m2    LVIDs 4.70 (A) 2.1 - 4.0 cm    LV Diastolic Volume 129.99 mL    LV Diastolic Volume Index 62.50 mL/m2    IVS 1.33 (A) 0.6 - 1.1 cm    LVOT diameter 2.25 cm    LVOT area 4.0 cm2    FS 10 (A) 28 - 44 %    Left Ventricle Relative Wall Thickness 0.55 cm    Posterior Wall 1.43 (A) 0.6 - 1.1 cm    LV mass 304.15 g    LV Mass Index 146 g/m2    MV Peak E Boby 0.63 m/s    TDI LATERAL 0.04 m/s    TDI SEPTAL 0.03 m/s    E/E' ratio 18.00 m/s    MV Peak A Boby 0.29 m/s    TR Max Boby 3.26 m/s    E/A ratio 2.17     E wave deceleration time 117.17 msec    LV SEPTAL E/E' RATIO 21.00 m/s    LV LATERAL E/E' RATIO 15.75 m/s    LVOT peak boby 0.57 m/s    Left Ventricular Outflow Tract Mean Velocity 0.37 cm/s    Left Ventricular Outflow Tract Mean Gradient 0.66 mmHg    RV S' 6.39 cm/s    LA size 3.44 cm    Left Atrium Minor Axis 6.32 cm    Left Atrium Major Axis 6.92 cm    LA volume (mod) 82.77 cm3    LA Volume Index (Mod) 39.8 mL/m2    RA Major Axis 5.51 cm    AV mean gradient 3 mmHg    AV peak gradient 4 mmHg    Ao peak boby 1.05 m/s    Ao VTI 17.20 cm    LVOT peak VTI 9.40 cm    AV valve area 2.17 cm²    AV Velocity Ratio 0.54     AV index (prosthetic) 0.55     MICHEL by Velocity Ratio 2.16 cm²    Mr max boby 4.54 m/s    MV stenosis pressure 1/2 time 33.98 ms    MV valve area p 1/2 method 6.47 cm2    TV mean gradient 27 mmHg    Triscuspid Valve Regurgitation Peak Gradient 43 mmHg    PV PEAK VELOCITY 0.31 m/s    PV peak gradient 0 mmHg    RVOT peak boby 0.26 m/s    STJ 2.61 cm    Ascending aorta 2.64 cm    IVC diameter 2.32 cm    Mean e' 0.04 m/s    ZLVIDS 1.48     ZLVIDD -2.01     LA Volume Index 41.8 mL/m2    LA volume 86.93 cm3    TAPSE 1.30 cm    LA WIDTH 4.5 cm    RA Width 4.0 cm    Sinus 3.0 cm    TV resting pulmonary artery pressure 51 mmHg    RV TB RVSP 11 mmHg    Est. RA pres 8 mmHg    Narrative      Left Ventricle: The  left ventricle is mildly dilated. Mildly increased   wall thickness. Severe global hypokinesis present. There is severely   reduced systolic function with a visually estimated ejection fraction of   15 - 20%. Global longitudinal strain is reduced; -4%. Grade II diastolic   dysfunction. MV e' lateral velocity is 0.04 m/s.    Right Ventricle: Mild right ventricular enlargement. Wall thickness is   normal. Right ventricle wall motion has global hypokinesis. Systolic   function is severely reduced.    Left Atrium: Left atrium is moderately dilated.    Mitral Valve: There is mild regurgitation.    Tricuspid Valve: There is mild regurgitation.    Pulmonary Artery: The estimated pulmonary artery systolic pressure is   51 mmHg.    IVC/SVC: Intermediate venous pressure at 8 mmHg.         Radiology:  US Retroperitoneal Complete    Result Date: 5/11/2024  EXAMINATION: US RETROPERITONEAL COMPLETE CLINICAL HISTORY: ARELI; TECHNIQUE: Ultrasound of the kidneys and urinary bladder was performed including color flow and Doppler evaluation of the kidneys. COMPARISON: None. FINDINGS: Right kidney: The right kidney measures 9.3 cm. No significant cortical thinning.  Increased cortical echogenicity.  Resistive index measures 0.81.  No renal stone. No hydronephrosis. Left kidney: The left kidney measures 8.0 cm. No significant cortical thinning.  Increased cortical echogenicity.  Resistive index measures 0.82.  No renal stone. No hydronephrosis. The bladder is partially distended at the time of scanning and has an unremarkable appearance.  Incidentally noted ascites in the right lower quadrant.     1. No evidence of hydronephrosis bilaterally. 2. Findings suggestive of underlying medical renal disease. 3. Incidentally noted right lower quadrant ascites. Electronically signed by: Kaelyn Damon MD Date:    05/11/2024 Time:    06:45    Echo    Result Date: 5/10/2024    Left Ventricle: The left ventricle is mildly dilated. Mildly increased wall  thickness. Severe global hypokinesis present. There is severely reduced systolic function with a visually estimated ejection fraction of 15 - 20%. Global longitudinal strain is reduced; -4%. Grade II diastolic dysfunction. MV e' lateral velocity is 0.04 m/s.   Right Ventricle: Mild right ventricular enlargement. Wall thickness is normal. Right ventricle wall motion has global hypokinesis. Systolic function is severely reduced.   Left Atrium: Left atrium is moderately dilated.   Mitral Valve: There is mild regurgitation.   Tricuspid Valve: There is mild regurgitation.   Pulmonary Artery: The estimated pulmonary artery systolic pressure is 51 mmHg.   IVC/SVC: Intermediate venous pressure at 8 mmHg.     X-Ray Chest AP Portable    Result Date: 5/9/2024  EXAMINATION: XR CHEST AP PORTABLE CLINICAL HISTORY: CHF; TECHNIQUE: Single frontal view of the chest was performed. COMPARISON: 02/01/2024 FINDINGS: The cardiomediastinal silhouette is prominent, similar to the previous exam noting calcification of the aorta..  There is obscuration of the costophrenic angles, left greater than right suggesting effusions..  The trachea is midline.  The lungs are symmetrically expanded bilaterally with coarse interstitial attenuation bilaterally.  There is increased parenchymal attenuation projected over the left lower lung zone, may reflect a combination of atelectasis and pleural fluid however developing consolidation not excluded..  There is no pneumothorax.  The osseous structures are remarkable for degenerative change and osteopenia..     1. Pulmonary findings suggest pulmonary edema with pleural effusions, developing left lower lung zone consolidation not excluded. Electronically signed by: Ej Goel MD Date:    05/09/2024 Time:    13:27        Current Medications:     Infusions:         Scheduled:   allopurinoL  100 mg Oral Daily    aspirin  81 mg Oral Daily    atorvastatin  20 mg Oral Daily    calcitRIOL  0.25 mcg Oral Every  Mon, Wed, Fri    cetirizine  10 mg Oral QHS    heparin (porcine)  5,000 Units Subcutaneous Q8H    senna-docusate 8.6-50 mg  1 tablet Oral BID        PRN:    Current Facility-Administered Medications:     acetaminophen, 650 mg, Oral, Q4H PRN    albuterol-ipratropium, 3 mL, Nebulization, Once PRN    HYDROcodone-acetaminophen, 1 tablet, Oral, Q4H PRN    melatonin, 6 mg, Oral, Nightly PRN    midodrine, 5 mg, Oral, TID PRN    ondansetron, 4 mg, Oral, Q8H PRN    ondansetron, 4 mg, Intravenous, Q8H PRN    ondansetron, 4 mg, Intravenous, Q8H PRN    polyethylene glycol, 17 g, Oral, BID PRN    sodium chloride 0.9%, 10 mL, Intravenous, PRN    sodium chloride 0.9%, 10 mL, Intravenous, PRN     Assessment and Plan:     Felicia Lopez is a 78 y.o.male with  Acute on chronic combined systolic and diastolic congestive heart failure with anasarca.  No significant response to IV lasix.  Likely has cardio-renal syndrome.  Echo with worsening biventricular dysfunction.  -discussed with patient and Dr. Montoya.  Transfer to ICU for dobutamine.  Consult renal for assistance.  Consult palliative care medicine.  -5/13:  responded well past 3 days with dobutamine and lasix drip.  Will stop dobutamine today.  Add prn midodrine for BP.  Continue lasix drip today?  -5/14:  Transferred out of the ICU.  Continues to do well.  Decreased Lasix drip 0.5 milligrams/hour.  Aware of plans to transition to oral Lasix tomorrow.  -5/15:  lasix held due to contraction alkalosis as per renal team. Resume metoprolol at 12.5mg qd.     CAD, stable, no angina  -on aspirin, statin and metoprolol     HTN, low BP  -hold home BP meds     CKD  -consult renal team     MARLENI     Chronic idiopathic thrombocytopenia  -as per HM team         Fabio Martinez MD        Disclaimer: This document was created using voice recognition software (M*Modal Fluency Direct). Although it may be edited, this document may contain errors related to incorrect recognition of the spoken  word. Please call the physician if clarification is needed.

## 2024-05-15 NOTE — PROGRESS NOTES
[facility] Surg ([address] Medicine  Progress Note    Patient Name: Felicia Lopez  MRN: [id number]  Patient Class: IP- Inpatient   Admission Date: 5/9/2024  Length of Stay: 6 days  Attending Physician: Miriam Field MD  Primary Care Provider: Mickey Darden MD        Subjective:     Principal Problem:Acute on chronic combined systolic and diastolic congestive heart failure        HPI:  HPI by Roselia William NP:  Mr. Lopez is a 78 YOM with PMHx of CAD with history of MI s/p PCI (RCA x 2 JOSE C 2010), combined systolic and diastolic CHF, HTN, HLD, history of CVA (2015), preDM, CKD III (baseline SCr 2.1-2.3, follows with Dr. Matson), secondary hyperparathyroidism, MARLENI, chronic idiopathic thrombocytopenia, and history of prostate CA s/p TURP.     He presents to ED from Cardiology clinic due to worsening edema in thighs, legs, and scrotum with associated HARTMAN, orthopnea, PND, and decreased UOP despite taking home PO lasix daily. Symptoms have progressively worsened over the last couple of weeks. He notes some noncompliance with low sodium diet including take out pizza and [employer] meals recently. He denies fever, chills, recent illness, CP, abdominal pain, N/V/D, constipation, hematuria, changes in bowel habits or blood in stool, decreased appetite, changes in PO intake, light headiness, dizziness, seizures, or syncope. He lives with spouse, uses a walker at baseline, and is independent in ADLs.     In the ED he is HDS and afebrile. CBC with WBC 5, H/H 15.6/49.2, platelets 92 (baseline 110-133). Chemistry with , K 4.2, chloride 105, CO2 24, BUN 40, SCr 2.6 (baseline 2.1-2.3), glucose 79. LFTs WNL. BNP 2691. Troponin 0.189 (similar to priors). Hep C and HIV non reactive. UA noninfectious. CXR with pulmonary edema and effusions. EKG with SR, PACs, and T wave abnormalities similar to priors.     The patient was placed in observation to the Hospital Medicine Service for further evaluation and  treatment.     Overview/Hospital Course:  Patient had minimal urine output with furosemide and echo showed worsening systolic function to 15-20%.  Cardiology recommended transfer to ICU and initiation of dobutamine drip 5/10, continued until 5/13 and then he was transferred back to the floor on a furosemide drip.  Nephrology was consulted for potential cardiorenal syndrome.  He was continued on a furosemide drip and urine output improved.  Electrolytes monitored.  Rate of drip was decreased due to contraction alkalosis.  Oral furosemide was to have been started 5/15 but he had contraction alkalosis.    Interval History: Feels irritable, his legs are hurting, thinks this is due to gout.  Working with OT.  Leg swelling improving, denies shortness of breath.    Review of Systems   Constitutional:  Positive for fatigue. Negative for chills and fever.   Respiratory:  Negative for cough and shortness of breath.    Cardiovascular:  Positive for leg swelling. Negative for chest pain and palpitations.   Musculoskeletal:         Leg pains     Objective:     Vital Signs (Most Recent):  Temp: 98.2 °F (36.8 °C) (05/15/24 1130)  Pulse: 87 (05/15/24 1437)  Resp: 17 (05/15/24 1440)  BP: 115/71 (05/15/24 1130)  SpO2: 96 % (05/15/24 1256) Vital Signs (24h Range):  Temp:  [97.8 °F (36.6 °C)-98.8 °F (37.1 °C)] 98.2 °F (36.8 °C)  Pulse:  [] 87  Resp:  [16-20] 17  SpO2:  [96 %-99 %] 96 %  BP: (102-121)/(60-71) 115/71     Weight: 72.6 kg (160 lb 0.9 oz)  Body mass index is 21.71 kg/m².    Intake/Output Summary (Last 24 hours) at 5/15/2024 1722  Last data filed at 5/15/2024 0919  Gross per 24 hour   Intake 1053.9 ml   Output 2100 ml   Net -1046.1 ml         Physical Exam  Cardiovascular:      Rate and Rhythm: Normal rate. Rhythm irregular.      Pulses: Normal pulses.      Heart sounds: Murmur heard.   Pulmonary:      Effort: Pulmonary effort is normal.      Breath sounds: Normal breath sounds.   Abdominal:      General: Bowel sounds  are normal.      Palpations: Abdomen is soft.   Musculoskeletal:      Right lower leg: Edema present.      Left lower leg: Edema present.      Comments: Bilateral pitting edema   Skin:     General: Skin is warm and dry.   Neurological:      General: No focal deficit present.      Mental Status: He is alert and oriented to person, place, and time.             Significant Labs: All pertinent labs within the past 24 hours have been reviewed.    Significant Imaging: I have reviewed all pertinent imaging results/findings within the past 24 hours.    Assessment/Plan:      * Acute on chronic combined systolic and diastolic congestive heart failure  Anasarca  Cardiorenal syndrome    - Acute heart failure exacerbation in setting of dietary and medication noncompliance with associated ARELI on CKD and chronic demand ischemia   - At admission HDS and afebrile  - Admission BNP 2691, troponin 0.189 (at baseline), CXR with pulmonary edema and effusions, EKG with SR, PVCs and TWA similar to prior studies   - Recent TTE reviewed noting EF 35-40% with regional WMAs, LV dilation, diastolic dysfunction, mild AVS, mild MR    Current echo - with worsening systolic function 15-20%     Plan -   -elevate LEs and scrotum as tolerated  -Wound Care consulted due to altered skin integrity with blister rupture and weeping edema  -cardiac diet with fluid restriction  -strict I&Os and daily weights  -trend labs, address/replete electrolytes as indicated  -cardiology consult - recommending transfer to ICU and dobutamine drip - transferred 5/10   -hold home metoprolol while on dobutamine drip  - nephrology consulted  - pulm crit followed while patient was in ICU, OK to sign off   - 5/10 started lasix drip with good urine output.  Slowly developing contraction alkalosis however.  - 5/15 >12 liters negative since presentation, plan to transition back to oral Lasix but now held due to contraction alkalosis.    Encounter for palliative  care        Gout  - continue home allopurinol  - Uric acid level 5.9 so unlikely to have a gout attack      Secondary hyperparathyroidism        Anasarca        History of percutaneous coronary intervention        History of CVA (cerebrovascular accident)        CAD (coronary artery disease)  History of PCI  History of CVA  Hyperlipidemia   History of remote MI with PCI in 2010 and CVA in 2015  Chronic demand ischemia noted with elevated troponins  Admission troponin at baseline and on acute dynamic EKG changes    Plan -   -continue home metoprolol as detailed above  -continue home ASA  -continue home statin per pharmacy formulary alternative  -dose/medication adjustment as appropriate   -EKG PRN concerns  -trend labs, address/replete electrolytes as indicated    Iron deficiency anemia  Chronic idiopathic thrombocytopenia  Chronic MARLENI and thrombocytopenia  Thrombocytopenia etiology unclear.  B12 and folate within normal limits and HIV negative.  No overt bleeding    Plan -   -trend labs over course, address/transfuse as indicated  -hold DVT prophylaxis if platelets drop less than 50          Hyperlipidemia  - continue simvastatin alternative      Chronic kidney disease, stage III (moderate)  ARELI on CKD - improving with diuresis   Secondary hyperparathyroidism   Chronic CKD with admission SCr 2.6 -->2.8->2.7  Baseline 2.1-2.3  Likely cardiorenal syndrome in setting of CHF exacerbation    Plan -   - Patient on Lasix drip with good urine output (aim for 100ml/hr)   - nephrology following  - continue home Calcitrol MWF  - trend labs, address/replete electrolytes as indicated  - avoid nephrotoxins and hypotension as able, renally dose medications    Essential hypertension  Plan -   -held home metoprolol while on dobutamine, resuming 12.5 mg daily today          VTE Risk Mitigation (From admission, onward)           Ordered     heparin (porcine) injection 5,000 Units  Every 8 hours         05/09/24 1447     IP VTE HIGH  RISK PATIENT  Once         05/09/24 1447     Place sequential compression device  Until discontinued         05/09/24 1447                    Discharge Planning   SHANICE:      Code Status: Full Code   Is the patient medically ready for discharge?:     Reason for patient still in hospital (select all that apply): Patient trending condition and Consult recommendations  Discharge Plan A: Home Health   Discharge Delays: None known at this time              Miriam Rod MD  Department of Hospital Medicine   [facility] ([address])

## 2024-05-15 NOTE — PLAN OF CARE
"DONNA called and spoke with patient's wife--patient declined SNF and wants to return home with HH. Patient wife is not happy with this plan but "knew  would say this because he is hard headed". Patient wife would like a new HH agency. SW will send out new referrals to switch HH agencies.   "

## 2024-05-15 NOTE — PT/OT/SLP PROGRESS
Occupational Therapy   Treatment    Name: Felicia Lopez  MRN: [id number]  Admitting Diagnosis:  Acute on chronic combined systolic and diastolic congestive heart failure       Recommendations:     Discharge Recommendations: Moderate Intensity Therapy  Discharge Equipment Recommendations:  bath bench, wheelchair  Barriers to discharge:  Decreased caregiver support    Assessment:     Felicia Lopez is a 78 y.o. male with a medical diagnosis of Acute on chronic combined systolic and diastolic congestive heart failure.  He presents with left knee pain. Performance deficits affecting function are weakness, impaired endurance, impaired self care skills, impaired functional mobility, gait instability, impaired balance, decreased coordination, decreased upper extremity function, decreased lower extremity function, decreased safety awareness, pain, decreased ROM, impaired cardiopulmonary response to activity. Patient with CGA, RW for functional mobility; needing increase of time and resting break. CGA, RW for grooming tasks while standing. Overall, tolerated session though patient with limited activity tolerance d/t knee pain.     Rehab Prognosis:  Good; patient would benefit from acute skilled OT services to address these deficits and reach maximum level of function.       Plan:     Patient to be seen 5 x/week to address the above listed problems via self-care/home management, therapeutic activities, therapeutic exercises  Plan of Care Expires:    Plan of Care Reviewed with: patient    Subjective     Chief Complaint: Knee pain during fxl mobility.   Patient/Family Comments/goals: agreeable to participate in therapy for OT intervention   Pain/Comfort:  Pain Rating 1: 10/10  Location - Side 1: Left  Location 1: knee  Pain Addressed 1: Reposition, Distraction, Cessation of Activity, Pre-medicate for activity  Pain Rating Post-Intervention 1: 10/10    Objective:     Communicated with: RN prior to session.  Patient found up in  chair with peripheral IV, oxygen upon OT entry to room.    General Precautions: Standard, fall    Orthopedic Precautions:N/A  Braces: N/A  Respiratory Status: Room air     Occupational Performance:     Bed Mobility:    NT d/t pt C      Functional Mobility/Transfers:  Sit <> Stand: Min A   Functional Mobility: CGA, RW   Chair transfer: CGA     Activities of Daily Living:  Grooming: CGA, RW for oral care, face washing, and hand washing standing at the sink       [facility] 6 Click ADL: 17    Treatment & Education:  OT role, plan of care, progression of goals, importance of continued OOB activity, ADL/functional transfer and mobility retraining, discharge recommendation, call don't fall, safety precautions, fall prevention.   *importance of participating in therapy   *resting breaks during functional ambulation   *importance of self care and grooming task while in the hospital      Patient left up in chair with all lines intact, call button in reach, RN notified, and MD present    GOALS:   Multidisciplinary Problems       Occupational Therapy Goals          Problem: Occupational Therapy    Goal Priority Disciplines Outcome Interventions   Occupational Therapy Goal     OT, PT/OT Progressing    Description: Goals to be met by: 5/25/2024     Patient will increase functional independence with ADLs by performing:    UE Dressing with Supervision.  LE Dressing with Minimal Assistance.  Grooming while standing at sink with Contact Guard Assistance.  Toileting from toilet with Contact Guard Assistance for hygiene and clothing management.   Toilet transfer to toilet with Contact Guard Assistance.                         Time Tracking:     OT Date of Treatment: 05/15/24  OT Start Time: 1129  OT Stop Time: 1150  OT Total Time (min): 21 min    Billable Minutes:Self Care/Home Management 21 min    OT/NADEEM: NADEEM     Number of NADEEM visits since last OT visit: 1    5/15/2024

## 2024-05-15 NOTE — ASSESSMENT & PLAN NOTE
Latest ECHO performed and demonstrates- Results for orders placed during the hospital encounter of 05/09/24    Echo    Interpretation Summary    Left Ventricle: The left ventricle is mildly dilated. Mildly increased wall thickness. Severe global hypokinesis present. There is severely reduced systolic function with a visually estimated ejection fraction of 15 - 20%. Global longitudinal strain is reduced; -4%. Grade II diastolic dysfunction. MV e' lateral velocity is 0.04 m/s.    Right Ventricle: Mild right ventricular enlargement. Wall thickness is normal. Right ventricle wall motion has global hypokinesis. Systolic function is severely reduced.    Left Atrium: Left atrium is moderately dilated.    Mitral Valve: There is mild regurgitation.    Tricuspid Valve: There is mild regurgitation.    Pulmonary Artery: The estimated pulmonary artery systolic pressure is 51 mmHg.    IVC/SVC: Intermediate venous pressure at 8 mmHg.    Recent Labs   Lab 05/09/24  1311   BNP 2,691*

## 2024-05-15 NOTE — SUBJECTIVE & OBJECTIVE
Interval History: Feels irritable, his legs are hurting, thinks this is due to gout.  Working with OT.  Leg swelling improving, denies shortness of breath.    Review of Systems   Constitutional:  Positive for fatigue. Negative for chills and fever.   Respiratory:  Negative for cough and shortness of breath.    Cardiovascular:  Positive for leg swelling. Negative for chest pain and palpitations.   Musculoskeletal:         Leg pains     Objective:     Vital Signs (Most Recent):  Temp: 98.2 °F (36.8 °C) (05/15/24 1130)  Pulse: 87 (05/15/24 1437)  Resp: 17 (05/15/24 1440)  BP: 115/71 (05/15/24 1130)  SpO2: 96 % (05/15/24 1256) Vital Signs (24h Range):  Temp:  [97.8 °F (36.6 °C)-98.8 °F (37.1 °C)] 98.2 °F (36.8 °C)  Pulse:  [] 87  Resp:  [16-20] 17  SpO2:  [96 %-99 %] 96 %  BP: (102-121)/(60-71) 115/71     Weight: 72.6 kg (160 lb 0.9 oz)  Body mass index is 21.71 kg/m².    Intake/Output Summary (Last 24 hours) at 5/15/2024 1722  Last data filed at 5/15/2024 0919  Gross per 24 hour   Intake 1053.9 ml   Output 2100 ml   Net -1046.1 ml         Physical Exam  Cardiovascular:      Rate and Rhythm: Normal rate. Rhythm irregular.      Pulses: Normal pulses.      Heart sounds: Murmur heard.   Pulmonary:      Effort: Pulmonary effort is normal.      Breath sounds: Normal breath sounds.   Abdominal:      General: Bowel sounds are normal.      Palpations: Abdomen is soft.   Musculoskeletal:      Right lower leg: Edema present.      Left lower leg: Edema present.      Comments: Bilateral pitting edema   Skin:     General: Skin is warm and dry.   Neurological:      General: No focal deficit present.      Mental Status: He is alert and oriented to person, place, and time.             Significant Labs: All pertinent labs within the past 24 hours have been reviewed.    Significant Imaging: I have reviewed all pertinent imaging results/findings within the past 24 hours.

## 2024-05-15 NOTE — PLAN OF CARE
Recommendations  1) Continue to follow cardiac diet   2) Shane BID to promote wound healing   3) Consider adding a nutrition supplement if pt's weight continues to downtrend  4) Monitor weights and labs   5) RD to follow to monitor changes     Goals: Pt will consume % of EEN/EPN by RD follow-up     Nutrition Goal Status: new  Communication of RD Recs: other (comment) (POC)     Assessment and Plan  Nutrition Problem  Increased nutrient needs     Related to (etiology):   Increased demand for nutrient     Signs and Symptoms (as evidenced by):   Unintentional weight loss of >10% in 6 months  Low albumin level  Delayed wound healing  Mild depletion of temporals, orbitals, and clavicles     Interventions (treatment strategy):  Increased energy diet  Commercial beverage  Collaboration with other providers     Nutrition Diagnosis Status:   New

## 2024-05-15 NOTE — PROGRESS NOTES
[facility] Surg [address])  Palliative Medicine  Progress Note    Patient Name: Felicia Lopez  MRN: [id number]  Admission Date: 5/9/2024  Hospital Length of Stay: 6 days  Code Status: Full Code   Attending Provider: Miriam Field MD  Consulting Provider: Rsoa Bacon DNP  Primary Care Physician: Mickey Darden MD  Principal Problem:Acute on chronic combined systolic and diastolic congestive heart failure    Patient information was obtained from patient and primary team.      Assessment/Plan:     Cardiac/Vascular  * Acute on chronic combined systolic and diastolic congestive heart failure  Latest ECHO performed and demonstrates- Results for orders placed during the hospital encounter of 05/09/24    Echo    Interpretation Summary    Left Ventricle: The left ventricle is mildly dilated. Mildly increased wall thickness. Severe global hypokinesis present. There is severely reduced systolic function with a visually estimated ejection fraction of 15 - 20%. Global longitudinal strain is reduced; -4%. Grade II diastolic dysfunction. MV e' lateral velocity is 0.04 m/s.    Right Ventricle: Mild right ventricular enlargement. Wall thickness is normal. Right ventricle wall motion has global hypokinesis. Systolic function is severely reduced.    Left Atrium: Left atrium is moderately dilated.    Mitral Valve: There is mild regurgitation.    Tricuspid Valve: There is mild regurgitation.    Pulmonary Artery: The estimated pulmonary artery systolic pressure is 51 mmHg.    IVC/SVC: Intermediate venous pressure at 8 mmHg.    Recent Labs   Lab 05/09/24  1311   BNP 2,691*         CAD (coronary artery disease)  - Noted; cardiology consulted   - Transferred to ICU on dobutamine drip    Renal/  Chronic kidney disease, stage III (moderate)  - creatinine of 2.6, BUN 40, BNP 2691, and chest x-ray with marked pulmonary edema.   - Nephrology consulted     Palliative Care  Encounter for palliative care  5/15/24  -  "Interval chart review performed  - Patient transferred out of the ICU this weekend   - Along with Vangie Lara RN, visited with the patient at bedside. Mr. Lopez was lying in bed. He does have temporal lobe wasting. Mr. Lopez reports he is feeling "better since getting out of bed". He remains on oxygen, but denies feeling SOB. He is hopeful he is able to discharge home today. I was very transparent that I am concerned he will continue to have hospitalizations due to heart failure given the nature of how weak his heart is. I was very transparent that we can't fix his heart failure and Mr. Lopez stated "yes you are right". I further elaborated that it is important he thinks about his wishes in the moment his heart or breathing were to stop. I was very transparent that given the severity of his HF, CPR/ intubation would likely not help him. Mr. Lopez stated "I don't even want to think about that". "I don't like to have that conversation". Emotional support provided. I did recommend home based PC at the minimum and Mr. Lopez asked about their role. Elaborated on role of home based pall care and Mr. Lopez is open to this. Will place referral for home based pall care.     5/13/24  - Consult for advance care planning/ goals of care in chronically ill patient with CHF and CKD admitted to the ICU on dobutamine drip. Of note, patient is known to me from previous hospitalization on 2/2/24.  Extensive chart review performed.  - Along with Vangie Lara (RN), visited with patient in the ICU. He was lying in bed, he is chronically ill appearing. He appears very weak with temporal lobe wasting. We reflected on Mr. Lopez outside of the hospital. He lives with his wife, Lauryn. She is elderly and sick herself and has a caregiver with her at home. He reports he is independent at home. He worked as a . He has 1 son who lives out of state.   - Today, Mr. Lopez is frustrated with being unable to get out " "of bed. He stated "I just want to stand up for a second". He further stated lying in the bed makes him very sore. He reports at home he is able to move around as he wishes.  - I was very transparent with Mr. Lopez that I am concerned that he is in the hospital again for similar presentation and he said "yes I know, but I just have to go with the flow". This is the patients fourth time coming to the ED for SOB related to CHF in the last 6 months. He is very weak appearing with temporal lobe wasting. Albumin 2.4. I was very transparent with Mr. Lopez that his heart is very weak and he stated "that may be why this is happening". I was transparent that this is likely the cause of presentation along with CKD.   - He reports little social support at home besides his wife's caregiver. He does have a sister and niece who call to check in on him  - Along with Vangie Lara RN, we did contact his very support wife, Lauryn. They have been  for 54 years. She reports Mr. Lopez had a heart attack in 2011 and did relatively well. She reports since he started taking lasix (which she reports has been some time) he has been weak and losing weight. She reports he has a very poor appetite and she is concerned about how weak he is. Lauryn stated that she has not been feeling well herself. She further stated that Mr. Lopez had home health and he seemed to be accumulating more fluid in his lower extremities. We were very transparent that Mr. Arevalos heart is very weak and she said "oh please dont tell me that right now". She is understandably overwhelmed with her own health. We did discuss CHF and high probability for more hospitalizations given the degree of his heart failure. We discussed use of dobutamine.   - Mrs. Combs remains hopeful that Mr. Lopez's status improves and she has been asking God to take care of him. She further stated she is hopeful he is able to work with PT/OT and return to his prior level of " "mobility.  - I would recommend home based pall care. Mr. Lopez would qualify for home hospice, however, it seems as though Mr. Lopez and his wife have poor insight and I would recommend further discussion regarding severity of disease.       Other  Anasarca  - Patient noted to have Lower extremity swelling and generalized edema         Subjective:     Chief Complaint:   Chief Complaint   Patient presents with    Leg Swelling     Pt. States his leg have been swollen x 1 week. Pt. reports SOB x 1 week. Pt. Denies chest pain. Pt. Is alert and ABC's are intact.       HPI:   Per H&P: "HPI: Mr. Lopez is a 78 YOM with PMHx of CAD with history of MI s/p PCI (RCA x 2 JOSE C 2010), combined systolic and diastolic CHF, HTN, HLD, history of CVA (2015), preDM, CKD III (baseline SCr 2.1-2.3, follows with Dr. Matson), secondary hyperparathyroidism, MARLENI, chronic idiopathic thrombocytopenia, and history of prostate CA s/p TURP.      He presents to ED from Cardiology clinic due to worsening edema in thighs, legs, and scrotum with associated HARTMAN, orthopnea, PND, and decreased UOP despite taking home PO lasix daily. Symptoms have progressively worsened over the last couple of weeks. He notes some noncompliance with low sodium diet including take out pizza and [employer] meals recently. He denies fever, chills, recent illness, CP, abdominal pain, N/V/D, constipation, hematuria, changes in bowel habits or blood in stool, decreased appetite, changes in PO intake, light headiness, dizziness, seizures, or syncope. He lives with spouse, uses a walker at baseline, and is independent in ADLs.      In the ED he is HDS and afebrile. CBC with WBC 5, H/H 15.6/49.2, platelets 92 (baseline 110-133). Chemistry with , K 4.2, chloride 105, CO2 24, BUN 40, SCr 2.6 (baseline 2.1-2.3), glucose 79. LFTs WNL. BNP 2691. Troponin 0.189 (similar to priors). Hep C and HIV non reactive. UA noninfectious. CXR with pulmonary edema and effusions. EKG with SR, PACs, " "and T wave abnormalities similar to priors.      The patient was placed in observation to the Hospital Medicine Service for further evaluation and treatment."    At time of initial consult, patient seen in the ICU. He is chronically ill appearing. Patient is known to me from previous hospitalization. Palliative medicine consulted for goals of care/ advance care planning.     Hospital Course:  No notes on file    Interval History: Reports feeling "good" after getting out of bed yesterday     Medications:  Continuous Infusions:  Scheduled Meds:   allopurinoL  100 mg Oral Daily    aspirin  81 mg Oral Daily    atorvastatin  20 mg Oral Daily    calcitRIOL  0.25 mcg Oral Every Mon, Wed, Fri    cetirizine  10 mg Oral QHS    heparin (porcine)  5,000 Units Subcutaneous Q8H    senna-docusate 8.6-50 mg  1 tablet Oral BID     PRN Meds:  Current Facility-Administered Medications:     acetaminophen, 650 mg, Oral, Q4H PRN    albuterol-ipratropium, 3 mL, Nebulization, Once PRN    HYDROcodone-acetaminophen, 1 tablet, Oral, Q4H PRN    melatonin, 6 mg, Oral, Nightly PRN    midodrine, 5 mg, Oral, TID PRN    ondansetron, 4 mg, Oral, Q8H PRN    ondansetron, 4 mg, Intravenous, Q8H PRN    ondansetron, 4 mg, Intravenous, Q8H PRN    polyethylene glycol, 17 g, Oral, BID PRN    sodium chloride 0.9%, 10 mL, Intravenous, PRN    sodium chloride 0.9%, 10 mL, Intravenous, PRN    Objective:     Vital Signs (Most Recent):  Temp: 97.8 °F (36.6 °C) (05/15/24 0832)  Pulse: 87 (05/15/24 0832)  Resp: 18 (05/15/24 1039)  BP: 117/71 (05/15/24 0832)  SpO2: 96 % (05/15/24 0832) Vital Signs (24h Range):  Temp:  [97.8 °F (36.6 °C)-98.8 °F (37.1 °C)] 97.8 °F (36.6 °C)  Pulse:  [] 87  Resp:  [14-20] 18  SpO2:  [96 %-99 %] 96 %  BP: (102-121)/(58-71) 117/71     Weight: 72.6 kg (160 lb 0.9 oz)  Body mass index is 21.71 kg/m².       Physical Exam  Constitutional:       Appearance: He is ill-appearing.      Comments: Temporal lobe wasting    Cardiovascular:     " " Rate and Rhythm: Normal rate.   Pulmonary:      Effort: No respiratory distress.   Neurological:      Mental Status: He is alert.      Comments: Reports feeling very good today, gets frustrated when discussing worsening HF             Review of Symptoms      Symptom Assessment (ESAS 0-10 Scale)  Pain:  0  Fatigue:  2         Psychosocial/Cultural:   See Palliative Psychosocial Note: Yes  - Lives with wife, Lauryn  **Primary  to Follow**  Palliative Care  Consult: No        Advance Care Planning  Advance Directives:   Goals of Care: What is most important right now is to focus on spending time at home, remaining as independent as possible. Accordingly, we have decided that the best plan to meet the patient's goals includes continuing with treatment.         Significant Labs: All pertinent labs within the past 24 hours have been reviewed.  CBC:   Recent Labs   Lab 05/14/24  0556   WBC 9.46   HGB 14.0   HCT 42.8   MCV 86   PLT 93*     BMP:  Recent Labs   Lab 05/15/24  0520   GLU 82      K 4.2   CL 92*   CO2 35*   BUN 50*   CREATININE 2.0*   CALCIUM 9.0   MG 2.0     LFT:  Lab Results   Component Value Date    AST 20 05/10/2024    ALKPHOS 88 05/10/2024    BILITOT 1.1 (H) 05/10/2024     Albumin:   Albumin   Date Value Ref Range Status   05/15/2024 2.5 (L) 3.5 - 5.2 g/dL Final     Protein:   Total Protein   Date Value Ref Range Status   05/10/2024 6.8 6.0 - 8.4 g/dL Final     Lactic acid:   No results found for: "LACTATE"    Significant Imaging: I have reviewed all pertinent imaging results/findings within the past 24 hours.    > 50% of 35 min visit spent in chart review, face to face discussion of symptom assessment, coordination of care with other specialists, and d/c planning      Rosa Bacon DNP  Palliative Medicine  [facility] ([address])                "

## 2024-05-15 NOTE — ASSESSMENT & PLAN NOTE
"5/15/24  - Interval chart review performed  - Patient transferred out of the ICU this weekend   - Along with Vangie Lara, RAIN, visited with the patient at bedside. Mr. Lopez was lying in bed. He does have temporal lobe wasting. Mr. Lopez reports he is feeling "better since getting out of bed". He remains on oxygen, but denies feeling SOB. He is hopeful he is able to discharge home today. I was very transparent that I am concerned he will continue to have hospitalizations due to heart failure given the nature of how weak his heart is. I was very transparent that we can't fix his heart failure and Mr. Lopez stated "yes you are right". I further elaborated that it is important he thinks about his wishes in the moment his heart or breathing were to stop. I was very transparent that given the severity of his HF, CPR/ intubation would likely not help him. Mr. Lopez stated "I don't even want to think about that". "I don't like to have that conversation". Emotional support provided. I did recommend home based PC at the minimum and Mr. Lopez asked about their role. Elaborated on role of home based pall care and Mr. Lopez is open to this. Will place referral for home based pall care.     5/13/24  - Consult for advance care planning/ goals of care in chronically ill patient with CHF and CKD admitted to the ICU on dobutamine drip. Of note, patient is known to me from previous hospitalization on 2/2/24.  Extensive chart review performed.  - Along with Vangie Lara (RN), visited with patient in the ICU. He was lying in bed, he is chronically ill appearing. He appears very weak with temporal lobe wasting. We reflected on Mr. Lopez outside of the hospital. He lives with his wife, Lauryn. She is elderly and sick herself and has a caregiver with her at home. He reports he is independent at home. He worked as a . He has 1 son who lives out of state.   - Today, Mr. Lopez is frustrated with being unable " "to get out of bed. He stated "I just want to stand up for a second". He further stated lying in the bed makes him very sore. He reports at home he is able to move around as he wishes.  - I was very transparent with Mr. Lopez that I am concerned that he is in the hospital again for similar presentation and he said "yes I know, but I just have to go with the flow". This is the patients fourth time coming to the ED for SOB related to CHF in the last 6 months. He is very weak appearing with temporal lobe wasting. Albumin 2.4. I was very transparent with Mr. Lopez that his heart is very weak and he stated "that may be why this is happening". I was transparent that this is likely the cause of presentation along with CKD.   - He reports little social support at home besides his wife's caregiver. He does have a sister and niece who call to check in on him  - Along with Vangie Lara RN, we did contact his very support wife, Lauryn. They have been  for 54 years. She reports Mr. Lopez had a heart attack in 2011 and did relatively well. She reports since he started taking lasix (which she reports has been some time) he has been weak and losing weight. She reports he has a very poor appetite and she is concerned about how weak he is. Lauryn stated that she has not been feeling well herself. She further stated that Mr. Lopez had home health and he seemed to be accumulating more fluid in his lower extremities. We were very transparent that Mr. Lopez's heart is very weak and she said "oh please dont tell me that right now". She is understandably overwhelmed with her own health. We did discuss CHF and high probability for more hospitalizations given the degree of his heart failure. We discussed use of dobutamine.   - Mrs. Combs remains hopeful that Mr. Lopez's status improves and she has been asking God to take care of him. She further stated she is hopeful he is able to work with PT/OT and return to his prior " level of mobility.  - I would recommend home based pall care. Mr. Lopez would qualify for home hospice, however, it seems as though Mr. Lopez and his wife have poor insight and I would recommend further discussion regarding severity of disease.

## 2024-05-15 NOTE — ASSESSMENT & PLAN NOTE
ARELI on CKD - improving with diuresis   Secondary hyperparathyroidism   Chronic CKD with admission SCr 2.6 -->2.8->2.7  Baseline 2.1-2.3  Likely cardiorenal syndrome in setting of CHF exacerbation    Plan -   - Patient on Lasix drip with good urine output (aim for 100ml/hr)   - nephrology following  - continue home Calcitrol MWF  - trend labs, address/replete electrolytes as indicated  - avoid nephrotoxins and hypotension as able, renally dose medications

## 2024-05-16 VITALS
TEMPERATURE: 98 F | BODY MASS INDEX: 21.68 KG/M2 | HEART RATE: 84 BPM | DIASTOLIC BLOOD PRESSURE: 53 MMHG | OXYGEN SATURATION: 96 % | HEIGHT: 72 IN | WEIGHT: 160.06 LBS | RESPIRATION RATE: 18 BRPM | SYSTOLIC BLOOD PRESSURE: 93 MMHG

## 2024-05-16 LAB
ALBUMIN SERPL BCP-MCNC: 2.5 G/DL (ref 3.5–5.2)
ANION GAP SERPL CALC-SCNC: 13 MMOL/L (ref 8–16)
BUN SERPL-MCNC: 58 MG/DL (ref 8–23)
CALCIUM SERPL-MCNC: 8.9 MG/DL (ref 8.7–10.5)
CHLORIDE SERPL-SCNC: 90 MMOL/L (ref 95–110)
CO2 SERPL-SCNC: 31 MMOL/L (ref 23–29)
CREAT SERPL-MCNC: 2.3 MG/DL (ref 0.5–1.4)
EST. GFR  (NO RACE VARIABLE): 28 ML/MIN/1.73 M^2
GLUCOSE SERPL-MCNC: 76 MG/DL (ref 70–110)
MAGNESIUM SERPL-MCNC: 2 MG/DL (ref 1.6–2.6)
PHOSPHATE SERPL-MCNC: 3.7 MG/DL (ref 2.7–4.5)
POTASSIUM SERPL-SCNC: 4.3 MMOL/L (ref 3.5–5.1)
SODIUM SERPL-SCNC: 134 MMOL/L (ref 136–145)

## 2024-05-16 PROCEDURE — 25000003 PHARM REV CODE 250: Performed by: INTERNAL MEDICINE

## 2024-05-16 PROCEDURE — 36415 COLL VENOUS BLD VENIPUNCTURE: CPT | Performed by: STUDENT IN AN ORGANIZED HEALTH CARE EDUCATION/TRAINING PROGRAM

## 2024-05-16 PROCEDURE — 97116 GAIT TRAINING THERAPY: CPT | Mod: CQ

## 2024-05-16 PROCEDURE — 25000003 PHARM REV CODE 250: Performed by: NURSE PRACTITIONER

## 2024-05-16 PROCEDURE — 83735 ASSAY OF MAGNESIUM: CPT | Performed by: STUDENT IN AN ORGANIZED HEALTH CARE EDUCATION/TRAINING PROGRAM

## 2024-05-16 PROCEDURE — 97535 SELF CARE MNGMENT TRAINING: CPT | Mod: CO

## 2024-05-16 PROCEDURE — 99232 SBSQ HOSP IP/OBS MODERATE 35: CPT | Mod: ,,, | Performed by: INTERNAL MEDICINE

## 2024-05-16 PROCEDURE — 80069 RENAL FUNCTION PANEL: CPT | Performed by: STUDENT IN AN ORGANIZED HEALTH CARE EDUCATION/TRAINING PROGRAM

## 2024-05-16 PROCEDURE — 63600175 PHARM REV CODE 636 W HCPCS: Performed by: NURSE PRACTITIONER

## 2024-05-16 RX ORDER — FUROSEMIDE 20 MG/1
80 TABLET ORAL DAILY
Status: DISCONTINUED | OUTPATIENT
Start: 2024-05-16 | End: 2024-05-16 | Stop reason: HOSPADM

## 2024-05-16 RX ORDER — FUROSEMIDE 80 MG/1
80 TABLET ORAL DAILY
Qty: 90 TABLET | Refills: 0 | Status: SHIPPED | OUTPATIENT
Start: 2024-05-16

## 2024-05-16 RX ORDER — METOPROLOL SUCCINATE 25 MG/1
12.5 TABLET, EXTENDED RELEASE ORAL DAILY
Qty: 45 TABLET | Refills: 0 | Status: SHIPPED | OUTPATIENT
Start: 2024-05-17

## 2024-05-16 RX ADMIN — FUROSEMIDE 80 MG: 20 TABLET ORAL at 08:05

## 2024-05-16 RX ADMIN — ALLOPURINOL 100 MG: 100 TABLET ORAL at 08:05

## 2024-05-16 RX ADMIN — HEPARIN SODIUM 5000 UNITS: 5000 INJECTION INTRAVENOUS; SUBCUTANEOUS at 05:05

## 2024-05-16 RX ADMIN — ASPIRIN 81 MG: 81 TABLET, COATED ORAL at 08:05

## 2024-05-16 RX ADMIN — HYDROCODONE BITARTRATE AND ACETAMINOPHEN 1 TABLET: 5; 325 TABLET ORAL at 07:05

## 2024-05-16 RX ADMIN — ATORVASTATIN CALCIUM 20 MG: 20 TABLET, FILM COATED ORAL at 08:05

## 2024-05-16 RX ADMIN — METOPROLOL SUCCINATE 12.5 MG: 25 TABLET, EXTENDED RELEASE ORAL at 08:05

## 2024-05-16 RX ADMIN — HEPARIN SODIUM 5000 UNITS: 5000 INJECTION INTRAVENOUS; SUBCUTANEOUS at 02:05

## 2024-05-16 NOTE — PLAN OF CARE
Medicare Message     Important Message from Medicare regarding Discharge Appeal Rights Given to patient/caregiver; Explained to patient/caregiver; Signed/date by patient/caregiver Given to patient/caregiver; Explained to patient/caregiver; Other (comments)Important Message from Medicare regarding Discharge Appeal Rights. Given to patient/caregiver; Explained to patient/caregiver; Other (comments). The comment is Verbal Consent. Taken on 5/16/24 1046   Date IMM was signed 5/11/2024 5/16/2024   Time IMM was signed [phone]

## 2024-05-16 NOTE — PT/OT/SLP PROGRESS
Physical Therapy Treatment    Patient Name:  Felicia Lopez   MRN:  [id number]    Recommendations:     Discharge Recommendations: Moderate Intensity Therapy  Discharge Equipment Recommendations: bath bench  Barriers to discharge: Inaccessible home and Decreased caregiver support    Assessment:     Felicia Lopez is a 78 y.o. male admitted with a medical diagnosis of Acute on chronic combined systolic and diastolic congestive heart failure.  He presents with the following impairments/functional limitations: weakness, gait instability, pain, impaired balance, impaired cardiopulmonary response to activity, impaired endurance, impaired functional mobility, impaired self care skills, decreased coordination, decreased lower extremity function, decreased ROM, decreased safety awareness, decreased upper extremity function.    Sit>stand with RW and SBA  Amb 45' with RW and SBA/CGA, decreased gait speed, mabel and B step length, no LoB or SoB  Pt feeling better today, less knee pain and improved gait  Rec Moderate Intensity Therapy    Rehab Prognosis: Good; patient would benefit from acute skilled PT services to address these deficits and reach maximum level of function.    Recent Surgery: * No surgery found *      Plan:     During this hospitalization, patient to be seen 5 x/week to address the identified rehab impairments via gait training, therapeutic activities, therapeutic exercises, neuromuscular re-education and progress toward the following goals:    Plan of Care Expires:  06/12/24    Subjective     Chief Complaint: still waiting for breakfast  Patient/Family Comments/goals: I'm doing better today, already walked with the nurse  Pain/Comfort:  Pain Rating 1: 0/10  Pain Rating Post-Intervention 1: 0/10      Objective:     Communicated with nurse Bates prior to session.  Patient found up in chair with peripheral IV upon PT entry to room.     General Precautions: Standard, fall (strict I&O)  Orthopedic Precautions:  N/A  Braces: N/A  Respiratory Status: Room air     Functional Mobility:  Transfers:     Sit to Stand:  stand by assistance with rolling walker  Gait: 45' with RW and SBA/CGA, decreased gait speed, mabel and B step length, no LoB or SoB      AM-PAC 6 CLICK MOBILITY  Turning over in bed (including adjusting bedclothes, sheets and blankets)?: 3  Sitting down on and standing up from a chair with arms (e.g., wheelchair, bedside commode, etc.): 3  Moving from lying on back to sitting on the side of the bed?: 3  Moving to and from a bed to a chair (including a wheelchair)?: 3  Need to walk in hospital room?: 3  Climbing 3-5 steps with a railing?: 1  Basic Mobility Total Score: 16       Treatment & Education:  Gait training as noted    Patient left up in chair with all lines intact and call button in reach..    GOALS:   Multidisciplinary Problems       Physical Therapy Goals          Problem: Physical Therapy    Goal Priority Disciplines Outcome Goal Variances Interventions   Physical Therapy Goal     PT, PT/OT Progressing     Description: Goals to be met by: 24    Patient will increase functional independence with mobility by performin. Supine<>sit with supervision without use of HB features.   2. Sit<>stand with supervision with RW.  3. Gait x 25 feet with supervision with RW.  4. Standing balance x3 min with UE support and no increase in L knee pain and supervision.                       Time Tracking:     PT Received On: 24  PT Start Time: 831     PT Stop Time: 842  PT Total Time (min): 11 min     Billable Minutes: Gait Training 11    Treatment Type: Treatment  PT/PTA: PTA     Number of PTA visits since last PT visit: 2     2024

## 2024-05-16 NOTE — PLAN OF CARE
Patient will discharge home with [facility] . Patient appointments have been scheduled and added to AVS. Patient family at bedside to transport patient home. All CM needs have been met.    05/16/24 1342   Final Note   Assessment Type Final Discharge Note   Anticipated Discharge Disposition Home-Health   Hospital Resources/Appts/Education Provided Provided patient/caregiver with written discharge plan information;Appointments scheduled and added to AVS   Post-Acute Status   Post-Acute Authorization Home Health   Home Health Status Set-up Complete/Auth obtained   Discharge Delays None known at this time     [facility] ([address])  Discharge Final Note    Primary Care Provider: Mickey Darden MD    Expected Discharge Date: 5/16/2024    Final Discharge Note (most recent)       Final Note - 05/16/24 1342          Final Note    Assessment Type Final Discharge Note (P)      Anticipated Discharge Disposition Home-Health Care Svc (P)      Hospital Resources/Appts/Education Provided Provided patient/caregiver with written discharge plan information;Appointments scheduled and added to AVS (P)         Post-Acute Status    Post-Acute Authorization Home Health (P)      Home Health Status Set-up Complete/Auth obtained (P)      Discharge Delays None known at this time (P)                      Important Message from Medicare  Important Message from Medicare regarding Discharge Appeal Rights: Given to patient/caregiver, Explained to patient/caregiver, Other (comments) (Verbal Consent)     Date IMM was signed: 05/16/24  Time IMM was signed: 0959    Contact Info       [facility]   Specialty: Home Health Services, Home Therapy Services, Home Living Aide Services    [address] [facility] [address] [place] [address]   Phone: [phone]       Next Steps: Follow up on 5/20/2024    Mickey Darden MD   Specialty: Internal Medicine   Relationship: PCP - General    [address] [address]   Phone: [phone]       Next Steps: Follow up    Instructions: Follow up in 1-2 weeks    Fabio Martinez MD   Specialty: Interventional Cardiology, Nuclear Medicine, Cardiovascular Disease, Cardiology   Relationship: Consulting Physician    [address] [facility] [address]  [place] [id number]   Phone: [phone]       Next Steps: Follow up    Instructions: Follow up for the next available appointment    Fareed Matson MD   Specialty: Nephrology    [address] [place] [address] [address]  [place] [id number]   Phone: [phone]       Next Steps: Follow up    Instructions: As scheduled 7/1/2024

## 2024-05-16 NOTE — PROGRESS NOTES
Progress Note  Nephrology    Consult Requested By: Miriam Field MD  Reason for Consult: Cardiorenal    SUBJECTIVE:     History of Present Illness:  Patient is a 78 y.o. male presents with worsening swelling, shortness of breath, decreased urine output, etcetera over last couple of weeks.  Patient was seen by Dr. Martinez in office yesterday and recommended admission for IV diuresis.  Extensive medical history as outlined below including CKD stage 3 with baseline creatinine around 2.0.  Patient is followed by Dr. Matson as an outpatient and was seen last week.  He had a creatinine of 2.2, urine protein creatinine ratio only 88 mg, and was started on calcitriol for mild secondary hyperparathyroidism.  He was on Lasix 40 mg daily, lisinopril 20 mg twice daily, and allopurinol 100 mg.  Upon initial evaluation emergency room patient had soft blood pressures of 90s to 100s.  Laboratory data consistent with creatinine of 2.6, BUN 40, BNP 26 91, and chest x-ray with marked pulmonary edema.  Patient was admitted to telemetry floor and started on diuresis.  Overnight patient had minimal urine output and creatinine has risen to 2.7 with worsening edema and hypotension.  Seen by Cardiology and plans for transfer to ICU with dobutamine drip and more aggressive diuresis.    Interval History:    Sitting in chair with minimal edema.  Labs stable.  Pt eager to go home.       Past Medical History:   Diagnosis Date    CAD (coronary artery disease)     Chronic combined systolic and diastolic CHF (congestive heart failure)     Chronic idiopathic thrombocytopenia     Disorder of kidney and ureter     History of heart attack     History of stroke     Hyperlipidemia     Hypertension      Past Surgical History:   Procedure Laterality Date    FACIAL RECONSTRUCTION SURGERY      PROSTATE SURGERY       Family History   Problem Relation Name Age of Onset    Hypertension Mother      No Known Problems Father       Social History     Tobacco  "Use    Smoking status: Former     Current packs/day: 0.00     Types: Cigarettes     Quit date: 10/5/2008     Years since quitting: 15.6    Smokeless tobacco: Never   Substance Use Topics    Alcohol use: No     Alcohol/week: 0.0 standard drinks of alcohol    Drug use: No       Review of patient's allergies indicates:  No Known Allergies     Review of Systems:  Constitutional: No fever or chills  Respiratory: shortness of breath better  Cardiovascular: No chest pain or palpitations  Gastrointestinal: No nausea or vomiting  Neurological: No confusion or weakness    OBJECTIVE:     Vital Signs (Most Recent)  Temp: 98.3 °F (36.8 °C) (05/16/24 0440)  Pulse: 81 (05/16/24 0734)  Resp: 16 (05/16/24 0744)  BP: 111/64 (05/16/24 0440)  SpO2: 95 % (05/16/24 0440)    Vital Signs Range (Last 24H):  Temp:  [96.7 °F (35.9 °C)-99 °F (37.2 °C)]   Pulse:  []   Resp:  [16-19]   BP: ()/(51-71)   SpO2:  [91 %-98 %]       Intake/Output Summary (Last 24 hours) at 5/16/2024 0800  Last data filed at 5/16/2024 0600  Gross per 24 hour   Intake 120 ml   Output 1025 ml   Net -905 ml       Physical Exam:  NAD A and O  RRR 3/6 STEPHAN Mild+JVD present  Pulm:  diminished   Abdomen soft NT ND +BS  Initial Ext severe 4+ edema to thighs +scrotal edema now trace+ much improved    Laboratory:  No results for input(s): "WBC", "RBC", "HGB", "HCT", "PLT", "MCV", "MCH", "MCHC" in the last 24 hours.    BMP:   Recent Labs   Lab 05/16/24  0433   GLU 76   *   K 4.3   CL 90*   CO2 31*   BUN 58*   CREATININE 2.3*   CALCIUM 8.9   MG 2.0   PHOS 3.7     Lab Results   Component Value Date    CALCIUM 8.9 05/16/2024    PHOS 3.7 05/16/2024     BNP  Recent Labs   Lab 05/09/24  1311   BNP 2,691*     Lab Results   Component Value Date    URICACID 5.9 05/13/2024     Lab Results   Component Value Date    IRON 57 02/01/2019     Lab Results   Component Value Date    CALCIUM 8.9 05/16/2024    PHOS 3.7 05/16/2024       Diagnostic Results:  X-Ray Knee 1 or 2 View Left "   Final Result      Please see above.         Electronically signed by: Rosa Adler   Date:    05/13/2024   Time:    15:32      US Retroperitoneal Complete   Final Result      1. No evidence of hydronephrosis bilaterally.   2. Findings suggestive of underlying medical renal disease.   3. Incidentally noted right lower quadrant ascites.         Electronically signed by: Kaelyn Damon MD   Date:    05/11/2024   Time:    06:45      X-Ray Chest AP Portable   Final Result      1. Pulmonary findings suggest pulmonary edema with pleural effusions, developing left lower lung zone consolidation not excluded.         Electronically signed by: Ej Goel MD   Date:    05/09/2024   Time:    13:27          ASSESSMENT/PLAN:     Improving Initially Oliguric and now nonoliguric acute kidney injury on progressive CKD 3b with baseline creatinine around 2.0 but recent 2.2 secondary to acute on chronic combined heart failure with dismal ejection fraction of 15% and needing aggressive diuresis but has poor cardiac output with hypotension:  -trends noted over the past few months with creatinine of 2.0 and then kellen to 2.2  -now 2.8 with need for aggressive diuresis > trending down with diuresis 2.4  -only 88 mg of proteinuria so edema is not from nephrotic syndrome but mostly from severe heart failure  -hold ACE inhibitor for now  -Renal US noted  -Transferred to ICU for dobutamine and lasix drip  -Can add metolazone if this combo doesn't result in adequate diuresis  -consider adding midodrine to assist with blood pressure but he appears to be tolerating well.  5/13:  will change lasix drip to 10mg/hr as hard to titrate hourly w/o mccauley.  Labs stable considering.   -no immediate need for renal replacement therapy at this time but will depend on degree of diuresis achieved by efforts above.  5/14:  back off to 5 mg/hr on lasix drip and developing some contraction   alkalosis.  Plan on transitioning to oral regimen by tomorrow.    5/15:  hold drip today and transition to oral tomorrow.  Home soon.  5/16:  resume oral diuretics today and DC.   -Renally dose meds, avoid nephrotoxins, and monitor I/O's closely.    Acute on chronic combined heart failure with ejection fraction of 15%:  -poor outlook based on trends and trajectory  -appreciate cardiology input    Hyperuricemia:  -continue allopurinol  -risks reviewed    Mineral bone density:  -continue calcitriol Monday Wednesday Friday    Thrombocytopenia:  -No active bleeding  -monitor  -defer to primary      As above  Home today  F/up with Dr. Matson    High [facility] complexity necessary to treat or prevent imminent or life-threatening deterioration of the following conditions:  ARELI, cardiorenal syndrome, severe heart failure with ejection fraction 15%  Time spent personally by me on the following activities 60 min: development of treatment plan with patient or surrogate, discussions with consultants, interpretation of cardiac output measurements, examination of patient, ordering and performing treatments and interventions, evaluation of patient's response to treatment, obtaining history from patient or surrogate, ordering and review of laboratory studies, ordering and review of radiographic studies, re-evaluation of patient's condition, pulse oximetry and blood draw for specimens

## 2024-05-16 NOTE — PLAN OF CARE
Problem: Adult Inpatient Plan of Care  Goal: Plan of Care Review  Outcome: Progressing  Goal: Patient-Specific Goal (Individualized)  Outcome: Progressing  Goal: Absence of Hospital-Acquired Illness or Injury  Outcome: Progressing  Goal: Optimal Comfort and Wellbeing  Outcome: Progressing  Goal: Readiness for Transition of Care  Outcome: Progressing     Problem: Wound  Goal: Optimal Coping  Outcome: Progressing  Goal: Optimal Functional Ability  Outcome: Progressing  Goal: Absence of Infection Signs and Symptoms  Outcome: Progressing  Goal: Improved Oral Intake  Outcome: Progressing  Goal: Optimal Pain Control and Function  Outcome: Progressing  Goal: Skin Health and Integrity  Outcome: Progressing  Goal: Optimal Wound Healing  Outcome: Progressing   Patient [id number]. Pain controlled with prn meds. Q2 purposeful rounding completed. Safety maintained. Will continue to monitor.

## 2024-05-16 NOTE — PHYSICIAN QUERY
"Select your response then make sure to press "Enter" to save your answer. The other options will disappear and then you can click Sign.      Please clarify the conflicting documentation.          Acute on Chronic Combined Systolic and Diastolic Heart Failure      "

## 2024-05-16 NOTE — PLAN OF CARE
Problem: Adult Inpatient Plan of Care  Goal: Plan of Care Review  Outcome: Progressing  Goal: Patient-Specific Goal (Individualized)  Outcome: Progressing  Goal: Absence of Hospital-Acquired Illness or Injury  Outcome: Progressing  Goal: Optimal Comfort and Wellbeing  Outcome: Progressing     Problem: Wound  Goal: Absence of Infection Signs and Symptoms  Outcome: Progressing  Goal: Skin Health and Integrity  Outcome: Progressing     Problem: Fall Injury Risk  Goal: Absence of Fall and Fall-Related Injury  Outcome: Progressing     POC reviewed with patient. All questions and concerns addressed. Fall/safety precautions implemented and maintained. 1.5L FR continued. Urinal provided and within reach. No acute events noted this shift. Please see flowsheet for full assessment and vitals. Bed locked in lowest position. Side rails up x2. Call bell within reach.

## 2024-05-16 NOTE — PT/OT/SLP PROGRESS
"Occupational Therapy   Treatment    Name: Felicia Lopez  MRN: [id number]  Admitting Diagnosis:  Acute on chronic combined systolic and diastolic congestive heart failure       Recommendations:     Discharge Recommendations: Moderate Intensity Therapy  Discharge Equipment Recommendations:  bath bench  Barriers to discharge:  Decreased caregiver support    Assessment:     Felicia Lopez is a 78 y.o. male with a medical diagnosis of Acute on chronic combined systolic and diastolic congestive heart failure.  He presents with . Performance deficits affecting function are weakness, impaired endurance, impaired self care skills, impaired functional mobility, gait instability, impaired balance, decreased coordination, decreased lower extremity function, decreased ROM, decreased safety awareness, impaired cardiopulmonary response to activity, edema. Patient agreeable to therapy with max encouragement. Attempted to educated patient with LBD with adaptive equipment to avoid bending forward d/t patient CHF condition though patient defer. SBA to don/doff B socks seated in chair. SBA to don pants seated in chair and able to manage waistline. Though patient educated on avoid bending over and keeping the head above the heart during functional mobility and ADL tasks, patient stating "I will be fine". Overall, tolerated session well and progressing toward goals.     Rehab Prognosis:  Good; patient would benefit from acute skilled OT services to address these deficits and reach maximum level of function.       Plan:     Patient to be seen 5 x/week to address the above listed problems via self-care/home management, therapeutic activities, therapeutic exercises  Plan of Care Expires:    Plan of Care Reviewed with: patient    Subjective     Chief Complaint: "I already did my exercise!!" Patient educated on the difference between OT vs. PT  Patient/Family Comments/goals: agreeable to participate in therapy for OT intervention; "   Pain/Comfort:  Pain Rating 1: 0/10    Objective:     Communicated with: RN prior to session.  Patient found up in chair with peripheral IV upon OT entry to room.  General Precautions: Standard, fall    Orthopedic Precautions:N/A  Braces: N/A  Respiratory Status: Room air     Occupational Performance:     Bed Mobility:    NT d/t pt UIC      Functional Mobility/Transfers:  Sit <> Stand: SBA   Functional Mobility: patient defer fxl ambulation   Chair Transfer: Supervision     Activities of Daily Living:  LB Dressing: SBA to don/doff B socks seated in chair. SBA to don paper scrub shorts seated in chair and management waistline in standing.   Attempted to educate patient on avoid bending over for LB Dressing and introducing adaptive equipment. Patient defer AE education.       [facility] 6 Click ADL:      Treatment & Education:  OT role, plan of care, progression of goals, importance of continued OOB activity, ADL/functional transfer and mobility retraining, discharge recommendation, call don't fall, safety precautions, fall prevention.   *Avoid bending over, keep head above heart d/t CHF conditions   *Adaptive equipment training with LB Dressing   *Difference of OT vs. PT    Patient left up in chair with all lines intact, call button in reach, and RN present    GOALS:   Multidisciplinary Problems       Occupational Therapy Goals          Problem: Occupational Therapy    Goal Priority Disciplines Outcome Interventions   Occupational Therapy Goal     OT, PT/OT Progressing    Description: Goals to be met by: 5/25/2024     Patient will increase functional independence with ADLs by performing:    UE Dressing with Supervision.  LE Dressing with Minimal Assistance.  Grooming while standing at sink with Contact Guard Assistance.  Toileting from toilet with Contact Guard Assistance for hygiene and clothing management.   Toilet transfer to toilet with Contact Guard Assistance.                         Time Tracking:     OT Date of  Treatment: 05/16/24  OT Start Time: 1030  OT Stop Time: 1040  OT Total Time (min): 10 min    Billable Minutes:Self Care/Home Management 10 min    OT/NADEEM: NADEEM     Number of NADEEM visits since last OT visit: 2    5/16/2024

## 2024-05-16 NOTE — DISCHARGE SUMMARY
Formerly [facility] Surg [address] Medicine  Discharge Summary      Patient Name: Felicia Lopez  MRN: [id number]  ANAMARIA: [id number]  Patient Class: IP- Inpatient  Admission Date: 5/9/2024  Hospital Length of Stay: 7 days  Discharge Date and Time: 5/16/2024  2:18 PM  Attending Physician: No att. providers found   Discharging Provider: Miriam oRd MD  Primary Care Provider: Mickey Darden MD    Primary Care Team: Networked reference to record PCT     HPI:   HPI by Roselia William NP:  Mr. Lopez is a 78 YOM with PMHx of CAD with history of MI s/p PCI (RCA x 2 JOSE C 2010), combined systolic and diastolic CHF, HTN, HLD, history of CVA (2015), preDM, CKD III (baseline SCr 2.1-2.3, follows with Dr. Matson), secondary hyperparathyroidism, MARLENI, chronic idiopathic thrombocytopenia, and history of prostate CA s/p TURP.     He presents to ED from Cardiology clinic due to worsening edema in thighs, legs, and scrotum with associated HARTMAN, orthopnea, PND, and decreased UOP despite taking home PO lasix daily. Symptoms have progressively worsened over the last couple of weeks. He notes some noncompliance with low sodium diet including take out pizza and [employer] meals recently. He denies fever, chills, recent illness, CP, abdominal pain, N/V/D, constipation, hematuria, changes in bowel habits or blood in stool, decreased appetite, changes in PO intake, light headiness, dizziness, seizures, or syncope. He lives with spouse, uses a walker at baseline, and is independent in ADLs.     In the ED he is HDS and afebrile. CBC with WBC 5, H/H 15.6/49.2, platelets 92 (baseline 110-133). Chemistry with , K 4.2, chloride 105, CO2 24, BUN 40, SCr 2.6 (baseline 2.1-2.3), glucose 79. LFTs WNL. BNP 2691. Troponin 0.189 (similar to priors). Hep C and HIV non reactive. UA noninfectious. CXR with pulmonary edema and effusions. EKG with SR, PACs, and T wave abnormalities similar to priors.     The patient was placed in observation to  the Hospital Medicine Service for further evaluation and treatment.           Hospital Course:   Patient had minimal urine output with furosemide and echo showed worsening systolic function to 15-20%.  Cardiology recommended transfer to ICU and initiation of dobutamine drip 5/10, continued until 5/13 and then he was transferred back to the floor on a furosemide drip.  Nephrology was consulted for potential cardiorenal syndrome.  He was continued on a furosemide drip and urine output improved.  Electrolytes monitored and replaced as needed.  Rate of drip was decreased due to contraction alkalosis.  Oral furosemide was to have been started 5/15 but he still had contraction alkalosis so it was held one day with improvement.  Patient discharged home 5/16 with fluid balance -13 liters.  He was seen and examined on the day of discharge and was feeling well enough to go home.  Home health was ordered with a consult to palliative care nursing.     Goals of Care Treatment Preferences:  Code Status: Full Code          What is most important right now is to focus on spending time at home, remaining as independent as possible.  Accordingly, we have decided that the best plan to meet the patient's goals includes continuing with treatment.      Consults:   Consults (From admission, onward)          Status Ordering Provider     Inpatient consult to Palliative Care  Once        Provider:  Garcia Randall MD    Completed GAMALIEL YEUNG     Inpatient consult to Nephrology-[place]  Once        Provider:  Omid Pagan NP    Completed GAMALIEL YEUNG     Inpatient consult to Pulmonary Critical Care  Once        Provider:  Kelley Carbajal MD    Completed GAMALIEL YEUNG     Inpatient consult to Cardiology  Once        Provider:  Fabio Martinez MD    Completed FELIPE GONSALVES     Inpatient consult to Social Work/Case Management  Once        Provider:  (Not yet assigned)    Completed FELIPE GONSALVES     Inpatient  consult to Registered Dietitian/Nutritionist  Once        Provider:  (Not yet assigned)    Completed FELIPE GONSALVES              Final Active Diagnoses:    Diagnosis Date Noted POA    PRINCIPAL PROBLEM:  Acute on chronic combined systolic and diastolic congestive heart failure [I50.43] 08/09/2022 Yes    Encounter for palliative care [Z51.5] 05/13/2024 Not Applicable    Gout [M10.9] 05/10/2024 Yes    History of percutaneous coronary intervention [Z98.61] 05/09/2024 Not Applicable    Anasarca [R60.1] 05/09/2024 Yes    Secondary hyperparathyroidism [N25.81] 05/09/2024 Yes    Iron deficiency anemia [D50.9] 08/09/2022 Yes    CAD (coronary artery disease) [I25.10] 08/09/2022 Yes    History of CVA (cerebrovascular accident) [Z86.73] 08/09/2022 Not Applicable    Hyperlipidemia [E78.5] 10/03/2018 Yes    Essential hypertension [I10] 10/05/2015 Yes    Chronic kidney disease, stage III (moderate) [N18.30] 10/05/2015 Yes      Problems Resolved During this Admission:    Diagnosis Date Noted Date Resolved POA    Chronic idiopathic thrombocytopenia [D69.3] 10/05/2015 05/11/2024 Yes       Discharged Condition: stable    Disposition: Home-Health Care [facility]    Follow Up:   Follow-up Information       [facility] Follow up on 5/20/2024.    Specialties: Home Health Services, Home Therapy Services, Home Living Aide Services  Contact information:  [address]  [facility] [address]  [phone]             Mickey Darden MD Follow up.    Specialty: Internal Medicine  Why: Follow up in 1-2 weeks  Contact information:  [address] [facility] [address] [facility] [address]  [phone]               Fabio Martinez MD Follow up.    Specialties: Interventional Cardiology, Nuclear Medicine, Cardiovascular Disease, Cardiology  Why: Follow up for the next available appointment  Contact information:  [address] [facility] [address] [facility] [address]  [facility] [id number] [phone]               Fareed Matson MD Follow up.     Specialty: Nephrology  Why: As scheduled 7/1/2024  Contact information:  [address] [facility] [address]  [facility] [address]  [phone]                           Patient Instructions:      Ambulatory referral/consult to HOME Palliative Care   Standing Status: Future   Referral Priority: Routine Referral Type: Consultation   Requested Specialty: Hospice and Palliative Medicine   Number of Visits Requested: 1     Diet Cardiac     Activity as tolerated         Medications:  Reconciled Home Medications:      Medication List        CHANGE how you take these medications      furosemide 80 MG tablet  Commonly known as: LASIX  Take 1 tablet (80 mg total) by mouth once daily.  What changed:   medication strength  how much to take  when to take this     metoprolol succinate 25 MG 24 hr tablet  Commonly known as: TOPROL-XL  Take 0.5 tablets (12.5 mg total) by mouth once daily.  Start taking on: May 17, 2024  What changed:   how much to take  when to take this            CONTINUE taking these medications      allopurinoL 100 MG tablet  Commonly known as: ZYLOPRIM  Take 1 tablet by mouth once daily.     aspirin 81 MG EC tablet  Commonly known as: ECOTRIN  Take 1 tablet (81 mg total) by mouth once daily.     calcitRIOL 0.25 MCG Cap  Commonly known as: ROCALTROL  Take 0.25 mcg by mouth 3 (three) times a week.     fluticasone propionate 50 mcg/actuation nasal spray  Commonly known as: FLONASE  1 spray by Each Nostril route once daily.     latanoprost 0.005 % ophthalmic solution  Place 1 drop into both eyes every evening.     levocetirizine 5 MG tablet  Commonly known as: XYZAL  Take 1 tablet by mouth once daily.     simvastatin 40 MG tablet  Commonly known as: ZOCOR  Take 1 tablet (40 mg total) by mouth once daily.              Time spent on the discharge of patient: >30 minutes         Miriam Rod MD  Department of Hospital Medicine  [facility] - Med Surg ([address])

## 2024-05-16 NOTE — PLAN OF CARE
[facility] - Med Surg ([address])      HOME HEALTH ORDERS  FACE TO FACE ENCOUNTER    Patient Name: Felicia Lopez  YOB: 1945    PCP: Mickey Darden MD   PCP Address: [address] [place] [id number]  PCP Phone Number: [phone]  PCP Fax: [phone]    Encounter Date: 5/9/24    Admit to Home Health with Consult to Home Palliative Care    Diagnoses:  Active Hospital Problems    Diagnosis  POA    *Acute on chronic combined systolic and diastolic congestive heart failure [I50.43]  Yes    Encounter for palliative care [Z51.5]  Not Applicable    Gout [M10.9]  Yes    History of percutaneous coronary intervention [Z98.61]  Not Applicable    Anasarca [R60.1]  Yes    Secondary hyperparathyroidism [N25.81]  Yes    Iron deficiency anemia [D50.9]  Yes    CAD (coronary artery disease) [I25.10]  Yes    History of CVA (cerebrovascular accident) [Z86.73]  Not Applicable    Hyperlipidemia [E78.5]  Yes    Essential hypertension [I10]  Yes    Chronic kidney disease, stage III (moderate) [N18.30]  Yes      Resolved Hospital Problems    Diagnosis Date Resolved POA    Chronic idiopathic thrombocytopenia [D69.3] 05/11/2024 Yes       Follow Up Appointments:  No future appointments.    Allergies:Review of patient's allergies indicates:  No Known Allergies    Medications: Review discharge medications with patient and family and provide education.      Current Discharge Medication List        CONTINUE these medications which have CHANGED    Details   furosemide (LASIX) 80 MG tablet Take 1 tablet (80 mg total) by mouth once daily.  Qty: 90 tablet, Refills: 0      metoprolol succinate (TOPROL-XL) 25 MG 24 hr tablet Take 0.5 tablets (12.5 mg total) by mouth once daily.  Qty: 45 tablet, Refills: 0    Comments: .           CONTINUE these medications which have NOT CHANGED    Details   allopurinoL (ZYLOPRIM) 100 MG tablet Take 1 tablet by mouth once daily.      aspirin (ECOTRIN) 81 MG EC tablet Take 1 tablet (81 mg  total) by mouth once daily.  Qty: 30 tablet, Refills: 0      calcitRIOL (ROCALTROL) 0.25 MCG Cap Take 0.25 mcg by mouth 3 (three) times a week.      fluticasone propionate (FLONASE) 50 mcg/actuation nasal spray 1 spray by Each Nostril route once daily.      latanoprost 0.005 % ophthalmic solution Place 1 drop into both eyes every evening.      levocetirizine (XYZAL) 5 MG tablet Take 1 tablet by mouth once daily.      simvastatin (ZOCOR) 40 MG tablet Take 1 tablet (40 mg total) by mouth once daily.  Qty: 90 tablet, Refills: 0    Associated Diagnoses: Hyperlipidemia, unspecified hyperlipidemia type; Essential hypertension; Hyperglycemia; Anemia, unspecified type; Allergic rhinitis due to pollen; Thrombocytopenia; Screening for prostate cancer               I have seen and examined this patient within the last 30 days. My clinical findings that support the need for the home health skilled services and home bound status are the following:no   Weakness/numbness causing balance and gait disturbance due to Heart Failure making it taxing to leave home.     Diet:   cardiac diet    Referrals/ Consults  Physical Therapy to evaluate and treat. Evaluate for home safety and equipment needs; Establish/upgrade home exercise program. Perform / instruct on therapeutic exercises, gait training, transfer training, and Range of Motion.  Occupational Therapy to evaluate and treat. Evaluate home environment for safety and equipment needs. Perform/Instruct on transfers, ADL training, ROM, and therapeutic exercises.    Activities:   activity as tolerated    Nursing:   Agency to admit patient within 24 hours of hospital discharge unless specified on physician order or at patient request    SN to complete comprehensive assessment including routine vital signs. Instruct on disease process and s/s of complications to report to MD. Review/verify medication list sent home with the patient at time of discharge  and instruct patient/caregiver as  needed. Frequency may be adjusted depending on start of care date.     Skilled nurse to perform up to 3 visits PRN for symptoms related to diagnosis    Notify MD if SBP > 160 or < 90; DBP > 90 or < 50; HR > 120 or < 50; Temp > 101; O2 < 88%    Ok to schedule additional visits based on staff availability and patient request on consecutive days within the home health episode.    When multiple disciplines ordered:    Start of Care occurs on Sunday - Wednesday schedule remaining discipline evaluations as ordered on separate consecutive days following the start of care.    Thursday SOC -schedule subsequent evaluations Friday and Monday the following week.     Friday - Saturday SOC - schedule subsequent discipline evaluations on consecutive days starting Monday of the following week.    For all post-discharge communication and subsequent orders please contact patient's primary care physician.    Wound Care Orders  Right anterior leg blister - clean with Vashe and apply a thin layer of triad paste to wound bed.  Cover with foam dressing.  Change every 3 days or as needed if saturated.    I certify that this patient is confined to his home and needs intermittent skilled nursing care, physical therapy, and occupational therapy.

## 2024-05-16 NOTE — PLAN OF CARE
Problem: Adult Inpatient Plan of Care  Goal: Plan of Care Review  Outcome: Met  Goal: Patient-Specific Goal (Individualized)  Outcome: Met  Goal: Absence of Hospital-Acquired Illness or Injury  Outcome: Met  Goal: Optimal Comfort and Wellbeing  Outcome: Met  Goal: Readiness for Transition of Care  Outcome: Met     Problem: Wound  Goal: Optimal Coping  Outcome: Met  Goal: Optimal Functional Ability  Outcome: Met  Goal: Absence of Infection Signs and Symptoms  Outcome: Met  Goal: Improved Oral Intake  Outcome: Met  Goal: Optimal Pain Control and Function  Outcome: Met  Goal: Skin Health and Integrity  Outcome: Met  Goal: Optimal Wound Healing  Outcome: Met     Problem: Fall Injury Risk  Goal: Absence of Fall and Fall-Related Injury  Outcome: Met     Problem: Gas Exchange Impaired  Goal: Optimal Gas Exchange  Outcome: Met     Problem: Skin Injury Risk Increased  Goal: Skin Health and Integrity  Outcome: Met     Problem: Coping Ineffective  Goal: Effective Coping  Outcome: Met     Adequate for Discharge

## 2024-05-16 NOTE — PROGRESS NOTES
Cardiology Progress Note    5/16/2024  12:19 PM    Subjective/Interim:      Feels much better.  No SOB.  Sitting in chair.  Lasix po resumed.      Objective:   24-hour Vitals:  Temp:  [96.7 °F (35.9 °C)-99 °F (37.2 °C)] 97.7 °F (36.5 °C)  Pulse:  [70-94] 84  Resp:  [16-19] 18  SpO2:  [91 %-97 %] 96 %  BP: ()/(51-64) 93/53    Physical Examination:  Vitals: BP (!) 93/53   Pulse 84   Temp 97.7 °F (36.5 °C) (Oral)   Resp 18   Ht 6' (1.829 m)   Wt 72.6 kg (160 lb 0.9 oz)   SpO2 96%   BMI 21.71 kg/m²     Physical Exam  Constitutional:       General: He is not in acute distress.  Neck:      Vascular: Hepatojugular reflux and JVD (improved) present.   Cardiovascular:      Rate and Rhythm: Normal rate and regular rhythm. Occasional Extrasystoles are present.     Heart sounds: S1 normal and S2 normal. Murmur heard.      Systolic murmur is present.      Gallop present. S3 sounds present. No S4 sounds.   Pulmonary:      Effort: Pulmonary effort is normal.      Breath sounds: Normal air entry. Examination of the right-lower field reveals decreased breath sounds. Examination of the left-lower field reveals decreased breath sounds. Decreased breath sounds present.   Abdominal:      General: Bowel sounds are normal.      Palpations: Abdomen is soft. There is hepatomegaly.      Tenderness: There is no abdominal tenderness.   Musculoskeletal:      Right lower leg: Edema (improved) present.      Left lower leg: Edema (improved) present.   Skin:     Coloration: Skin is not pale.   Neurological:      Mental Status: He is alert.   Psychiatric:         Behavior: Behavior is cooperative.           Intake/Output Summary (Last 24 hours) at 5/16/2024 1300  Last data filed at 5/16/2024 0600  Gross per 24 hour   Intake 120 ml   Output 550 ml   Net -430 ml       Laboratory:  Trended Lab Data:  Recent Labs   Lab 05/12/24  0429 05/13/24  0408 05/14/24  0556   WBC 8.48 8.63 9.46   HGB 13.8* 12.8* 14.0   HCT 43.5 39.3* 42.8   PLT  "84* 79* 93*       Recent Labs   Lab 05/15/24  0520 05/15/24  1858 05/16/24  0433    133* 134*   K 4.2 4.5 4.3   CL 92* 88* 90*   CO2 35* 32* 31*   BUN 50* 53* 58*   GLU 82 115* 76   CALCIUM 9.0 9.0 8.9   MG 2.0 2.0 2.0   PHOS 4.2 4.0 3.7       Recent Labs   Lab 05/09/24  1405 05/10/24  0923 05/10/24  1909 05/15/24  0520 05/15/24  1858 05/16/24  0433   PROT 6.9 6.8  --   --   --   --    ALBUMIN 3.0* 3.0*   < > 2.5* 2.6* 2.5*   BILITOT 1.1* 1.1*  --   --   --   --    AST 19 20  --   --   --   --    ALT 14 14  --   --   --   --    ALKPHOS 83 88  --   --   --   --     < > = values in this interval not displayed.       No results for input(s): "PROTIME", "PTT", "INR" in the last 168 hours.    Cardiac:   Recent Labs   Lab 05/09/24  1311 05/10/24  0923   TROPONINI 0.189* 0.165*   BNP 2,691*  --        FLP:   Lab Results   Component Value Date    CHOL 123 08/09/2022    HDL 35 (L) 08/09/2022    LDLCALC 78.2 08/09/2022    TRIG 49 08/09/2022    CHOLHDL 28.5 08/09/2022     DM:   Lab Results   Component Value Date    HGBA1C 5.8 (H) 05/10/2024    HGBA1C 5.7 (H) 08/28/2023    HGBA1C 5.7 (H) 08/09/2022    MICROALBUR 2.4 02/15/2016    LDLCALC 78.2 08/09/2022    CREATININE 2.3 (H) 05/16/2024     Thyroid: No results found for: "TSH", "FREET4", "Y2UREKQ", "P8KMYFC", "THYROIDAB"  Anemia:   Lab Results   Component Value Date    IRON 57 02/01/2019    FOXAAJVN94 1045 (H) 02/02/2024    FOLATE 9.3 02/02/2024     Urinalysis:   Lab Results   Component Value Date    COLORU Yellow 05/09/2024    SPECGRAV 1.010 05/09/2024    NITRITE Negative 05/09/2024    GLUCOSEU NEGATIVE 02/01/2019    KETONESU Negative 05/09/2024    UROBILINOGEN 2.0-3.0 (A) 05/09/2024    BILIRUBINUR NEGATIVE 02/01/2019     @      Other Results:  EKG (my interpretation):    TELEMETRY:  sinus    Echo:   Results for orders placed or performed during the hospital encounter of 05/09/24   Echo   Result Value Ref Range    BSA 2.09 m2    LVOT stroke volume 37.36 cm3    LVIDd 5.21 " 3.5 - 6.0 cm    LV Systolic Volume 102.42 mL    LV Systolic Volume Index 49.2 mL/m2    LVIDs 4.70 (A) 2.1 - 4.0 cm    LV Diastolic Volume 129.99 mL    LV Diastolic Volume Index 62.50 mL/m2    IVS 1.33 (A) 0.6 - 1.1 cm    LVOT diameter 2.25 cm    LVOT area 4.0 cm2    FS 10 (A) 28 - 44 %    Left Ventricle Relative Wall Thickness 0.55 cm    Posterior Wall 1.43 (A) 0.6 - 1.1 cm    LV mass 304.15 g    LV Mass Index 146 g/m2    MV Peak E Boby 0.63 m/s    TDI LATERAL 0.04 m/s    TDI SEPTAL 0.03 m/s    E/E' ratio 18.00 m/s    MV Peak A Boby 0.29 m/s    TR Max Boby 3.26 m/s    E/A ratio 2.17     E wave deceleration time 117.17 msec    LV SEPTAL E/E' RATIO 21.00 m/s    LV LATERAL E/E' RATIO 15.75 m/s    LVOT peak boby 0.57 m/s    Left Ventricular Outflow Tract Mean Velocity 0.37 cm/s    Left Ventricular Outflow Tract Mean Gradient 0.66 mmHg    RV S' 6.39 cm/s    LA size 3.44 cm    Left Atrium Minor Axis 6.32 cm    Left Atrium Major Axis 6.92 cm    LA volume (mod) 82.77 cm3    LA Volume Index (Mod) 39.8 mL/m2    RA Major Axis 5.51 cm    AV mean gradient 3 mmHg    AV peak gradient 4 mmHg    Ao peak boby 1.05 m/s    Ao VTI 17.20 cm    LVOT peak VTI 9.40 cm    AV valve area 2.17 cm²    AV Velocity Ratio 0.54     AV index (prosthetic) 0.55     MICHEL by Velocity Ratio 2.16 cm²    Mr max boby 4.54 m/s    MV stenosis pressure 1/2 time 33.98 ms    MV valve area p 1/2 method 6.47 cm2    TV mean gradient 27 mmHg    Triscuspid Valve Regurgitation Peak Gradient 43 mmHg    PV PEAK VELOCITY 0.31 m/s    PV peak gradient 0 mmHg    RVOT peak boby 0.26 m/s    STJ 2.61 cm    Ascending aorta 2.64 cm    IVC diameter 2.32 cm    Mean e' 0.04 m/s    ZLVIDS 1.48     ZLVIDD -2.01     LA Volume Index 41.8 mL/m2    LA volume 86.93 cm3    TAPSE 1.30 cm    LA WIDTH 4.5 cm    RA Width 4.0 cm    Sinus 3.0 cm    TV resting pulmonary artery pressure 51 mmHg    RV TB RVSP 11 mmHg    Est. RA pres 8 mmHg    Narrative      Left Ventricle: The left ventricle is mildly  dilated. Mildly increased   wall thickness. Severe global hypokinesis present. There is severely   reduced systolic function with a visually estimated ejection fraction of   15 - 20%. Global longitudinal strain is reduced; -4%. Grade II diastolic   dysfunction. MV e' lateral velocity is 0.04 m/s.    Right Ventricle: Mild right ventricular enlargement. Wall thickness is   normal. Right ventricle wall motion has global hypokinesis. Systolic   function is severely reduced.    Left Atrium: Left atrium is moderately dilated.    Mitral Valve: There is mild regurgitation.    Tricuspid Valve: There is mild regurgitation.    Pulmonary Artery: The estimated pulmonary artery systolic pressure is   51 mmHg.    IVC/SVC: Intermediate venous pressure at 8 mmHg.         Radiology:  US Retroperitoneal Complete    Result Date: 5/11/2024  EXAMINATION: US RETROPERITONEAL COMPLETE CLINICAL HISTORY: ARELI; TECHNIQUE: Ultrasound of the kidneys and urinary bladder was performed including color flow and Doppler evaluation of the kidneys. COMPARISON: None. FINDINGS: Right kidney: The right kidney measures 9.3 cm. No significant cortical thinning.  Increased cortical echogenicity.  Resistive index measures 0.81.  No renal stone. No hydronephrosis. Left kidney: The left kidney measures 8.0 cm. No significant cortical thinning.  Increased cortical echogenicity.  Resistive index measures 0.82.  No renal stone. No hydronephrosis. The bladder is partially distended at the time of scanning and has an unremarkable appearance.  Incidentally noted ascites in the right lower quadrant.     1. No evidence of hydronephrosis bilaterally. 2. Findings suggestive of underlying medical renal disease. 3. Incidentally noted right lower quadrant ascites. Electronically signed by: Kaelyn Damon MD Date:    05/11/2024 Time:    06:45    Echo    Result Date: 5/10/2024    Left Ventricle: The left ventricle is mildly dilated. Mildly increased wall thickness. Severe global  hypokinesis present. There is severely reduced systolic function with a visually estimated ejection fraction of 15 - 20%. Global longitudinal strain is reduced; -4%. Grade II diastolic dysfunction. MV e' lateral velocity is 0.04 m/s.   Right Ventricle: Mild right ventricular enlargement. Wall thickness is normal. Right ventricle wall motion has global hypokinesis. Systolic function is severely reduced.   Left Atrium: Left atrium is moderately dilated.   Mitral Valve: There is mild regurgitation.   Tricuspid Valve: There is mild regurgitation.   Pulmonary Artery: The estimated pulmonary artery systolic pressure is 51 mmHg.   IVC/SVC: Intermediate venous pressure at 8 mmHg.     X-Ray Chest AP Portable    Result Date: 5/9/2024  EXAMINATION: XR CHEST AP PORTABLE CLINICAL HISTORY: CHF; TECHNIQUE: Single frontal view of the chest was performed. COMPARISON: 02/01/2024 FINDINGS: The cardiomediastinal silhouette is prominent, similar to the previous exam noting calcification of the aorta..  There is obscuration of the costophrenic angles, left greater than right suggesting effusions..  The trachea is midline.  The lungs are symmetrically expanded bilaterally with coarse interstitial attenuation bilaterally.  There is increased parenchymal attenuation projected over the left lower lung zone, may reflect a combination of atelectasis and pleural fluid however developing consolidation not excluded..  There is no pneumothorax.  The osseous structures are remarkable for degenerative change and osteopenia..     1. Pulmonary findings suggest pulmonary edema with pleural effusions, developing left lower lung zone consolidation not excluded. Electronically signed by: Ej Goel MD Date:    05/09/2024 Time:    13:27        Current Medications:     Infusions:         Scheduled:   allopurinoL  100 mg Oral Daily    aspirin  81 mg Oral Daily    atorvastatin  20 mg Oral Daily    calcitRIOL  0.25 mcg Oral Every Mon, Wed, Fri     cetirizine  10 mg Oral QHS    furosemide  80 mg Oral Daily    heparin (porcine)  5,000 Units Subcutaneous Q8H    metoprolol succinate  12.5 mg Oral Daily    senna-docusate 8.6-50 mg  1 tablet Oral BID        PRN:    Current Facility-Administered Medications:     acetaminophen, 650 mg, Oral, Q4H PRN    albuterol-ipratropium, 3 mL, Nebulization, Once PRN    HYDROcodone-acetaminophen, 1 tablet, Oral, Q4H PRN    melatonin, 6 mg, Oral, Nightly PRN    midodrine, 5 mg, Oral, TID PRN    ondansetron, 4 mg, Oral, Q8H PRN    ondansetron, 4 mg, Intravenous, Q8H PRN    ondansetron, 4 mg, Intravenous, Q8H PRN    polyethylene glycol, 17 g, Oral, BID PRN    sodium chloride 0.9%, 10 mL, Intravenous, PRN    sodium chloride 0.9%, 10 mL, Intravenous, PRN     Assessment and Plan:     Felicia Lopez is a 78 y.o.male with  Acute on chronic combined systolic and diastolic congestive heart failure with anasarca.  No significant response to IV lasix.  Likely has cardio-renal syndrome.  Echo with worsening biventricular dysfunction.  -discussed with patient and Dr. Montoya.  Transfer to ICU for dobutamine.  Consult renal for assistance.  Consult palliative care medicine.  -5/13:  responded well past 3 days with dobutamine and lasix drip.  Will stop dobutamine today.  Add prn midodrine for BP.  Continue lasix drip today?  -5/14:  Transferred out of the ICU.  Continues to do well.  Decreased Lasix drip 0.5 milligrams/hour.  Aware of plans to transition to oral Lasix tomorrow.  -5/15:  lasix held due to contraction alkalosis as per renal team. Resume metoprolol at 12.5mg qd.  -5/16:  volume status much improved.  Oral lasix resumed.  Tolerating low dose metoprolol 12.5mg qd.  Being discharged.     CAD, stable, no angina  -on aspirin, statin and metoprolol     HTN, low BP  -resumed metoprolol at 12.5mg qd     CKD  -consult renal team     MARLENI     Chronic idiopathic thrombocytopenia  -as per  team         Fabio Martinez MD        Disclaimer:  This document was created using voice recognition software ([employer]). Although it may be edited, this document may contain errors related to incorrect recognition of the spoken word. Please call the physician if clarification is needed.

## 2024-05-29 ENCOUNTER — TELEPHONE (OUTPATIENT)
Dept: CARDIOLOGY | Facility: CLINIC | Age: 79
End: 2024-05-29
Payer: MEDICARE

## 2024-05-29 NOTE — TELEPHONE ENCOUNTER
----- Message from Shira Alfaro sent at 5/29/2024  8:40 AM CDT -----  Regarding: call back  Type: Patient Call Back    Who called: [facility]     What is the request in detail: requesting call to schedule a hospital f/u for pt, missed appt from last week     Can the clinic reply by [facility]?no    Would the patient rather a call back or a response via [facility]? call    Best call back number: [phone], [facility]     Additional Information:

## 2024-06-11 ENCOUNTER — EXTERNAL HOME HEALTH (OUTPATIENT)
Dept: HOME HEALTH SERVICES | Facility: HOSPITAL | Age: 79
End: 2024-06-11
Payer: MEDICARE

## 2024-07-19 ENCOUNTER — OFFICE VISIT (OUTPATIENT)
Dept: CARDIOLOGY | Facility: CLINIC | Age: 79
End: 2024-07-19
Payer: MEDICARE

## 2024-07-19 VITALS
WEIGHT: 179 LBS | DIASTOLIC BLOOD PRESSURE: 54 MMHG | HEART RATE: 79 BPM | HEIGHT: 72 IN | BODY MASS INDEX: 24.24 KG/M2 | SYSTOLIC BLOOD PRESSURE: 108 MMHG

## 2024-07-19 DIAGNOSIS — I10 ESSENTIAL HYPERTENSION: Primary | ICD-10-CM

## 2024-07-19 DIAGNOSIS — I25.10 CORONARY ARTERY DISEASE INVOLVING NATIVE CORONARY ARTERY OF NATIVE HEART WITHOUT ANGINA PECTORIS: ICD-10-CM

## 2024-07-19 DIAGNOSIS — N18.32 STAGE 3B CHRONIC KIDNEY DISEASE: ICD-10-CM

## 2024-07-19 DIAGNOSIS — E78.5 HYPERLIPIDEMIA, UNSPECIFIED HYPERLIPIDEMIA TYPE: ICD-10-CM

## 2024-07-19 DIAGNOSIS — I70.219 ATHEROSCLEROSIS OF NATIVE ARTERY OF EXTREMITY WITH INTERMITTENT CLAUDICATION, UNSPECIFIED EXTREMITY: ICD-10-CM

## 2024-07-19 DIAGNOSIS — I50.43 ACUTE ON CHRONIC COMBINED SYSTOLIC AND DIASTOLIC CONGESTIVE HEART FAILURE: ICD-10-CM

## 2024-07-19 PROCEDURE — 3078F DIAST BP <80 MM HG: CPT | Mod: CPTII,S$GLB,, | Performed by: INTERNAL MEDICINE

## 2024-07-19 PROCEDURE — 99999 PR PBB SHADOW E&M-EST. PATIENT-LVL III: CPT | Mod: PBBFAC,,, | Performed by: INTERNAL MEDICINE

## 2024-07-19 PROCEDURE — 99214 OFFICE O/P EST MOD 30 MIN: CPT | Mod: S$GLB,,, | Performed by: INTERNAL MEDICINE

## 2024-07-19 PROCEDURE — 1126F AMNT PAIN NOTED NONE PRSNT: CPT | Mod: CPTII,S$GLB,, | Performed by: INTERNAL MEDICINE

## 2024-07-19 PROCEDURE — 3074F SYST BP LT 130 MM HG: CPT | Mod: CPTII,S$GLB,, | Performed by: INTERNAL MEDICINE

## 2024-07-19 NOTE — PROGRESS NOTES
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    Gastroesophageal reflux disease    History of malignant neoplasm of prostate    Hypercoagulable state    Iron deficiency anemia    Chest pain    CAD (coronary artery disease)    Acute on chronic combined systolic and diastolic congestive heart failure    History of heart attack    History of CVA (cerebrovascular accident)    Supratherapeutic INR    ACP (advance care planning)    History of percutaneous coronary intervention    Anasarca    Secondary hyperparathyroidism    Gout    Encounter for palliative care       CC:  Hospital discharge f/u    HPI:  He was here 2 months ago and was admitted due to decompensated congestive heart failure.  In the hospital he was treated with dobutamine and Lasix drip.  He had cardiorenal syndrome.  He responded.  He states that since discharge he is doing fine.  He still has lower extremity swelling.  However he denies any shortness of breath, dyspnea on exertion or paroxysmal nocturnal dyspnea.  He saw his nephrologist on July 8th.  He is doing well with medications.  He is compliant with his diet.    Medications  Current Outpatient Medications   Medication Sig Dispense Refill    allopurinoL (ZYLOPRIM) 100 MG tablet Take 1 tablet by mouth once daily.      calcitRIOL (ROCALTROL) 0.25 MCG Cap Take 0.25 mcg by mouth 3 (three) times a week.      fluticasone propionate (FLONASE) 50  mcg/actuation nasal spray 1 spray by Each Nostril route once daily.      furosemide (LASIX) 80 MG tablet Take 1 tablet (80 mg total) by mouth once daily. 90 tablet 0    latanoprost 0.005 % ophthalmic solution Place 1 drop into both eyes every evening.      levocetirizine (XYZAL) 5 MG tablet Take 1 tablet by mouth once daily.      metoprolol succinate (TOPROL-XL) 25 MG 24 hr tablet Take 0.5 tablets (12.5 mg total) by mouth once daily. 45 tablet 0    simvastatin (ZOCOR) 40 MG tablet Take 1 tablet (40 mg total) by mouth once daily. 90 tablet 0    aspirin (ECOTRIN) 81 MG EC tablet Take 1 tablet (81 mg total) by mouth once daily. 30 tablet 0     No current facility-administered medications for this visit.      Prior to Admission medications    Medication Sig Start Date End Date Taking? Authorizing Provider   allopurinoL (ZYLOPRIM) 100 MG tablet Take 1 tablet by mouth once daily. 2/23/24  Yes Provider, Historical   calcitRIOL (ROCALTROL) 0.25 MCG Cap Take 0.25 mcg by mouth 3 (three) times a week.   Yes Provider, Historical   fluticasone propionate (FLONASE) 50 mcg/actuation nasal spray 1 spray by Each Nostril route once daily.   Yes Provider, Historical   furosemide (LASIX) 80 MG tablet Take 1 tablet (80 mg total) by mouth once daily. 5/16/24  Yes Miriam Field MD   latanoprost 0.005 % ophthalmic solution Place 1 drop into both eyes every evening. 7/21/23  Yes Provider, Historical   levocetirizine (XYZAL) 5 MG tablet Take 1 tablet by mouth once daily. 2/23/24  Yes Provider, Historical   metoprolol succinate (TOPROL-XL) 25 MG 24 hr tablet Take 0.5 tablets (12.5 mg total) by mouth once daily. 5/17/24  Yes Miriam Field MD   simvastatin (ZOCOR) 40 MG tablet Take 1 tablet (40 mg total) by mouth once daily. 12/14/18  Yes Mickey Darden MD   aspirin (ECOTRIN) 81 MG EC tablet Take 1 tablet (81 mg total) by mouth once daily. 8/11/22 5/9/24  Mara Burgos, CHULA   baclofen (LIORESAL) 10 MG tablet Take 1 tablet  (10 mg total) by mouth 3 (three) times daily. 10/3/18 8/11/22  Mickey Darden MD   clopidogrel (PLAVIX) 75 mg tablet Take 1 tablet (75 mg total) by mouth once daily. 12/14/18 8/11/22  Mickey Darden MD   hydroCHLOROthiazide (MICROZIDE) 12.5 mg capsule Take 1 capsule (12.5 mg total) by mouth once daily.  Patient taking differently: Take 25 mg by mouth once daily. 10/31/18 8/11/22  Mickey Darden MD         History  Past Medical History:   Diagnosis Date    CAD (coronary artery disease)     Chronic combined systolic and diastolic CHF (congestive heart failure)     Chronic idiopathic thrombocytopenia     Disorder of kidney and ureter     History of heart attack     History of stroke     Hyperlipidemia     Hypertension      Past Surgical History:   Procedure Laterality Date    FACIAL RECONSTRUCTION SURGERY      PROSTATE SURGERY       Social History     Socioeconomic History    Marital status:    Tobacco Use    Smoking status: Former     Current packs/day: 0.00     Types: Cigarettes     Quit date: 10/5/2008     Years since quitting: 15.7    Smokeless tobacco: Never   Substance and Sexual Activity    Alcohol use: No     Alcohol/week: 0.0 standard drinks of alcohol    Drug use: No    Sexual activity: Never     Social Determinants of Health     Financial Resource Strain: Patient Declined (5/11/2024)    Overall Financial Resource Strain (CARDIA)     Difficulty of Paying Living Expenses: Patient declined   Food Insecurity: Patient Declined (5/11/2024)    Hunger Vital Sign     Worried About Running Out of Food in the Last Year: Patient declined     Ran Out of Food in the Last Year: Patient declined   Transportation Needs: Patient Declined (5/11/2024)    TRANSPORTATION NEEDS     Transportation : Patient declined   Physical Activity: Patient Declined (5/11/2024)    Exercise Vital Sign     Days of Exercise per Week: Patient declined     Minutes of Exercise per Session: Patient declined   Stress: Patient  Declined (5/11/2024)    [employer] - Occupational Stress Questionnaire     Feeling of Stress : Patient declined   Housing Stability: Patient Declined (5/11/2024)    Housing Stability Vital Sign     Unable to Pay for Housing in the Last Year: Patient declined     Homeless in the Last Year: Patient declined         Allergies  Review of patient's allergies indicates:  No Known Allergies      Review of Systems   Review of Systems   Cardiovascular:  Positive for leg swelling. Negative for chest pain, claudication, cyanosis, dyspnea on exertion, irregular heartbeat, near-syncope, orthopnea, palpitations and paroxysmal nocturnal dyspnea.   Respiratory:  Negative for cough, hemoptysis, shortness of breath, sleep disturbances due to breathing, snoring, sputum production and wheezing.    All other systems reviewed and are negative.        Physical Exam  Wt Readings from Last 1 Encounters:   07/19/24 81.2 kg (179 lb)     BP Readings from Last 3 Encounters:   07/19/24 (!) 108/54   05/16/24 (!) 93/53   05/09/24 112/74     Pulse Readings from Last 1 Encounters:   07/19/24 79     Body mass index is 24.28 kg/m².    Physical Exam  Neck:      Vascular: JVD present.   Cardiovascular:      Rate and Rhythm: Normal rate and regular rhythm.      Pulses:           Carotid pulses are 2+ on the right side and 2+ on the left side.       Radial pulses are 2+ on the right side and 2+ on the left side.      Heart sounds: S1 normal and S2 normal. Murmur heard.      Systolic murmur is present.      Gallop present. S3 sounds present.   Pulmonary:      Breath sounds: Normal breath sounds and air entry.   Musculoskeletal:      Right lower leg: Edema present.      Left lower leg: Edema present.   Neurological:      Mental Status: He is alert.             Assessment  1. Essential hypertension  Controlled on current medication, continue medications and monitor    2. Acute on chronic combined systolic and diastolic  congestive heart failure  End-stage CMP  - DECLINE - This patient qualifies for a referral to the General Congestive Heart Failure Clinic. Click here to decline this recommendation.    3. Coronary artery disease involving native coronary artery of native heart without angina pectoris  Stable, continue meds and monitor    4. Atherosclerosis of native artery of extremity with intermittent claudication, unspecified extremity  stable    5. Hyperlipidemia, unspecified hyperlipidemia type  unchanged    6. Stage 3b chronic kidney disease  unchanged        Plan and Discussion  Reviewed and discussed his nephrologist notes from July 8th.  He has 2+ pitting edema which is chronic.  He denies any shortness of breath or paroxysmal nocturnal dyspnea.  He is responding to furosemide.  Will continue with current medications.    Follow Up  3 months      Fabio Martinez MD, F.A.C.C, F.S.C.A.I.      Total professional time spent for the encounter: 30 minutes  Time was spent preparing to see the patient, reviewing results of prior testing, obtaining and/or reviewing separately obtained history, performing a medically appropriate examination and interview, counseling and educating the patient/family, ordering medications/tests/procedures, referring and communicating with other health care professionals, documenting clinical information in the electronic health record, and independently interpreting results.    Disclaimer: This document was created using voice recognition software ([employer]). Although it may be edited, this document may contain errors related to incorrect recognition of the spoken word. Please call the physician if clarification is needed.

## 2024-10-18 ENCOUNTER — OFFICE VISIT (OUTPATIENT)
Dept: CARDIOLOGY | Facility: CLINIC | Age: 79
End: 2024-10-18
Payer: MEDICARE

## 2024-10-18 VITALS
DIASTOLIC BLOOD PRESSURE: 66 MMHG | BODY MASS INDEX: 24.28 KG/M2 | WEIGHT: 179 LBS | HEART RATE: 54 BPM | SYSTOLIC BLOOD PRESSURE: 96 MMHG | OXYGEN SATURATION: 98 %

## 2024-10-18 DIAGNOSIS — I10 ESSENTIAL HYPERTENSION: Primary | ICD-10-CM

## 2024-10-18 DIAGNOSIS — N18.32 STAGE 3B CHRONIC KIDNEY DISEASE: ICD-10-CM

## 2024-10-18 DIAGNOSIS — I50.43 ACUTE ON CHRONIC COMBINED SYSTOLIC AND DIASTOLIC CONGESTIVE HEART FAILURE: ICD-10-CM

## 2024-10-18 DIAGNOSIS — E78.5 HYPERLIPIDEMIA, UNSPECIFIED HYPERLIPIDEMIA TYPE: ICD-10-CM

## 2024-10-18 PROCEDURE — 99999 PR PBB SHADOW E&M-EST. PATIENT-LVL III: CPT | Mod: PBBFAC,,, | Performed by: INTERNAL MEDICINE

## 2024-10-18 RX ORDER — METOPROLOL TARTRATE 25 MG/1
12.5 TABLET, FILM COATED ORAL DAILY
COMMUNITY

## 2024-10-18 RX ORDER — EMPAGLIFLOZIN 10 MG/1
10 TABLET, FILM COATED ORAL DAILY
COMMUNITY
Start: 2024-10-16

## 2024-10-18 RX ORDER — PANTOPRAZOLE SODIUM 40 MG/1
40 TABLET, DELAYED RELEASE ORAL DAILY
COMMUNITY
Start: 2024-08-01

## 2024-10-18 NOTE — PROGRESS NOTES
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  He states his leg edema is slightly better since being home.  No changes to his HARTMAN and SOB.  No PND.  He was admitted at [facility] from 9/3 to 9/6 with CHF, cardiorenal syndrome requiring dbx which caused NSVT.  Medications changed: He is now on Jardiance, his metoprolol succinate was changed to metoprolol tartrate 12.5 mg daily.  He had echo (results below):    Significant Diagnostic Studies:   TRANSTHORACIC ECHOCARDIOGRAM REPORTSummary:  1. Estimated left ventricular ejection fraction is 20 to 25%.  2. Severely decreased left ventricular systolic function.  3. Moderately dilated left atrium.  4. Mildly dilated right atrium.  5. Mild aortic valve stenosis.  6. Left ventricular wall thickness is severely increased.  7. Moderate to severe mitral valve  regurgitation.  8. Moderately enlarged right ventricle.  9. Right ventricular systolic function is moderately reduced.    Electronically signed by Andrew Stuart MD  Signature Date/Time: 9/4/2024/7:19:17 PM     Medications  Current Outpatient Medications   Medication Sig Dispense Refill    allopurinoL (ZYLOPRIM) 100 MG tablet Take 1 tablet by mouth once daily.      fluticasone propionate (FLONASE) 50 mcg/actuation nasal spray 1 spray by Each Nostril route once daily.      furosemide (LASIX) 80 MG tablet Take 1 tablet (80 mg total) by mouth once daily. 90 tablet 0    JARDIANCE 10 mg tablet Take 10 mg by mouth once daily.      latanoprost 0.005 % ophthalmic solution Place 1 drop into both eyes every evening.      levocetirizine (XYZAL) 5 MG tablet Take 1 tablet by mouth once daily.      metoprolol tartrate (LOPRESSOR) 25 MG tablet Take 12.5 mg by mouth once daily.      pantoprazole (PROTONIX) 40 MG tablet Take 40 mg by mouth once daily.      aspirin (ECOTRIN) 81 MG EC tablet Take 1 tablet (81 mg total) by mouth once daily. 30 tablet 0     No current facility-administered medications for this visit.      Prior to Admission medications    Medication Sig Start Date End Date Taking? Authorizing Provider   allopurinoL (ZYLOPRIM) 100 MG tablet Take 1 tablet by mouth once daily. 2/23/24  Yes Provider, Historical   fluticasone propionate (FLONASE) 50 mcg/actuation nasal spray 1 spray by Each Nostril route once daily.   Yes Provider, Historical   furosemide (LASIX) 80 MG tablet Take 1 tablet (80 mg total) by mouth once daily. 5/16/24  Yes Miriam Field MD   JARDIANCE 10 mg tablet Take 10 mg by mouth once daily. 10/16/24  Yes Provider, Historical   latanoprost 0.005 % ophthalmic solution Place 1 drop into both eyes every evening. 7/21/23  Yes Provider, Historical   levocetirizine (XYZAL) 5 MG tablet Take 1 tablet by mouth once daily. 2/23/24  Yes Provider, Historical   metoprolol tartrate (LOPRESSOR) 25 MG tablet Take  12.5 mg by mouth once daily.   Yes Provider, Historical   pantoprazole (PROTONIX) 40 MG tablet Take 40 mg by mouth once daily. 24  Yes Provider, Historical   aspirin (ECOTRIN) 81 MG EC tablet Take 1 tablet (81 mg total) by mouth once daily. 22  Mara Burgos PA-C   baclofen (LIORESAL) 10 MG tablet Take 1 tablet (10 mg total) by mouth 3 (three) times daily. 10/3/18 8/11/22  Mickey Darden MD   calcitRIOL (ROCALTROL) 0.25 MCG Cap Take 0.25 mcg by mouth 3 (three) times a week.  10/18/24  Provider, Historical   clopidogrel (PLAVIX) 75 mg tablet Take 1 tablet (75 mg total) by mouth once daily. 18  Mickey Darden MD   hydroCHLOROthiazide (MICROZIDE) 12.5 mg capsule Take 1 capsule (12.5 mg total) by mouth once daily.  Patient taking differently: Take 25 mg by mouth once daily. 10/31/18 8/11/22  Mickey Darden MD   metoprolol succinate (TOPROL-XL) 25 MG 24 hr tablet Take 0.5 tablets (12.5 mg total) by mouth once daily. 5/17/24 10/18/24  Miriam Field MD   simvastatin (ZOCOR) 40 MG tablet Take 1 tablet (40 mg total) by mouth once daily. 12/14/18 10/18/24  Mickey Darden MD         History  Past Medical History:   Diagnosis Date    CAD (coronary artery disease)     Chronic combined systolic and diastolic CHF (congestive heart failure)     Chronic idiopathic thrombocytopenia     Disorder of kidney and ureter     History of heart attack     History of stroke     Hyperlipidemia     Hypertension      Past Surgical History:   Procedure Laterality Date    FACIAL RECONSTRUCTION SURGERY      PROSTATE SURGERY       Social History     Socioeconomic History    Marital status:    Tobacco Use    Smoking status: Former     Current packs/day: 0.00     Types: Cigarettes     Quit date: 10/5/2008     Years since quittin.0    Smokeless tobacco: Never   Substance and Sexual Activity    Alcohol use: No     Alcohol/week: 0.0 standard drinks of alcohol    Drug use: No     Sexual activity: Never     Social Drivers of Health     Financial Resource Strain: Low Risk  (9/3/2024)    Received from [facility]    Overall Financial Resource Strain (CARDIA)     Difficulty of Paying Living Expenses: Not hard at all   Food Insecurity: No Food Insecurity (9/3/2024)    Received from [facility]    Hunger Vital Sign     Worried About Running Out of Food in the Last Year: Never true     Ran Out of Food in the Last Year: Never true   Transportation Needs: No Transportation Needs (9/3/2024)    Received from [facility]    PRAPARE - Transportation     Lack of Transportation (Medical): No     Lack of Transportation (Non-Medical): No   Physical Activity: Patient Declined (5/11/2024)    Exercise Vital Sign     Days of Exercise per Week: Patient declined     Minutes of Exercise per Session: Patient declined   Stress: Patient Declined (5/11/2024)    [employer] - Occupational Stress Questionnaire     Feeling of Stress : Patient declined   Housing Stability: Low Risk  (9/3/2024)    Received from [facility]    Housing Stability Vital Sign     Unable to Pay for Housing in the Last Year: No     Number of Places Lived in the Last Year: 1     Unstable Housing in the Last Year: No         Allergies  Review of patient's allergies indicates:  No Known Allergies      Review of Systems   Review of Systems   Cardiovascular:  Positive for leg swelling. Negative for chest pain, claudication, cyanosis, dyspnea on exertion, irregular heartbeat, near-syncope, orthopnea, palpitations and paroxysmal nocturnal dyspnea.   Respiratory:  Negative for cough, hemoptysis, shortness of breath, sleep disturbances due to breathing, snoring, sputum production and wheezing.    All other systems reviewed and are negative.        Physical Exam  Wt Readings from Last 1 Encounters:   10/18/24 81.2 kg (179 lb)     BP Readings from Last 3 Encounters:   10/18/24 96/66   07/19/24 (!) 108/54   05/16/24 (!) 93/53      Pulse Readings from Last 1 Encounters:   10/18/24 (!) 54     Body mass index is 24.28 kg/m².    Physical Exam  Neck:      Vascular: JVD present.   Cardiovascular:      Rate and Rhythm: Normal rate and regular rhythm.      Pulses:           Carotid pulses are 2+ on the right side and 2+ on the left side.       Radial pulses are 2+ on the right side and 2+ on the left side.      Heart sounds: S1 normal and S2 normal. Murmur heard.      Systolic murmur is present.      Gallop present. S3 sounds present.   Pulmonary:      Breath sounds: Normal breath sounds and air entry.   Musculoskeletal:      Right lower leg: Edema present.      Left lower leg: Edema present.   Neurological:      Mental Status: He is alert.             Assessment  1. Essential hypertension (Primary)  Controlled.  Continue current medications, monitor and treat.    2. Hyperlipidemia, unspecified hyperlipidemia type  Stable    3. Acute on chronic combined systolic and diastolic congestive heart failure  Class III, recommend to continue current goal-directed medical therapy.  Agree with recent addition of Jardiance.  Recommend to continue with current diuretic.  Will continue to monitor his heart failure symptoms evaluate and treat.    4. Stage 3b chronic kidney disease  As per Dr Matson.        Plan and Discussion  Reviewed his hospitalization records from [facility] ([facility]) where he was admitted from 9/3 to 9/6.  Reviewed and discussed his echo results from [facility].  Recommend to continue current goal-directed medical therapy.  Agree with recent addition of Jardiance.  Recommend to continue with current diuretic.  Will continue to monitor his heart failure symptoms evaluate and treat.    Follow Up  3 months      Fabio Martinez MD, F.A.C.C, F.S.C.A.I.      Total professional time spent for the encounter: 40 minutes  Time was spent preparing to see the patient, reviewing results of prior testing, obtaining and/or reviewing separately obtained history,  performing a medically appropriate examination and interview, counseling and educating the patient/family, ordering medications/tests/procedures, referring and communicating with other health care professionals, documenting clinical information in the electronic health record, and independently interpreting results.    Disclaimer: This document was created using voice recognition software ([employer]). Although it may be edited, this document may contain errors related to incorrect recognition of the spoken word. Please call the physician if clarification is needed.

## 2025-01-17 ENCOUNTER — OFFICE VISIT (OUTPATIENT)
Dept: CARDIOLOGY | Facility: CLINIC | Age: 80
End: 2025-01-17
Payer: MEDICARE

## 2025-01-17 VITALS
OXYGEN SATURATION: 98 % | BODY MASS INDEX: 24.62 KG/M2 | HEART RATE: 85 BPM | WEIGHT: 181.5 LBS | SYSTOLIC BLOOD PRESSURE: 110 MMHG | DIASTOLIC BLOOD PRESSURE: 74 MMHG

## 2025-01-17 DIAGNOSIS — I50.9 ACUTE ON CHRONIC HEART FAILURE, UNSPECIFIED HEART FAILURE TYPE: Primary | ICD-10-CM

## 2025-01-17 DIAGNOSIS — I10 ESSENTIAL HYPERTENSION: ICD-10-CM

## 2025-01-17 DIAGNOSIS — N18.32 STAGE 3B CHRONIC KIDNEY DISEASE: ICD-10-CM

## 2025-01-17 DIAGNOSIS — I25.10 CORONARY ARTERY DISEASE INVOLVING NATIVE CORONARY ARTERY OF NATIVE HEART WITHOUT ANGINA PECTORIS: ICD-10-CM

## 2025-01-17 DIAGNOSIS — I50.43 ACUTE ON CHRONIC COMBINED SYSTOLIC AND DIASTOLIC CONGESTIVE HEART FAILURE: ICD-10-CM

## 2025-01-17 DIAGNOSIS — E78.5 HYPERLIPIDEMIA, UNSPECIFIED HYPERLIPIDEMIA TYPE: ICD-10-CM

## 2025-01-17 PROCEDURE — 99214 OFFICE O/P EST MOD 30 MIN: CPT | Mod: S$GLB,,, | Performed by: INTERNAL MEDICINE

## 2025-01-17 PROCEDURE — 1159F MED LIST DOCD IN RCRD: CPT | Mod: CPTII,S$GLB,, | Performed by: INTERNAL MEDICINE

## 2025-01-17 PROCEDURE — 3078F DIAST BP <80 MM HG: CPT | Mod: CPTII,S$GLB,, | Performed by: INTERNAL MEDICINE

## 2025-01-17 PROCEDURE — 1126F AMNT PAIN NOTED NONE PRSNT: CPT | Mod: CPTII,S$GLB,, | Performed by: INTERNAL MEDICINE

## 2025-01-17 PROCEDURE — 3074F SYST BP LT 130 MM HG: CPT | Mod: CPTII,S$GLB,, | Performed by: INTERNAL MEDICINE

## 2025-01-17 PROCEDURE — 3288F FALL RISK ASSESSMENT DOCD: CPT | Mod: CPTII,S$GLB,, | Performed by: INTERNAL MEDICINE

## 2025-01-17 PROCEDURE — 1101F PT FALLS ASSESS-DOCD LE1/YR: CPT | Mod: CPTII,S$GLB,, | Performed by: INTERNAL MEDICINE

## 2025-01-17 PROCEDURE — 99999 PR PBB SHADOW E&M-EST. PATIENT-LVL III: CPT | Mod: PBBFAC,,, | Performed by: INTERNAL MEDICINE

## 2025-03-07 ENCOUNTER — TELEPHONE (OUTPATIENT)
Dept: CARDIOLOGY | Facility: CLINIC | Age: 80
End: 2025-03-07
Payer: MEDICARE

## 2025-03-07 NOTE — TELEPHONE ENCOUNTER
Spoke to pt to reschedule 5/23/25 appointment with Dr. Martinez to 5/16/25 at 10am. Will mail pt letter with new appointment information. Pt verbalized understanding.  
show